# Patient Record
Sex: FEMALE | Race: BLACK OR AFRICAN AMERICAN | Employment: OTHER
[De-identification: names, ages, dates, MRNs, and addresses within clinical notes are randomized per-mention and may not be internally consistent; named-entity substitution may affect disease eponyms.]

---

## 2017-01-05 RX ORDER — PRAVASTATIN SODIUM 40 MG
TABLET ORAL
Qty: 30 TABLET | Refills: 5 | Status: SHIPPED | OUTPATIENT
Start: 2017-01-05 | End: 2017-01-30 | Stop reason: ALTCHOICE

## 2017-01-24 ENCOUNTER — TELEPHONE (OUTPATIENT)
Dept: FAMILY MEDICINE CLINIC | Facility: CLINIC | Age: 77
End: 2017-01-24

## 2017-01-27 RX ORDER — AMLODIPINE BESYLATE 5 MG/1
TABLET ORAL
Qty: 30 TABLET | Refills: 5 | Status: SHIPPED | OUTPATIENT
Start: 2017-01-27 | End: 2017-08-15 | Stop reason: SDUPTHER

## 2017-01-27 RX ORDER — ALLOPURINOL 100 MG/1
TABLET ORAL
Qty: 30 TABLET | Refills: 3 | Status: SHIPPED | OUTPATIENT
Start: 2017-01-27 | End: 2017-06-06 | Stop reason: SDUPTHER

## 2017-01-27 RX ORDER — COLCHICINE 0.6 MG/1
TABLET ORAL
Qty: 30 TABLET | Refills: 3 | Status: SHIPPED | OUTPATIENT
Start: 2017-01-27 | End: 2017-01-30

## 2017-01-30 ENCOUNTER — OFFICE VISIT (OUTPATIENT)
Dept: FAMILY MEDICINE CLINIC | Facility: CLINIC | Age: 77
End: 2017-01-30

## 2017-01-30 VITALS
SYSTOLIC BLOOD PRESSURE: 142 MMHG | WEIGHT: 171 LBS | HEIGHT: 62 IN | BODY MASS INDEX: 31.47 KG/M2 | DIASTOLIC BLOOD PRESSURE: 80 MMHG | HEART RATE: 90 BPM

## 2017-01-30 DIAGNOSIS — I10 ESSENTIAL HYPERTENSION: ICD-10-CM

## 2017-01-30 DIAGNOSIS — G89.29 OTHER CHRONIC PAIN: ICD-10-CM

## 2017-01-30 DIAGNOSIS — E11.9 TYPE 2 DIABETES MELLITUS WITHOUT COMPLICATION, WITHOUT LONG-TERM CURRENT USE OF INSULIN (HCC): ICD-10-CM

## 2017-01-30 DIAGNOSIS — E78.00 HYPERCHOLESTEREMIA: ICD-10-CM

## 2017-01-30 DIAGNOSIS — Z87.39 H/O: GOUT: ICD-10-CM

## 2017-01-30 DIAGNOSIS — R42 VERTIGO: Primary | ICD-10-CM

## 2017-01-30 PROCEDURE — 99214 OFFICE O/P EST MOD 30 MIN: CPT | Performed by: FAMILY MEDICINE

## 2017-01-30 RX ORDER — HYDROCODONE BITARTRATE AND ACETAMINOPHEN 5; 325 MG/1; MG/1
TABLET ORAL
Qty: 60 TABLET | Refills: 0 | Status: SHIPPED | OUTPATIENT
Start: 2017-01-30 | End: 2017-02-27 | Stop reason: SDUPTHER

## 2017-01-30 RX ORDER — ATORVASTATIN CALCIUM 40 MG/1
40 TABLET, FILM COATED ORAL DAILY
Qty: 30 TABLET | Refills: 5 | Status: SHIPPED | OUTPATIENT
Start: 2017-01-30 | End: 2017-08-28 | Stop reason: SDUPTHER

## 2017-01-30 ASSESSMENT — ENCOUNTER SYMPTOMS
DIARRHEA: 0
BLOOD IN STOOL: 0
VOMITING: 0
SHORTNESS OF BREATH: 0
TROUBLE SWALLOWING: 0
WHEEZING: 0
ABDOMINAL PAIN: 0
COUGH: 0

## 2017-02-13 RX ORDER — MECLIZINE HYDROCHLORIDE 25 MG/1
25 TABLET ORAL 3 TIMES DAILY PRN
Qty: 30 TABLET | Refills: 0 | Status: SHIPPED | OUTPATIENT
Start: 2017-02-13 | End: 2017-03-10 | Stop reason: SDUPTHER

## 2017-02-16 RX ORDER — ZOLPIDEM TARTRATE 10 MG/1
TABLET ORAL
Qty: 30 TABLET | Refills: 0 | Status: SHIPPED | OUTPATIENT
Start: 2017-02-16 | End: 2017-04-13 | Stop reason: SDUPTHER

## 2017-02-27 RX ORDER — HYDROCODONE BITARTRATE AND ACETAMINOPHEN 5; 325 MG/1; MG/1
TABLET ORAL
Qty: 60 TABLET | Refills: 0 | Status: SHIPPED | OUTPATIENT
Start: 2017-02-27 | End: 2017-04-06 | Stop reason: SDUPTHER

## 2017-03-06 RX ORDER — MELOXICAM 15 MG/1
TABLET ORAL
Qty: 30 TABLET | Refills: 3 | Status: SHIPPED | OUTPATIENT
Start: 2017-03-06 | End: 2017-07-10 | Stop reason: SDUPTHER

## 2017-03-10 RX ORDER — MECLIZINE HYDROCHLORIDE 25 MG/1
25 TABLET ORAL 3 TIMES DAILY PRN
Qty: 30 TABLET | Refills: 0 | Status: SHIPPED | OUTPATIENT
Start: 2017-03-10 | End: 2017-03-20

## 2017-03-21 ENCOUNTER — OFFICE VISIT (OUTPATIENT)
Dept: OBGYN CLINIC | Age: 77
End: 2017-03-21
Payer: MEDICARE

## 2017-03-21 VITALS
SYSTOLIC BLOOD PRESSURE: 140 MMHG | BODY MASS INDEX: 32.65 KG/M2 | HEIGHT: 62 IN | DIASTOLIC BLOOD PRESSURE: 70 MMHG | WEIGHT: 177.4 LBS

## 2017-03-21 DIAGNOSIS — R35.0 URINARY FREQUENCY: ICD-10-CM

## 2017-03-21 DIAGNOSIS — N94.9 FEMALE PERINEAL PRESSURE: ICD-10-CM

## 2017-03-21 DIAGNOSIS — Z12.31 ENCOUNTER FOR SCREENING MAMMOGRAM FOR BREAST CANCER: Primary | ICD-10-CM

## 2017-03-21 PROCEDURE — 99201 PR OFFICE OUTPATIENT NEW 10 MINUTES: CPT | Performed by: NURSE PRACTITIONER

## 2017-04-06 RX ORDER — HYDROCODONE BITARTRATE AND ACETAMINOPHEN 5; 325 MG/1; MG/1
TABLET ORAL
Qty: 60 TABLET | Refills: 0 | Status: SHIPPED | OUTPATIENT
Start: 2017-04-06 | End: 2017-05-09 | Stop reason: SDUPTHER

## 2017-04-11 ENCOUNTER — OFFICE VISIT (OUTPATIENT)
Dept: FAMILY MEDICINE CLINIC | Age: 77
End: 2017-04-11
Payer: MEDICARE

## 2017-04-11 VITALS
HEART RATE: 88 BPM | HEIGHT: 62 IN | SYSTOLIC BLOOD PRESSURE: 138 MMHG | DIASTOLIC BLOOD PRESSURE: 83 MMHG | WEIGHT: 180 LBS | BODY MASS INDEX: 33.13 KG/M2

## 2017-04-11 DIAGNOSIS — E11.9 TYPE 2 DIABETES MELLITUS WITHOUT COMPLICATION, WITHOUT LONG-TERM CURRENT USE OF INSULIN (HCC): ICD-10-CM

## 2017-04-11 DIAGNOSIS — R35.0 URINARY FREQUENCY: Primary | ICD-10-CM

## 2017-04-11 DIAGNOSIS — I10 ESSENTIAL HYPERTENSION: ICD-10-CM

## 2017-04-11 PROCEDURE — 99213 OFFICE O/P EST LOW 20 MIN: CPT | Performed by: FAMILY MEDICINE

## 2017-04-16 RX ORDER — ZOLPIDEM TARTRATE 10 MG/1
TABLET ORAL
Qty: 30 TABLET | Refills: 0 | Status: SHIPPED | OUTPATIENT
Start: 2017-04-16 | End: 2017-06-15 | Stop reason: SDUPTHER

## 2017-04-16 ASSESSMENT — ENCOUNTER SYMPTOMS
TROUBLE SWALLOWING: 0
SHORTNESS OF BREATH: 0
ABDOMINAL PAIN: 0
SORE THROAT: 0
VOMITING: 0
WHEEZING: 0

## 2017-04-25 ENCOUNTER — OFFICE VISIT (OUTPATIENT)
Dept: FAMILY MEDICINE CLINIC | Age: 77
End: 2017-04-25
Payer: MEDICARE

## 2017-04-25 VITALS — SYSTOLIC BLOOD PRESSURE: 127 MMHG | DIASTOLIC BLOOD PRESSURE: 78 MMHG | HEART RATE: 87 BPM

## 2017-04-25 DIAGNOSIS — E11.9 TYPE 2 DIABETES MELLITUS WITHOUT COMPLICATION, WITHOUT LONG-TERM CURRENT USE OF INSULIN (HCC): ICD-10-CM

## 2017-04-25 DIAGNOSIS — G47.9 SLEEP DISTURBANCE: ICD-10-CM

## 2017-04-25 DIAGNOSIS — I10 ESSENTIAL HYPERTENSION: Primary | ICD-10-CM

## 2017-04-25 PROCEDURE — 99213 OFFICE O/P EST LOW 20 MIN: CPT | Performed by: FAMILY MEDICINE

## 2017-04-27 RX ORDER — CLONIDINE HYDROCHLORIDE 0.2 MG/1
TABLET ORAL
Qty: 30 TABLET | Refills: 3 | Status: SHIPPED | OUTPATIENT
Start: 2017-04-27 | End: 2017-08-31 | Stop reason: SDUPTHER

## 2017-04-30 ASSESSMENT — ENCOUNTER SYMPTOMS
SORE THROAT: 0
ABDOMINAL PAIN: 0
TROUBLE SWALLOWING: 0
RHINORRHEA: 0
SHORTNESS OF BREATH: 0
DIARRHEA: 0
BLOOD IN STOOL: 0
VOMITING: 0
WHEEZING: 0
BACK PAIN: 1

## 2017-05-08 RX ORDER — TIZANIDINE 2 MG/1
TABLET ORAL
Qty: 30 TABLET | Refills: 1 | Status: SHIPPED | OUTPATIENT
Start: 2017-05-08 | End: 2017-07-10 | Stop reason: SDUPTHER

## 2017-05-12 RX ORDER — HYDROCODONE BITARTRATE AND ACETAMINOPHEN 5; 325 MG/1; MG/1
TABLET ORAL
Qty: 60 TABLET | Refills: 0 | Status: SHIPPED | OUTPATIENT
Start: 2017-05-12 | End: 2017-06-13 | Stop reason: SDUPTHER

## 2017-05-19 RX ORDER — PANTOPRAZOLE SODIUM 40 MG/1
TABLET, DELAYED RELEASE ORAL
Qty: 30 TABLET | Refills: 2 | Status: SHIPPED | OUTPATIENT
Start: 2017-05-19 | End: 2017-10-25 | Stop reason: SDUPTHER

## 2017-06-07 RX ORDER — CARVEDILOL 3.12 MG/1
TABLET ORAL
Qty: 60 TABLET | Refills: 5 | Status: SHIPPED | OUTPATIENT
Start: 2017-06-07 | End: 2018-01-08 | Stop reason: SDUPTHER

## 2017-06-07 RX ORDER — ALLOPURINOL 100 MG/1
TABLET ORAL
Qty: 30 TABLET | Refills: 5 | Status: SHIPPED | OUTPATIENT
Start: 2017-06-07 | End: 2017-11-29 | Stop reason: SDUPTHER

## 2017-06-07 RX ORDER — GLIMEPIRIDE 2 MG/1
TABLET ORAL
Qty: 30 TABLET | Refills: 5 | Status: SHIPPED | OUTPATIENT
Start: 2017-06-07 | End: 2017-08-08 | Stop reason: ALTCHOICE

## 2017-06-13 DIAGNOSIS — Z79.899 HIGH RISK MEDICATIONS (NOT ANTICOAGULANTS) LONG-TERM USE: Primary | ICD-10-CM

## 2017-06-13 RX ORDER — HYDROCODONE BITARTRATE AND ACETAMINOPHEN 5; 325 MG/1; MG/1
TABLET ORAL
Qty: 60 TABLET | Refills: 0 | Status: SHIPPED | OUTPATIENT
Start: 2017-06-13 | End: 2017-07-20 | Stop reason: SDUPTHER

## 2017-06-13 RX ORDER — ALLOPURINOL 100 MG/1
TABLET ORAL
Qty: 30 TABLET | Refills: 3 | Status: SHIPPED | OUTPATIENT
Start: 2017-06-13 | End: 2017-08-08 | Stop reason: SDUPTHER

## 2017-06-15 ENCOUNTER — HOSPITAL ENCOUNTER (OUTPATIENT)
Age: 77
Setting detail: SPECIMEN
Discharge: HOME | End: 2017-06-15
Payer: MEDICARE

## 2017-06-15 LAB
AMPHETAMINE SCREEN URINE: NEGATIVE
BARBITURATE SCREEN URINE: NEGATIVE
BENZODIAZEPINE SCREEN, URINE: NEGATIVE
BUPRENORPHINE URINE: NORMAL
CANNABINOID SCREEN URINE: NEGATIVE
COCAINE METABOLITE, URINE: NEGATIVE
MDMA URINE: NORMAL
METHADONE SCREEN, URINE: NEGATIVE
METHAMPHETAMINE, URINE: NORMAL
OPIATES, URINE: NEGATIVE
OXYCODONE SCREEN URINE: NEGATIVE
PHENCYCLIDINE, URINE: NEGATIVE
PROPOXYPHENE, URINE: NORMAL
TEST INFORMATION: NORMAL
TRICYCLIC ANTIDEPRESSANTS, UR: NORMAL

## 2017-06-15 RX ORDER — ZOLPIDEM TARTRATE 10 MG/1
TABLET ORAL
Qty: 30 TABLET | Refills: 0 | Status: SHIPPED | OUTPATIENT
Start: 2017-06-15 | End: 2017-08-15 | Stop reason: SDUPTHER

## 2017-06-15 RX ORDER — GABAPENTIN 300 MG/1
2 CAPSULE ORAL 3 TIMES DAILY
Refills: 0 | COMMUNITY
Start: 2017-06-11 | End: 2017-09-20 | Stop reason: SDUPTHER

## 2017-07-11 RX ORDER — TIZANIDINE 2 MG/1
TABLET ORAL
Qty: 30 TABLET | Refills: 0 | Status: SHIPPED | OUTPATIENT
Start: 2017-07-11 | End: 2017-08-13 | Stop reason: SDUPTHER

## 2017-07-11 RX ORDER — MELOXICAM 15 MG/1
TABLET ORAL
Qty: 30 TABLET | Refills: 0 | Status: SHIPPED | OUTPATIENT
Start: 2017-07-11 | End: 2017-08-13 | Stop reason: SDUPTHER

## 2017-07-24 ENCOUNTER — TELEPHONE (OUTPATIENT)
Dept: FAMILY MEDICINE CLINIC | Age: 77
End: 2017-07-24

## 2017-07-24 ENCOUNTER — HOSPITAL ENCOUNTER (OUTPATIENT)
Age: 77
Setting detail: SPECIMEN
Discharge: HOME | End: 2017-07-24
Payer: MEDICARE

## 2017-07-24 DIAGNOSIS — Z91.89 COMPLIANCE WITH MEDICATION REGIMEN: Primary | ICD-10-CM

## 2017-07-26 LAB
ALCOHOL URINE: NEGATIVE MG/DL
AMPHETAMINE SCREEN URINE: NEGATIVE NG/ML
BARBITURATE SCREEN URINE: NEGATIVE NG/ML
BENZODIAZEPINE SCREEN, URINE: NEGATIVE NG/ML
CANNABINOID SCREEN URINE: NEGATIVE NG/ML
COCAINE(METAB.)SCREEN, URINE: NEGATIVE NG/ML
CREATININE URINE: 49.5 MG/DL (ref 20–400)
Lab: NORMAL
METHADONE SCREEN, URINE: NEGATIVE NG/ML
OPIATES, URINE: NEGATIVE NG/ML
PHENCYCLIDINE, URINE: NEGATIVE NG/ML
PROPOXYPHENE, URINE: NEGATIVE NG/ML

## 2017-08-08 ENCOUNTER — OFFICE VISIT (OUTPATIENT)
Dept: FAMILY MEDICINE CLINIC | Age: 77
End: 2017-08-08
Payer: MEDICARE

## 2017-08-08 VITALS
TEMPERATURE: 98.8 F | SYSTOLIC BLOOD PRESSURE: 126 MMHG | DIASTOLIC BLOOD PRESSURE: 70 MMHG | BODY MASS INDEX: 32.85 KG/M2 | HEART RATE: 85 BPM | OXYGEN SATURATION: 97 % | WEIGHT: 179.6 LBS

## 2017-08-08 DIAGNOSIS — J30.9 ALLERGIC RHINITIS, UNSPECIFIED ALLERGIC RHINITIS TRIGGER, UNSPECIFIED RHINITIS SEASONALITY: ICD-10-CM

## 2017-08-08 DIAGNOSIS — Z13.820 SCREENING FOR OSTEOPOROSIS: ICD-10-CM

## 2017-08-08 DIAGNOSIS — K59.03 DRUG-INDUCED CONSTIPATION: ICD-10-CM

## 2017-08-08 DIAGNOSIS — Z79.899 HIGH RISK MEDICATIONS (NOT ANTICOAGULANTS) LONG-TERM USE: ICD-10-CM

## 2017-08-08 DIAGNOSIS — E78.00 PURE HYPERCHOLESTEROLEMIA: ICD-10-CM

## 2017-08-08 DIAGNOSIS — E11.9 TYPE 2 DIABETES MELLITUS WITHOUT COMPLICATION, WITHOUT LONG-TERM CURRENT USE OF INSULIN (HCC): Primary | ICD-10-CM

## 2017-08-08 DIAGNOSIS — I10 ESSENTIAL HYPERTENSION: ICD-10-CM

## 2017-08-08 DIAGNOSIS — R60.9 PERIPHERAL EDEMA: ICD-10-CM

## 2017-08-08 LAB — HBA1C MFR BLD: 6.2 %

## 2017-08-08 PROCEDURE — 83036 HEMOGLOBIN GLYCOSYLATED A1C: CPT | Performed by: INTERNAL MEDICINE

## 2017-08-08 PROCEDURE — 99214 OFFICE O/P EST MOD 30 MIN: CPT | Performed by: INTERNAL MEDICINE

## 2017-08-08 RX ORDER — LORATADINE 10 MG/1
10 TABLET ORAL DAILY
Qty: 30 TABLET | Refills: 1 | Status: SHIPPED | OUTPATIENT
Start: 2017-08-08 | End: 2017-10-25 | Stop reason: SDUPTHER

## 2017-08-08 RX ORDER — FLUTICASONE PROPIONATE 50 MCG
1 SPRAY, SUSPENSION (ML) NASAL DAILY
Qty: 1 BOTTLE | Refills: 3 | Status: SHIPPED | OUTPATIENT
Start: 2017-08-08 | End: 2017-11-08 | Stop reason: SDUPTHER

## 2017-08-08 ASSESSMENT — PATIENT HEALTH QUESTIONNAIRE - PHQ9
SUM OF ALL RESPONSES TO PHQ9 QUESTIONS 1 & 2: 2
SUM OF ALL RESPONSES TO PHQ QUESTIONS 1-9: 2
1. LITTLE INTEREST OR PLEASURE IN DOING THINGS: 1
2. FEELING DOWN, DEPRESSED OR HOPELESS: 1

## 2017-08-10 ENCOUNTER — TELEPHONE (OUTPATIENT)
Dept: FAMILY MEDICINE CLINIC | Age: 77
End: 2017-08-10

## 2017-08-10 DIAGNOSIS — G89.29 OTHER CHRONIC PAIN: Primary | ICD-10-CM

## 2017-08-15 RX ORDER — TIZANIDINE 2 MG/1
TABLET ORAL
Qty: 30 TABLET | Refills: 0 | Status: SHIPPED | OUTPATIENT
Start: 2017-08-15 | End: 2017-09-24 | Stop reason: SDUPTHER

## 2017-08-15 RX ORDER — AMLODIPINE BESYLATE 5 MG/1
TABLET ORAL
Qty: 30 TABLET | Refills: 5 | Status: SHIPPED | OUTPATIENT
Start: 2017-08-15 | End: 2018-03-06 | Stop reason: SDUPTHER

## 2017-08-15 RX ORDER — MELOXICAM 15 MG/1
TABLET ORAL
Qty: 30 TABLET | Refills: 0 | Status: SHIPPED | OUTPATIENT
Start: 2017-08-15 | End: 2017-08-18 | Stop reason: ALTCHOICE

## 2017-08-18 ENCOUNTER — INITIAL CONSULT (OUTPATIENT)
Dept: PAIN MANAGEMENT | Age: 77
End: 2017-08-18
Payer: MEDICARE

## 2017-08-18 VITALS
DIASTOLIC BLOOD PRESSURE: 80 MMHG | HEART RATE: 62 BPM | OXYGEN SATURATION: 92 % | SYSTOLIC BLOOD PRESSURE: 140 MMHG | WEIGHT: 179.8 LBS | BODY MASS INDEX: 33.09 KG/M2 | RESPIRATION RATE: 16 BRPM | HEIGHT: 62 IN

## 2017-08-18 DIAGNOSIS — M48.02 CERVICAL SPINAL STENOSIS: Primary | ICD-10-CM

## 2017-08-18 DIAGNOSIS — M48.061 LUMBAR SPINAL STENOSIS: ICD-10-CM

## 2017-08-18 DIAGNOSIS — M47.816 SPONDYLOSIS OF LUMBAR REGION WITHOUT MYELOPATHY OR RADICULOPATHY: ICD-10-CM

## 2017-08-18 DIAGNOSIS — M96.1 FAILED NECK SYNDROME: ICD-10-CM

## 2017-08-18 DIAGNOSIS — M47.812 SPONDYLOSIS OF CERVICAL REGION WITHOUT MYELOPATHY OR RADICULOPATHY: ICD-10-CM

## 2017-08-18 PROCEDURE — 99205 OFFICE O/P NEW HI 60 MIN: CPT | Performed by: ANESTHESIOLOGY

## 2017-08-18 RX ORDER — HYDROCODONE BITARTRATE AND ACETAMINOPHEN 5; 325 MG/1; MG/1
1 TABLET ORAL 2 TIMES DAILY PRN
Qty: 60 TABLET | Refills: 0 | Status: ON HOLD | OUTPATIENT
Start: 2017-08-18 | End: 2017-08-25 | Stop reason: SDUPTHER

## 2017-08-18 RX ORDER — GLIMEPIRIDE 2 MG/1
2 TABLET ORAL
COMMUNITY
Start: 2017-08-11 | End: 2017-12-21 | Stop reason: SDUPTHER

## 2017-08-18 ASSESSMENT — ENCOUNTER SYMPTOMS
ALLERGIC/IMMUNOLOGIC NEGATIVE: 1
SHORTNESS OF BREATH: 1
BACK PAIN: 1
GASTROINTESTINAL NEGATIVE: 1
EYES NEGATIVE: 1

## 2017-08-25 ENCOUNTER — APPOINTMENT (OUTPATIENT)
Dept: GENERAL RADIOLOGY | Age: 77
End: 2017-08-25
Attending: ANESTHESIOLOGY
Payer: MEDICARE

## 2017-08-25 ENCOUNTER — HOSPITAL ENCOUNTER (OUTPATIENT)
Age: 77
Setting detail: OUTPATIENT SURGERY
Discharge: HOME | End: 2017-08-25
Attending: ANESTHESIOLOGY | Admitting: ANESTHESIOLOGY
Payer: MEDICARE

## 2017-08-25 VITALS
SYSTOLIC BLOOD PRESSURE: 98 MMHG | HEART RATE: 71 BPM | RESPIRATION RATE: 16 BRPM | WEIGHT: 179 LBS | HEIGHT: 62 IN | DIASTOLIC BLOOD PRESSURE: 70 MMHG | TEMPERATURE: 97.7 F | OXYGEN SATURATION: 96 % | BODY MASS INDEX: 32.94 KG/M2

## 2017-08-25 PROBLEM — M54.12 CERVICAL RADICULAR PAIN: Chronic | Status: ACTIVE | Noted: 2017-08-25

## 2017-08-25 PROBLEM — M96.1 FAILED NECK SYNDROME: Chronic | Status: ACTIVE | Noted: 2017-08-25

## 2017-08-25 PROBLEM — M48.02 CERVICAL STENOSIS OF SPINE: Chronic | Status: ACTIVE | Noted: 2017-08-25

## 2017-08-25 LAB — GLUCOSE BLD-MCNC: 125 MG/DL (ref 65–105)

## 2017-08-25 PROCEDURE — 3209999900 FLUORO FOR SURGICAL PROCEDURES

## 2017-08-25 PROCEDURE — 62321 NJX INTERLAMINAR CRV/THRC: CPT | Performed by: ANESTHESIOLOGY

## 2017-08-25 PROCEDURE — 7100000011 HC PHASE II RECOVERY - ADDTL 15 MIN: Performed by: ANESTHESIOLOGY

## 2017-08-25 PROCEDURE — 7100000010 HC PHASE II RECOVERY - FIRST 15 MIN: Performed by: ANESTHESIOLOGY

## 2017-08-25 PROCEDURE — 6360000002 HC RX W HCPCS: Performed by: ANESTHESIOLOGY

## 2017-08-25 PROCEDURE — 82947 ASSAY GLUCOSE BLOOD QUANT: CPT

## 2017-08-25 PROCEDURE — 2500000003 HC RX 250 WO HCPCS: Performed by: ANESTHESIOLOGY

## 2017-08-25 PROCEDURE — 3600000050 HC PAIN LEVEL 1 BASE: Performed by: ANESTHESIOLOGY

## 2017-08-25 PROCEDURE — 6360000004 HC RX CONTRAST MEDICATION: Performed by: ANESTHESIOLOGY

## 2017-08-25 PROCEDURE — 99152 MOD SED SAME PHYS/QHP 5/>YRS: CPT | Performed by: ANESTHESIOLOGY

## 2017-08-25 RX ORDER — SODIUM CHLORIDE 0.9 % (FLUSH) 0.9 %
10 SYRINGE (ML) INJECTION EVERY 12 HOURS SCHEDULED
Status: DISCONTINUED | OUTPATIENT
Start: 2017-08-25 | End: 2017-08-25 | Stop reason: HOSPADM

## 2017-08-25 RX ORDER — SODIUM CHLORIDE 0.9 % (FLUSH) 0.9 %
10 SYRINGE (ML) INJECTION PRN
Status: DISCONTINUED | OUTPATIENT
Start: 2017-08-25 | End: 2017-08-25 | Stop reason: HOSPADM

## 2017-08-25 RX ORDER — MIDAZOLAM HYDROCHLORIDE 1 MG/ML
INJECTION INTRAMUSCULAR; INTRAVENOUS PRN
Status: DISCONTINUED | OUTPATIENT
Start: 2017-08-25 | End: 2017-08-25 | Stop reason: HOSPADM

## 2017-08-25 RX ORDER — LIDOCAINE HYDROCHLORIDE 10 MG/ML
INJECTION, SOLUTION INFILTRATION; PERINEURAL PRN
Status: DISCONTINUED | OUTPATIENT
Start: 2017-08-25 | End: 2017-08-25 | Stop reason: HOSPADM

## 2017-08-25 RX ORDER — BETAMETHASONE SODIUM PHOSPHATE AND BETAMETHASONE ACETATE 3; 3 MG/ML; MG/ML
INJECTION, SUSPENSION INTRA-ARTICULAR; INTRALESIONAL; INTRAMUSCULAR; SOFT TISSUE PRN
Status: DISCONTINUED | OUTPATIENT
Start: 2017-08-25 | End: 2017-08-25 | Stop reason: HOSPADM

## 2017-08-25 RX ORDER — TRIAMCINOLONE ACETONIDE 40 MG/ML
INJECTION, SUSPENSION INTRA-ARTICULAR; INTRAMUSCULAR PRN
Status: DISCONTINUED | OUTPATIENT
Start: 2017-08-25 | End: 2017-08-25 | Stop reason: HOSPADM

## 2017-08-25 RX ORDER — FENTANYL CITRATE 50 UG/ML
INJECTION, SOLUTION INTRAMUSCULAR; INTRAVENOUS PRN
Status: DISCONTINUED | OUTPATIENT
Start: 2017-08-25 | End: 2017-08-25 | Stop reason: HOSPADM

## 2017-08-25 ASSESSMENT — PAIN SCALES - GENERAL
PAINLEVEL_OUTOF10: 3
PAINLEVEL_OUTOF10: 3
PAINLEVEL_OUTOF10: 0

## 2017-08-25 ASSESSMENT — PAIN DESCRIPTION - DESCRIPTORS: DESCRIPTORS: CONSTANT;ACHING;BURNING

## 2017-08-25 ASSESSMENT — PAIN - FUNCTIONAL ASSESSMENT: PAIN_FUNCTIONAL_ASSESSMENT: 0-10

## 2017-08-28 RX ORDER — ATORVASTATIN CALCIUM 40 MG/1
40 TABLET, FILM COATED ORAL DAILY
Qty: 30 TABLET | Refills: 5 | Status: SHIPPED | OUTPATIENT
Start: 2017-08-28 | End: 2018-03-14 | Stop reason: SDUPTHER

## 2017-09-01 RX ORDER — CLONIDINE HYDROCHLORIDE 0.2 MG/1
TABLET ORAL
Qty: 30 TABLET | Refills: 3 | Status: SHIPPED | OUTPATIENT
Start: 2017-09-01 | End: 2018-01-08 | Stop reason: SDUPTHER

## 2017-09-20 RX ORDER — HYDROCODONE BITARTRATE AND ACETAMINOPHEN 5; 325 MG/1; MG/1
1 TABLET ORAL 2 TIMES DAILY PRN
Qty: 60 TABLET | Refills: 0 | Status: SHIPPED | OUTPATIENT
Start: 2017-09-20 | End: 2017-11-06 | Stop reason: SDUPTHER

## 2017-09-20 RX ORDER — GABAPENTIN 300 MG/1
600 CAPSULE ORAL 3 TIMES DAILY
Qty: 180 CAPSULE | Refills: 0 | Status: SHIPPED | OUTPATIENT
Start: 2017-09-20 | End: 2017-10-31 | Stop reason: SDUPTHER

## 2017-09-26 RX ORDER — TIZANIDINE 2 MG/1
TABLET ORAL
Qty: 30 TABLET | Refills: 0 | Status: SHIPPED | OUTPATIENT
Start: 2017-09-26 | End: 2017-10-25 | Stop reason: SDUPTHER

## 2017-09-27 ENCOUNTER — OFFICE VISIT (OUTPATIENT)
Dept: PAIN MANAGEMENT | Age: 77
End: 2017-09-27
Payer: MEDICARE

## 2017-09-27 VITALS
OXYGEN SATURATION: 98 % | SYSTOLIC BLOOD PRESSURE: 110 MMHG | HEIGHT: 61 IN | HEART RATE: 80 BPM | RESPIRATION RATE: 16 BRPM | WEIGHT: 174.8 LBS | BODY MASS INDEX: 33 KG/M2 | DIASTOLIC BLOOD PRESSURE: 74 MMHG

## 2017-09-27 DIAGNOSIS — M48.02 CERVICAL STENOSIS OF SPINE: Chronic | ICD-10-CM

## 2017-09-27 DIAGNOSIS — M96.1 FAILED NECK SYNDROME: Chronic | ICD-10-CM

## 2017-09-27 DIAGNOSIS — M48.062 LUMBAR STENOSIS WITH NEUROGENIC CLAUDICATION: Primary | ICD-10-CM

## 2017-09-27 DIAGNOSIS — M54.12 CERVICAL RADICULAR PAIN: Chronic | ICD-10-CM

## 2017-09-27 PROCEDURE — 99214 OFFICE O/P EST MOD 30 MIN: CPT | Performed by: ANESTHESIOLOGY

## 2017-09-27 ASSESSMENT — ENCOUNTER SYMPTOMS
SHORTNESS OF BREATH: 1
APNEA: 0
COUGH: 1
CHEST TIGHTNESS: 0
BACK PAIN: 1
CHOKING: 0
STRIDOR: 0
WHEEZING: 1

## 2017-10-17 RX ORDER — ZOLPIDEM TARTRATE 10 MG/1
TABLET ORAL
Qty: 30 TABLET | Refills: 0 | Status: SHIPPED | OUTPATIENT
Start: 2017-10-17 | End: 2018-01-03 | Stop reason: SDUPTHER

## 2017-10-18 DIAGNOSIS — E11.9 TYPE 2 DIABETES MELLITUS WITHOUT COMPLICATION, WITHOUT LONG-TERM CURRENT USE OF INSULIN (HCC): Primary | ICD-10-CM

## 2017-10-18 RX ORDER — ZOLPIDEM TARTRATE 10 MG/1
TABLET ORAL
Qty: 30 TABLET | Refills: 0 | OUTPATIENT
Start: 2017-10-18

## 2017-10-25 DIAGNOSIS — J30.9 ALLERGIC RHINITIS: ICD-10-CM

## 2017-10-26 RX ORDER — PANTOPRAZOLE SODIUM 40 MG/1
TABLET, DELAYED RELEASE ORAL
Qty: 90 TABLET | Refills: 1 | Status: SHIPPED | OUTPATIENT
Start: 2017-10-26 | End: 2018-04-18 | Stop reason: SDUPTHER

## 2017-10-26 RX ORDER — TIZANIDINE 2 MG/1
TABLET ORAL
Qty: 30 TABLET | Refills: 0 | Status: SHIPPED | OUTPATIENT
Start: 2017-10-26 | End: 2017-11-08 | Stop reason: SDUPTHER

## 2017-10-26 RX ORDER — LORATADINE 10 MG/1
TABLET ORAL
Qty: 90 TABLET | Refills: 1 | Status: SHIPPED | OUTPATIENT
Start: 2017-10-26 | End: 2018-05-08 | Stop reason: SDUPTHER

## 2017-10-31 ENCOUNTER — OFFICE VISIT (OUTPATIENT)
Dept: NEUROLOGY | Age: 77
End: 2017-10-31
Payer: MEDICARE

## 2017-10-31 VITALS
WEIGHT: 176.8 LBS | DIASTOLIC BLOOD PRESSURE: 83 MMHG | SYSTOLIC BLOOD PRESSURE: 145 MMHG | HEIGHT: 62 IN | BODY MASS INDEX: 32.54 KG/M2 | HEART RATE: 94 BPM

## 2017-10-31 DIAGNOSIS — M48.062 LUMBAR STENOSIS WITH NEUROGENIC CLAUDICATION: ICD-10-CM

## 2017-10-31 DIAGNOSIS — M48.02 CERVICAL STENOSIS OF SPINE: Primary | Chronic | ICD-10-CM

## 2017-10-31 PROCEDURE — G8417 CALC BMI ABV UP PARAM F/U: HCPCS | Performed by: NURSE PRACTITIONER

## 2017-10-31 PROCEDURE — G8400 PT W/DXA NO RESULTS DOC: HCPCS | Performed by: NURSE PRACTITIONER

## 2017-10-31 PROCEDURE — 1090F PRES/ABSN URINE INCON ASSESS: CPT | Performed by: NURSE PRACTITIONER

## 2017-10-31 PROCEDURE — 3017F COLORECTAL CA SCREEN DOC REV: CPT | Performed by: NURSE PRACTITIONER

## 2017-10-31 PROCEDURE — 4040F PNEUMOC VAC/ADMIN/RCVD: CPT | Performed by: NURSE PRACTITIONER

## 2017-10-31 PROCEDURE — G8484 FLU IMMUNIZE NO ADMIN: HCPCS | Performed by: NURSE PRACTITIONER

## 2017-10-31 PROCEDURE — G8427 DOCREV CUR MEDS BY ELIG CLIN: HCPCS | Performed by: NURSE PRACTITIONER

## 2017-10-31 PROCEDURE — 1123F ACP DISCUSS/DSCN MKR DOCD: CPT | Performed by: NURSE PRACTITIONER

## 2017-10-31 PROCEDURE — 1036F TOBACCO NON-USER: CPT | Performed by: NURSE PRACTITIONER

## 2017-10-31 PROCEDURE — 99214 OFFICE O/P EST MOD 30 MIN: CPT | Performed by: NURSE PRACTITIONER

## 2017-10-31 RX ORDER — AMITRIPTYLINE HYDROCHLORIDE 25 MG/1
25 TABLET, FILM COATED ORAL NIGHTLY
Qty: 30 TABLET | Refills: 3 | Status: SHIPPED | OUTPATIENT
Start: 2017-10-31 | End: 2018-02-22 | Stop reason: SDUPTHER

## 2017-10-31 RX ORDER — GABAPENTIN 300 MG/1
600 CAPSULE ORAL 3 TIMES DAILY
Qty: 180 CAPSULE | Refills: 3 | Status: SHIPPED | OUTPATIENT
Start: 2017-10-31 | End: 2018-05-25 | Stop reason: SDUPTHER

## 2017-10-31 NOTE — LETTER
October 31, 2017     Vanessa Orourke, [address] [address]    Patient: Wesley Pena  MR Number: [id number]  YOB: 1940  Date of Visit: 10/31/2017    Dear Dr. Vanessa Orourke:        [facility]  [facility], [address] [place] [address], [address] [place] [address]  Dept: [phone]  Dept Fax: [phone]                              MD Belgica Price, MD Félix Hansen, MD Trae Avitia, TRUDI Cabrera, MD Saad Lopez MD    10/31/2017    HPI:      Your patient, Wesley Pena returns for continuing neurologic care for numbness. Patient is a 81-year-old woman who was seen in the past by Dr. Hakeem Mann and her last visit was for an EMG in August 2016. She has both cervical and lumbar stenosis and chronic pain as a result of these findings.  prescribed gabapentin at her last visit 600 mg TID. MRI of the cervical spine was done in August 2016 showing postoperative anterior cervical fusion and severe central canal stenosis at C6 and C7. MRI of the lumbar spine showed spondylitic changes with severe central canal stenosis at L4 5 and moderate central canal stenosis at L5-S1. EMG done in 2016 also confirmed as lumbar radiculopathy as well as a borderline primary axonal sensorimotor polyneuropathy. MRI of the brain in April 2017 showed microvascular ischemia, without acute changes. Hemoglobin A1c 6.3, vitamin B12 643, protein electrophoresis negative, and TSH 1.39  She comes today stating that her gabapentin is not significantly relieving her discomfort as it had been initially. She has tingling and numbness in her extremities bilaterally. She uses a walker to ambulate. She also notes that she does not sleep well at night.         Past Medical History:   Diagnosis Date    Arthritis present, Muscle pain: present, Joint pain: present Restless legs: present   DERMATOLOGIC Hair loss: absent, Skin changes: absent   NEUROLOGIC Memory loss: absent, Confusion: absent, Seizures: absent Trouble walking or imbalance: present, Dizziness: absent, Weakness: present, Numbness: present Tremor: absent, Spasm: absent, Speech difficulty: absent, Headache: absent, Light sensitivity: absent   PSYCHIATRIC Anxiety: present, Hallucination: absent, Mood disorder: present   HEMATOLOGIC Abnormal bleeding: absent, Anemia: absent, Clotting disorder: absent, Lymph gland changes: absent           No Known Allergies        Current Outpatient Prescriptions   Medication Sig Dispense Refill    pantoprazole (PROTONIX) 40 MG tablet take 1 tablet by mouth every morning BEFORE BREAKFAST 90 tablet 1    loratadine (CLARITIN) 10 MG tablet take 1 tablet by mouth once daily 90 tablet 1    tiZANidine (ZANAFLEX) 2 MG tablet take 1 tablet by mouth once daily 30 tablet 0    glucose blood VI test strips (ONE TOUCH ULTRA TEST) strip TEST BLOOD SUGAR ONCE DAILY    DX E11.9 100 strip 4    zolpidem (AMBIEN) 10 MG tablet take 1/2 tablet by mouth at bedtime if needed for sleep 30 tablet 0    gabapentin (NEURONTIN) 300 MG capsule Take 2 capsules by mouth 3 times daily 180 capsule 0    cloNIDine (CATAPRES) 0.2 MG tablet take 1/2 tablet by mouth twice a day 30 tablet 3    atorvastatin (LIPITOR) 40 MG tablet Take 1 tablet by mouth daily 30 tablet 5    glimepiride (AMARYL) 2 MG tablet 2 mg      amLODIPine (NORVASC) 5 MG tablet take 1 tablet by mouth once daily 30 tablet 5    fluticasone (FLONASE) 50 MCG/ACT nasal spray 1 spray by Nasal route daily 1 Bottle 3    allopurinol (ZYLOPRIM) 100 MG tablet TAKE 1 TABLET BY MOUTH ONCE DAILY 30 tablet 5    carvedilol (COREG) 3.125 MG tablet take 1 tablet by mouth twice a day 60 tablet 5    metFORMIN (GLUCOPHAGE) 500 MG tablet take 1 tablet by mouth twice a day with food 180 tablet 3

## 2017-10-31 NOTE — PROGRESS NOTES
PHYSICAL EXAMINATION       There were no vitals filed for this visit. .                                                                                                    General Appearance:  Alert, cooperative, no signs of distress, appears stated age   Head:  Normocephalic, no signs of trauma   Eyes:  Conjunctiva/corneas clear;  eyelids intact   Ears:  Normal external ear and canals   Nose: Nares normal, mucosa normal, no drainage    Throat: Lips and tongue normal; teeth normal;  gums normal   Neck: Supple, intact flexion, extension and rotation;   trachea midline;  no adenopathy;   thyroid: not enlarged;   no carotid pulse abnormality   Back:   Symmetric, no curvature, ROM adequate   Lungs:   Respirations unlabored   Heart:  Regular rate and rhythm           Extremities: Extremities normal, no cyanosis, no edema   Pulses: Symmetric over head and neck   Skin: Skin color, texture normal, no rashes, no lesions                                             NEUROLOGIC EXAMINATION    Neurologic Exam     Mental Status   Oriented to person, place, and time. Attention: normal.   Speech: speech is normal   Level of consciousness: alert  Normal comprehension. Cranial Nerves     CN II   Visual fields full to confrontation. CN III, IV, VI   Pupils are equal, round, and reactive to light. Extraocular motions are normal.     CN V   Facial sensation intact. CN VII   Facial expression full, symmetric. CN VIII   CN VIII normal.     CN IX, X   CN IX normal.     CN XI   CN XI normal.     CN XII   CN XII normal.     Motor Exam   Muscle bulk: normal  Overall muscle tone: normal  Right arm pronator drift: absent    Strength   Strength 5/5 throughout.      Sensory Exam   Light touch normal.   Right arm vibration: normal  Left arm vibration: normal  Right leg vibration: decreased from ankle  Left leg vibration: decreased from ankle  Pinprick normal.     Gait,

## 2017-11-06 RX ORDER — HYDROCODONE BITARTRATE AND ACETAMINOPHEN 5; 325 MG/1; MG/1
1 TABLET ORAL 2 TIMES DAILY PRN
Qty: 60 TABLET | Refills: 0 | Status: SHIPPED | OUTPATIENT
Start: 2017-11-06 | End: 2017-12-13 | Stop reason: SDUPTHER

## 2017-11-06 NOTE — TELEPHONE ENCOUNTER
Moody Curtis is requesting a refill on the following medications:   Requested Prescriptions     Pending Prescriptions Disp Refills    HYDROcodone-acetaminophen (NORCO) 5-325 MG per tablet 60 tablet 0     Sig: Take 1 tablet by mouth 2 times daily as needed for Pain . Earliest Fill Date: 11/6/17       Last OV 10/31/17    Future Appointments  Date Time Provider Pradeep Alexander   11/8/2017 10:15 AM Connie Harltey MD [facility] FP [facility]   11/22/2017 9:15 AM Christine Patel MD [facility]   1/31/2018 9:00 AM Humberto Cameron CNP Neuro Spec [facility]       OARRS report sent to Dr. Mecca Lugo through alternative route for review.

## 2017-11-08 ENCOUNTER — OFFICE VISIT (OUTPATIENT)
Dept: FAMILY MEDICINE CLINIC | Age: 77
End: 2017-11-08
Payer: MEDICARE

## 2017-11-08 VITALS
HEART RATE: 90 BPM | DIASTOLIC BLOOD PRESSURE: 64 MMHG | TEMPERATURE: 98.8 F | SYSTOLIC BLOOD PRESSURE: 108 MMHG | RESPIRATION RATE: 14 BRPM | HEIGHT: 62 IN | WEIGHT: 181 LBS | BODY MASS INDEX: 33.31 KG/M2 | OXYGEN SATURATION: 94 %

## 2017-11-08 DIAGNOSIS — M15.9 PRIMARY OSTEOARTHRITIS INVOLVING MULTIPLE JOINTS: ICD-10-CM

## 2017-11-08 DIAGNOSIS — B35.1 ONYCHOMYCOSIS: ICD-10-CM

## 2017-11-08 DIAGNOSIS — K59.03 DRUG INDUCED CONSTIPATION: ICD-10-CM

## 2017-11-08 DIAGNOSIS — M1A.9XX0 CHRONIC GOUT WITHOUT TOPHUS, UNSPECIFIED CAUSE, UNSPECIFIED SITE: ICD-10-CM

## 2017-11-08 DIAGNOSIS — E11.9 TYPE 2 DIABETES MELLITUS WITHOUT COMPLICATION, WITHOUT LONG-TERM CURRENT USE OF INSULIN (HCC): ICD-10-CM

## 2017-11-08 DIAGNOSIS — Z23 NEED FOR INFLUENZA VACCINATION: ICD-10-CM

## 2017-11-08 DIAGNOSIS — J30.89 CHRONIC NON-SEASONAL ALLERGIC RHINITIS, UNSPECIFIED TRIGGER: ICD-10-CM

## 2017-11-08 DIAGNOSIS — J01.90 ACUTE BACTERIAL SINUSITIS: ICD-10-CM

## 2017-11-08 DIAGNOSIS — E61.1 IRON DEFICIENCY: ICD-10-CM

## 2017-11-08 DIAGNOSIS — E78.00 HYPERCHOLESTEROLEMIA: ICD-10-CM

## 2017-11-08 DIAGNOSIS — S16.1XXA ACUTE STRAIN OF NECK MUSCLE, INITIAL ENCOUNTER: ICD-10-CM

## 2017-11-08 DIAGNOSIS — Z78.0 POST-MENOPAUSAL: Primary | ICD-10-CM

## 2017-11-08 DIAGNOSIS — B96.89 ACUTE BACTERIAL SINUSITIS: ICD-10-CM

## 2017-11-08 DIAGNOSIS — I10 ESSENTIAL HYPERTENSION: ICD-10-CM

## 2017-11-08 LAB — HBA1C MFR BLD: 6.4 %

## 2017-11-08 PROCEDURE — G8427 DOCREV CUR MEDS BY ELIG CLIN: HCPCS | Performed by: INTERNAL MEDICINE

## 2017-11-08 PROCEDURE — 3044F HG A1C LEVEL LT 7.0%: CPT | Performed by: INTERNAL MEDICINE

## 2017-11-08 PROCEDURE — 3017F COLORECTAL CA SCREEN DOC REV: CPT | Performed by: INTERNAL MEDICINE

## 2017-11-08 PROCEDURE — 1036F TOBACCO NON-USER: CPT | Performed by: INTERNAL MEDICINE

## 2017-11-08 PROCEDURE — 83036 HEMOGLOBIN GLYCOSYLATED A1C: CPT | Performed by: INTERNAL MEDICINE

## 2017-11-08 PROCEDURE — 4040F PNEUMOC VAC/ADMIN/RCVD: CPT | Performed by: INTERNAL MEDICINE

## 2017-11-08 PROCEDURE — G8400 PT W/DXA NO RESULTS DOC: HCPCS | Performed by: INTERNAL MEDICINE

## 2017-11-08 PROCEDURE — G8484 FLU IMMUNIZE NO ADMIN: HCPCS | Performed by: INTERNAL MEDICINE

## 2017-11-08 PROCEDURE — 1123F ACP DISCUSS/DSCN MKR DOCD: CPT | Performed by: INTERNAL MEDICINE

## 2017-11-08 PROCEDURE — 1090F PRES/ABSN URINE INCON ASSESS: CPT | Performed by: INTERNAL MEDICINE

## 2017-11-08 PROCEDURE — 99214 OFFICE O/P EST MOD 30 MIN: CPT | Performed by: INTERNAL MEDICINE

## 2017-11-08 PROCEDURE — G8417 CALC BMI ABV UP PARAM F/U: HCPCS | Performed by: INTERNAL MEDICINE

## 2017-11-08 RX ORDER — POLYETHYLENE GLYCOL 3350 17 G/17G
17 POWDER, FOR SOLUTION ORAL DAILY
Qty: 510 G | Refills: 1 | Status: SHIPPED | OUTPATIENT
Start: 2017-11-08 | End: 2018-07-19 | Stop reason: SDUPTHER

## 2017-11-08 RX ORDER — TIZANIDINE 2 MG/1
2 TABLET ORAL EVERY 8 HOURS PRN
Qty: 30 TABLET | Refills: 1 | Status: SHIPPED | OUTPATIENT
Start: 2017-11-08 | End: 2018-02-05 | Stop reason: SDUPTHER

## 2017-11-08 RX ORDER — KETOCONAZOLE 20 MG/G
CREAM TOPICAL
Qty: 30 G | Refills: 1 | Status: SHIPPED | OUTPATIENT
Start: 2017-11-08 | End: 2020-09-30 | Stop reason: ALTCHOICE

## 2017-11-08 RX ORDER — AMOXICILLIN AND CLAVULANATE POTASSIUM 875; 125 MG/1; MG/1
1 TABLET, FILM COATED ORAL 2 TIMES DAILY
Qty: 14 TABLET | Refills: 0 | Status: SHIPPED | OUTPATIENT
Start: 2017-11-08 | End: 2017-11-15

## 2017-11-08 RX ORDER — FLUTICASONE PROPIONATE 50 MCG
1 SPRAY, SUSPENSION (ML) NASAL DAILY
Qty: 1 BOTTLE | Refills: 3 | Status: SHIPPED | OUTPATIENT
Start: 2017-11-08 | End: 2018-12-19 | Stop reason: SDUPTHER

## 2017-11-08 NOTE — PROGRESS NOTES
Subjective:       Patient ID: Meaghan Avila is a 78 y.o. female who presents for   Chief Complaint   Patient presents with   [facility]   , and follow up of chronic medical problems. HPI:  Nursing note reviewed and discussed with patient. Cough  Patient complains of nonproductive cough. Symptoms began 1 week ago. Symptoms have been unchanged since that time. The cough is dry and is aggravated by nothing. Associated symptoms include: none. Patient does not have new pets. Patient does not have a history of asthma. Patient does have a history of environmental allergens. Patient has not traveled recently. Patient does have a history of smoking. Patient has not had a previous chest x-ray. Patient has not had a PPD done. Had got better since starting allergy meds last visit but got worse about a week ago. Additional Complaints:  Dyslipidemia  Patient presents for evaluation of lipids. Compliance with treatment thus far has been good. A repeat fasting lipid profile was done. The patient does use medications that may worsen dyslipidemias (corticosteroids, progestins, anabolic steroids, diuretics, beta-blockers, amiodarone, cyclosporine, olanzapine). The patient exercises never. The patient is not known to have coexisting coronary artery disease. Hypertension  Patient is here for follow-up of elevated blood pressure. She is not exercising and is adherent to a low-salt diet. Blood pressure is well controlled at home. Cardiac symptoms: none. Patient denies chest pain, chest pressure/discomfort, claudication, dyspnea, exertional chest pressure/discomfort, fatigue, irregular heart beat, lower extremity edema, near-syncope, orthopnea, palpitations and paroxysmal nocturnal dyspnea. Cardiovascular risk factors: advanced age (older than 54 for men, 72 for women), diabetes mellitus, dyslipidemia, hypertension and sedentary lifestyle. Use of agents associated with hypertension: none.  History of target organ damage: Provider, MD   amLODIPine (NORVASC) 5 MG tablet take 1 tablet by mouth once daily Yes Connie Cervantes MD   allopurinol (ZYLOPRIM) 100 MG tablet TAKE 1 TABLET BY MOUTH ONCE DAILY Yes Connie Cervantes MD   carvedilol (COREG) 3.125 MG tablet take 1 tablet by mouth twice a day Yes Benji Pretty MD   metFORMIN (GLUCOPHAGE) 500 MG tablet take 1 tablet by mouth twice a day with food Yes Estuardo Cuello MD   aspirin 325 MG tablet Take 325 mg by mouth daily Yes Historical Provider, MD   Blood Glucose Monitoring Suppl KIT 1 blood glucose monitoring supplies kit. Test strips, lancets, alcohol swabs Yes Estuardo Cuello MD   fluticasone (FLONASE) 50 MCG/ACT nasal spray 1 spray by Nasal route daily  Connie Cervantes MD          Assessment/Plan:       1. Post-menopausal  - DEXA Bone Density Axial Skeleton; Future    2. Need for influenza vaccination  Cannot get today due to sinusitis     3. Type 2 diabetes mellitus without complication, without long-term current use of insulin ([facility])  -  DIABETES FOOT EXAM  - POCT glycosylated hemoglobin (Hb A1C)  Continue current meds     4. Chronic non-seasonal allergic rhinitis, unspecified trigger  - fluticasone (FLONASE) 50 MCG/ACT nasal spray; 1 spray by Nasal route daily  Dispense: 1 Bottle; Refill: 3    5. Acute bacterial sinusitis  - amoxicillin-clavulanate (AUGMENTIN) 875-125 MG per tablet; Take 1 tablet by mouth 2 times daily for 7 days  Dispense: 14 tablet; Refill: 0  - fluticasone (FLONASE) 50 MCG/ACT nasal spray; 1 spray by Nasal route daily  Dispense: 1 Bottle; Refill: 3    6. Acute strain of neck muscle, initial encounter  - tiZANidine (ZANAFLEX) 2 MG tablet; Take 1 tablet by mouth every 8 hours as needed (pain, muscle spasm)  Dispense: 30 tablet; Refill: 1    7. Essential hypertension  - Comprehensive Metabolic Panel; Future  contineu current regimen    8. Primary osteoarthritis involving multiple joints  norco per pain clinic     9.  Onychomycosis  - ketoconazole (NIZORAL) 2 % cream; Apply topically daily. Dispense: 30 g; Refill: 1    10. Chronic gout without tophus, unspecified cause, unspecified site  - Uric Acid; Future    11. Iron deficiency  - CBC With Auto Differential; Future    12. Hypercholesterolemia  - Comprehensive Metabolic Panel; Future  - Lipid Panel; Future    13. Drug induced constipation  - polyethylene glycol (MIRALAX) powder;  Take 17 g by mouth daily  Dispense: 510 g; Refill: 1             Electronically signed by Purnima Guerrero MD on 11/8/2017 at 10:45 AM

## 2017-11-08 NOTE — PROGRESS NOTES
Pt is here for a routine check up. Pt states that her eyes have been really itchy and it feel like there is water in her head. Pt did not take any OTC medication for this issue. pt does feel drainage to the back of her throat. Pt does have a clear phlegm. Pt describes a bad dry cough. Pt's neck is sore it is hard to move from side to side. This has been present for two weeks. Pt is not sure if it from sleeping in the wrong position. No other concerns at this time. Visit Information    Have you changed or started any medications since your last visit including any over-the-counter medicines, vitamins, or herbal medicines? no   Have you stopped taking any of your medications? Is so, why? -  no  Are you having any side effects from any of your medications? - yes dry mouth from BP meds    Have you seen any other physician or provider since your last visit?  no   Have you had any other diagnostic tests since your last visit?  no   Have you been seen in the emergency room and/or had an admission in a hospital since we last saw you?  no   Have you had your routine dental cleaning in the past 6 months?  no     Do you have an active MyChart account? If no, what is the barrier?   No: declined    Patient Care Team:  Gianluca Schneider MD as PCP - General (Internal Medicine)  Jose Arredondo MD as PCP - Family Medicine (Family Medicine)  Gianluca Schneider MD as PCP - [facility] Attributed Provider  Eula Cho MD as Consulting Physician (Colon and Rectal Surgery)    Medical History Review  Past Medical, Family, and Social History reviewed and does not contribute to the patient presenting condition    Health Maintenance   Topic Date Due    Diabetic foot exam  05/28/1950    Diabetic retinal exam  05/28/1952    Zostavax vaccine  05/28/2002    DEXA (modify frequency per FRAX score)  05/28/2007    Flu vaccine (1) 09/01/2017    DTaP/Tdap/Td vaccine (1 - Tdap) 10/18/2026 (Originally 5/28/1959)    Lipid screen  01/12/2018    Diabetic hemoglobin A1C test  08/08/2018    Colon cancer screen colonoscopy  05/28/2020    Pneumococcal low/med risk  Completed

## 2017-11-20 ENCOUNTER — HOSPITAL ENCOUNTER (OUTPATIENT)
Age: 77
Setting detail: OUTPATIENT SURGERY
Discharge: HOME | End: 2017-11-20
Attending: ANESTHESIOLOGY | Admitting: ANESTHESIOLOGY
Payer: MEDICARE

## 2017-11-20 ENCOUNTER — APPOINTMENT (OUTPATIENT)
Dept: GENERAL RADIOLOGY | Age: 77
End: 2017-11-20
Attending: ANESTHESIOLOGY
Payer: MEDICARE

## 2017-11-20 VITALS
HEIGHT: 62 IN | BODY MASS INDEX: 31.65 KG/M2 | DIASTOLIC BLOOD PRESSURE: 77 MMHG | HEART RATE: 79 BPM | TEMPERATURE: 98.6 F | RESPIRATION RATE: 16 BRPM | WEIGHT: 172 LBS | OXYGEN SATURATION: 100 % | SYSTOLIC BLOOD PRESSURE: 140 MMHG

## 2017-11-20 LAB — GLUCOSE BLD-MCNC: 101 MG/DL (ref 65–105)

## 2017-11-20 PROCEDURE — 64483 NJX AA&/STRD TFRM EPI L/S 1: CPT | Performed by: ANESTHESIOLOGY

## 2017-11-20 PROCEDURE — 6360000002 HC RX W HCPCS: Performed by: ANESTHESIOLOGY

## 2017-11-20 PROCEDURE — 7100000011 HC PHASE II RECOVERY - ADDTL 15 MIN: Performed by: ANESTHESIOLOGY

## 2017-11-20 PROCEDURE — 3600000050 HC PAIN LEVEL 1 BASE: Performed by: ANESTHESIOLOGY

## 2017-11-20 PROCEDURE — 2500000003 HC RX 250 WO HCPCS: Performed by: ANESTHESIOLOGY

## 2017-11-20 PROCEDURE — 82947 ASSAY GLUCOSE BLOOD QUANT: CPT

## 2017-11-20 PROCEDURE — 7100000010 HC PHASE II RECOVERY - FIRST 15 MIN: Performed by: ANESTHESIOLOGY

## 2017-11-20 PROCEDURE — 3600000051 HC PAIN LEVEL 1 ADDL 15 MIN: Performed by: ANESTHESIOLOGY

## 2017-11-20 PROCEDURE — 99152 MOD SED SAME PHYS/QHP 5/>YRS: CPT | Performed by: ANESTHESIOLOGY

## 2017-11-20 PROCEDURE — 6360000004 HC RX CONTRAST MEDICATION: Performed by: ANESTHESIOLOGY

## 2017-11-20 PROCEDURE — 3209999900 FLUORO FOR SURGICAL PROCEDURES

## 2017-11-20 PROCEDURE — 2580000003 HC RX 258: Performed by: ANESTHESIOLOGY

## 2017-11-20 RX ORDER — FENTANYL CITRATE 50 UG/ML
INJECTION, SOLUTION INTRAMUSCULAR; INTRAVENOUS PRN
Status: DISCONTINUED | OUTPATIENT
Start: 2017-11-20 | End: 2017-11-20 | Stop reason: HOSPADM

## 2017-11-20 RX ORDER — SODIUM CHLORIDE 0.9 % (FLUSH) 0.9 %
10 SYRINGE (ML) INJECTION EVERY 12 HOURS SCHEDULED
Status: DISCONTINUED | OUTPATIENT
Start: 2017-11-20 | End: 2017-11-20 | Stop reason: HOSPADM

## 2017-11-20 RX ORDER — LIDOCAINE HYDROCHLORIDE 10 MG/ML
INJECTION, SOLUTION INFILTRATION; PERINEURAL PRN
Status: DISCONTINUED | OUTPATIENT
Start: 2017-11-20 | End: 2017-11-20 | Stop reason: HOSPADM

## 2017-11-20 RX ORDER — MIDAZOLAM HYDROCHLORIDE 1 MG/ML
INJECTION INTRAMUSCULAR; INTRAVENOUS PRN
Status: DISCONTINUED | OUTPATIENT
Start: 2017-11-20 | End: 2017-11-20 | Stop reason: HOSPADM

## 2017-11-20 RX ORDER — TRIAMCINOLONE ACETONIDE 40 MG/ML
INJECTION, SUSPENSION INTRA-ARTICULAR; INTRAMUSCULAR PRN
Status: DISCONTINUED | OUTPATIENT
Start: 2017-11-20 | End: 2017-11-20 | Stop reason: HOSPADM

## 2017-11-20 RX ORDER — SODIUM CHLORIDE 0.9 % (FLUSH) 0.9 %
10 SYRINGE (ML) INJECTION PRN
Status: DISCONTINUED | OUTPATIENT
Start: 2017-11-20 | End: 2017-11-20 | Stop reason: HOSPADM

## 2017-11-20 RX ADMIN — Medication 10 ML: at 09:08

## 2017-11-20 ASSESSMENT — PAIN SCALES - GENERAL
PAINLEVEL_OUTOF10: 2
PAINLEVEL_OUTOF10: 0

## 2017-11-20 ASSESSMENT — PAIN - FUNCTIONAL ASSESSMENT: PAIN_FUNCTIONAL_ASSESSMENT: 0-10

## 2017-11-20 ASSESSMENT — PAIN DESCRIPTION - DESCRIPTORS: DESCRIPTORS: BURNING

## 2017-11-20 NOTE — H&P
History and Physical Update    Pt Name: Myra Gamboa  MRN: [id number]  [place]: 1940  Date of evaluation: 11/20/2017      [x] I have reviewed the Progress notes found in [facility] dated 10/31/17 by Meagan Luque NP which meets the criteria for an Interval History and Physical note and is attached below. Procedure: Epidural steroid injection  Dx: Lumbar stenosis with neurogenic claudication  [x] I have examined  Myra Gamboa and there are no changes to the patient or plans. States numbness and tingling with burning pain to bilateral lower legs. Denies any recent illness fever or chills. States had an injection in her back many years ago. Vital signs: /65   Pulse 90   Temp 98.2 °F (36.8 °C) (Oral)   Resp 20   Ht 5' 2\" (1.575 m)   Wt 172 lb (78 kg)   SpO2 97%   BMI 31.46 kg/m²     Allergies:  Review of patient's allergies indicates no known allergies. Medications:   No current facility-administered medications on file prior to encounter. Current Outpatient Prescriptions on File Prior to Encounter   Medication Sig Dispense Refill    tiZANidine (ZANAFLEX) 2 MG tablet Take 1 tablet by mouth every 8 hours as needed (pain, muscle spasm) 30 tablet 1    fluticasone (FLONASE) 50 MCG/ACT nasal spray 1 spray by Nasal route daily 1 Bottle 3    polyethylene glycol (MIRALAX) powder Take 17 g by mouth daily 510 g 1    HYDROcodone-acetaminophen (NORCO) 5-325 MG per tablet Take 1 tablet by mouth 2 times daily as needed for Pain .  60 tablet 0    gabapentin (NEURONTIN) 300 MG capsule Take 2 capsules by mouth 3 times daily 180 capsule 3    amitriptyline (ELAVIL) 25 MG tablet Take 1 tablet by mouth nightly 30 tablet 3    pantoprazole (PROTONIX) 40 MG tablet take 1 tablet by mouth every morning BEFORE BREAKFAST 90 tablet 1    loratadine (CLARITIN) 10 MG tablet take 1 tablet by mouth once daily 90 tablet 1    zolpidem (AMBIEN) 10 MG tablet take 1/2 tablet by mouth at bedtime if needed for sleep 30 tablet 0    cloNIDine (CATAPRES) 0.2 MG tablet take 1/2 tablet by mouth twice a day 30 tablet 3    atorvastatin (LIPITOR) 40 MG tablet Take 1 tablet by mouth daily 30 tablet 5    glimepiride (AMARYL) 2 MG tablet 2 mg      amLODIPine (NORVASC) 5 MG tablet take 1 tablet by mouth once daily 30 tablet 5    allopurinol (ZYLOPRIM) 100 MG tablet TAKE 1 TABLET BY MOUTH ONCE DAILY 30 tablet 5    carvedilol (COREG) 3.125 MG tablet take 1 tablet by mouth twice a day 60 tablet 5    metFORMIN (GLUCOPHAGE) 500 MG tablet take 1 tablet by mouth twice a day with food 180 tablet 3    ketoconazole (NIZORAL) 2 % cream Apply topically daily. 30 g 1    glucose blood VI test strips (ONE TOUCH ULTRA TEST) strip TEST BLOOD SUGAR ONCE DAILY    DX E11.9 100 strip 4    aspirin 325 MG tablet Take 325 mg by mouth daily      Blood Glucose Monitoring Suppl KIT 1 blood glucose monitoring supplies kit. Test strips, lancets, alcohol swabs 1 kit 0            Prior to Admission medications    Medication Sig Start Date End Date Taking? Authorizing Provider   tiZANidine (ZANAFLEX) 2 MG tablet Take 1 tablet by mouth every 8 hours as needed (pain, muscle spasm) 11/8/17  Yes Connie Mckeon MD   fluticasone [facility]) 50 MCG/ACT nasal spray 1 spray by Nasal route daily 11/8/17  Yes Connie Mckeon MD   polyethylene glycol [facility]) powder Take 17 g by mouth daily 11/8/17 12/8/17 Yes Connie Mckeon MD   HYDROcodone-acetaminophen (NORCO) 5-325 MG per tablet Take 1 tablet by mouth 2 times daily as needed for Pain .  11/6/17 12/6/17 Yes Migue lA Gutiérrez MD   gabapentin (NEURONTIN) 300 MG capsule Take 2 capsules by mouth 3 times daily 10/31/17  Yes Charito Muniz CNP   amitriptyline (ELAVIL) 25 MG tablet Take 1 tablet by mouth nightly 10/31/17  Yes Charito Muniz CNP   pantoprazole (PROTONIX) 40 MG tablet take 1 tablet by mouth every morning BEFORE BREAKFAST 10/26/17  Yes Connie Mckeon MD   loratadine (CLARITIN) 10 MG tablet take 1 tablet by mouth once daily 10/26/17  Yes Connie Ceron MD   zolpidem (AMBIEN) 10 MG tablet take 1/2 tablet by mouth at bedtime if needed for sleep 10/17/17  Yes Connie Ceron MD   cloNIDine (CATAPRES) 0.2 MG tablet take 1/2 tablet by mouth twice a day 9/1/17  Yes Connie Ceron MD   atorvastatin (LIPITOR) 40 MG tablet Take 1 tablet by mouth daily 8/28/17  Yes Claudia Guzman NP   glimepiride (AMARYL) 2 MG tablet 2 mg 8/11/17  Yes Historical Provider, MD   amLODIPine (NORVASC) 5 MG tablet take 1 tablet by mouth once daily 8/15/17  Yes Connie Ceron MD   allopurinol (ZYLOPRIM) 100 MG tablet TAKE 1 TABLET BY MOUTH ONCE DAILY 6/7/17  Yes Connie Ceron MD   carvedilol (COREG) 3.125 MG tablet take 1 tablet by mouth twice a day 6/7/17  Yes Raffi Raymundo MD   metFORMIN (GLUCOPHAGE) 500 MG tablet take 1 tablet by mouth twice a day with food 1/5/17  Yes Misael Ordaz MD   ketoconazole (NIZORAL) 2 % cream Apply topically daily. 11/8/17   Connie Ceron MD   glucose blood VI test strips (ONE TOUCH ULTRA TEST) strip TEST BLOOD SUGAR ONCE DAILY    DX E11.9 10/19/17   Connie Ceron MD   aspirin 325 MG tablet Take 325 mg by mouth daily    Historical Provider, MD   Blood Glucose Monitoring Suppl KIT 1 blood glucose monitoring supplies kit. Test strips, lancets, alcohol swabs 8/26/14   Misael Ordaz MD       This is a 78 y.o. female who is  pleasant, cooperative, alert and oriented x3, in no acute distress. Heart: Heart regular rate and rhythm   Lungs:clear to auscultation without wheezes or rales  No increased work of breathing, good air exchange, Abdomen: soft, nontender, nondistended, no masses or organomegaly.        Daneil Montero NP  Electronically signed 11/20/2017 at 9:20 AM     Dileep Wang CNP Nurse Practitioner Signed   Progress Notes Date of Service: 10/31/2017  9:19 AM   Related encounter: Office Visit from 10/31/2017 in [facility] Neurology Specialist       Expand All  Neck pain      Osteoarthrosis, unspecified whether generalized or localized, unspecified site 10/5/2012    Pure hypercholesterolemia 10/5/2012    Shoulder blade pain       right  side    Stroke [facility])      Tubular adenoma of colon 2015    Type II or unspecified type diabetes mellitus without mention of complication, not stated as uncontrolled 10/5/2012    Unspecified essential hypertension 10/5/2012    Unspecified sleep apnea                 Past Surgical History         Past Surgical History:   Procedure Laterality Date    COLONOSCOPY   8/23/06     Dr Bella Callejas   5 12 15     extensive diverticulosis, tubular adenoma    EPIDURAL STEROID INJECTION N/A 8/25/2017     EPIDURAL STEROID INJECTION cervical performed by Isidro Ward MD at [place] [id number]   10/1/06    NECK SURGERY   5/2011 & 2001    OTHER SURGICAL HISTORY   08/25/2017     L5-S1 steroid injection            Family History          Family History   Problem Relation Age of Onset    Breast Cancer Sister      Cancer Sister         liver    Diabetes Daughter                      Social History   Substance Use Topics    Smoking status: Former Smoker       Types: Cigarettes       Quit date: 10/5/2001    Smokeless tobacco: Never Used    Alcohol use 0.0 oz/week          Comment: on occasion                                  REVIEW OF SYSTEMS     CONSTITUTIONAL Weight: absent, Appetite: absent, Fatigue: present      HEENT Ears: Ringing & Pain, Visual disturbance: absent   RESPIRATORY Shortness of breath: present, Cough: present   CARDIOVASCULAR Chest pain: absent, Leg swelling :absent      GI Constipation: present, Diarrhea: absent, Swallowing change: absent       Urinary frequency: present, Urinary urgency: absent, Urinary incontinence: absent   MUSCULOSKELETAL Neck pain: present, Back pain: present, Stiffness: present, Muscle pain: present, Joint pain: present Restless legs: present DERMATOLOGIC Hair loss: absent, Skin changes: absent   NEUROLOGIC Memory loss: absent, Confusion: absent, Seizures: absent Trouble walking or imbalance: present, Dizziness: absent, Weakness: present, Numbness: present Tremor: absent, Spasm: absent, Speech difficulty: absent, Headache: absent, Light sensitivity: absent   PSYCHIATRIC Anxiety: present, Hallucination: absent, Mood disorder: present   HEMATOLOGIC Abnormal bleeding: absent, Anemia: absent, Clotting disorder: absent, Lymph gland changes: absent             No Known Allergies           Current Facility-Administered Medications          Current Outpatient Prescriptions   Medication Sig Dispense Refill    pantoprazole (PROTONIX) 40 MG tablet take 1 tablet by mouth every morning BEFORE BREAKFAST 90 tablet 1    loratadine (CLARITIN) 10 MG tablet take 1 tablet by mouth once daily 90 tablet 1    tiZANidine (ZANAFLEX) 2 MG tablet take 1 tablet by mouth once daily 30 tablet 0    glucose blood VI test strips (ONE TOUCH ULTRA TEST) strip TEST BLOOD SUGAR ONCE DAILY    DX E11.9 100 strip 4    zolpidem (AMBIEN) 10 MG tablet take 1/2 tablet by mouth at bedtime if needed for sleep 30 tablet 0    gabapentin (NEURONTIN) 300 MG capsule Take 2 capsules by mouth 3 times daily 180 capsule 0    cloNIDine (CATAPRES) 0.2 MG tablet take 1/2 tablet by mouth twice a day 30 tablet 3    atorvastatin (LIPITOR) 40 MG tablet Take 1 tablet by mouth daily 30 tablet 5    glimepiride (AMARYL) 2 MG tablet 2 mg        amLODIPine (NORVASC) 5 MG tablet take 1 tablet by mouth once daily 30 tablet 5    fluticasone (FLONASE) 50 MCG/ACT nasal spray 1 spray by Nasal route daily 1 Bottle 3    allopurinol (ZYLOPRIM) 100 MG tablet TAKE 1 TABLET BY MOUTH ONCE DAILY 30 tablet 5    carvedilol (COREG) 3.125 MG tablet take 1 tablet by mouth twice a day 60 tablet 5    metFORMIN (GLUCOPHAGE) 500 MG tablet take 1 tablet by mouth twice a day with food 180 tablet 3    aspirin 325 MG tablet Take 325 mg by mouth daily        Blood Glucose Monitoring Suppl KIT 1 blood glucose monitoring supplies kit. Test strips, lancets, alcohol swabs 1 kit 0      No current facility-administered medications for this visit.                                                   PHYSICAL EXAMINATION        There were no vitals filed for this visit.                                            .                                                                                                    General Appearance:  Alert, cooperative, no signs of distress, appears stated age   Head:  Normocephalic, no signs of trauma   Eyes:  Conjunctiva/corneas clear;  eyelids intact   Ears:  Normal external ear and canals   Nose: Nares normal, mucosa normal, no drainage    Throat: Lips and tongue normal; teeth normal;  gums normal   Neck: Supple, intact flexion, extension and rotation;   trachea midline;  no adenopathy;   thyroid: not enlarged;   no carotid pulse abnormality   Back:   Symmetric, no curvature, ROM adequate   Lungs:   Respirations unlabored   Heart:  Regular rate and rhythm               Extremities: Extremities normal, no cyanosis, no edema   Pulses: Symmetric over head and neck   Skin: Skin color, texture normal, no rashes, no lesions                                                      NEUROLOGIC EXAMINATION     Neurologic Exam      Mental Status   Oriented to person, place, and time. Attention: normal.   Speech: speech is normal   Level of consciousness: alert  Normal comprehension.      Cranial Nerves      CN II   Visual fields full to confrontation.      CN III, IV, VI   Pupils are equal, round, and reactive to light.   Extraocular motions are normal.      CN V   Facial sensation intact.      CN VII   Facial expression full, symmetric.      CN VIII   CN VIII normal.      CN IX, X   CN IX normal.      CN XI   CN XI normal.      CN XII   CN XII normal.      Motor Exam   Muscle bulk: normal  Overall muscle tone: normal  Right arm

## 2017-11-29 RX ORDER — ALLOPURINOL 100 MG/1
TABLET ORAL
Qty: 30 TABLET | Refills: 5 | Status: SHIPPED | OUTPATIENT
Start: 2017-11-29 | End: 2018-06-14 | Stop reason: SDUPTHER

## 2017-11-29 NOTE — TELEPHONE ENCOUNTER
Last refill 11/07/17  Last visit 11/08/2017    Next Visit Date:  Future Appointments  Date Time Provider Pradeep Alexander   1/31/2018 9:00 [address], CNP Neuro Spec [id number]   2/8/2018 10:15 AM Connie Mccarty  [address] Maintenance   Topic Date Due    Zostavax vaccine  05/28/2002    DEXA (modify frequency per FRAX score)  05/28/2007    Flu vaccine (1) 09/01/2017    Diabetic retinal exam  11/08/2019 (Originally 5/28/1950)    DTaP/Tdap/Td vaccine (1 - Tdap) 10/18/2026 (Originally 5/28/1961)    Lipid screen  01/12/2018    Diabetic foot exam  11/08/2018    Diabetic hemoglobin A1C test  11/08/2018    Colon cancer screen colonoscopy  05/28/2020    Pneumococcal low/med risk  Completed       Hemoglobin A1C (%)   Date Value   11/08/2017 6.4   08/08/2017 6.2   01/12/2017 6.3 (H)             ( goal A1C is < 7)   Microalb/Crt.  Ratio (mcg/mg creat)   Date Value   01/12/2017 18     LDL Cholesterol (mg/dL)   Date Value   01/12/2017 100       (goal LDL is <100)   AST (U/L)   Date Value   01/12/2017 25     ALT (U/L)   Date Value   01/12/2017 22     BUN (mg/dL)   Date Value   01/21/2017 7 (L)     BP Readings from Last 3 Encounters:   11/20/17 (!) 140/77   11/08/17 108/64   10/31/17 (!) 145/83          (goal 120/80)    All Future Testing planned in [facility]  Lab Frequency Next Occurrence   SHAKIR Digital Screen Bilateral Once 05/18/2018   DEXA Bone Density 2 Sites Once 11/22/2017   DEXA Bone Density Axial Skeleton Once 12/08/2017   CBC With Auto Differential Once 02/08/2018   Comprehensive Metabolic Panel Once 60/50/3560   Lipid Panel Once 02/08/2018   Uric Acid Once 02/08/2018               Patient Active Problem List:     Osteoarthritis     Essential hypertension     Type 2 diabetes mellitus without complication ([address]     Pure hypercholesterolemia     Constipation     Rectal pain     Diverticulosis of colon     Tubular adenoma of colon     History of colon polyps     Rectal pressure

## 2017-12-06 ENCOUNTER — TELEPHONE (OUTPATIENT)
Dept: PAIN MANAGEMENT | Age: 77
End: 2017-12-06

## 2017-12-13 ENCOUNTER — OFFICE VISIT (OUTPATIENT)
Dept: PAIN MANAGEMENT | Age: 77
End: 2017-12-13
Payer: MEDICARE

## 2017-12-13 VITALS
BODY MASS INDEX: 32.46 KG/M2 | HEIGHT: 62 IN | TEMPERATURE: 98 F | DIASTOLIC BLOOD PRESSURE: 81 MMHG | RESPIRATION RATE: 16 BRPM | SYSTOLIC BLOOD PRESSURE: 157 MMHG | WEIGHT: 176.4 LBS | HEART RATE: 97 BPM | OXYGEN SATURATION: 97 %

## 2017-12-13 DIAGNOSIS — M15.9 PRIMARY OSTEOARTHRITIS INVOLVING MULTIPLE JOINTS: ICD-10-CM

## 2017-12-13 DIAGNOSIS — M48.062 LUMBAR STENOSIS WITH NEUROGENIC CLAUDICATION: ICD-10-CM

## 2017-12-13 DIAGNOSIS — M96.1 FAILED NECK SYNDROME: Chronic | ICD-10-CM

## 2017-12-13 DIAGNOSIS — M48.02 CERVICAL STENOSIS OF SPINE: Primary | Chronic | ICD-10-CM

## 2017-12-13 PROCEDURE — G8417 CALC BMI ABV UP PARAM F/U: HCPCS | Performed by: ANESTHESIOLOGY

## 2017-12-13 PROCEDURE — 3017F COLORECTAL CA SCREEN DOC REV: CPT | Performed by: ANESTHESIOLOGY

## 2017-12-13 PROCEDURE — 1123F ACP DISCUSS/DSCN MKR DOCD: CPT | Performed by: ANESTHESIOLOGY

## 2017-12-13 PROCEDURE — 4040F PNEUMOC VAC/ADMIN/RCVD: CPT | Performed by: ANESTHESIOLOGY

## 2017-12-13 PROCEDURE — 1090F PRES/ABSN URINE INCON ASSESS: CPT | Performed by: ANESTHESIOLOGY

## 2017-12-13 PROCEDURE — G8427 DOCREV CUR MEDS BY ELIG CLIN: HCPCS | Performed by: ANESTHESIOLOGY

## 2017-12-13 PROCEDURE — 99214 OFFICE O/P EST MOD 30 MIN: CPT | Performed by: ANESTHESIOLOGY

## 2017-12-13 PROCEDURE — G8484 FLU IMMUNIZE NO ADMIN: HCPCS | Performed by: ANESTHESIOLOGY

## 2017-12-13 PROCEDURE — 1036F TOBACCO NON-USER: CPT | Performed by: ANESTHESIOLOGY

## 2017-12-13 PROCEDURE — G8400 PT W/DXA NO RESULTS DOC: HCPCS | Performed by: ANESTHESIOLOGY

## 2017-12-13 RX ORDER — HYDROCODONE BITARTRATE AND ACETAMINOPHEN 5; 325 MG/1; MG/1
1 TABLET ORAL 2 TIMES DAILY PRN
Qty: 60 TABLET | Refills: 0 | Status: SHIPPED | OUTPATIENT
Start: 2017-12-13 | End: 2018-01-24 | Stop reason: SDUPTHER

## 2017-12-13 ASSESSMENT — ENCOUNTER SYMPTOMS
RESPIRATORY NEGATIVE: 1
CONSTIPATION: 1
BACK PAIN: 1

## 2017-12-13 NOTE — PROGRESS NOTES
pain, joint swelling and neck pain. Neurological: Positive for headaches (occasionally). Objective:   Physical Exam   Constitutional: She is oriented to person, place, and time. She appears well-developed and well-nourished. No distress. HENT:   Head: Normocephalic and atraumatic. Eyes: Conjunctivae and EOM are normal. Pupils are equal, round, and reactive to light. Cardiovascular: Normal rate, regular rhythm and normal heart sounds. Pulmonary/Chest: Effort normal and breath sounds normal.   Musculoskeletal:        Cervical back: She exhibits decreased range of motion, tenderness and pain. Lumbar back: She exhibits decreased range of motion, tenderness and pain. Right upper leg: She exhibits tenderness. Left upper leg: She exhibits tenderness. Legs:  Neurological: She is alert and oriented to person, place, and time. She displays no atrophy and no tremor. No cranial nerve deficit or sensory deficit. She exhibits normal muscle tone. She displays no seizure activity. Coordination and gait normal.   Skin: Skin is warm and dry. Psychiatric: She has a normal mood and affect. Her behavior is normal. Judgment and thought content normal.   Nursing note and vitals reviewed. Assessment:      22-year-old woman with a history of chronic neck and chronic lower back pain. -- She describes her neck pain location in the upper mid and lower neck with extension over both shoulder and then radiation down the left arm to the elbow. She reported numbness and paresthesia in both arms. Her neck pain aggravates with activity nothing seems to alleviate the pain. In past she was diagnosed with cervical spinal stenosis and had a cervical spine fusion with Dr. Fariba Davis 7 years ago with no improvement in her symptoms since her surgery. She denies any loss of bladder or bowel control. Last year she had an MRI of cervical spine.   MRI cervical spine without intravenous contrast, 8/15/2016 excluded. Lumbar spondylotic changes with severe central canal stenosis L4-5 level and moderate central canal stenosis L5-S1 level. Please see above level by level complete analysis and details. Procedure Performed:  Transforaminal lumbar epidural steroid injection at the levels of L4  on the Bilateral side under fluoroscopic guidance. % of pain relief:  40% for few days      1. Cervical stenosis of spine  HYDROcodone-acetaminophen (NORCO) 5-325 MG per tablet   2. Primary osteoarthritis involving multiple joints  HYDROcodone-acetaminophen (NORCO) 5-325 MG per tablet   3. Failed neck syndrome     4. Lumbar stenosis with neurogenic claudication             Plan:      - She do have persistent sever severe cervical spinal stenosis despite of anterior cervical discectomy and fusion. Patient denies any loss of bladder or bowel control or any worsening arm weakness or numbness. There are no sign or symptoms of myelopathy. She is not interested in any surgical referral or intervention for her neck at this time. - Chronic lower back and bilateral lower extremity pain associated with walking and bilateral have heaviness and numbness in the leg with walking likely related to lumbar stenosis and neurogenic claudication she had very short term relief with epidural injection. I have suggested a minimally invasive lumbar spine interspace her device for the treatment of lumbar spinal stenosis and neurogenic claudication at L3-L4 and L4-L5 level for failed conservative measures with rest therapy lifestyle modification medical medications and epidural injections. I also offered her a surgical referral but she is not interested in any open surgery for that her concern of advanced age. For admission material for the procedure as provided to the patient she will decide about it and contact us.   Orders Placed This Encounter   Medications    HYDROcodone-acetaminophen (NORCO) 5-325 MG per tablet     Sig: Take 1 tablet by

## 2017-12-21 RX ORDER — GLIMEPIRIDE 2 MG/1
TABLET ORAL
Qty: 90 TABLET | Refills: 1 | Status: SHIPPED | OUTPATIENT
Start: 2017-12-21 | End: 2018-02-08

## 2017-12-21 NOTE — TELEPHONE ENCOUNTER
Last seen 11/8/17  Last refill 11/22/17    Next Visit Date:  Future Appointments  Date Time Provider Pradeep Alexander   1/31/2018 9:00 AM Lizbet Montes CNP Neuro Spec [facility]   2/7/2018 9:20 AM Susanne Black MD [facility]   2/8/2018 10:15 AM Connie Ibrahim  [address] Maintenance   Topic Date Due    Zostavax vaccine  05/28/2002    DEXA (modify frequency per FRAX score)  05/28/2007    Flu vaccine (1) 09/01/2017    Diabetic retinal exam  11/08/2019 (Originally 5/28/1950)    DTaP/Tdap/Td vaccine (1 - Tdap) 10/18/2026 (Originally 5/28/1961)    Lipid screen  01/12/2018    Diabetic foot exam  11/08/2018    Diabetic hemoglobin A1C test  11/08/2018    Colon cancer screen colonoscopy  05/28/2020    Pneumococcal low/med risk  Completed       Hemoglobin A1C (%)   Date Value   11/08/2017 6.4   08/08/2017 6.2   01/12/2017 6.3 (H)             ( goal A1C is < 7)   Microalb/Crt.  Ratio (mcg/mg creat)   Date Value   01/12/2017 18     LDL Cholesterol (mg/dL)   Date Value   01/12/2017 100       (goal LDL is <100)   AST (U/L)   Date Value   01/12/2017 25     ALT (U/L)   Date Value   01/12/2017 22     BUN (mg/dL)   Date Value   01/21/2017 7 (L)     BP Readings from Last 3 Encounters:   12/13/17 (!) 157/81   11/20/17 (!) 140/77   11/08/17 108/64          (goal 120/80)    All Future Testing planned in [facility]  Lab Frequency Next Occurrence   SHAKIR Digital Screen Bilateral Once 05/18/2018   DEXA Bone Density Axial Skeleton Once 12/08/2017   CBC With Auto Differential Once 02/08/2018   Comprehensive Metabolic Panel Once 32/51/1102   Lipid Panel Once 02/08/2018   Uric Acid Once 02/08/2018               Patient Active Problem List:     Osteoarthritis     Essential hypertension     Type 2 diabetes mellitus without complication ([address]     Pure hypercholesterolemia     Constipation     Rectal pain     Diverticulosis of colon     Tubular adenoma of colon     History of colon polyps     Rectal

## 2018-01-03 DIAGNOSIS — G47.9 SLEEPING DIFFICULTY: Primary | ICD-10-CM

## 2018-01-04 NOTE — TELEPHONE ENCOUNTER
Last seen: 11/8/17  Next appt:2/8/18      Uds: 7/24/17  No pain contract    12/22/2017 1 GABAPENTIN 300 MG CAPSULE 180 30  12/16/2017 1 HYDROCODON-ACETAMINOPHEN 5-325  11/06/2017 1 HYDROCODON-ACETAMINOPHEN 5-325  10/31/2017 1 GABAPENTIN 300 MG CAPSULE 180 30  10/30/2017 1 ZOLPIDEM TARTRATE 10 MG TABLET 30       Next Visit Date:  Future Appointments  Date Time Provider Pradeep Alexander   1/31/2018 9:00 AM Frank Vanessa CNP Neuro Spec [facility]   2/7/2018 9:20 AM Shiva Weinberg MD [facility]   2/8/2018 10:15 AM Connie Calabrese  [address] Maintenance   Topic Date Due    Potassium monitoring  1940    Zostavax vaccine  05/28/2002    DEXA (modify frequency per FRAX score)  05/28/2007    Creatinine monitoring  04/01/2014    Flu vaccine (1) 09/01/2017    Lipid screen  01/12/2018    Diabetic retinal exam  11/08/2019 (Originally 5/28/1950)    DTaP/Tdap/Td vaccine (1 - Tdap) 10/18/2026 (Originally 5/28/1959)    Diabetic foot exam  11/08/2018    A1C test (Diabetic or Prediabetic)  11/08/2018    Colon cancer screen colonoscopy  05/28/2020    Pneumococcal low/med risk  Completed       Hemoglobin A1C (%)   Date Value   11/08/2017 6.4   08/08/2017 6.2   01/12/2017 6.3 (H)             ( goal A1C is < 7)   Microalb/Crt.  Ratio (mcg/mg creat)   Date Value   01/12/2017 18     LDL Cholesterol (mg/dL)   Date Value   01/12/2017 100       (goal LDL is <100)   AST (U/L)   Date Value   01/12/2017 25     ALT (U/L)   Date Value   01/12/2017 22     BUN (mg/dL)   Date Value   01/21/2017 7 (L)     BP Readings from Last 3 Encounters:   12/13/17 (!) 157/81   11/20/17 (!) 140/77   11/08/17 108/64          (goal 120/80)    All Future Testing planned in [facility]  Lab Frequency Next Occurrence   SHAKIR Digital Screen Bilateral Once 05/18/2018   DEXA Bone Density Axial Skeleton Once 02/26/2018   CBC With Auto Differential Once 02/08/2018   Comprehensive Metabolic Panel Once 95/69/7877   Lipid Panel Once 02/08/2018   Uric Acid Once 02/08/2018               Patient Active Problem List:     Osteoarthritis     Essential hypertension     Type 2 diabetes mellitus without complication ([address]     Pure hypercholesterolemia     Constipation     Rectal pain     Diverticulosis of colon     Tubular adenoma of colon     History of colon polyps     Rectal pressure     Failed neck syndrome     Cervical stenosis of spine     Cervical radicular pain     Lumbar stenosis with neurogenic claudication

## 2018-01-05 RX ORDER — ZOLPIDEM TARTRATE 10 MG/1
5 TABLET ORAL NIGHTLY PRN
Qty: 30 TABLET | Refills: 0 | Status: SHIPPED | OUTPATIENT
Start: 2018-01-05 | End: 2018-03-05 | Stop reason: SDUPTHER

## 2018-01-08 RX ORDER — CARVEDILOL 3.12 MG/1
TABLET ORAL
Qty: 180 TABLET | Refills: 1 | Status: SHIPPED | OUTPATIENT
Start: 2018-01-08 | End: 2018-06-28 | Stop reason: SDUPTHER

## 2018-01-08 RX ORDER — CLONIDINE HYDROCHLORIDE 0.2 MG/1
TABLET ORAL
Qty: 90 TABLET | Refills: 1 | Status: SHIPPED | OUTPATIENT
Start: 2018-01-08 | End: 2018-05-08 | Stop reason: SDUPTHER

## 2018-01-08 NOTE — TELEPHONE ENCOUNTER
Last filled 1/5/17 #180 with 3 RF  Last seen 11/8/17    Next Visit Date:  Future Appointments  Date Time Provider Pradeep Alexander   1/31/2018 9:00 [address], CNP Neuro Spec [facility]   2/7/2018 9:20 AM Paty العراقي MD [facility]   2/8/2018 10:15 AM Connie Segal  [address] Maintenance   Topic Date Due    Zostavax vaccine  05/28/2002    DEXA (modify frequency per FRAX score)  05/28/2007    Flu vaccine (1) 09/01/2017    Lipid screen  01/12/2018    Potassium monitoring  01/21/2018    Creatinine monitoring  01/21/2018    Diabetic retinal exam  11/08/2019 (Originally 5/28/1950)    DTaP/Tdap/Td vaccine (1 - Tdap) 10/18/2026 (Originally 5/28/1961)    Diabetic foot exam  11/08/2018    A1C test (Diabetic or Prediabetic)  11/08/2018    Colon cancer screen colonoscopy  05/28/2020    Pneumococcal low/med risk  Completed       Hemoglobin A1C (%)   Date Value   11/08/2017 6.4   08/08/2017 6.2   01/12/2017 6.3 (H)             ( goal A1C is < 7)   Microalb/Crt.  Ratio (mcg/mg creat)   Date Value   01/12/2017 18     LDL Cholesterol (mg/dL)   Date Value   01/12/2017 100       (goal LDL is <100)   AST (U/L)   Date Value   01/12/2017 25     ALT (U/L)   Date Value   01/12/2017 22     BUN (mg/dL)   Date Value   01/21/2017 7 (L)     BP Readings from Last 3 Encounters:   12/13/17 (!) 157/81   11/20/17 (!) 140/77   11/08/17 108/64          (goal 120/80)    All Future Testing planned in [facility]  Lab Frequency Next Occurrence   SHAKIR Digital Screen Bilateral Once 05/18/2018   DEXA Bone Density Axial Skeleton Once 02/26/2018   CBC With Auto Differential Once 02/08/2018   Comprehensive Metabolic Panel Once 84/32/6410   Lipid Panel Once 02/08/2018   Uric Acid Once 02/08/2018               Patient Active Problem List:     Osteoarthritis     Essential hypertension     Type 2 diabetes mellitus without complication ([address]     Pure hypercholesterolemia     Constipation     Rectal pain

## 2018-01-08 NOTE — TELEPHONE ENCOUNTER
Last visit 11/8/17  Next visit 2/8/18     Next Visit Date:  Future Appointments  Date Time Provider Pradeep Alexander   1/31/2018 9:00 [address], CNP Neuro Spec [facility]   2/7/2018 9:20 AM MD Flor Hernández Pain [facility]   2/8/2018 10:15 AM Connie Bustos  [address] Maintenance   Topic Date Due    Zostavax vaccine  05/28/2002    DEXA (modify frequency per FRAX score)  05/28/2007    Flu vaccine (1) 09/01/2017    Lipid screen  01/12/2018    Potassium monitoring  01/21/2018    Creatinine monitoring  01/21/2018    Diabetic retinal exam  11/08/2019 (Originally 5/28/1950)    DTaP/Tdap/Td vaccine (1 - Tdap) 10/18/2026 (Originally 5/28/1959)    Diabetic foot exam  11/08/2018    A1C test (Diabetic or Prediabetic)  11/08/2018    Colon cancer screen colonoscopy  05/28/2020    Pneumococcal low/med risk  Completed       Hemoglobin A1C (%)   Date Value   11/08/2017 6.4   08/08/2017 6.2   01/12/2017 6.3 (H)             ( goal A1C is < 7)   Microalb/Crt.  Ratio (mcg/mg creat)   Date Value   01/12/2017 18     LDL Cholesterol (mg/dL)   Date Value   01/12/2017 100       (goal LDL is <100)   AST (U/L)   Date Value   01/12/2017 25     ALT (U/L)   Date Value   01/12/2017 22     BUN (mg/dL)   Date Value   01/21/2017 7 (L)     BP Readings from Last 3 Encounters:   12/13/17 (!) 157/81   11/20/17 (!) 140/77   11/08/17 108/64          (goal 120/80)    All Future Testing planned in [facility]  Lab Frequency Next Occurrence   SHAKIR Digital Screen Bilateral Once 05/18/2018   DEXA Bone Density Axial Skeleton Once 02/26/2018   CBC With Auto Differential Once 02/08/2018   Comprehensive Metabolic Panel Once 30/67/0727   Lipid Panel Once 02/08/2018   Uric Acid Once 02/08/2018               Patient Active Problem List:     Osteoarthritis     Essential hypertension     Type 2 diabetes mellitus without complication ([address]     Pure hypercholesterolemia     Constipation     Rectal pain     Diverticulosis of

## 2018-01-24 DIAGNOSIS — M48.02 CERVICAL STENOSIS OF SPINE: Chronic | ICD-10-CM

## 2018-01-24 DIAGNOSIS — M15.9 PRIMARY OSTEOARTHRITIS INVOLVING MULTIPLE JOINTS: ICD-10-CM

## 2018-01-24 RX ORDER — HYDROCODONE BITARTRATE AND ACETAMINOPHEN 5; 325 MG/1; MG/1
1 TABLET ORAL 2 TIMES DAILY PRN
Qty: 60 TABLET | Refills: 0 | Status: SHIPPED | OUTPATIENT
Start: 2018-01-24 | End: 2018-03-08 | Stop reason: SDUPTHER

## 2018-01-24 NOTE — TELEPHONE ENCOUNTER
script sent to pharmacy    Controlled Substances Monitoring:     Attestation: The Prescription Monitoring Report for this patient was reviewed today.  Massimo Aldana MD)

## 2018-01-24 NOTE — TELEPHONE ENCOUNTER
Viola Fields is requesting a refill on the following medications:   Requested Prescriptions     Pending Prescriptions Disp Refills    HYDROcodone-acetaminophen (NORCO) 5-325 MG per tablet 60 tablet 0     Sig: Take 1 tablet by mouth 2 times daily as needed for Pain for up to 30 days. Earliest Fill Date: 1/24/18       Last OV 12/13/17    Future Appointments  Date Time Provider Department Center   1/31/2018 9:00 AM Lina Gregg CNP Neuro Spec [facility]   2/2/2018 9:30 AM STA DEXA RM STAZ MAMMO [facility] Radiolog   2/2/2018 10:45 AM STA SCR MAMMO RM 2 STAZ MAMMO STA Radiolog   2/7/2018 9:20 AM Shalonda Hood MD Sylv [facility]   2/8/2018 10:15 AM Connie Rouse MD [facility]       OARRS report sent to Dr. Chrissy Cabrales through alternative route for review.

## 2018-02-02 ENCOUNTER — TELEPHONE (OUTPATIENT)
Dept: PAIN MANAGEMENT | Age: 78
End: 2018-02-02

## 2018-02-02 ENCOUNTER — HOSPITAL ENCOUNTER (OUTPATIENT)
Dept: MAMMOGRAPHY | Age: 78
Discharge: HOME | End: 2018-02-04
Payer: MEDICARE

## 2018-02-02 DIAGNOSIS — Z78.0 POST-MENOPAUSAL: ICD-10-CM

## 2018-02-02 DIAGNOSIS — Z12.31 ENCOUNTER FOR SCREENING MAMMOGRAM FOR BREAST CANCER: ICD-10-CM

## 2018-02-02 PROCEDURE — 77080 DXA BONE DENSITY AXIAL: CPT

## 2018-02-02 PROCEDURE — 77063 BREAST TOMOSYNTHESIS BI: CPT

## 2018-02-08 ENCOUNTER — OFFICE VISIT (OUTPATIENT)
Dept: FAMILY MEDICINE CLINIC | Age: 78
End: 2018-02-08
Payer: MEDICARE

## 2018-02-08 ENCOUNTER — HOSPITAL ENCOUNTER (OUTPATIENT)
Age: 78
Setting detail: SPECIMEN
Discharge: HOME | End: 2018-02-08
Payer: MEDICARE

## 2018-02-08 VITALS
RESPIRATION RATE: 16 BRPM | WEIGHT: 176.6 LBS | BODY MASS INDEX: 32.3 KG/M2 | DIASTOLIC BLOOD PRESSURE: 74 MMHG | SYSTOLIC BLOOD PRESSURE: 130 MMHG | HEART RATE: 95 BPM | TEMPERATURE: 97.3 F | OXYGEN SATURATION: 95 %

## 2018-02-08 DIAGNOSIS — E11.40 CONTROLLED TYPE 2 DIABETES WITH NEUROPATHY (HCC): ICD-10-CM

## 2018-02-08 DIAGNOSIS — I10 ESSENTIAL HYPERTENSION: ICD-10-CM

## 2018-02-08 DIAGNOSIS — M15.9 PRIMARY OSTEOARTHRITIS INVOLVING MULTIPLE JOINTS: ICD-10-CM

## 2018-02-08 DIAGNOSIS — Z23 NEED FOR INFLUENZA VACCINATION: ICD-10-CM

## 2018-02-08 DIAGNOSIS — E78.2 MIXED HYPERLIPIDEMIA: ICD-10-CM

## 2018-02-08 DIAGNOSIS — E61.1 IRON DEFICIENCY: ICD-10-CM

## 2018-02-08 DIAGNOSIS — M1A.9XX0 CHRONIC GOUT WITHOUT TOPHUS, UNSPECIFIED CAUSE, UNSPECIFIED SITE: ICD-10-CM

## 2018-02-08 DIAGNOSIS — E11.40 TYPE 2 DIABETES MELLITUS WITH DIABETIC NEUROPATHY, WITHOUT LONG-TERM CURRENT USE OF INSULIN (HCC): ICD-10-CM

## 2018-02-08 DIAGNOSIS — E11.40 TYPE 2 DIABETES MELLITUS WITH DIABETIC NEUROPATHY, WITHOUT LONG-TERM CURRENT USE OF INSULIN (HCC): Primary | ICD-10-CM

## 2018-02-08 DIAGNOSIS — M48.062 LUMBAR STENOSIS WITH NEUROGENIC CLAUDICATION: ICD-10-CM

## 2018-02-08 DIAGNOSIS — E78.00 HYPERCHOLESTEROLEMIA: ICD-10-CM

## 2018-02-08 LAB
ABSOLUTE EOS #: 0.21 K/UL (ref 0–0.44)
ABSOLUTE IMMATURE GRANULOCYTE: <0.03 K/UL (ref 0–0.3)
ABSOLUTE LYMPH #: 4.28 K/UL (ref 1.1–3.7)
ABSOLUTE MONO #: 0.69 K/UL (ref 0.1–1.2)
ALBUMIN SERPL-MCNC: 4.2 G/DL (ref 3.5–5.2)
ALBUMIN/GLOBULIN RATIO: 1.2 (ref 1–2.5)
ALP BLD-CCNC: 93 U/L (ref 35–104)
ALT SERPL-CCNC: 20 U/L (ref 5–33)
ANION GAP SERPL CALCULATED.3IONS-SCNC: 18 MMOL/L (ref 9–17)
AST SERPL-CCNC: 23 U/L
BASOPHILS # BLD: 1 % (ref 0–2)
BASOPHILS ABSOLUTE: 0.06 K/UL (ref 0–0.2)
BILIRUB SERPL-MCNC: 0.28 MG/DL (ref 0.3–1.2)
BUN BLDV-MCNC: 9 MG/DL (ref 8–23)
BUN/CREAT BLD: ABNORMAL (ref 9–20)
CALCIUM SERPL-MCNC: 9.1 MG/DL (ref 8.6–10.4)
CHLORIDE BLD-SCNC: 103 MMOL/L (ref 98–107)
CHOLESTEROL/HDL RATIO: 2.2
CHOLESTEROL: 179 MG/DL
CO2: 24 MMOL/L (ref 20–31)
CREAT SERPL-MCNC: 0.59 MG/DL (ref 0.5–0.9)
CREATININE URINE: 59.8 MG/DL (ref 28–217)
DIFFERENTIAL TYPE: ABNORMAL
EOSINOPHILS RELATIVE PERCENT: 3 % (ref 1–4)
GFR AFRICAN AMERICAN: >60 ML/MIN
GFR NON-AFRICAN AMERICAN: >60 ML/MIN
GFR SERPL CREATININE-BSD FRML MDRD: ABNORMAL ML/MIN/{1.73_M2}
GFR SERPL CREATININE-BSD FRML MDRD: ABNORMAL ML/MIN/{1.73_M2}
GLUCOSE BLD-MCNC: 141 MG/DL (ref 70–99)
HBA1C MFR BLD: 6 %
HCT VFR BLD CALC: 40.1 % (ref 36.3–47.1)
HDLC SERPL-MCNC: 81 MG/DL
HEMOGLOBIN: 11.8 G/DL (ref 11.9–15.1)
IMMATURE GRANULOCYTES: 0 %
LDL CHOLESTEROL: 69 MG/DL (ref 0–130)
LYMPHOCYTES # BLD: 53 % (ref 24–43)
MCH RBC QN AUTO: 22.9 PG (ref 25.2–33.5)
MCHC RBC AUTO-ENTMCNC: 29.4 G/DL (ref 28.4–34.8)
MCV RBC AUTO: 77.9 FL (ref 82.6–102.9)
MICROALBUMIN/CREAT 24H UR: <12 MG/L
MICROALBUMIN/CREAT UR-RTO: NORMAL MCG/MG CREAT
MONOCYTES # BLD: 9 % (ref 3–12)
NRBC AUTOMATED: 0 PER 100 WBC
PDW BLD-RTO: 17.9 % (ref 11.8–14.4)
PLATELET # BLD: 320 K/UL (ref 138–453)
PLATELET ESTIMATE: ABNORMAL
PMV BLD AUTO: 9.9 FL (ref 8.1–13.5)
POTASSIUM SERPL-SCNC: 4.3 MMOL/L (ref 3.7–5.3)
RBC # BLD: 5.15 M/UL (ref 3.95–5.11)
RBC # BLD: ABNORMAL 10*6/UL
SEG NEUTROPHILS: 34 % (ref 36–65)
SEGMENTED NEUTROPHILS ABSOLUTE COUNT: 2.67 K/UL (ref 1.5–8.1)
SODIUM BLD-SCNC: 145 MMOL/L (ref 135–144)
TOTAL PROTEIN: 7.7 G/DL (ref 6.4–8.3)
TRIGL SERPL-MCNC: 146 MG/DL
URIC ACID: 4.2 MG/DL (ref 2.4–5.7)
VLDLC SERPL CALC-MCNC: NORMAL MG/DL (ref 1–30)
WBC # BLD: 7.9 K/UL (ref 3.5–11.3)
WBC # BLD: ABNORMAL 10*3/UL

## 2018-02-08 PROCEDURE — 1036F TOBACCO NON-USER: CPT | Performed by: INTERNAL MEDICINE

## 2018-02-08 PROCEDURE — G8484 FLU IMMUNIZE NO ADMIN: HCPCS | Performed by: INTERNAL MEDICINE

## 2018-02-08 PROCEDURE — 1123F ACP DISCUSS/DSCN MKR DOCD: CPT | Performed by: INTERNAL MEDICINE

## 2018-02-08 PROCEDURE — 90662 IIV NO PRSV INCREASED AG IM: CPT | Performed by: INTERNAL MEDICINE

## 2018-02-08 PROCEDURE — G8399 PT W/DXA RESULTS DOCUMENT: HCPCS | Performed by: INTERNAL MEDICINE

## 2018-02-08 PROCEDURE — 3044F HG A1C LEVEL LT 7.0%: CPT | Performed by: INTERNAL MEDICINE

## 2018-02-08 PROCEDURE — 1090F PRES/ABSN URINE INCON ASSESS: CPT | Performed by: INTERNAL MEDICINE

## 2018-02-08 PROCEDURE — 3017F COLORECTAL CA SCREEN DOC REV: CPT | Performed by: INTERNAL MEDICINE

## 2018-02-08 PROCEDURE — 99214 OFFICE O/P EST MOD 30 MIN: CPT | Performed by: INTERNAL MEDICINE

## 2018-02-08 PROCEDURE — G8427 DOCREV CUR MEDS BY ELIG CLIN: HCPCS | Performed by: INTERNAL MEDICINE

## 2018-02-08 PROCEDURE — 4040F PNEUMOC VAC/ADMIN/RCVD: CPT | Performed by: INTERNAL MEDICINE

## 2018-02-08 PROCEDURE — G8417 CALC BMI ABV UP PARAM F/U: HCPCS | Performed by: INTERNAL MEDICINE

## 2018-02-08 PROCEDURE — 83036 HEMOGLOBIN GLYCOSYLATED A1C: CPT | Performed by: INTERNAL MEDICINE

## 2018-02-08 PROCEDURE — G0008 ADMIN INFLUENZA VIRUS VAC: HCPCS | Performed by: INTERNAL MEDICINE

## 2018-02-08 NOTE — PROGRESS NOTES
needed Yes Historical Provider, MD   tiZANidine (ZANAFLEX) 2 MG tablet take 1 tablet by mouth every 8 hours if needed for muscle spasm Yes Antony Ayon CNP   HYDROcodone-acetaminophen (NORCO) 5-325 MG per tablet Take 1 tablet by mouth 2 times daily as needed for Pain for up to 30 days. Yes Eulogio Slater MD   cloNIDine (CATAPRES) 0.2 MG tablet take 1/2 tablet by mouth twice a day Yes Antony Ayon CNP   carvedilol (COREG) 3.125 MG tablet take 1 tablet by mouth twice a day Yes Antony Ayon CNP   metFORMIN (GLUCOPHAGE) 500 MG tablet take 1 tablet by mouth twice a day with food Yes Antony Ayon CNP   zolpidem (AMBIEN) 10 MG tablet Take 0.5 tablets by mouth nightly as needed for Sleep for up to 60 days. Yes Ashlyn Randall MD   glimepiride (AMARYL) 2 MG tablet take 1 tablet once daily WIT BREAKFAST Yes Connie Combs MD   allopurinol (ZYLOPRIM) 100 MG tablet take 1 tablet by mouth once daily Yes Ashlyn Randall MD   fluticasone [facility]) 50 MCG/ACT nasal spray 1 spray by Nasal route daily Yes Ashlyn Randall MD   ketoconazole (NIZORAL) 2 % cream Apply topically daily.  Yes Ashlyn Randall MD   gabapentin (NEURONTIN) 300 MG capsule Take 2 capsules by mouth 3 times daily Yes Prudencio Crews CNP   amitriptyline (ELAVIL) 25 MG tablet Take 1 tablet by mouth nightly Yes Prudencio Crews CNP   pantoprazole (PROTONIX) 40 MG tablet take 1 tablet by mouth every morning BEFORE BREAKFAST Yes Connie Combs MD   loratadine (CLARITIN) 10 MG tablet take 1 tablet by mouth once daily Yes Connie Combs MD   glucose blood VI test strips (ONE TOUCH ULTRA TEST) strip TEST BLOOD SUGAR ONCE DAILY    DX E11.9 Yes Connie Combs MD   atorvastatin (LIPITOR) 40 MG tablet Take 1 tablet by mouth daily Yes Mathew Yates NP   amLODIPine (NORVASC) 5 MG tablet take 1 tablet by mouth once daily Yes Connie Combs MD   aspirin 325 MG tablet Take 325 mg by mouth daily Yes Historical Provider,

## 2018-02-22 ENCOUNTER — OFFICE VISIT (OUTPATIENT)
Dept: NEUROLOGY | Age: 78
End: 2018-02-22
Payer: MEDICARE

## 2018-02-22 VITALS — HEART RATE: 94 BPM | DIASTOLIC BLOOD PRESSURE: 73 MMHG | SYSTOLIC BLOOD PRESSURE: 125 MMHG

## 2018-02-22 DIAGNOSIS — M48.062 LUMBAR STENOSIS WITH NEUROGENIC CLAUDICATION: ICD-10-CM

## 2018-02-22 DIAGNOSIS — G62.9 NEUROPATHY: ICD-10-CM

## 2018-02-22 DIAGNOSIS — M48.02 CERVICAL STENOSIS OF SPINE: Primary | Chronic | ICD-10-CM

## 2018-02-22 PROCEDURE — 3017F COLORECTAL CA SCREEN DOC REV: CPT | Performed by: NURSE PRACTITIONER

## 2018-02-22 PROCEDURE — G8427 DOCREV CUR MEDS BY ELIG CLIN: HCPCS | Performed by: NURSE PRACTITIONER

## 2018-02-22 PROCEDURE — 99214 OFFICE O/P EST MOD 30 MIN: CPT | Performed by: NURSE PRACTITIONER

## 2018-02-22 PROCEDURE — G8417 CALC BMI ABV UP PARAM F/U: HCPCS | Performed by: NURSE PRACTITIONER

## 2018-02-22 PROCEDURE — 1123F ACP DISCUSS/DSCN MKR DOCD: CPT | Performed by: NURSE PRACTITIONER

## 2018-02-22 PROCEDURE — G8484 FLU IMMUNIZE NO ADMIN: HCPCS | Performed by: NURSE PRACTITIONER

## 2018-02-22 PROCEDURE — G8399 PT W/DXA RESULTS DOCUMENT: HCPCS | Performed by: NURSE PRACTITIONER

## 2018-02-22 PROCEDURE — 1036F TOBACCO NON-USER: CPT | Performed by: NURSE PRACTITIONER

## 2018-02-22 PROCEDURE — 1090F PRES/ABSN URINE INCON ASSESS: CPT | Performed by: NURSE PRACTITIONER

## 2018-02-22 PROCEDURE — 4040F PNEUMOC VAC/ADMIN/RCVD: CPT | Performed by: NURSE PRACTITIONER

## 2018-02-22 RX ORDER — TIZANIDINE 2 MG/1
TABLET ORAL
Qty: 30 TABLET | Refills: 3 | Status: SHIPPED | OUTPATIENT
Start: 2018-02-22 | End: 2018-08-21 | Stop reason: SDUPTHER

## 2018-02-22 RX ORDER — AMITRIPTYLINE HYDROCHLORIDE 25 MG/1
50 TABLET, FILM COATED ORAL NIGHTLY
Qty: 30 TABLET | Refills: 3 | Status: SHIPPED | OUTPATIENT
Start: 2018-02-22 | End: 2018-05-20 | Stop reason: SDUPTHER

## 2018-02-22 NOTE — PROGRESS NOTES
[facility]  [facility], [address] [place] [address], [address] [place] [address]  Dept: [phone]  Dept Fax: [phone]                                                                Abdirahman Méndez, [place]      2/22/2018    HPI:      Your patient, Sloan Mcintyre returns for continuing neurologic care. Patient is a 44-year-old woman who was seen last on October 31, 2017 for cervical and lumbar stenosis associated with chronic pain. Patient is currently taking gabapentin 600 mg 3 times daily and at her last visit amitriptyline was prescribed to help her sleep as well as to help with her chronic pain. She has noticed a difference, that it is helping her to sleep and she is slightly more comfortable. She complains mainly of pain in her neck radiating into her right arm as well as low back pain and painful sensory neuropathy. Previous testing includes MRIs of the cervical spine showing severe central canal stenosis at C6-C7. MRI of the lumbar spine showed spondylitic changes with severe central canal stenosis stenosis at L4-5 and moderate canal stenosis at L5S1. Previous EMG done in 2016 also confirmed a lumbar radiculopathy as well as borderline primary axonal sensorimotor neuropathy. MRI of the brain in April 2017 showed microvascular ischemia, without acute changes. Patient's most recent hemoglobin A1c is 6.4 done on November 8, 2017. Previous testing also includes a negative serum protein electrophoresis, vitamin B12 643, and TSH 1.39.   Patient continues to use a walker to ambulate    Past Medical History:   Diagnosis Date    Arthritis     Cataract     Cataracts, bilateral     Diverticulosis of colon 2015    Headache(784.0)     History of colon polyps 2015    Insomnia     Neck pain     Osteoarthrosis, unspecified whether generalized or localized, unspecified site 10/5/2012    Pure hypercholesterolemia 10/5/2012    Shoulder blade pain     right  side    Stroke ([facility] [address]     Tubular adenoma of colon 2015    Type II or unspecified type diabetes mellitus without mention of complication, not stated as uncontrolled 10/5/2012    Unspecified essential hypertension 10/5/2012    Unspecified sleep apnea          Past Surgical History:   Procedure Laterality Date    COLONOSCOPY  8/23/06    Dr Zeferino Vo  5 12 15    extensive diverticulosis, tubular adenoma    EPIDURAL STEROID INJECTION N/A 8/25/2017    EPIDURAL STEROID INJECTION cervical performed by Shanta Flor MD at [facility] 11/20/2017    EPIDURAL STEROID INJECTION L5S1 performed by Shanta Flor MD at [address]  10/1/06    NECK SURGERY  5/2011 & 2001    OTHER SURGICAL HISTORY  08/25/2017    L5-S1 steroid injection    OTHER SURGICAL HISTORY  11/20/2017    VANITA L5-S1       Family History   Problem Relation Age of Onset    Breast Cancer Sister     Cancer Sister      liver    Diabetes Daughter        Social History   Substance Use Topics    Smoking status: Former Smoker     Types: Cigarettes     Quit date: 10/5/2001    Smokeless tobacco: Never Used    Alcohol use 0.0 oz/week      Comment: on occasion                               REVIEW OF SYSTEMS    CONSTITUTIONAL Weight: absent, Appetite: absent, Fatigue: absent      HEENT Ears: tinnitus, Visual disturbance: absent   RESPIRATORY Shortness of breath: present, Cough: present   CARDIOVASCULAR Chest pain: absent, Leg swelling :absent      GI Constipation: present, Diarrhea: absent, Swallowing change: absent       Urinary frequency: absent, Urinary urgency: absent, Urinary incontinence: absent   MUSCULOSKELETAL Neck pain: present, Back pain: present, Stiffness: present, Muscle pain: present, Joint pain: present Restless legs: absent   DERMATOLOGIC Hair loss: absent, Skin changes: absent   NEUROLOGIC Memory loss: absent, Confusion: absent, Seizures: absent Trouble walking or imbalance: gabapentin. 2. Lumbar stenosis with neurogenic claudication. Severe central canal stenosis at L4-5 and moderate stenosis at L5-1  1. Continue using a walker to ambulate  3. Primary axonal sensorimotor neuropathy  1.  Continue medications as above            Signed: Zoya Cagle, CNP

## 2018-03-06 RX ORDER — AMLODIPINE BESYLATE 5 MG/1
TABLET ORAL
Qty: 30 TABLET | Refills: 5 | Status: SHIPPED | OUTPATIENT
Start: 2018-03-06 | End: 2018-09-01 | Stop reason: SDUPTHER

## 2018-03-08 DIAGNOSIS — M48.02 CERVICAL STENOSIS OF SPINE: Chronic | ICD-10-CM

## 2018-03-08 DIAGNOSIS — M15.9 PRIMARY OSTEOARTHRITIS INVOLVING MULTIPLE JOINTS: ICD-10-CM

## 2018-03-09 RX ORDER — HYDROCODONE BITARTRATE AND ACETAMINOPHEN 5; 325 MG/1; MG/1
1 TABLET ORAL 2 TIMES DAILY PRN
Qty: 60 TABLET | Refills: 0 | Status: SHIPPED | OUTPATIENT
Start: 2018-03-09 | End: 2018-03-28 | Stop reason: SDUPTHER

## 2018-03-15 RX ORDER — ATORVASTATIN CALCIUM 40 MG/1
TABLET, FILM COATED ORAL
Qty: 90 TABLET | Refills: 1 | Status: SHIPPED | OUTPATIENT
Start: 2018-03-15 | End: 2018-07-19 | Stop reason: SDUPTHER

## 2018-03-28 ENCOUNTER — OFFICE VISIT (OUTPATIENT)
Dept: PAIN MANAGEMENT | Age: 78
End: 2018-03-28
Payer: MEDICARE

## 2018-03-28 VITALS
TEMPERATURE: 98 F | WEIGHT: 180 LBS | HEIGHT: 62 IN | DIASTOLIC BLOOD PRESSURE: 67 MMHG | RESPIRATION RATE: 16 BRPM | SYSTOLIC BLOOD PRESSURE: 109 MMHG | HEART RATE: 85 BPM | BODY MASS INDEX: 33.13 KG/M2 | OXYGEN SATURATION: 97 %

## 2018-03-28 DIAGNOSIS — M48.02 CERVICAL STENOSIS OF SPINE: Chronic | ICD-10-CM

## 2018-03-28 DIAGNOSIS — M48.062 LUMBAR STENOSIS WITH NEUROGENIC CLAUDICATION: Primary | ICD-10-CM

## 2018-03-28 DIAGNOSIS — M96.1 FAILED NECK SYNDROME: Chronic | ICD-10-CM

## 2018-03-28 DIAGNOSIS — M15.9 PRIMARY OSTEOARTHRITIS INVOLVING MULTIPLE JOINTS: ICD-10-CM

## 2018-03-28 PROCEDURE — 3017F COLORECTAL CA SCREEN DOC REV: CPT | Performed by: ANESTHESIOLOGY

## 2018-03-28 PROCEDURE — 1123F ACP DISCUSS/DSCN MKR DOCD: CPT | Performed by: ANESTHESIOLOGY

## 2018-03-28 PROCEDURE — G8417 CALC BMI ABV UP PARAM F/U: HCPCS | Performed by: ANESTHESIOLOGY

## 2018-03-28 PROCEDURE — 1036F TOBACCO NON-USER: CPT | Performed by: ANESTHESIOLOGY

## 2018-03-28 PROCEDURE — 99214 OFFICE O/P EST MOD 30 MIN: CPT | Performed by: ANESTHESIOLOGY

## 2018-03-28 PROCEDURE — G8427 DOCREV CUR MEDS BY ELIG CLIN: HCPCS | Performed by: ANESTHESIOLOGY

## 2018-03-28 PROCEDURE — G8399 PT W/DXA RESULTS DOCUMENT: HCPCS | Performed by: ANESTHESIOLOGY

## 2018-03-28 PROCEDURE — 1090F PRES/ABSN URINE INCON ASSESS: CPT | Performed by: ANESTHESIOLOGY

## 2018-03-28 PROCEDURE — 4040F PNEUMOC VAC/ADMIN/RCVD: CPT | Performed by: ANESTHESIOLOGY

## 2018-03-28 PROCEDURE — G8482 FLU IMMUNIZE ORDER/ADMIN: HCPCS | Performed by: ANESTHESIOLOGY

## 2018-03-28 RX ORDER — HYDROCODONE BITARTRATE AND ACETAMINOPHEN 5; 325 MG/1; MG/1
1 TABLET ORAL 2 TIMES DAILY PRN
Qty: 60 TABLET | Refills: 0 | Status: SHIPPED | OUTPATIENT
Start: 2018-04-07 | End: 2018-05-21 | Stop reason: SDUPTHER

## 2018-03-28 RX ORDER — GLIMEPIRIDE 2 MG/1
TABLET ORAL
COMMUNITY
Start: 2018-03-18 | End: 2018-06-28 | Stop reason: SDUPTHER

## 2018-03-28 RX ORDER — POLYETHYLENE GLYCOL 3350 17 G/17G
POWDER, FOR SOLUTION ORAL
COMMUNITY
Start: 2018-03-25 | End: 2018-07-20

## 2018-03-28 ASSESSMENT — ENCOUNTER SYMPTOMS
CONSTIPATION: 1
BACK PAIN: 1
CHOKING: 1

## 2018-03-28 NOTE — PROGRESS NOTES
dizziness (now and then , not too often), weakness (uses a walker ) and numbness (hands ). Hematological: Bruises/bleeds easily. Psychiatric/Behavioral: Positive for sleep disturbance. All other systems reviewed and are negative  Objective:   Physical Exam   Constitutional: She is oriented to person, place, and time. She appears well-developed and well-nourished. No distress. HENT:   Head: Normocephalic and atraumatic. Eyes: Conjunctivae and EOM are normal. Pupils are equal, round, and reactive to light. Cardiovascular: Normal rate, regular rhythm and normal heart sounds. Pulmonary/Chest: Effort normal and breath sounds normal.   Musculoskeletal:        Cervical back: She exhibits decreased range of motion, tenderness and pain. Lumbar back: She exhibits decreased range of motion, tenderness and pain. Back:    Neurological: She is alert and oriented to person, place, and time. Skin: Skin is warm and dry. Psychiatric: She has a normal mood and affect. Her behavior is normal. Thought content normal.   Nursing note and vitals reviewed. Assessment:        79-year-old woman with a history of chronic neck and chronic lower back pain. -- She describes her neck pain location in the upper mid and lower neck with extension over both shoulder and then radiation down the left arm to the elbow. She reported numbness and paresthesia in both arms. Her neck pain aggravates with activity nothing seems to alleviate the pain. In past she was diagnosed with cervical spinal stenosis and had a cervical spine fusion with Dr. Rocío Marques 7 years ago with no improvement in her symptoms since her surgery. She denies any loss of bladder or bowel control. Last year she had an MRI of cervical spine.   MRI cervical spine without intravenous contrast, 8/15/2016    Status post anterior cervical spine fusion with severe central canal stenosis at C6-C7 level     We did a cervical VANITA  She report good but level by level complete analysis and details. Procedure Performed:  Transforaminal lumbar epidural steroid injection at the levels of L4  on the Bilateral side under fluoroscopic guidance. INTERVAL HISTORY:  - Neck pain with radiation down both arms  Ongoing neck pain issues with radiation down left arm more than rightsignificant worsening since last evaluation report worsening weakness in leg and walking with use of walker only    - Lower back pain with radiation down both legs, associated with leg weakness heaviness and numbness, pain aggravates with standing and walking and alleviates with rest  Report worsening leg weakness    - Medication management  Taking several medications for pain including Elavil tizanidine Neurontin and Norco  Denies any side effect of the medication  OA RRS report is reviewed and is consistent with prescription history  No sign symptoms of aberrancy  Last urine drug screening was positive for alcohol    1. Lumbar stenosis with neurogenic claudication     2. Cervical stenosis of spine  HYDROcodone-acetaminophen (NORCO) 5-325 MG per tablet   3. Primary osteoarthritis involving multiple joints  HYDROcodone-acetaminophen (NORCO) 5-325 MG per tablet   4. Failed neck syndrome               Plan:        - Patient is advised to avoid use of alcohol as several of her medication have sedated of potential and can have increased risk of fall with alcohol consumption     - I advised her that her previous cervical spine surgery did not significantly improved the cervical spinal stenosis is suggested with a postsurgical MRI and she in my opinion needs new surgical evaluation for possible posterior cervical spine decompression. This was extensively discussed with patient and her accompanying daughter.   They both are reluctant to proceed with any extensive surgical procedure and declined the the referral    - With regard to lower back and bilateral lower extremity pain related to lumbar spinal stenosis, she states that last lumbar epidural injection did not provide any relief. She is not interested in any surgical referral.  I have discussed in in detail the lumbar spine minimally invasive interspace her device she is not sure if she wanted to proceed with that either. She will contact us if she decides to proceed    - medication management Norco refill is provided. No orders of the defined types were placed in this encounter. Orders Placed This Encounter   Medications    HYDROcodone-acetaminophen (NORCO) 5-325 MG per tablet     Sig: Take 1 tablet by mouth 2 times daily as needed for Pain for up to 30 days. Earliest Fill Date: 4/7/18     Dispense:  60 tablet     Refill:  0       Controlled Substances Monitoring: The Prescription Monitoring Report for this patient was reviewed today. Claudetta Simas, MD)    No signs of potential drug abuse or diversion identified. Claudetta Simas, MD)              Existing medication contract.  Claudetta Simas, MD)

## 2018-04-18 RX ORDER — PANTOPRAZOLE SODIUM 40 MG/1
TABLET, DELAYED RELEASE ORAL
Qty: 90 TABLET | Refills: 1 | Status: SHIPPED | OUTPATIENT
Start: 2018-04-18 | End: 2019-02-12 | Stop reason: SDUPTHER

## 2018-05-08 ENCOUNTER — OFFICE VISIT (OUTPATIENT)
Dept: FAMILY MEDICINE CLINIC | Age: 78
End: 2018-05-08
Payer: MEDICARE

## 2018-05-08 VITALS
HEART RATE: 81 BPM | DIASTOLIC BLOOD PRESSURE: 66 MMHG | WEIGHT: 178.6 LBS | BODY MASS INDEX: 32.67 KG/M2 | RESPIRATION RATE: 16 BRPM | TEMPERATURE: 98 F | OXYGEN SATURATION: 94 % | SYSTOLIC BLOOD PRESSURE: 110 MMHG

## 2018-05-08 DIAGNOSIS — G47.9 SLEEPING DIFFICULTY: ICD-10-CM

## 2018-05-08 DIAGNOSIS — B96.89 ACUTE BACTERIAL SINUSITIS: ICD-10-CM

## 2018-05-08 DIAGNOSIS — I10 ESSENTIAL HYPERTENSION: ICD-10-CM

## 2018-05-08 DIAGNOSIS — J30.9 ALLERGIC RHINITIS, UNSPECIFIED CHRONICITY, UNSPECIFIED SEASONALITY, UNSPECIFIED TRIGGER: ICD-10-CM

## 2018-05-08 DIAGNOSIS — E78.00 PURE HYPERCHOLESTEROLEMIA: ICD-10-CM

## 2018-05-08 DIAGNOSIS — J30.89 ACUTE NON-SEASONAL ALLERGIC RHINITIS, UNSPECIFIED TRIGGER: ICD-10-CM

## 2018-05-08 DIAGNOSIS — E11.40 TYPE 2 DIABETES MELLITUS WITH DIABETIC NEUROPATHY, WITHOUT LONG-TERM CURRENT USE OF INSULIN (HCC): Primary | ICD-10-CM

## 2018-05-08 DIAGNOSIS — J01.90 ACUTE BACTERIAL SINUSITIS: ICD-10-CM

## 2018-05-08 LAB — HBA1C MFR BLD: 6.9 %

## 2018-05-08 PROCEDURE — G8417 CALC BMI ABV UP PARAM F/U: HCPCS | Performed by: INTERNAL MEDICINE

## 2018-05-08 PROCEDURE — G8427 DOCREV CUR MEDS BY ELIG CLIN: HCPCS | Performed by: INTERNAL MEDICINE

## 2018-05-08 PROCEDURE — 3044F HG A1C LEVEL LT 7.0%: CPT | Performed by: INTERNAL MEDICINE

## 2018-05-08 PROCEDURE — 2022F DILAT RTA XM EVC RTNOPTHY: CPT | Performed by: INTERNAL MEDICINE

## 2018-05-08 PROCEDURE — G8399 PT W/DXA RESULTS DOCUMENT: HCPCS | Performed by: INTERNAL MEDICINE

## 2018-05-08 PROCEDURE — 1123F ACP DISCUSS/DSCN MKR DOCD: CPT | Performed by: INTERNAL MEDICINE

## 2018-05-08 PROCEDURE — 1036F TOBACCO NON-USER: CPT | Performed by: INTERNAL MEDICINE

## 2018-05-08 PROCEDURE — 4040F PNEUMOC VAC/ADMIN/RCVD: CPT | Performed by: INTERNAL MEDICINE

## 2018-05-08 PROCEDURE — 83036 HEMOGLOBIN GLYCOSYLATED A1C: CPT | Performed by: INTERNAL MEDICINE

## 2018-05-08 PROCEDURE — 99214 OFFICE O/P EST MOD 30 MIN: CPT | Performed by: INTERNAL MEDICINE

## 2018-05-08 PROCEDURE — 1090F PRES/ABSN URINE INCON ASSESS: CPT | Performed by: INTERNAL MEDICINE

## 2018-05-08 PROCEDURE — 3017F COLORECTAL CA SCREEN DOC REV: CPT | Performed by: INTERNAL MEDICINE

## 2018-05-08 RX ORDER — LORATADINE 10 MG/1
TABLET ORAL
Qty: 90 TABLET | Refills: 1 | Status: CANCELLED | OUTPATIENT
Start: 2018-05-08

## 2018-05-08 RX ORDER — LORATADINE 10 MG/1
10 TABLET ORAL DAILY
Qty: 90 TABLET | Refills: 1 | Status: SHIPPED | OUTPATIENT
Start: 2018-05-08 | End: 2019-01-11 | Stop reason: SDUPTHER

## 2018-05-08 RX ORDER — AMOXICILLIN AND CLAVULANATE POTASSIUM 875; 125 MG/1; MG/1
1 TABLET, FILM COATED ORAL 2 TIMES DAILY
Qty: 14 TABLET | Refills: 0 | Status: SHIPPED | OUTPATIENT
Start: 2018-05-08 | End: 2018-05-15

## 2018-05-08 RX ORDER — LISINOPRIL 10 MG/1
10 TABLET ORAL DAILY
Qty: 30 TABLET | Refills: 3 | Status: SHIPPED | OUTPATIENT
Start: 2018-05-08 | End: 2018-09-19 | Stop reason: SDUPTHER

## 2018-05-08 RX ORDER — CLONIDINE HYDROCHLORIDE 0.2 MG/1
0.1 TABLET ORAL DAILY
Qty: 90 TABLET | Refills: 1 | Status: SHIPPED | OUTPATIENT
Start: 2018-05-08 | End: 2019-06-19 | Stop reason: SDUPTHER

## 2018-05-08 RX ORDER — ZOLPIDEM TARTRATE 10 MG/1
5 TABLET ORAL NIGHTLY PRN
Qty: 15 TABLET | Refills: 0 | Status: SHIPPED | OUTPATIENT
Start: 2018-05-08 | End: 2018-06-07

## 2018-05-21 DIAGNOSIS — M15.9 PRIMARY OSTEOARTHRITIS INVOLVING MULTIPLE JOINTS: ICD-10-CM

## 2018-05-21 DIAGNOSIS — M48.02 CERVICAL STENOSIS OF SPINE: Chronic | ICD-10-CM

## 2018-05-21 RX ORDER — AMITRIPTYLINE HYDROCHLORIDE 25 MG/1
TABLET, FILM COATED ORAL
Qty: 60 TABLET | Refills: 1 | Status: SHIPPED | OUTPATIENT
Start: 2018-05-21 | End: 2018-08-06 | Stop reason: SDUPTHER

## 2018-05-23 RX ORDER — HYDROCODONE BITARTRATE AND ACETAMINOPHEN 5; 325 MG/1; MG/1
1 TABLET ORAL 2 TIMES DAILY PRN
Qty: 60 TABLET | Refills: 0 | Status: SHIPPED | OUTPATIENT
Start: 2018-05-23 | End: 2018-07-10 | Stop reason: SDUPTHER

## 2018-05-25 RX ORDER — GABAPENTIN 300 MG/1
CAPSULE ORAL
Qty: 180 CAPSULE | Refills: 0 | Status: SHIPPED | OUTPATIENT
Start: 2018-05-25 | End: 2018-07-13 | Stop reason: SDUPTHER

## 2018-06-05 ENCOUNTER — OFFICE VISIT (OUTPATIENT)
Dept: FAMILY MEDICINE CLINIC | Age: 78
End: 2018-06-05
Payer: MEDICARE

## 2018-06-05 VITALS
DIASTOLIC BLOOD PRESSURE: 70 MMHG | TEMPERATURE: 97.9 F | OXYGEN SATURATION: 94 % | HEART RATE: 77 BPM | SYSTOLIC BLOOD PRESSURE: 120 MMHG | WEIGHT: 178 LBS | BODY MASS INDEX: 32.56 KG/M2

## 2018-06-05 DIAGNOSIS — G47.33 OSA (OBSTRUCTIVE SLEEP APNEA): ICD-10-CM

## 2018-06-05 DIAGNOSIS — I10 ESSENTIAL HYPERTENSION: ICD-10-CM

## 2018-06-05 DIAGNOSIS — E11.40 TYPE 2 DIABETES MELLITUS WITH DIABETIC NEUROPATHY, WITHOUT LONG-TERM CURRENT USE OF INSULIN (HCC): Primary | ICD-10-CM

## 2018-06-05 DIAGNOSIS — J30.2 ACUTE SEASONAL ALLERGIC RHINITIS, UNSPECIFIED TRIGGER: ICD-10-CM

## 2018-06-05 PROCEDURE — 99214 OFFICE O/P EST MOD 30 MIN: CPT | Performed by: INTERNAL MEDICINE

## 2018-06-05 PROCEDURE — 1090F PRES/ABSN URINE INCON ASSESS: CPT | Performed by: INTERNAL MEDICINE

## 2018-06-05 PROCEDURE — 1123F ACP DISCUSS/DSCN MKR DOCD: CPT | Performed by: INTERNAL MEDICINE

## 2018-06-05 PROCEDURE — G8417 CALC BMI ABV UP PARAM F/U: HCPCS | Performed by: INTERNAL MEDICINE

## 2018-06-05 PROCEDURE — 1036F TOBACCO NON-USER: CPT | Performed by: INTERNAL MEDICINE

## 2018-06-05 PROCEDURE — G8427 DOCREV CUR MEDS BY ELIG CLIN: HCPCS | Performed by: INTERNAL MEDICINE

## 2018-06-05 PROCEDURE — 4040F PNEUMOC VAC/ADMIN/RCVD: CPT | Performed by: INTERNAL MEDICINE

## 2018-06-05 PROCEDURE — G8399 PT W/DXA RESULTS DOCUMENT: HCPCS | Performed by: INTERNAL MEDICINE

## 2018-06-14 RX ORDER — ALLOPURINOL 100 MG/1
TABLET ORAL
Qty: 90 TABLET | Refills: 1 | Status: SHIPPED | OUTPATIENT
Start: 2018-06-14 | End: 2018-12-07 | Stop reason: SDUPTHER

## 2018-06-28 RX ORDER — ALLOPURINOL 100 MG/1
TABLET ORAL
Qty: 30 TABLET | Refills: 5 | OUTPATIENT
Start: 2018-06-28

## 2018-06-28 RX ORDER — GLIMEPIRIDE 2 MG/1
TABLET ORAL
Qty: 30 TABLET | Refills: 5 | Status: SHIPPED | OUTPATIENT
Start: 2018-06-28 | End: 2019-01-09 | Stop reason: SDUPTHER

## 2018-06-28 RX ORDER — CARVEDILOL 3.12 MG/1
TABLET ORAL
Qty: 60 TABLET | Refills: 5 | Status: SHIPPED | OUTPATIENT
Start: 2018-06-28 | End: 2019-06-01 | Stop reason: SDUPTHER

## 2018-07-10 ENCOUNTER — OFFICE VISIT (OUTPATIENT)
Dept: PAIN MANAGEMENT | Age: 78
End: 2018-07-10
Payer: MEDICARE

## 2018-07-10 VITALS
RESPIRATION RATE: 14 BRPM | HEIGHT: 62 IN | WEIGHT: 174 LBS | HEART RATE: 80 BPM | BODY MASS INDEX: 32.02 KG/M2 | SYSTOLIC BLOOD PRESSURE: 119 MMHG | OXYGEN SATURATION: 96 % | DIASTOLIC BLOOD PRESSURE: 74 MMHG

## 2018-07-10 DIAGNOSIS — M96.1 FAILED NECK SYNDROME: Primary | Chronic | ICD-10-CM

## 2018-07-10 DIAGNOSIS — M48.062 LUMBAR STENOSIS WITH NEUROGENIC CLAUDICATION: ICD-10-CM

## 2018-07-10 DIAGNOSIS — M54.12 CERVICAL RADICULAR PAIN: Chronic | ICD-10-CM

## 2018-07-10 DIAGNOSIS — M48.02 CERVICAL STENOSIS OF SPINE: Chronic | ICD-10-CM

## 2018-07-10 DIAGNOSIS — M15.9 PRIMARY OSTEOARTHRITIS INVOLVING MULTIPLE JOINTS: ICD-10-CM

## 2018-07-10 PROCEDURE — 99213 OFFICE O/P EST LOW 20 MIN: CPT | Performed by: NURSE PRACTITIONER

## 2018-07-10 RX ORDER — HYDROCODONE BITARTRATE AND ACETAMINOPHEN 5; 325 MG/1; MG/1
1 TABLET ORAL 2 TIMES DAILY PRN
Qty: 60 TABLET | Refills: 0 | Status: SHIPPED | OUTPATIENT
Start: 2018-07-10 | End: 2018-08-10 | Stop reason: SDUPTHER

## 2018-07-10 ASSESSMENT — ENCOUNTER SYMPTOMS
SHORTNESS OF BREATH: 0
COUGH: 0
BACK PAIN: 1
CONSTIPATION: 0

## 2018-07-10 NOTE — PROGRESS NOTES
times daily as needed for Pain for up to 30 days. Juni Senior Date: 5/23/18, Disp: 60 tablet, Rfl: 0    amitriptyline (ELAVIL) 25 MG tablet, take 2 tablets by mouth at bedtime, Disp: 60 tablet, Rfl: 1    metFORMIN (GLUCOPHAGE) 1000 MG tablet, Take 1 tablet by mouth 2 times daily (with meals), Disp: 180 tablet, Rfl: 1    loratadine (CLARITIN) 10 MG tablet, Take 1 tablet by mouth daily, Disp: 90 tablet, Rfl: 1    cloNIDine (CATAPRES) 0.2 MG tablet, Take 0.5 tablets by mouth daily, Disp: 90 tablet, Rfl: 1    pantoprazole (PROTONIX) 40 MG tablet, take 1 tablet by mouth every morning BEFORE BREAKFAST, Disp: 90 tablet, Rfl: 1    polyethylene glycol (GLYCOLAX) powder, , Disp: , Rfl:     atorvastatin (LIPITOR) 40 MG tablet, TAKE 1 TABLET BY MOUTH ONE TIME A DAY , Disp: 90 tablet, Rfl: 1    amLODIPine (NORVASC) 5 MG tablet, take 1 tablet by mouth once daily, Disp: 30 tablet, Rfl: 5    tiZANidine (ZANAFLEX) 2 MG tablet, take 1 tablet by mouth every 8 hours if needed for muscle spasm, Disp: 30 tablet, Rfl: 3    fluticasone (FLONASE) 50 MCG/ACT nasal spray, 1 spray by Nasal route daily, Disp: 1 Bottle, Rfl: 3    ketoconazole (NIZORAL) 2 % cream, Apply topically daily. , Disp: 30 g, Rfl: 1    glucose blood VI test strips (ONE TOUCH ULTRA TEST) strip, TEST BLOOD SUGAR ONCE DAILY    DX E11.9, Disp: 100 strip, Rfl: 4    aspirin 325 MG tablet, Take 325 mg by mouth daily, Disp: , Rfl:     Blood Glucose Monitoring Suppl KIT, 1 blood glucose monitoring supplies kit.  Test strips, lancets, alcohol swabs, Disp: 1 kit, Rfl: 0    lisinopril (PRINIVIL;ZESTRIL) 10 MG tablet, Take 1 tablet by mouth daily, Disp: 30 tablet, Rfl: 3    Chlorpheniramine Maleate (ALLERGY RELIEF PO), Take by mouth daily as needed, Disp: , Rfl:     Family History   Problem Relation Age of Onset    Breast Cancer Sister     Cancer Sister         liver    Diabetes Daughter        Social History     Social History    Marital status: Single     Spouse

## 2018-07-13 RX ORDER — GABAPENTIN 300 MG/1
CAPSULE ORAL
Qty: 180 CAPSULE | Refills: 0 | Status: SHIPPED | OUTPATIENT
Start: 2018-07-13 | End: 2018-08-06 | Stop reason: SDUPTHER

## 2018-07-18 NOTE — TELEPHONE ENCOUNTER
After Visit Summary   7/18/2018    Don Mayer    MRN: [id number]           Patient Information     Date Of Birth          1984        Visit Information        Provider Department      7/18/2018 8:30 AM Joyce Moura MD [facility]        Today's Diagnoses     Routine general medical examination at a health care facility    -  1    Hypertriglyceridemia          Care Instructions    Ask wife's OB about getting a Tdap, your last one was in 2012.    Fasting lipid panel today.     Follow-up in 1-2 years.    Treat sprain with ibuprofen, ice, rest, strengthening exercises and gradual return to activity.     Preventive Health Recommendations  Male Ages 26 - 39    Yearly exam:             See your health care provider every year in order to  o   Review health changes.   o   Discuss preventive care.    o   Review your medicines if your doctor has prescribed any.    You should be tested each year for STDs (sexually transmitted diseases), if you re at risk.     After age 35, talk to your provider about cholesterol testing. If you are at risk for heart disease, have your cholesterol tested at least every 5 years.     If you are at risk for diabetes, you should have a diabetes test (fasting glucose).  Shots: Get a flu shot each year. Get a tetanus shot every 10 years.     Nutrition:    Eat at least 5 servings of fruits and vegetables daily.     Eat whole-grain bread, whole-wheat pasta and brown rice instead of white grains and rice.     Get adequate Calcium and Vitamin D.     Lifestyle    Exercise for at least 150 minutes a week (30 minutes a day, 5 days a week). This will help you control your weight and prevent disease.     Limit alcohol to one drink per day.     No smoking.     Wear sunscreen to prevent skin cancer.     See your dentist every six months for an exam and cleaning.             Follow-ups after your visit        Who to contact     If you have questions or need follow up  She has still not decided yet she is scared but she is talking it over with her kids and she will call back to let me know what she decides. "information about today's clinic visit or your schedule please contact [facility] directly at [phone].  Normal or non-critical lab and imaging results will be communicated to you by MyChart, letter or phone within 4 business days after the clinic has received the results. If you do not hear from us within 7 days, please contact the clinic through [employer] or phone. If you have a critical or abnormal lab result, we will notify you by phone as soon as possible.  Submit refill requests through [employer] or call your pharmacy and they will forward the refill request to us. Please allow 3 business days for your refill to be completed.          Additional Information About Your Visit        [employer] Information     [employer] gives you secure access to your electronic health record. If you see a primary care provider, you can also send messages to your care team and make appointments. If you have questions, please call your primary care clinic.  If you do not have a primary care provider, please call [phone] and they will assist you.        Care EveryWhere ID     This is your Care EveryWhere ID. This could be used by other organizations to access your [place] medical records  [id number]        Your Vitals Were     Pulse Temperature Height Pulse Oximetry BMI (Body Mass Index)       69 98.1  F (36.7  C) (Oral) 5' 10\" (1.778 m) 96% 32.01 kg/m2        Blood Pressure from Last 3 Encounters:   07/18/18 110/72   07/14/18 114/76   07/13/17 106/68    Weight from Last 3 Encounters:   07/18/18 223 lb 1.6 oz (101.2 kg)   07/14/18 223 lb 4.8 oz (101.3 kg)   07/13/17 204 lb 7 oz (92.7 kg)              We Performed the Following     Lipid panel reflex to direct LDL Fasting        Primary Care Provider Office Phone # Fax #    Joyce Moura [phone] [address] [place] [address]        Equal Access to Services     VALERIO JOHNSON AH: alexi Alaniz, " joseekaykayskyler zoraidanilesdanae bateskelbylottieimtiaz ziain hayaan adeeg kharash la'aan ah. So [facility] [place] [phone].    ATENCIÓN: Si rosy linn, tiene a rivera disposición servicios gratuitos de asistencia lingüística. Savage al [phone].    We comply with applicable federal civil rights laws and [place] laws. We do not discriminate on the basis of race, color, national origin, age, disability, sex, sexual orientation, or gender identity.            Thank you!     Thank you for choosing [facility]  for your care. Our goal is always to provide you with excellent care. Hearing back from our patients is one way we can continue to improve our services. Please take a few minutes to complete the written survey that you may receive in the mail after your visit with us. Thank you!             Your Updated Medication List - Protect others around you: Learn how to safely use, store and throw away your medicines at www.disposemymeds.org.          This list is accurate as of 7/18/18  9:06 AM.  Always use your most recent med list.                   Brand Name Dispense Instructions for use Diagnosis    amoxicillin 875 MG tablet    AMOXIL    20 tablet    Take 1 tablet (875 mg) by mouth 2 times daily    Strep throat       Fish Oil 1000 MG Cpdr      Take 4 capsules by mouth daily.        MULTIVITAMINS PO       Routine general medical examination at a health care facility

## 2018-07-19 RX ORDER — ATORVASTATIN CALCIUM 40 MG/1
TABLET, FILM COATED ORAL
Qty: 90 TABLET | Refills: 1 | Status: SHIPPED | OUTPATIENT
Start: 2018-07-19 | End: 2019-01-07 | Stop reason: SDUPTHER

## 2018-08-06 ENCOUNTER — OFFICE VISIT (OUTPATIENT)
Dept: NEUROLOGY | Age: 78
End: 2018-08-06
Payer: MEDICARE

## 2018-08-06 VITALS
DIASTOLIC BLOOD PRESSURE: 56 MMHG | BODY MASS INDEX: 31.58 KG/M2 | HEART RATE: 96 BPM | HEIGHT: 62 IN | SYSTOLIC BLOOD PRESSURE: 95 MMHG | WEIGHT: 171.6 LBS

## 2018-08-06 DIAGNOSIS — M48.062 LUMBAR STENOSIS WITH NEUROGENIC CLAUDICATION: Primary | ICD-10-CM

## 2018-08-06 DIAGNOSIS — M48.02 CERVICAL STENOSIS OF SPINE: Chronic | ICD-10-CM

## 2018-08-06 PROCEDURE — 4040F PNEUMOC VAC/ADMIN/RCVD: CPT | Performed by: NURSE PRACTITIONER

## 2018-08-06 PROCEDURE — G8417 CALC BMI ABV UP PARAM F/U: HCPCS | Performed by: NURSE PRACTITIONER

## 2018-08-06 PROCEDURE — 1123F ACP DISCUSS/DSCN MKR DOCD: CPT | Performed by: NURSE PRACTITIONER

## 2018-08-06 PROCEDURE — 1036F TOBACCO NON-USER: CPT | Performed by: NURSE PRACTITIONER

## 2018-08-06 PROCEDURE — G8427 DOCREV CUR MEDS BY ELIG CLIN: HCPCS | Performed by: NURSE PRACTITIONER

## 2018-08-06 PROCEDURE — 99214 OFFICE O/P EST MOD 30 MIN: CPT | Performed by: NURSE PRACTITIONER

## 2018-08-06 PROCEDURE — 1101F PT FALLS ASSESS-DOCD LE1/YR: CPT | Performed by: NURSE PRACTITIONER

## 2018-08-06 PROCEDURE — G8399 PT W/DXA RESULTS DOCUMENT: HCPCS | Performed by: NURSE PRACTITIONER

## 2018-08-06 PROCEDURE — 1090F PRES/ABSN URINE INCON ASSESS: CPT | Performed by: NURSE PRACTITIONER

## 2018-08-06 RX ORDER — AMITRIPTYLINE HYDROCHLORIDE 25 MG/1
TABLET, FILM COATED ORAL
Qty: 60 TABLET | Refills: 5 | Status: SHIPPED | OUTPATIENT
Start: 2018-08-06 | End: 2019-01-09 | Stop reason: SDUPTHER

## 2018-08-06 RX ORDER — GABAPENTIN 300 MG/1
CAPSULE ORAL
Qty: 180 CAPSULE | Refills: 5 | Status: SHIPPED | OUTPATIENT
Start: 2018-08-06 | End: 2019-02-06

## 2018-08-06 NOTE — PROGRESS NOTES
[facility]            [address]          [place], [address] [facility]          Dept: [phone]          Dept Fax: [phone]        MD Soni Sharp Ma, MD Ahmed B. Osvaldo Dane, MD Sammie Childs, MD Deane Foerster, MD Karolynn Poles, CNP            8/6/2018    HPI:      Your patient, Zach Perez returns for continuing neurologic care. Patient is a 79-year-old woman whose was seen last on February 22, 2018 for management of cervical and lumbar stenosis. Patient is currently taking gabapentin 600 mg 3 times daily and at her last visit, we increased amitriptyline to 50 mg daily to help her sleep as well as to help her chronic pain. Patient complains mainly of pain in her neck radiating into her right arm as well as her low back radiating into her right leg. Patient displays symptoms of neurogenic claudication with pain being minimized while sitting and exacerbated by standing or ambulating. Previous testing includes MRIs of the cervical spine showing severe central canal stenosis at C6-7 and an MRI of the lumbar spine showing spondylitic changes with severe central canal stenosis at L4-5 and moderate canal stenosis at L5-S1. A previous EMG study done in 2016 also confirmed a lumbar radiculopathy as well as a borderline primary axonal sensorimotor neuropathy. After one of her surgeries, patient also had symptoms of confusion and an MRI of the brain done in April 2017 showed microvascular ischemia without acute changes. Patient continues with pain management, who prescribes [address] [facility] and has recently suggested that the patient reconsider having surgical intervention of her lumbar spine. She continues to ambulate with a wheeled walker.     MRI Cervical Spine and Lumbar spine 8/15/16            Past Medical History:   Diagnosis Date    Arthritis     Cataract     Cataracts, bilateral incontinence: absent   MUSCULOSKELETAL Neck pain: present, Back pain: present, Stiffness: present, Muscle pain: present, Joint pain: absent Restless legs: absent   DERMATOLOGIC Hair loss: absent, Skin changes: absent   NEUROLOGIC Memory loss: present, Confusion: absent, Seizures: absent Trouble walking or imbalance: present, Dizziness: present, Weakness: absent, Numbness: present Tremor: absent, Spasm: present, Speech difficulty: absent, Headache: absent, Light sensitivity: absent   PSYCHIATRIC Anxiety: absent, Hallucination: absent, Mood disorder: absent   HEMATOLOGIC Abnormal bleeding: absent, Anemia: absent, Clotting disorder: absent, Lymph gland changes: absent           No Known Allergies        Current Outpatient Prescriptions   Medication Sig Dispense Refill    polyethylene glycol (GLYCOLAX) powder take 17GM (DISSOLVED IN WATER) by mouth once daily 510 g 1    atorvastatin (LIPITOR) 40 MG tablet TAKE 1 TABLET BY MOUTH ONE TIME A DAY 90 tablet 1    gabapentin (NEURONTIN) 300 MG capsule take 2 capsules by mouth three times a day 180 capsule 0    zolpidem (AMBIEN) 10 MG tablet take 1/2 tablet by mouth NIGHTLY if needed for sleep 15 tablet 0    HYDROcodone-acetaminophen (NORCO) 5-325 MG per tablet Take 1 tablet by mouth 2 times daily as needed for Pain for up to 30 days. . 60 tablet 0    glimepiride (AMARYL) 2 MG tablet take 1 tablet once daily WIT BREAKFAST 30 tablet 5    carvedilol (COREG) 3.125 MG tablet take 1 tablet by mouth twice a day 60 tablet 5    allopurinol (ZYLOPRIM) 100 MG tablet take 1 tablet by mouth once daily 90 tablet 1    amitriptyline (ELAVIL) 25 MG tablet take 2 tablets by mouth at bedtime 60 tablet 1    metFORMIN (GLUCOPHAGE) 1000 MG tablet Take 1 tablet by mouth 2 times daily (with meals) 180 tablet 1    loratadine (CLARITIN) 10 MG tablet Take 1 tablet by mouth daily 90 tablet 1    lisinopril (PRINIVIL;ZESTRIL) 10 MG tablet Take 1 tablet by mouth daily 30 tablet 3    cloNIDine no cyanosis, no edema   Pulses: Symmetric over head and neck   Skin: Skin color, texture normal, no rashes, no lesions                                             NEUROLOGIC EXAMINATION    Neurologic Exam     Mental Status   Oriented to person, place, and time. Attention: normal.   Speech: speech is normal   Level of consciousness: alert  Normal comprehension. Cranial Nerves     CN II   Visual fields full to confrontation. CN III, IV, VI   Pupils are equal, round, and reactive to light. Extraocular motions are normal.     CN V   Facial sensation intact. CN VII   Facial expression full, symmetric. CN VIII   CN VIII normal.     CN IX, X   CN IX normal.     CN XI   CN XI normal.     CN XII   CN XII normal.     Motor Exam   Muscle bulk: normal  Overall muscle tone: normal  Right arm tone: normal  Left arm tone: normal  Right arm pronator drift: absent  Left arm pronator drift: absent  Right leg tone: normal  Left leg tone: normal    Strength   Strength 5/5 throughout. Sensory Exam   Right leg light touch: decreased from knee  Left leg light touch: decreased from knee  Right leg vibration: decreased from knee  Left leg vibration: decreased from knee  Right leg pinprick: decreased from knee  Left leg pinprick: decreased from knee    Gait, Coordination, and Reflexes     Gait  Gait: normal    Coordination   Finger to nose coordination: normal    Tremor   Resting tremor: absent    Reflexes   Right brachioradialis: 2+  Left brachioradialis: 2+  Right biceps: 2+  Left biceps: 2+  Right triceps: 2+  Left triceps: 2+  Right patellar: 1+  Left patellar: 1+  Right achilles: 0  Left achilles: 0  Right ankle clonus: absent  Left ankle clonus: absent            ASSESSMENT/PLAN:       In summary, your patient, Damir Centeno exhibits the following, with associated plan:    1. Cervical stenosis at C6-7  1. Continue gabapentin 600 mg 3 times daily  2. Continue amitriptyline 50 mg at bedtime daily  2.  Lumbar

## 2018-08-10 ENCOUNTER — OFFICE VISIT (OUTPATIENT)
Dept: PAIN MANAGEMENT | Age: 78
End: 2018-08-10
Payer: MEDICARE

## 2018-08-10 VITALS
SYSTOLIC BLOOD PRESSURE: 127 MMHG | DIASTOLIC BLOOD PRESSURE: 72 MMHG | BODY MASS INDEX: 31.98 KG/M2 | TEMPERATURE: 98.5 F | OXYGEN SATURATION: 95 % | WEIGHT: 173.8 LBS | HEIGHT: 62 IN | HEART RATE: 89 BPM

## 2018-08-10 DIAGNOSIS — M48.02 CERVICAL STENOSIS OF SPINE: Chronic | ICD-10-CM

## 2018-08-10 DIAGNOSIS — G89.29 ENCOUNTER FOR CHRONIC PAIN MANAGEMENT: Primary | ICD-10-CM

## 2018-08-10 DIAGNOSIS — M47.817 LUMBOSACRAL SPONDYLOSIS WITHOUT MYELOPATHY: ICD-10-CM

## 2018-08-10 DIAGNOSIS — Z79.891 CHRONIC USE OF OPIATE FOR THERAPEUTIC PURPOSE: ICD-10-CM

## 2018-08-10 DIAGNOSIS — M48.062 LUMBAR STENOSIS WITH NEUROGENIC CLAUDICATION: ICD-10-CM

## 2018-08-10 DIAGNOSIS — M15.9 PRIMARY OSTEOARTHRITIS INVOLVING MULTIPLE JOINTS: ICD-10-CM

## 2018-08-10 DIAGNOSIS — G47.33 OSA (OBSTRUCTIVE SLEEP APNEA): ICD-10-CM

## 2018-08-10 PROCEDURE — G8399 PT W/DXA RESULTS DOCUMENT: HCPCS | Performed by: ANESTHESIOLOGY

## 2018-08-10 PROCEDURE — 1101F PT FALLS ASSESS-DOCD LE1/YR: CPT | Performed by: ANESTHESIOLOGY

## 2018-08-10 PROCEDURE — 1036F TOBACCO NON-USER: CPT | Performed by: ANESTHESIOLOGY

## 2018-08-10 PROCEDURE — 1090F PRES/ABSN URINE INCON ASSESS: CPT | Performed by: ANESTHESIOLOGY

## 2018-08-10 PROCEDURE — 1123F ACP DISCUSS/DSCN MKR DOCD: CPT | Performed by: ANESTHESIOLOGY

## 2018-08-10 PROCEDURE — 4040F PNEUMOC VAC/ADMIN/RCVD: CPT | Performed by: ANESTHESIOLOGY

## 2018-08-10 PROCEDURE — G8427 DOCREV CUR MEDS BY ELIG CLIN: HCPCS | Performed by: ANESTHESIOLOGY

## 2018-08-10 PROCEDURE — G8417 CALC BMI ABV UP PARAM F/U: HCPCS | Performed by: ANESTHESIOLOGY

## 2018-08-10 PROCEDURE — 99214 OFFICE O/P EST MOD 30 MIN: CPT | Performed by: ANESTHESIOLOGY

## 2018-08-10 RX ORDER — HYDROCODONE BITARTRATE AND ACETAMINOPHEN 5; 325 MG/1; MG/1
1 TABLET ORAL 2 TIMES DAILY PRN
Qty: 60 TABLET | Refills: 0 | Status: SHIPPED | OUTPATIENT
Start: 2018-08-10 | End: 2018-09-24 | Stop reason: SDUPTHER

## 2018-08-10 ASSESSMENT — ENCOUNTER SYMPTOMS: RESPIRATORY NEGATIVE: 1

## 2018-08-10 NOTE — PROGRESS NOTES
and fell. she occasionaly has dizzy spells ), weakness (uses walker) and numbness (fingers). Hematological: Bruises/bleeds easily. Psychiatric/Behavioral: Positive for dysphoric mood. Objective:   Physical Exam   Constitutional: She is oriented to person, place, and time. She appears well-developed and well-nourished. No distress. HENT:   Head: Normocephalic and atraumatic. Eyes: Conjunctivae and EOM are normal. Pupils are equal, round, and reactive to light. Cardiovascular: Normal rate. Pulmonary/Chest: Effort normal and breath sounds normal.   Musculoskeletal:        Cervical back: She exhibits decreased range of motion, tenderness and pain. Lumbar back: She exhibits decreased range of motion, tenderness, bony tenderness, pain and spasm. Back:         Legs:  Neurological: She is alert and oriented to person, place, and time. She has normal strength. No sensory deficit. Gait abnormal.   Skin: Skin is warm and dry. Psychiatric: She has a normal mood and affect. Her behavior is normal. Judgment and thought content normal.   Nursing note and vitals reviewed. Assessment:       BRIEF HISTORY:  21-year-old woman with a history of chronic neck and chronic lower back pain. -- Neck pain location in the upper mid and lower neck with extension over both shoulder and then radiation down the left arm to the elbow. She reported numbness and paresthesia in both arms. Her neck pain aggravates with activity nothing seems to alleviate the pain. In past she was diagnosed with cervical spinal stenosis and had a cervical spine fusion with Dr. Nii Schaefer 7 years ago with no improvement in her symptoms since her surgery. She denies any loss of bladder or bowel control. Last year she had an MRI of cervical spine.   MRI cervical spine without intravenous contrast, 8/15/2016    Status post anterior cervical spine fusion with severe central canal stenosis at C6-C7 level     We did a cervical VANITA

## 2018-08-15 DIAGNOSIS — F51.01 PRIMARY INSOMNIA: ICD-10-CM

## 2018-08-15 NOTE — TELEPHONE ENCOUNTER
Last visit: 6/5/18  Last Med refill:5/8/18    Next Visit Date:  Future Appointments  Date Time Provider Pradeep Mcleani   9/5/2018 10:15 AM Connie Sanchez MD [facility] FP [facility]   10/17/2018 9:20 AM MD [facility]   2/6/2019 9:00 AM Jolanta Ocampo, APRN - CNP Neuro [address] [facility] Maintenance   Topic Date Due    Shingles Vaccine (1 of 2 - 2 Dose Series) 05/08/2019 (Originally 5/28/1992)    DTaP/Tdap/Td vaccine (1 - Tdap) 10/18/2026 (Originally 5/28/1959)    Flu vaccine (1) 09/01/2018    Potassium monitoring  02/08/2019    Creatinine monitoring  02/08/2019    DEXA (modify frequency per FRAX score)  Completed    Pneumococcal low/med risk  Completed       Hemoglobin A1C (%)   Date Value   05/08/2018 6.9   02/08/2018 6.0   11/08/2017 6.4             ( goal A1C is < 7)   Microalb/Crt.  Ratio (mcg/mg creat)   Date Value   02/08/2018 CANNOT BE CALCULATED     LDL Cholesterol (mg/dL)   Date Value   02/08/2018 69   01/12/2017 100       (goal LDL is <100)   AST (U/L)   Date Value   02/08/2018 23     ALT (U/L)   Date Value   02/08/2018 20     BUN (mg/dL)   Date Value   02/08/2018 9     BP Readings from Last 3 Encounters:   08/10/18 127/72   08/06/18 (!) 95/56   07/10/18 119/74          (goal 120/80)    All Future Testing planned in [facility]  Lab Frequency Next Occurrence   AL INJ DX/THER AGNT PARAVERT FACET JOINT, LUMBAR/SAC, 1ST LEVEL Once 08/11/2018   AL NJX DX/THER SBST INTRLMNR CRV/THRC W/IMG GDN Once 08/11/2018   AL INJECT ANES/STEROID FORAMEN LUMBAR/SACRAL W IMG GUIDE ,1 LEVEL Once 08/11/2018               Patient Active Problem List:     Osteoarthritis     Essential hypertension     Type 2 diabetes mellitus with diabetic neuropathy, without long-term current use of insulin (HCC)     Pure hypercholesterolemia     Constipation     Rectal pain     Diverticulosis of colon     Tubular adenoma of colon     History of colon polyps     Rectal pressure     Failed neck syndrome

## 2018-08-16 RX ORDER — ZOLPIDEM TARTRATE 10 MG/1
TABLET ORAL
Qty: 15 TABLET | Refills: 0 | Status: SHIPPED | OUTPATIENT
Start: 2018-08-16 | End: 2018-09-12 | Stop reason: SDUPTHER

## 2018-08-21 RX ORDER — TIZANIDINE 2 MG/1
TABLET ORAL
Qty: 30 TABLET | Refills: 2 | Status: SHIPPED | OUTPATIENT
Start: 2018-08-21 | End: 2019-01-11 | Stop reason: SDUPTHER

## 2018-09-04 RX ORDER — AMLODIPINE BESYLATE 5 MG/1
TABLET ORAL
Qty: 90 TABLET | Refills: 1 | Status: SHIPPED | OUTPATIENT
Start: 2018-09-04 | End: 2019-02-26 | Stop reason: SDUPTHER

## 2018-09-04 NOTE — TELEPHONE ENCOUNTER
of spine     Cervical radicular pain     Lumbar stenosis with neurogenic claudication     JANN (obstructive sleep apnea)

## 2018-09-10 ENCOUNTER — TELEPHONE (OUTPATIENT)
Dept: PAIN MANAGEMENT | Age: 78
End: 2018-09-10

## 2018-09-12 DIAGNOSIS — F51.01 PRIMARY INSOMNIA: ICD-10-CM

## 2018-09-13 RX ORDER — ZOLPIDEM TARTRATE 10 MG/1
TABLET ORAL
Qty: 15 TABLET | Refills: 0 | Status: SHIPPED | OUTPATIENT
Start: 2018-09-13 | End: 2018-10-10 | Stop reason: SDUPTHER

## 2018-09-13 NOTE — TELEPHONE ENCOUNTER
Cervical radicular pain     Lumbar stenosis with neurogenic claudication     JANN (obstructive sleep apnea)

## 2018-09-19 DIAGNOSIS — I10 ESSENTIAL HYPERTENSION: ICD-10-CM

## 2018-09-19 DIAGNOSIS — E11.40 TYPE 2 DIABETES MELLITUS WITH DIABETIC NEUROPATHY, WITHOUT LONG-TERM CURRENT USE OF INSULIN (HCC): ICD-10-CM

## 2018-09-19 RX ORDER — LISINOPRIL 10 MG/1
TABLET ORAL
Qty: 30 TABLET | Refills: 3 | Status: SHIPPED | OUTPATIENT
Start: 2018-09-19 | End: 2018-09-26

## 2018-09-19 NOTE — TELEPHONE ENCOUNTER
Last visit: 6/5/18  Last Med refill: 5/8/18    Next Visit Date:  Future Appointments  Date Time Provider Pradeep Mcleani   9/26/2018 9:15 AM Connie Vaughn MD [facility] FP [facility]   10/19/2018 9:20 AM Rik Knox MD [facility]   2/6/2019 9:00 AM Arin Calderon, APRN - CNP [facility] [address] Maintenance   Topic Date Due    Flu vaccine (1) 09/01/2018    Shingles Vaccine (1 of 2 - 2 Dose Series) 05/08/2019 (Originally 5/28/1992)    DTaP/Tdap/Td vaccine (1 - Tdap) 10/18/2026 (Originally 5/28/1959)    Potassium monitoring  02/08/2019    Creatinine monitoring  02/08/2019    DEXA (modify frequency per FRAX score)  Completed    Pneumococcal low/med risk  Completed       Hemoglobin A1C (%)   Date Value   05/08/2018 6.9   02/08/2018 6.0   11/08/2017 6.4             ( goal A1C is < 7)   Microalb/Crt.  Ratio (mcg/mg creat)   Date Value   02/08/2018 CANNOT BE CALCULATED     LDL Cholesterol (mg/dL)   Date Value   02/08/2018 69   01/12/2017 100       (goal LDL is <100)   AST (U/L)   Date Value   02/08/2018 23     ALT (U/L)   Date Value   02/08/2018 20     BUN (mg/dL)   Date Value   02/08/2018 9     BP Readings from Last 3 Encounters:   08/10/18 127/72   08/06/18 (!) 95/56   07/10/18 119/74          (goal 120/80)    All Future Testing planned in [facility]  Lab Frequency Next Occurrence   DC INJ DX/THER AGNT PARAVERT FACET JOINT, LUMBAR/SAC, 1ST LEVEL Once 08/11/2018   DC NJX DX/THER SBST INTRLMNR CRV/THRC W/IMG GDN Once 08/11/2018   DC INJECT ANES/STEROID FORAMEN LUMBAR/SACRAL W IMG GUIDE ,1 LEVEL Once 08/11/2018               Patient Active Problem List:     Osteoarthritis     Essential hypertension     Type 2 diabetes mellitus with diabetic neuropathy, without long-term current use of insulin (HCC)     Pure hypercholesterolemia     Constipation     Rectal pain     Diverticulosis of colon     Tubular adenoma of colon     History of colon polyps     Rectal pressure     Failed neck syndrome Cervical stenosis of spine     Cervical radicular pain     Lumbar stenosis with neurogenic claudication     JANN (obstructive sleep apnea)

## 2018-09-24 DIAGNOSIS — M48.062 LUMBAR STENOSIS WITH NEUROGENIC CLAUDICATION: ICD-10-CM

## 2018-09-24 DIAGNOSIS — Z79.891 CHRONIC USE OF OPIATE FOR THERAPEUTIC PURPOSE: ICD-10-CM

## 2018-09-24 DIAGNOSIS — M48.02 CERVICAL STENOSIS OF SPINE: Chronic | ICD-10-CM

## 2018-09-24 DIAGNOSIS — M15.9 PRIMARY OSTEOARTHRITIS INVOLVING MULTIPLE JOINTS: ICD-10-CM

## 2018-09-24 NOTE — TELEPHONE ENCOUNTER
10/19/2018 9:20 AM Phil Rodriguez MD [facility]   2/6/2019 9:00 AM YOLANDA Corbett - CNP Neuro [address] [facility] Maintenance   Topic Date Due    Flu vaccine (1) 09/01/2018    Shingles Vaccine (1 of 2 - 2 Dose Series) 05/08/2019 (Originally 5/28/1992)    DTaP/Tdap/Td vaccine (1 - Tdap) 10/18/2026 (Originally 5/28/1959)    Potassium monitoring  02/08/2019    Creatinine monitoring  02/08/2019    DEXA (modify frequency per FRAX score)  Completed    Pneumococcal low/med risk  Completed       Hemoglobin A1C (%)   Date Value   05/08/2018 6.9   02/08/2018 6.0   11/08/2017 6.4                                                                     ( goal A1C is < 7)   Microalb/Crt.  Ratio (mcg/mg creat)   Date Value   02/08/2018 CANNOT BE CALCULATED     LDL Cholesterol (mg/dL)   Date Value   02/08/2018 69                                                  (goal LDL is <100)   AST (U/L)   Date Value   02/08/2018 23     ALT (U/L)   Date Value   02/08/2018 20     BUN (mg/dL)   Date Value   02/08/2018 9     BP Readings from Last 3 Encounters:   08/10/18 127/72   08/06/18 (!) 95/56   07/10/18 119/74                                                                                     (goal 120/80)      Patient Active Problem List:     Osteoarthritis     Essential hypertension     Type 2 diabetes mellitus with diabetic neuropathy, without long-term current use of insulin (HCC)     Pure hypercholesterolemia     Constipation     Rectal pain     Diverticulosis of colon     Tubular adenoma of colon     History of colon polyps     Rectal pressure     Failed neck syndrome     Cervical stenosis of spine     Cervical radicular pain     Lumbar stenosis with neurogenic claudication     JANN (obstructive sleep apnea)

## 2018-09-25 RX ORDER — HYDROCODONE BITARTRATE AND ACETAMINOPHEN 5; 325 MG/1; MG/1
1 TABLET ORAL 2 TIMES DAILY PRN
Qty: 60 TABLET | Refills: 0 | Status: SHIPPED | OUTPATIENT
Start: 2018-09-25 | End: 2018-10-19 | Stop reason: SDUPTHER

## 2018-09-26 ENCOUNTER — OFFICE VISIT (OUTPATIENT)
Dept: FAMILY MEDICINE CLINIC | Age: 78
End: 2018-09-26
Payer: MEDICARE

## 2018-09-26 VITALS
HEART RATE: 78 BPM | RESPIRATION RATE: 16 BRPM | WEIGHT: 168.6 LBS | DIASTOLIC BLOOD PRESSURE: 64 MMHG | BODY MASS INDEX: 30.84 KG/M2 | SYSTOLIC BLOOD PRESSURE: 88 MMHG | OXYGEN SATURATION: 96 % | TEMPERATURE: 97.3 F

## 2018-09-26 DIAGNOSIS — E11.40 TYPE 2 DIABETES MELLITUS WITH DIABETIC NEUROPATHY, WITHOUT LONG-TERM CURRENT USE OF INSULIN (HCC): Primary | ICD-10-CM

## 2018-09-26 DIAGNOSIS — Z63.4 RECENT BEREAVEMENT: ICD-10-CM

## 2018-09-26 DIAGNOSIS — K59.03 DRUG-INDUCED CONSTIPATION: ICD-10-CM

## 2018-09-26 DIAGNOSIS — Z23 NEED FOR INFLUENZA VACCINATION: ICD-10-CM

## 2018-09-26 DIAGNOSIS — R05.8 COUGH SECONDARY TO ANGIOTENSIN CONVERTING ENZYME INHIBITOR (ACE-I): ICD-10-CM

## 2018-09-26 DIAGNOSIS — E78.00 PURE HYPERCHOLESTEROLEMIA: ICD-10-CM

## 2018-09-26 DIAGNOSIS — I10 ESSENTIAL HYPERTENSION: ICD-10-CM

## 2018-09-26 DIAGNOSIS — T46.4X5A COUGH SECONDARY TO ANGIOTENSIN CONVERTING ENZYME INHIBITOR (ACE-I): ICD-10-CM

## 2018-09-26 LAB — HBA1C MFR BLD: 6.3 %

## 2018-09-26 PROCEDURE — 99214 OFFICE O/P EST MOD 30 MIN: CPT | Performed by: INTERNAL MEDICINE

## 2018-09-26 PROCEDURE — 1036F TOBACCO NON-USER: CPT | Performed by: INTERNAL MEDICINE

## 2018-09-26 PROCEDURE — 1123F ACP DISCUSS/DSCN MKR DOCD: CPT | Performed by: INTERNAL MEDICINE

## 2018-09-26 PROCEDURE — G8427 DOCREV CUR MEDS BY ELIG CLIN: HCPCS | Performed by: INTERNAL MEDICINE

## 2018-09-26 PROCEDURE — 1090F PRES/ABSN URINE INCON ASSESS: CPT | Performed by: INTERNAL MEDICINE

## 2018-09-26 PROCEDURE — 4040F PNEUMOC VAC/ADMIN/RCVD: CPT | Performed by: INTERNAL MEDICINE

## 2018-09-26 PROCEDURE — 83036 HEMOGLOBIN GLYCOSYLATED A1C: CPT | Performed by: INTERNAL MEDICINE

## 2018-09-26 PROCEDURE — G8399 PT W/DXA RESULTS DOCUMENT: HCPCS | Performed by: INTERNAL MEDICINE

## 2018-09-26 PROCEDURE — 1101F PT FALLS ASSESS-DOCD LE1/YR: CPT | Performed by: INTERNAL MEDICINE

## 2018-09-26 PROCEDURE — G8417 CALC BMI ABV UP PARAM F/U: HCPCS | Performed by: INTERNAL MEDICINE

## 2018-09-26 RX ORDER — LOSARTAN POTASSIUM 25 MG/1
25 TABLET ORAL DAILY
Qty: 30 TABLET | Refills: 3 | Status: SHIPPED | OUTPATIENT
Start: 2018-09-26 | End: 2019-01-09 | Stop reason: SDUPTHER

## 2018-09-26 SDOH — SOCIAL STABILITY - SOCIAL INSECURITY: DISSAPEARANCE AND DEATH OF FAMILY MEMBER: Z63.4

## 2018-09-26 ASSESSMENT — PATIENT HEALTH QUESTIONNAIRE - PHQ9
2. FEELING DOWN, DEPRESSED OR HOPELESS: 1
SUM OF ALL RESPONSES TO PHQ QUESTIONS 1-9: 1
SUM OF ALL RESPONSES TO PHQ9 QUESTIONS 1 & 2: 1
1. LITTLE INTEREST OR PLEASURE IN DOING THINGS: 0
SUM OF ALL RESPONSES TO PHQ QUESTIONS 1-9: 1

## 2018-09-26 NOTE — PROGRESS NOTES
Subjective:       Patient ID: Jeremie Welch is a 70 y.o. female who presents for   Chief Complaint   Patient presents with    Diabetes       HPI:  Nursing note reviewed and discussed with patient. Diabetes Mellitus  Patient presents for follow up of diabetes. Current symptoms include: hypoglycemia  and paresthesia of the feet. Symptoms have been well-controlled. Patient denies foot ulcerations, hypoglycemia , increase appetite, nausea, polydipsia, polyuria, visual disturbances, vomiting and weight loss. Evaluation to date has included: hemoglobin A1C. Home sugars: BGs are running  consistent with Hgb A1C. Current treatment: Continued amaryl and metformin which has been effective and  . Last dilated eye exam: 3-4 years ago, has an appointment scheduled but needs a referral. Does not see podiatry. Foot exam done here 11/2017. Sugars 120's in AM,  in evening. Not checking sugars daily       Additional Complaints:  Dyslipidemia  Patient presents for evaluation of lipids. Compliance with treatment thus far has been good. A repeat fasting lipid profile was not done. She will be getting labs drawn today. The patient does use medications that may worsen dyslipidemias (corticosteroids, progestins, anabolic steroids, diuretics, beta-blockers, amiodarone, cyclosporine, olanzapine). The patient exercises rarely. The patient is not known to have coexisting coronary artery disease. Hypertension  Patient is here for follow-up of elevated blood pressure. She is not exercising and is adherent to a low-salt diet. Blood pressure is well controlled at home. Cardiac symptoms: none. Patient denies chest pain, chest pressure/discomfort, claudication, dyspnea, exertional chest pressure/discomfort, fatigue, irregular heart beat, lower extremity edema, near-syncope, orthopnea, palpitations, paroxysmal nocturnal dyspnea, syncope and tachypnea.  Cardiovascular risk factors: advanced age (older than 54 for men, 72 for women), A1C)    2. Essential hypertension  D/c lisinopril due to ACEI-induced cough   - losartan (COZAAR) 25 MG tablet; Take 1 tablet by mouth daily  Dispense: 30 tablet; Refill: 3    3. Pure hypercholesterolemia  Continue lipitor     4. Drug-induced constipation  Continue bowel regimen with miralax    5. Cough secondary to angiotensin converting enzyme inhibitor (ACE-I)  D/c lisinopril     6. Need for influenza vaccination  - INFLUENZA, HIGH DOSE, 65 YRS +, IM, PF, PREFILL SYR, 0.5ML (FLUZONE HD)    7. Recent bereavement  Increase to ambien 10mg at night for next week or so. If not able to go back down to 5mg thereafter will consider adding some remeron and counseling referral due to concern for complicated grief.           Electronically signed by Eligio Leiva MD on 9/26/2018 at 10:10 AM

## 2018-09-27 PROCEDURE — G0008 ADMIN INFLUENZA VIRUS VAC: HCPCS | Performed by: INTERNAL MEDICINE

## 2018-09-27 PROCEDURE — 90662 IIV NO PRSV INCREASED AG IM: CPT | Performed by: INTERNAL MEDICINE

## 2018-10-01 ENCOUNTER — HOSPITAL ENCOUNTER (OUTPATIENT)
Age: 78
Setting detail: OUTPATIENT SURGERY
Discharge: HOME | End: 2018-10-01
Attending: ANESTHESIOLOGY | Admitting: ANESTHESIOLOGY
Payer: MEDICARE

## 2018-10-01 ENCOUNTER — APPOINTMENT (OUTPATIENT)
Dept: GENERAL RADIOLOGY | Age: 78
End: 2018-10-01
Attending: ANESTHESIOLOGY
Payer: MEDICARE

## 2018-10-01 VITALS
SYSTOLIC BLOOD PRESSURE: 134 MMHG | HEART RATE: 75 BPM | BODY MASS INDEX: 30.84 KG/M2 | HEIGHT: 62 IN | TEMPERATURE: 97.9 F | DIASTOLIC BLOOD PRESSURE: 62 MMHG | RESPIRATION RATE: 16 BRPM | WEIGHT: 167.6 LBS | OXYGEN SATURATION: 95 %

## 2018-10-01 LAB — GLUCOSE BLD-MCNC: 85 MG/DL (ref 65–105)

## 2018-10-01 PROCEDURE — 99152 MOD SED SAME PHYS/QHP 5/>YRS: CPT | Performed by: ANESTHESIOLOGY

## 2018-10-01 PROCEDURE — 3600000050 HC PAIN LEVEL 1 BASE: Performed by: ANESTHESIOLOGY

## 2018-10-01 PROCEDURE — 7100000011 HC PHASE II RECOVERY - ADDTL 15 MIN: Performed by: ANESTHESIOLOGY

## 2018-10-01 PROCEDURE — 3600000051 HC PAIN LEVEL 1 ADDL 15 MIN: Performed by: ANESTHESIOLOGY

## 2018-10-01 PROCEDURE — 7100000010 HC PHASE II RECOVERY - FIRST 15 MIN: Performed by: ANESTHESIOLOGY

## 2018-10-01 PROCEDURE — 82947 ASSAY GLUCOSE BLOOD QUANT: CPT

## 2018-10-01 PROCEDURE — 6360000002 HC RX W HCPCS: Performed by: ANESTHESIOLOGY

## 2018-10-01 PROCEDURE — 2580000003 HC RX 258: Performed by: ANESTHESIOLOGY

## 2018-10-01 PROCEDURE — 62321 NJX INTERLAMINAR CRV/THRC: CPT | Performed by: ANESTHESIOLOGY

## 2018-10-01 PROCEDURE — 2709999900 HC NON-CHARGEABLE SUPPLY: Performed by: ANESTHESIOLOGY

## 2018-10-01 PROCEDURE — 2500000003 HC RX 250 WO HCPCS: Performed by: ANESTHESIOLOGY

## 2018-10-01 PROCEDURE — 3209999900 FLUORO FOR SURGICAL PROCEDURES

## 2018-10-01 RX ORDER — SODIUM CHLORIDE 0.9 % (FLUSH) 0.9 %
10 SYRINGE (ML) INJECTION PRN
Status: DISCONTINUED | OUTPATIENT
Start: 2018-10-01 | End: 2018-10-01 | Stop reason: HOSPADM

## 2018-10-01 RX ORDER — SODIUM CHLORIDE 0.9 % (FLUSH) 0.9 %
10 SYRINGE (ML) INJECTION EVERY 12 HOURS SCHEDULED
Status: DISCONTINUED | OUTPATIENT
Start: 2018-10-01 | End: 2018-10-01 | Stop reason: HOSPADM

## 2018-10-01 RX ORDER — MIDAZOLAM HYDROCHLORIDE 1 MG/ML
INJECTION INTRAMUSCULAR; INTRAVENOUS PRN
Status: DISCONTINUED | OUTPATIENT
Start: 2018-10-01 | End: 2018-10-01 | Stop reason: HOSPADM

## 2018-10-01 RX ORDER — FENTANYL CITRATE 50 UG/ML
INJECTION, SOLUTION INTRAMUSCULAR; INTRAVENOUS PRN
Status: DISCONTINUED | OUTPATIENT
Start: 2018-10-01 | End: 2018-10-01 | Stop reason: HOSPADM

## 2018-10-01 RX ORDER — SODIUM CHLORIDE 0.9 % (FLUSH) 0.9 %
10 SYRINGE (ML) INJECTION EVERY 12 HOURS SCHEDULED
Status: DISCONTINUED | OUTPATIENT
Start: 2018-10-01 | End: 2018-10-01

## 2018-10-01 RX ORDER — SODIUM CHLORIDE 0.9 % (FLUSH) 0.9 %
10 SYRINGE (ML) INJECTION PRN
Status: DISCONTINUED | OUTPATIENT
Start: 2018-10-01 | End: 2018-10-01

## 2018-10-01 ASSESSMENT — PAIN SCALES - GENERAL
PAINLEVEL_OUTOF10: 3
PAINLEVEL_OUTOF10: 0
PAINLEVEL_OUTOF10: 3
PAINLEVEL_OUTOF10: 3

## 2018-10-01 ASSESSMENT — PAIN - FUNCTIONAL ASSESSMENT: PAIN_FUNCTIONAL_ASSESSMENT: 0-10

## 2018-10-01 ASSESSMENT — PAIN DESCRIPTION - DESCRIPTORS: DESCRIPTORS: ACHING

## 2018-10-01 NOTE — H&P
speech, cranial nerves II through XII grossly intact  Strength 5+/5+   Skin: No gross lesions, rashes, bruising or bleeding on exposed skin area  Extremities:  peripheral pulses palpable, no pedal edema or calf pain with palpation  Psych: normal affect     Investigations:      Laboratory Testing:  Recent Results (from the past 24 hour(s))   POC Glucose Fingerstick    Collection Time: 10/01/18 10:30 AM   Result Value Ref Range    POC Glucose 85 65 - 105 mg/dL       No results for input(s): HGB, HCT, WBC, MCV, PLATELET, NA, K, CL, CO2, BUN, CREATININE, GLUCOSE, INR, PROTIME, APTT, AST, ALT, LABALBU, HCG in the last 720 hours. Imaging/Diagnostics:      Diagnosis:      1. Cervical spine stenosis     Plans:     1.  Cervical Epidural steroid injection C7-T1      YOLANDA Valladares - CNP  10/1/2018  10:42 AM

## 2018-10-10 DIAGNOSIS — F51.01 PRIMARY INSOMNIA: ICD-10-CM

## 2018-10-10 RX ORDER — ZOLPIDEM TARTRATE 10 MG/1
TABLET ORAL
Qty: 15 TABLET | Refills: 0 | Status: SHIPPED | OUTPATIENT
Start: 2018-10-10 | End: 2018-11-20 | Stop reason: SDUPTHER

## 2018-10-10 NOTE — TELEPHONE ENCOUNTER
Last visit: 9/26/18  Last Med refill: 9/13/18    Next Visit Date:  Future Appointments  Date Time Provider Pradeep Alexander   10/19/2018 9:20 AM Festus Hashimoto, MD [facility]   11/28/2018 9:45 AM Connie Hamilton MD [facility] FP [facility]   2/6/2019 9:00 AM Acosta Query, APRN - CNP Neuro [address] [facility] Maintenance   Topic Date Due    Shingles Vaccine (1 of 2 - 2 Dose Series) 05/08/2019 (Originally 5/28/1992)    DTaP/Tdap/Td vaccine (1 - Tdap) 10/18/2026 (Originally [birth year]28/1959)    Potassium monitoring  02/08/2019    Creatinine monitoring  02/08/2019    DEXA (modify frequency per FRAX score)  Completed    Flu vaccine  Completed    Pneumococcal low/med risk  Completed       Hemoglobin A1C (%)   Date Value   09/26/2018 6.3   05/08/2018 6.9   02/08/2018 6.0             ( goal A1C is < 7)   Microalb/Crt.  Ratio (mcg/mg creat)   Date Value   02/08/2018 CANNOT BE CALCULATED     LDL Cholesterol (mg/dL)   Date Value   02/08/2018 69   01/12/2017 100       (goal LDL is <100)   AST (U/L)   Date Value   02/08/2018 23     ALT (U/L)   Date Value   02/08/2018 20     BUN (mg/dL)   Date Value   02/08/2018 9     BP Readings from Last 3 Encounters:   10/01/18 134/62   09/26/18 88/64   08/10/18 127/72          (goal 120/80)    All Future Testing planned in [facility]  Lab Frequency Next Occurrence   ME INJ DX/THER AGNT PARAVERT FACET JOINT, LUMBAR/SAC, 1ST LEVEL Once 08/11/2018   ME NJX DX/THER SBST INTRLMNR CRV/THRC W/IMG GDN Once 08/11/2018   ME INJECT ANES/STEROID FORAMEN LUMBAR/SACRAL W IMG GUIDE ,1 LEVEL Once 08/11/2018               Patient Active Problem List:     Osteoarthritis     Essential hypertension     Type 2 diabetes mellitus with diabetic neuropathy, without long-term current use of insulin (HCC)     Pure hypercholesterolemia     Constipation     Rectal pain     Diverticulosis of colon     Tubular adenoma of colon     History of colon polyps     Rectal pressure     Failed neck syndrome     Cervical

## 2018-10-14 DIAGNOSIS — F51.01 PRIMARY INSOMNIA: ICD-10-CM

## 2018-10-15 RX ORDER — ZOLPIDEM TARTRATE 10 MG/1
TABLET ORAL
Qty: 15 TABLET | Refills: 0 | OUTPATIENT
Start: 2018-10-15 | End: 2018-11-14

## 2018-10-19 ENCOUNTER — OFFICE VISIT (OUTPATIENT)
Dept: PAIN MANAGEMENT | Age: 78
End: 2018-10-19
Payer: MEDICARE

## 2018-10-19 VITALS
RESPIRATION RATE: 16 BRPM | HEIGHT: 62 IN | SYSTOLIC BLOOD PRESSURE: 125 MMHG | DIASTOLIC BLOOD PRESSURE: 71 MMHG | HEART RATE: 83 BPM | WEIGHT: 171.8 LBS | OXYGEN SATURATION: 97 % | TEMPERATURE: 97.9 F | BODY MASS INDEX: 31.62 KG/M2

## 2018-10-19 DIAGNOSIS — M47.817 LUMBOSACRAL SPONDYLOSIS WITHOUT MYELOPATHY: Primary | ICD-10-CM

## 2018-10-19 DIAGNOSIS — M15.9 PRIMARY OSTEOARTHRITIS INVOLVING MULTIPLE JOINTS: ICD-10-CM

## 2018-10-19 DIAGNOSIS — Z79.891 CHRONIC USE OF OPIATE FOR THERAPEUTIC PURPOSE: ICD-10-CM

## 2018-10-19 DIAGNOSIS — M48.062 LUMBAR STENOSIS WITH NEUROGENIC CLAUDICATION: ICD-10-CM

## 2018-10-19 DIAGNOSIS — M48.02 CERVICAL STENOSIS OF SPINE: Chronic | ICD-10-CM

## 2018-10-19 PROCEDURE — G8417 CALC BMI ABV UP PARAM F/U: HCPCS | Performed by: ANESTHESIOLOGY

## 2018-10-19 PROCEDURE — 1123F ACP DISCUSS/DSCN MKR DOCD: CPT | Performed by: ANESTHESIOLOGY

## 2018-10-19 PROCEDURE — G8482 FLU IMMUNIZE ORDER/ADMIN: HCPCS | Performed by: ANESTHESIOLOGY

## 2018-10-19 PROCEDURE — 1036F TOBACCO NON-USER: CPT | Performed by: ANESTHESIOLOGY

## 2018-10-19 PROCEDURE — G8427 DOCREV CUR MEDS BY ELIG CLIN: HCPCS | Performed by: ANESTHESIOLOGY

## 2018-10-19 PROCEDURE — G8399 PT W/DXA RESULTS DOCUMENT: HCPCS | Performed by: ANESTHESIOLOGY

## 2018-10-19 PROCEDURE — 99214 OFFICE O/P EST MOD 30 MIN: CPT | Performed by: ANESTHESIOLOGY

## 2018-10-19 PROCEDURE — 4040F PNEUMOC VAC/ADMIN/RCVD: CPT | Performed by: ANESTHESIOLOGY

## 2018-10-19 PROCEDURE — 1101F PT FALLS ASSESS-DOCD LE1/YR: CPT | Performed by: ANESTHESIOLOGY

## 2018-10-19 PROCEDURE — 1090F PRES/ABSN URINE INCON ASSESS: CPT | Performed by: ANESTHESIOLOGY

## 2018-10-19 RX ORDER — HYDROCODONE BITARTRATE AND ACETAMINOPHEN 5; 325 MG/1; MG/1
1 TABLET ORAL 2 TIMES DAILY PRN
Qty: 60 TABLET | Refills: 0 | Status: SHIPPED | OUTPATIENT
Start: 2018-10-24 | End: 2018-12-06 | Stop reason: SDUPTHER

## 2018-10-19 ASSESSMENT — ENCOUNTER SYMPTOMS: RESPIRATORY NEGATIVE: 1

## 2018-10-19 NOTE — PROGRESS NOTES
2018  Positive for gabapentin, hydrocodone and metabolite  No illicit substance  Validity testing okay  Consistent with prescription history    - Lower back pain with radiation down both legs  Onset of pain several years ago, symptoms have progressively worsened over years  Patient a failed conservative measures in past  Pain interfering with quality of life  She has been diagnosed with severe spinal stenosis  She has been offered referable to the surgery but she is adamant that she will do not want to have any surgery at this age. In is constant aching throbbing stabbing pain in back with sharp shooting burning sensation in legs  Pain aggravates with daily physical activity    Lab Results   Component Value Date    LABA1C 6.3 09/26/2018     Lab Results   Component Value Date     01/12/2017       Past Medical History, Past Surgical History, Social History, Allergies and Medications, reviewed and updated in EPIC as indicated    Review of Systems   HENT: Negative. Respiratory: Negative. Cardiovascular: Negative. Musculoskeletal: Positive for joint swelling. Neurological: Positive for dizziness and weakness (uses a walker ). Numbness: fingers. Hematological: Bruises/bleeds easily. Psychiatric/Behavioral: Positive for confusion (minimal ). Objective:   Physical Exam    Assessment:      BRIEF HISTORY:  29-year-old woman with a history of chronic neck and chronic lower back pain. -- Neck pain location in the upper mid and lower neck with extension over both shoulder and then radiation down the left arm to the elbow. She reported numbness and paresthesia in both arms. Her neck pain aggravates with activity nothing seems to alleviate the pain. In past she was diagnosed with cervical spinal stenosis and had a cervical spine fusion with Dr. Jadiel Kinney 7 years ago with no improvement in her symptoms since her surgery. She denies any loss of bladder or bowel control.   Last year she had an MRI of cervical spine. MRI cervical spine without intravenous contrast, 8/15/2016    Status post anterior cervical spine fusion with severe central canal stenosis at C6-C7 level     We did a cervical VANITA 10/2018  She report good but short term pain relief     -- For her lower back pain she describes the pain location in the lower lumbar area as a constant aching throbbing pain sensation which radiates down both legs and numbness in the leg with walking only for a few steps. He she cannot even walk one block. She walks with the help of 4 walker in a forward leaning posture. Her pain and numbness in the leg improves with sitting. She denies any loss of bladder or bowel control. She tried conservative measures with rest lifestyle modifications and medications and home exercises with no relief pain significantly interfere with her quality of life and daily activities. MRI lumbar spine without intravenous contrast, 8/15/2016    L1-L2 level: Bilateral facet arthrosis with ligamentum flavum thickening without significant central canal stenosis and mild bilateral neuroforaminal narrowing     L2-L3 level: Mild bilateral facet arthrosis with ligamentum flavum thickening and broad-based is bulge with mild bilateral neuroforaminal narrowing without significant central canal stenosis. L3-L4 level: Mild bilateral facet arthrosis with ligamentum flavum thickening and facet joint fusions with broad-based is bulge resulting in moderate to severe bilateral neuroforaminal narrowing and moderate central canal stenosis. L4-5 level: Bilateral facet arthrosis with ligamentum flavum thickening and broad-based is bulge resulting in severe bilateral neuroforaminal narrowing and severe central canal stenosis. Complete effacement of the thecal sac at this level.  Bilateral facet joint fusions     L5-S1 level: Bilateral facet arthrosis and ligamentum flavum thickening and broad-based is bulge with moderate bilateral neuroforaminal

## 2018-11-10 DIAGNOSIS — E11.40 TYPE 2 DIABETES MELLITUS WITH DIABETIC NEUROPATHY, WITHOUT LONG-TERM CURRENT USE OF INSULIN (HCC): ICD-10-CM

## 2018-11-19 ENCOUNTER — HOSPITAL ENCOUNTER (OUTPATIENT)
Age: 78
Setting detail: OUTPATIENT SURGERY
Discharge: HOME | End: 2018-11-19
Attending: ANESTHESIOLOGY | Admitting: ANESTHESIOLOGY
Payer: MEDICARE

## 2018-11-19 ENCOUNTER — APPOINTMENT (OUTPATIENT)
Dept: GENERAL RADIOLOGY | Age: 78
End: 2018-11-19
Attending: ANESTHESIOLOGY
Payer: MEDICARE

## 2018-11-19 VITALS
DIASTOLIC BLOOD PRESSURE: 60 MMHG | HEIGHT: 62 IN | BODY MASS INDEX: 30.79 KG/M2 | OXYGEN SATURATION: 97 % | SYSTOLIC BLOOD PRESSURE: 109 MMHG | WEIGHT: 167.3 LBS | RESPIRATION RATE: 12 BRPM | TEMPERATURE: 97.5 F | HEART RATE: 80 BPM

## 2018-11-19 PROBLEM — M48.062 SPINAL STENOSIS OF LUMBAR REGION WITH NEUROGENIC CLAUDICATION: Chronic | Status: ACTIVE | Noted: 2017-09-27

## 2018-11-19 LAB — GLUCOSE BLD-MCNC: 112 MG/DL (ref 65–105)

## 2018-11-19 PROCEDURE — 3209999900 FLUORO FOR SURGICAL PROCEDURES

## 2018-11-19 PROCEDURE — 82947 ASSAY GLUCOSE BLOOD QUANT: CPT

## 2018-11-19 PROCEDURE — 99152 MOD SED SAME PHYS/QHP 5/>YRS: CPT | Performed by: ANESTHESIOLOGY

## 2018-11-19 PROCEDURE — 2709999900 HC NON-CHARGEABLE SUPPLY: Performed by: ANESTHESIOLOGY

## 2018-11-19 PROCEDURE — 2500000003 HC RX 250 WO HCPCS: Performed by: ANESTHESIOLOGY

## 2018-11-19 PROCEDURE — 7100000011 HC PHASE II RECOVERY - ADDTL 15 MIN: Performed by: ANESTHESIOLOGY

## 2018-11-19 PROCEDURE — 62323 NJX INTERLAMINAR LMBR/SAC: CPT | Performed by: ANESTHESIOLOGY

## 2018-11-19 PROCEDURE — 6360000004 HC RX CONTRAST MEDICATION: Performed by: ANESTHESIOLOGY

## 2018-11-19 PROCEDURE — 6360000002 HC RX W HCPCS: Performed by: ANESTHESIOLOGY

## 2018-11-19 PROCEDURE — 3600000050 HC PAIN LEVEL 1 BASE: Performed by: ANESTHESIOLOGY

## 2018-11-19 PROCEDURE — 99153 MOD SED SAME PHYS/QHP EA: CPT | Performed by: ANESTHESIOLOGY

## 2018-11-19 PROCEDURE — 7100000010 HC PHASE II RECOVERY - FIRST 15 MIN: Performed by: ANESTHESIOLOGY

## 2018-11-19 PROCEDURE — 3600000051 HC PAIN LEVEL 1 ADDL 15 MIN: Performed by: ANESTHESIOLOGY

## 2018-11-19 RX ORDER — DEXAMETHASONE SODIUM PHOSPHATE 10 MG/ML
INJECTION INTRAMUSCULAR; INTRAVENOUS PRN
Status: DISCONTINUED | OUTPATIENT
Start: 2018-11-19 | End: 2018-11-19 | Stop reason: HOSPADM

## 2018-11-19 RX ORDER — LIDOCAINE HYDROCHLORIDE 10 MG/ML
INJECTION, SOLUTION EPIDURAL; INFILTRATION; INTRACAUDAL; PERINEURAL PRN
Status: DISCONTINUED | OUTPATIENT
Start: 2018-11-19 | End: 2018-11-19 | Stop reason: HOSPADM

## 2018-11-19 RX ORDER — MIDAZOLAM HYDROCHLORIDE 1 MG/ML
INJECTION INTRAMUSCULAR; INTRAVENOUS PRN
Status: DISCONTINUED | OUTPATIENT
Start: 2018-11-19 | End: 2018-11-19 | Stop reason: HOSPADM

## 2018-11-19 RX ORDER — SODIUM CHLORIDE 0.9 % (FLUSH) 0.9 %
10 SYRINGE (ML) INJECTION PRN
Status: DISCONTINUED | OUTPATIENT
Start: 2018-11-19 | End: 2018-11-19

## 2018-11-19 RX ORDER — SODIUM CHLORIDE 0.9 % (FLUSH) 0.9 %
10 SYRINGE (ML) INJECTION PRN
Status: DISCONTINUED | OUTPATIENT
Start: 2018-11-19 | End: 2018-11-19 | Stop reason: HOSPADM

## 2018-11-19 RX ORDER — SODIUM CHLORIDE 0.9 % (FLUSH) 0.9 %
10 SYRINGE (ML) INJECTION EVERY 12 HOURS SCHEDULED
Status: DISCONTINUED | OUTPATIENT
Start: 2018-11-19 | End: 2018-11-19

## 2018-11-19 RX ORDER — LIDOCAINE HYDROCHLORIDE 10 MG/ML
INJECTION, SOLUTION INFILTRATION; PERINEURAL PRN
Status: DISCONTINUED | OUTPATIENT
Start: 2018-11-19 | End: 2018-11-19 | Stop reason: HOSPADM

## 2018-11-19 RX ORDER — SODIUM CHLORIDE 0.9 % (FLUSH) 0.9 %
10 SYRINGE (ML) INJECTION EVERY 12 HOURS SCHEDULED
Status: DISCONTINUED | OUTPATIENT
Start: 2018-11-19 | End: 2018-11-19 | Stop reason: HOSPADM

## 2018-11-19 RX ORDER — FENTANYL CITRATE 50 UG/ML
INJECTION, SOLUTION INTRAMUSCULAR; INTRAVENOUS PRN
Status: DISCONTINUED | OUTPATIENT
Start: 2018-11-19 | End: 2018-11-19 | Stop reason: HOSPADM

## 2018-11-19 ASSESSMENT — PAIN SCALES - GENERAL
PAINLEVEL_OUTOF10: 4
PAINLEVEL_OUTOF10: 0
PAINLEVEL_OUTOF10: 4

## 2018-11-19 ASSESSMENT — PAIN - FUNCTIONAL ASSESSMENT: PAIN_FUNCTIONAL_ASSESSMENT: 0-10

## 2018-11-19 ASSESSMENT — PAIN DESCRIPTION - DESCRIPTORS: DESCRIPTORS: ACHING

## 2018-11-19 NOTE — H&P
Arnaud Johnson MD   cloNIDine (CATAPRES) 0.2 MG tablet Take 0.5 tablets by mouth daily 5/8/18  Yes Connie Johnson MD   pantoprazole (PROTONIX) 40 MG tablet take 1 tablet by mouth every morning BEFORE BREAKFAST 4/18/18  Yes Connie Johnson MD   Chlorpheniramine Maleate (ALLERGY RELIEF PO) Take by mouth daily as needed   Yes Historical Provider, MD   fluticasone Latishaa Kawasaki) 50 MCG/ACT nasal spray 1 spray by Nasal route daily 11/8/17  Yes Connie Johnson MD   polyethylene glycol (GLYCOLAX) powder take 17GM (DISSOLVED IN WATER) by mouth once daily 7/20/18   YOLANDA Spears CNP   ketoconazole (NIZORAL) 2 % cream Apply topically daily. 11/8/17   Connie Johnson MD   glucose blood VI test strips (ONE TOUCH ULTRA TEST) strip TEST BLOOD SUGAR ONCE DAILY    DX E11.9 10/19/17   Connie Johnson MD   aspirin 325 MG tablet Take 325 mg by mouth daily    Historical Provider, MD   Blood Glucose Monitoring Suppl KIT 1 blood glucose monitoring supplies kit. Test strips, lancets, alcohol swabs 8/26/14   Chalo Dasilva MD        Allergies:     Patient has no known allergies. Social History:     Tobacco:    reports that she quit smoking about 17 years ago. Her smoking use included Cigarettes. She has a 7.50 pack-year smoking history. She has never used smokeless tobacco.  Alcohol:      reports that she drinks alcohol. Drug Use:  reports that she does not use drugs. Family History:     Family History   Problem Relation Age of Onset    Breast Cancer Sister     Cancer Sister         liver    Diabetes Daughter        Review of Systems:     Positive and Negative as described in HPI. CONSTITUTIONAL:  negative for fevers, chills, sweats, fatigue, weight loss  HEENT:  negative for vision, hearing changes, runny nose, throat pain  RESPIRATORY:  negative for shortness of breath, cough, congestion, wheezing. CARDIOVASCULAR:  negative for chest pain, palpitations.   GASTROINTESTINAL:  negative for nausea,

## 2018-11-20 DIAGNOSIS — F51.01 PRIMARY INSOMNIA: ICD-10-CM

## 2018-11-20 NOTE — TELEPHONE ENCOUNTER
Filled 10/10/18 #15 with 0 RF  Seen 9/26/18    Next Visit Date:  Future Appointments  Date Time Provider Pradeep Catherine   11/28/2018 9:45 AM Connie Balbuena MD [facility]   2/6/2019 9:00 AM YOLANDA Cortez - CNP [facility] Maintenance   Topic Date Due    Shingles Vaccine (1 of 2 - 2 Dose Series) 05/08/2019 (Originally 5/28/1992)    DTaP/Tdap/Td vaccine (1 - Tdap) 10/18/2026 (Originally [birth year]28/1959)    Potassium monitoring  02/08/2019    Creatinine monitoring  02/08/2019    DEXA (modify frequency per FRAX score)  Completed    Flu vaccine  Completed    Pneumococcal low/med risk  Completed       Hemoglobin A1C (%)   Date Value   09/26/2018 6.3   05/08/2018 6.9   02/08/2018 6.0             ( goal A1C is < 7)   Microalb/Crt.  Ratio (mcg/mg creat)   Date Value   02/08/2018 CANNOT BE CALCULATED     LDL Cholesterol (mg/dL)   Date Value   02/08/2018 69   01/12/2017 100       (goal LDL is <100)   AST (U/L)   Date Value   02/08/2018 23     ALT (U/L)   Date Value   02/08/2018 20     BUN (mg/dL)   Date Value   02/08/2018 9     BP Readings from Last 3 Encounters:   11/19/18 109/60   10/19/18 125/71   10/01/18 134/62          (goal 120/80)    All Future Testing planned in [facility]  Lab Frequency Next Occurrence               Patient Active Problem List:     Osteoarthritis     Essential hypertension     Type 2 diabetes mellitus with diabetic neuropathy, without long-term current use of insulin (HCC)     Pure hypercholesterolemia     Constipation     Rectal pain     Diverticulosis of colon     Tubular adenoma of colon     History of colon polyps     Rectal pressure     Failed neck syndrome     Cervical stenosis of spine     Cervical radicular pain     Spinal stenosis of lumbar region with neurogenic claudication     JANN (obstructive sleep apnea)

## 2018-11-23 RX ORDER — ZOLPIDEM TARTRATE 10 MG/1
TABLET ORAL
Qty: 15 TABLET | Refills: 0 | Status: SHIPPED | OUTPATIENT
Start: 2018-11-23 | End: 2018-12-28 | Stop reason: SDUPTHER

## 2018-12-03 DIAGNOSIS — Z79.891 CHRONIC USE OF OPIATE FOR THERAPEUTIC PURPOSE: ICD-10-CM

## 2018-12-03 DIAGNOSIS — M48.062 LUMBAR STENOSIS WITH NEUROGENIC CLAUDICATION: ICD-10-CM

## 2018-12-03 DIAGNOSIS — M48.02 CERVICAL STENOSIS OF SPINE: Chronic | ICD-10-CM

## 2018-12-03 DIAGNOSIS — M15.9 PRIMARY OSTEOARTHRITIS INVOLVING MULTIPLE JOINTS: ICD-10-CM

## 2018-12-03 RX ORDER — HYDROCODONE BITARTRATE AND ACETAMINOPHEN 5; 325 MG/1; MG/1
1 TABLET ORAL 2 TIMES DAILY PRN
Qty: 60 TABLET | Refills: 0 | Status: CANCELLED | OUTPATIENT
Start: 2018-12-03 | End: 2019-01-02

## 2018-12-03 NOTE — TELEPHONE ENCOUNTER
Pt scheduled to come in for med refill Thursday
02/08/2019    DEXA (modify frequency per FRAX score)  Completed    Flu vaccine  Completed    Pneumococcal low/med risk  Completed       Hemoglobin A1C (%)   Date Value   09/26/2018 6.3   05/08/2018 6.9   02/08/2018 6.0                                                                     ( goal A1C is < 7)   Microalb/Crt.  Ratio (mcg/mg creat)   Date Value   02/08/2018 CANNOT BE CALCULATED     LDL Cholesterol (mg/dL)   Date Value   02/08/2018 69                                                  (goal LDL is <100)   AST (U/L)   Date Value   02/08/2018 23     ALT (U/L)   Date Value   02/08/2018 20     BUN (mg/dL)   Date Value   02/08/2018 9     BP Readings from Last 3 Encounters:   11/19/18 109/60   10/19/18 125/71   10/01/18 134/62                                                                                     (goal 120/80)      Patient Active Problem List:     Osteoarthritis     Essential hypertension     Type 2 diabetes mellitus with diabetic neuropathy, without long-term current use of insulin (HCC)     Pure hypercholesterolemia     Constipation     Rectal pain     Diverticulosis of colon     Tubular adenoma of colon     History of colon polyps     Rectal pressure     Failed neck syndrome     Cervical stenosis of spine     Cervical radicular pain     Spinal stenosis of lumbar region with neurogenic claudication     JANN (obstructive sleep apnea)

## 2018-12-06 ENCOUNTER — OFFICE VISIT (OUTPATIENT)
Dept: PAIN MANAGEMENT | Age: 78
End: 2018-12-06
Payer: MEDICARE

## 2018-12-06 VITALS
HEART RATE: 80 BPM | RESPIRATION RATE: 16 BRPM | DIASTOLIC BLOOD PRESSURE: 53 MMHG | WEIGHT: 169.2 LBS | TEMPERATURE: 97 F | HEIGHT: 62 IN | BODY MASS INDEX: 31.14 KG/M2 | SYSTOLIC BLOOD PRESSURE: 86 MMHG | OXYGEN SATURATION: 95 %

## 2018-12-06 DIAGNOSIS — M48.062 LUMBAR STENOSIS WITH NEUROGENIC CLAUDICATION: ICD-10-CM

## 2018-12-06 DIAGNOSIS — M48.02 CERVICAL STENOSIS OF SPINE: Primary | Chronic | ICD-10-CM

## 2018-12-06 DIAGNOSIS — Z79.891 CHRONIC USE OF OPIATE FOR THERAPEUTIC PURPOSE: ICD-10-CM

## 2018-12-06 DIAGNOSIS — M47.817 LUMBOSACRAL SPONDYLOSIS WITHOUT MYELOPATHY: ICD-10-CM

## 2018-12-06 DIAGNOSIS — M15.9 PRIMARY OSTEOARTHRITIS INVOLVING MULTIPLE JOINTS: ICD-10-CM

## 2018-12-06 PROCEDURE — G8427 DOCREV CUR MEDS BY ELIG CLIN: HCPCS | Performed by: ANESTHESIOLOGY

## 2018-12-06 PROCEDURE — 1123F ACP DISCUSS/DSCN MKR DOCD: CPT | Performed by: ANESTHESIOLOGY

## 2018-12-06 PROCEDURE — G8482 FLU IMMUNIZE ORDER/ADMIN: HCPCS | Performed by: ANESTHESIOLOGY

## 2018-12-06 PROCEDURE — G8417 CALC BMI ABV UP PARAM F/U: HCPCS | Performed by: ANESTHESIOLOGY

## 2018-12-06 PROCEDURE — 99213 OFFICE O/P EST LOW 20 MIN: CPT | Performed by: ANESTHESIOLOGY

## 2018-12-06 PROCEDURE — 4040F PNEUMOC VAC/ADMIN/RCVD: CPT | Performed by: ANESTHESIOLOGY

## 2018-12-06 PROCEDURE — 1101F PT FALLS ASSESS-DOCD LE1/YR: CPT | Performed by: ANESTHESIOLOGY

## 2018-12-06 PROCEDURE — 1090F PRES/ABSN URINE INCON ASSESS: CPT | Performed by: ANESTHESIOLOGY

## 2018-12-06 PROCEDURE — 1036F TOBACCO NON-USER: CPT | Performed by: ANESTHESIOLOGY

## 2018-12-06 PROCEDURE — G8399 PT W/DXA RESULTS DOCUMENT: HCPCS | Performed by: ANESTHESIOLOGY

## 2018-12-06 RX ORDER — HYDROCODONE BITARTRATE AND ACETAMINOPHEN 5; 325 MG/1; MG/1
1 TABLET ORAL 2 TIMES DAILY PRN
Qty: 60 TABLET | Refills: 0 | Status: SHIPPED | OUTPATIENT
Start: 2018-12-06 | End: 2019-01-21 | Stop reason: SDUPTHER

## 2018-12-06 ASSESSMENT — ENCOUNTER SYMPTOMS
BACK PAIN: 1
COUGH: 1
GASTROINTESTINAL NEGATIVE: 1

## 2018-12-06 NOTE — PROGRESS NOTES
Subjective:      Patient ID: Claribel Ley is a 70 y.o. female. HPI   Chief Complaint   Patient presents with    Medication Refill    Follow Up After Procedure    Back Pain    Neck Pain    Shoulder Pain     right      This is a 42-year-old woman with history of chronic neck and chronic lower back pain    Chronic neck pain  Neck pain onset several years ago radiates down left arm to the elbow associated with numbness and paresthesia in both arms. MRI cervical spine showed cervical spinal stenosis and patient had a cervical spine fusion surgery 7 years ago without any improvement in her symptoms. Most recent MRI in August 2016 showed severe cervical spinal stenosis at C6-C7 level    Patient had a cervical epidural steroid injection October 2018 with good outcome  Neck and arm pain are currently in control    Lower back pain  Back pain located in the lumbar area with radiation down both legs and numbness in both legs. Pain aggravates with walking only a few steps. She cannot even walk 1 block. Leaning forward helps her pain. Pain improves with sitting. Patient failed conservative measures  MRI lumbar spine in August 2016 showed severe central spinal stenosis at L4-L5 level  Patient had a caudal epidural injection in October 2018 with 50% improvement in her pain. Medication management  Currently takes Norco when necessary for pain  Denies any side effect  No sign or symptoms of any aberrancy  Finds the medication helpful  Automated prescription report is reviewed and is consistent with prescription history    S/P:CAUDAL epidural steroid injection under fluoroscopy guidance  Outcome: pain in the back is better she still gets tired quickly, wakes up with less pain    Any improvement of activity?   No still using, sleeping a little better    Any side effects (appetite,leg cramping,facial flushing):none   Increase of pain: no  Pain score Today: 4   % of pain relief:50 %    Pain score Today: 4  Adverse effects (Constipation / Nausea / Sedation / sexual Dysfunction / others) : none   Mood: good  Sleep pattern and quality: fair  Activity level: poor    Pill count Today:#2  Last dose taken  Last night   OARRS report reviewed today: yes  ER/Hospitalizations/PCP visit related to pain since last visit:no   Any legal problems e.g. DUI etc.:No  Satisfied with current management: Yes    Opioid Contract:8/10/14  Last Urine Dug screen dated:8/10/18    Lab Results   Component Value Date    LABA1C 6.3 09/26/2018     Lab Results   Component Value Date     01/12/2017       Past Medical History, Past Surgical History, Social History, Allergies and Medications, reviewed and updated in EPIC as indicated      Review of Systems   Constitutional: Positive for chills and diaphoresis. HENT: Negative. Respiratory: Positive for cough (dry). Cardiovascular: Negative. Gastrointestinal: Negative. Musculoskeletal: Positive for back pain and neck pain. Neurological: Positive for weakness (weakness in the legs, uses a walker ) and numbness (hands ). Hematological: Bruises/bleeds easily. Psychiatric/Behavioral: Positive for decreased concentration (little ), dysphoric mood (little ) and sleep disturbance. Objective:   Physical Exam    Assessment:        1. Cervical stenosis of spine    2. Primary osteoarthritis involving multiple joints    3. Lumbar stenosis with neurogenic claudication    4. Chronic use of opiate for therapeutic purpose    5. Lumbosacral spondylosis without myelopathy            Plan:      I have discussed surgical decompression option for cervical spinal stenosis and for lumbar stenosis again today  Patient is not interested in any surgery at this time.     Symptoms are currently in control  There is no sign of cauda equina    Automated prescription report is reviewed and is consistent with prescription history  Med refills ordered    May consider obtaining a new MRI cervical and lumbar spine in

## 2018-12-07 RX ORDER — ALLOPURINOL 100 MG/1
TABLET ORAL
Qty: 90 TABLET | Refills: 1 | Status: SHIPPED | OUTPATIENT
Start: 2018-12-07 | End: 2019-06-05 | Stop reason: SDUPTHER

## 2018-12-19 DIAGNOSIS — J30.89 CHRONIC NON-SEASONAL ALLERGIC RHINITIS: ICD-10-CM

## 2018-12-19 DIAGNOSIS — J01.90 ACUTE BACTERIAL SINUSITIS: ICD-10-CM

## 2018-12-19 DIAGNOSIS — B96.89 ACUTE BACTERIAL SINUSITIS: ICD-10-CM

## 2018-12-19 RX ORDER — FLUTICASONE PROPIONATE 50 MCG
SPRAY, SUSPENSION (ML) NASAL
Qty: 16 G | Refills: 2 | Status: SHIPPED | OUTPATIENT
Start: 2018-12-19 | End: 2021-04-13 | Stop reason: SDUPTHER

## 2018-12-19 NOTE — TELEPHONE ENCOUNTER
Last visit 9/26/18  Next Visit Date:  Future Appointments  Date Time Provider Pradeep Alexander   1/3/2019 9:00 AM YOLANDA Amaro CNP [facility]   2/6/2019 9:00 AM YOLANDA Valentine CNP [facility] Maintenance   Topic Date Due    Shingles Vaccine (1 of 2 - 2 Dose Series) 05/08/2019 (Originally 5/28/1992)    DTaP/Tdap/Td vaccine (1 - Tdap) 10/18/2026 (Originally [birth year]28/1959)    Potassium monitoring  02/08/2019    Creatinine monitoring  02/08/2019    DEXA (modify frequency per FRAX score)  Completed    Flu vaccine  Completed    Pneumococcal low/med risk  Completed       Hemoglobin A1C (%)   Date Value   09/26/2018 6.3   05/08/2018 6.9   02/08/2018 6.0             ( goal A1C is < 7)   Microalb/Crt.  Ratio (mcg/mg creat)   Date Value   02/08/2018 CANNOT BE CALCULATED     LDL Cholesterol (mg/dL)   Date Value   02/08/2018 69   01/12/2017 100       (goal LDL is <100)   AST (U/L)   Date Value   02/08/2018 23     ALT (U/L)   Date Value   02/08/2018 20     BUN (mg/dL)   Date Value   02/08/2018 9     BP Readings from Last 3 Encounters:   12/06/18 (!) 86/53   11/19/18 109/60   10/19/18 125/71          (goal 120/80)    All Future Testing planned in [facility]  Lab Frequency Next Occurrence               Patient Active Problem List:     Osteoarthritis     Essential hypertension     Type 2 diabetes mellitus with diabetic neuropathy, without long-term current use of insulin (HCC)     Pure hypercholesterolemia     Constipation     Rectal pain     Diverticulosis of colon     Tubular adenoma of colon     History of colon polyps     Rectal pressure     Failed neck syndrome     Cervical stenosis of spine     Cervical radicular pain     Spinal stenosis of lumbar region with neurogenic claudication     JANN (obstructive sleep apnea)

## 2018-12-28 DIAGNOSIS — F51.01 PRIMARY INSOMNIA: ICD-10-CM

## 2018-12-28 DIAGNOSIS — Z79.899 HIGH RISK MEDICATION USE: Primary | ICD-10-CM

## 2019-01-04 ENCOUNTER — TELEPHONE (OUTPATIENT)
Dept: FAMILY MEDICINE CLINIC | Age: 79
End: 2019-01-04

## 2019-01-04 ENCOUNTER — HOSPITAL ENCOUNTER (OUTPATIENT)
Age: 79
Setting detail: SPECIMEN
Discharge: HOME | End: 2019-01-04
Payer: MEDICARE

## 2019-01-04 DIAGNOSIS — Z79.899 HIGH RISK MEDICATION USE: ICD-10-CM

## 2019-01-04 RX ORDER — ZOLPIDEM TARTRATE 10 MG/1
5 TABLET ORAL NIGHTLY PRN
Qty: 15 TABLET | Refills: 1 | Status: SHIPPED | OUTPATIENT
Start: 2019-01-04 | End: 2019-03-08 | Stop reason: SDUPTHER

## 2019-01-07 LAB
6-ACETYLMORPHINE, UR: NOT DETECTED
7-AMINOCLONAZEPAM, URINE: NOT DETECTED
ALPHA-OH-ALPRAZ, URINE: NOT DETECTED
ALPRAZOLAM, URINE: NOT DETECTED
AMPHETAMINES, URINE: NOT DETECTED
BARBITURATES, URINE: NOT DETECTED
BENZOYLECGONINE, UR: NOT DETECTED
BUPRENORPHINE URINE: NOT DETECTED
CARISOPRODOL, UR: NOT DETECTED
CLONAZEPAM, URINE: NOT DETECTED
CODEINE, URINE: NOT DETECTED
CREATININE URINE: 47 MG/DL (ref 20–400)
DIAZEPAM, URINE: NOT DETECTED
EER PAIN MGT DRUG PANEL, HIGH RES/EMIT U: NORMAL
ETHYL GLUCURONIDE UR: NOT DETECTED
FENTANYL URINE: NOT DETECTED
HYDROCODONE, URINE: PRESENT
HYDROMORPHONE, URINE: PRESENT
LORAZEPAM, URINE: NOT DETECTED
MARIJUANA METAB, UR: NOT DETECTED
MDA, UR: NOT DETECTED
MDEA, EVE, UR: NOT DETECTED
MDMA URINE: NOT DETECTED
MEPERIDINE METAB, UR: NOT DETECTED
METHADONE, URINE: NOT DETECTED
METHAMPHETAMINE, URINE: NOT DETECTED
METHYLPHENIDATE: NOT DETECTED
MIDAZOLAM, URINE: NOT DETECTED
MORPHINE URINE: NOT DETECTED
NORBUPRENORPHINE, URINE: NOT DETECTED
NORDIAZEPAM, URINE: NOT DETECTED
NORFENTANYL, URINE: NOT DETECTED
NORHYDROCODONE, URINE: PRESENT
NOROXYCODONE, URINE: NOT DETECTED
NOROXYMORPHONE, URINE: NOT DETECTED
OXAZEPAM, URINE: NOT DETECTED
OXYCODONE URINE: NOT DETECTED
OXYMORPHONE, URINE: NOT DETECTED
PAIN MGT DRUG PANEL, HI RES, UR: NORMAL
PCP,URINE: NOT DETECTED
PHENTERMINE, UR: NOT DETECTED
PROPOXYPHENE, URINE: NOT DETECTED
TAPENTADOL, URINE: NOT DETECTED
TAPENTADOL-O-SULFATE, URINE: NOT DETECTED
TEMAZEPAM, URINE: NOT DETECTED
TRAMADOL, URINE: NOT DETECTED
ZOLPIDEM, URINE: NOT DETECTED

## 2019-01-08 RX ORDER — ATORVASTATIN CALCIUM 40 MG/1
TABLET, FILM COATED ORAL
Qty: 90 TABLET | Refills: 1 | Status: SHIPPED | OUTPATIENT
Start: 2019-01-08 | End: 2019-07-01 | Stop reason: SDUPTHER

## 2019-01-09 ENCOUNTER — OFFICE VISIT (OUTPATIENT)
Dept: FAMILY MEDICINE CLINIC | Age: 79
End: 2019-01-09
Payer: MEDICARE

## 2019-01-09 VITALS
OXYGEN SATURATION: 95 % | HEART RATE: 87 BPM | SYSTOLIC BLOOD PRESSURE: 110 MMHG | RESPIRATION RATE: 16 BRPM | TEMPERATURE: 98.5 F | BODY MASS INDEX: 30.36 KG/M2 | WEIGHT: 166 LBS | DIASTOLIC BLOOD PRESSURE: 68 MMHG

## 2019-01-09 DIAGNOSIS — I10 ESSENTIAL HYPERTENSION: ICD-10-CM

## 2019-01-09 DIAGNOSIS — E11.40 TYPE 2 DIABETES MELLITUS WITH DIABETIC NEUROPATHY, WITHOUT LONG-TERM CURRENT USE OF INSULIN (HCC): Primary | ICD-10-CM

## 2019-01-09 DIAGNOSIS — M54.12 CERVICAL RADICULAR PAIN: Chronic | ICD-10-CM

## 2019-01-09 DIAGNOSIS — G47.33 OSA (OBSTRUCTIVE SLEEP APNEA): ICD-10-CM

## 2019-01-09 DIAGNOSIS — M15.9 PRIMARY OSTEOARTHRITIS INVOLVING MULTIPLE JOINTS: ICD-10-CM

## 2019-01-09 DIAGNOSIS — Z91.81 AT HIGH RISK FOR FALLS: ICD-10-CM

## 2019-01-09 DIAGNOSIS — F51.01 PRIMARY INSOMNIA: ICD-10-CM

## 2019-01-09 DIAGNOSIS — M48.062 SPINAL STENOSIS OF LUMBAR REGION WITH NEUROGENIC CLAUDICATION: Chronic | ICD-10-CM

## 2019-01-09 DIAGNOSIS — E78.00 PURE HYPERCHOLESTEROLEMIA: ICD-10-CM

## 2019-01-09 LAB — HBA1C MFR BLD: 6.8 %

## 2019-01-09 PROCEDURE — 1101F PT FALLS ASSESS-DOCD LE1/YR: CPT | Performed by: INTERNAL MEDICINE

## 2019-01-09 PROCEDURE — 4040F PNEUMOC VAC/ADMIN/RCVD: CPT | Performed by: INTERNAL MEDICINE

## 2019-01-09 PROCEDURE — G8427 DOCREV CUR MEDS BY ELIG CLIN: HCPCS | Performed by: INTERNAL MEDICINE

## 2019-01-09 PROCEDURE — 1123F ACP DISCUSS/DSCN MKR DOCD: CPT | Performed by: INTERNAL MEDICINE

## 2019-01-09 PROCEDURE — 1090F PRES/ABSN URINE INCON ASSESS: CPT | Performed by: INTERNAL MEDICINE

## 2019-01-09 PROCEDURE — G8417 CALC BMI ABV UP PARAM F/U: HCPCS | Performed by: INTERNAL MEDICINE

## 2019-01-09 PROCEDURE — 1036F TOBACCO NON-USER: CPT | Performed by: INTERNAL MEDICINE

## 2019-01-09 PROCEDURE — 99214 OFFICE O/P EST MOD 30 MIN: CPT | Performed by: INTERNAL MEDICINE

## 2019-01-09 PROCEDURE — G8399 PT W/DXA RESULTS DOCUMENT: HCPCS | Performed by: INTERNAL MEDICINE

## 2019-01-09 PROCEDURE — 83036 HEMOGLOBIN GLYCOSYLATED A1C: CPT | Performed by: INTERNAL MEDICINE

## 2019-01-09 PROCEDURE — G8482 FLU IMMUNIZE ORDER/ADMIN: HCPCS | Performed by: INTERNAL MEDICINE

## 2019-01-09 RX ORDER — AMITRIPTYLINE HYDROCHLORIDE 25 MG/1
TABLET, FILM COATED ORAL
Qty: 60 TABLET | Refills: 5 | Status: SHIPPED | OUTPATIENT
Start: 2019-01-09 | End: 2019-08-12 | Stop reason: SDUPTHER

## 2019-01-09 RX ORDER — GLIMEPIRIDE 2 MG/1
TABLET ORAL
Qty: 90 TABLET | Refills: 1 | Status: SHIPPED | OUTPATIENT
Start: 2019-01-09 | End: 2019-06-05 | Stop reason: SDUPTHER

## 2019-01-09 RX ORDER — LOSARTAN POTASSIUM 25 MG/1
25 TABLET ORAL DAILY
Qty: 90 TABLET | Refills: 1 | Status: SHIPPED | OUTPATIENT
Start: 2019-01-09 | End: 2019-06-05 | Stop reason: SDUPTHER

## 2019-01-11 DIAGNOSIS — J30.89 ACUTE NON-SEASONAL ALLERGIC RHINITIS: ICD-10-CM

## 2019-01-11 RX ORDER — LORATADINE 10 MG/1
TABLET ORAL
Qty: 90 TABLET | Refills: 1 | Status: SHIPPED | OUTPATIENT
Start: 2019-01-11 | End: 2019-06-05 | Stop reason: SDUPTHER

## 2019-01-12 RX ORDER — TIZANIDINE 2 MG/1
TABLET ORAL
Qty: 30 TABLET | Refills: 0 | Status: SHIPPED | OUTPATIENT
Start: 2019-01-12 | End: 2019-02-06 | Stop reason: SDUPTHER

## 2019-01-15 DIAGNOSIS — M48.062 LUMBAR STENOSIS WITH NEUROGENIC CLAUDICATION: ICD-10-CM

## 2019-01-15 DIAGNOSIS — M15.9 PRIMARY OSTEOARTHRITIS INVOLVING MULTIPLE JOINTS: ICD-10-CM

## 2019-01-15 DIAGNOSIS — M48.02 CERVICAL STENOSIS OF SPINE: Chronic | ICD-10-CM

## 2019-01-15 RX ORDER — HYDROCODONE BITARTRATE AND ACETAMINOPHEN 5; 325 MG/1; MG/1
1 TABLET ORAL 2 TIMES DAILY PRN
Qty: 60 TABLET | Refills: 0 | Status: CANCELLED | OUTPATIENT
Start: 2019-01-15 | End: 2019-02-14

## 2019-01-21 ENCOUNTER — OFFICE VISIT (OUTPATIENT)
Dept: PAIN MANAGEMENT | Age: 79
End: 2019-01-21
Payer: MEDICARE

## 2019-01-21 VITALS
RESPIRATION RATE: 16 BRPM | DIASTOLIC BLOOD PRESSURE: 84 MMHG | WEIGHT: 170.6 LBS | SYSTOLIC BLOOD PRESSURE: 139 MMHG | HEART RATE: 108 BPM | OXYGEN SATURATION: 98 % | HEIGHT: 62 IN | BODY MASS INDEX: 31.39 KG/M2

## 2019-01-21 DIAGNOSIS — M48.062 LUMBAR STENOSIS WITH NEUROGENIC CLAUDICATION: ICD-10-CM

## 2019-01-21 DIAGNOSIS — M48.02 CERVICAL STENOSIS OF SPINE: Chronic | ICD-10-CM

## 2019-01-21 DIAGNOSIS — M15.9 PRIMARY OSTEOARTHRITIS INVOLVING MULTIPLE JOINTS: ICD-10-CM

## 2019-01-21 PROCEDURE — 99213 OFFICE O/P EST LOW 20 MIN: CPT | Performed by: NURSE PRACTITIONER

## 2019-01-21 RX ORDER — HYDROCODONE BITARTRATE AND ACETAMINOPHEN 5; 325 MG/1; MG/1
1 TABLET ORAL 2 TIMES DAILY PRN
Qty: 60 TABLET | Refills: 0 | Status: SHIPPED | OUTPATIENT
Start: 2019-01-21 | End: 2019-02-25 | Stop reason: SDUPTHER

## 2019-01-21 ASSESSMENT — ENCOUNTER SYMPTOMS: BACK PAIN: 1

## 2019-02-06 ENCOUNTER — OFFICE VISIT (OUTPATIENT)
Dept: NEUROLOGY | Age: 79
End: 2019-02-06
Payer: MEDICARE

## 2019-02-06 VITALS
HEIGHT: 62 IN | WEIGHT: 165 LBS | SYSTOLIC BLOOD PRESSURE: 134 MMHG | DIASTOLIC BLOOD PRESSURE: 83 MMHG | BODY MASS INDEX: 30.36 KG/M2 | HEART RATE: 94 BPM

## 2019-02-06 DIAGNOSIS — E11.40 TYPE 2 DIABETES MELLITUS WITH DIABETIC NEUROPATHY, WITHOUT LONG-TERM CURRENT USE OF INSULIN (HCC): ICD-10-CM

## 2019-02-06 DIAGNOSIS — M48.062 SPINAL STENOSIS OF LUMBAR REGION WITH NEUROGENIC CLAUDICATION: Chronic | ICD-10-CM

## 2019-02-06 DIAGNOSIS — M48.02 CERVICAL STENOSIS OF SPINE: Chronic | ICD-10-CM

## 2019-02-06 DIAGNOSIS — M96.1 FAILED NECK SYNDROME: Primary | Chronic | ICD-10-CM

## 2019-02-06 PROCEDURE — 4040F PNEUMOC VAC/ADMIN/RCVD: CPT | Performed by: NURSE PRACTITIONER

## 2019-02-06 PROCEDURE — G8417 CALC BMI ABV UP PARAM F/U: HCPCS | Performed by: NURSE PRACTITIONER

## 2019-02-06 PROCEDURE — 1090F PRES/ABSN URINE INCON ASSESS: CPT | Performed by: NURSE PRACTITIONER

## 2019-02-06 PROCEDURE — 99214 OFFICE O/P EST MOD 30 MIN: CPT | Performed by: NURSE PRACTITIONER

## 2019-02-06 PROCEDURE — G8427 DOCREV CUR MEDS BY ELIG CLIN: HCPCS | Performed by: NURSE PRACTITIONER

## 2019-02-06 PROCEDURE — 1101F PT FALLS ASSESS-DOCD LE1/YR: CPT | Performed by: NURSE PRACTITIONER

## 2019-02-06 PROCEDURE — 1123F ACP DISCUSS/DSCN MKR DOCD: CPT | Performed by: NURSE PRACTITIONER

## 2019-02-06 PROCEDURE — 1036F TOBACCO NON-USER: CPT | Performed by: NURSE PRACTITIONER

## 2019-02-06 PROCEDURE — G8482 FLU IMMUNIZE ORDER/ADMIN: HCPCS | Performed by: NURSE PRACTITIONER

## 2019-02-06 PROCEDURE — G8399 PT W/DXA RESULTS DOCUMENT: HCPCS | Performed by: NURSE PRACTITIONER

## 2019-02-06 RX ORDER — GABAPENTIN 600 MG/1
600 TABLET ORAL 3 TIMES DAILY
Qty: 90 TABLET | Refills: 5 | Status: SHIPPED | OUTPATIENT
Start: 2019-02-06 | End: 2019-08-15 | Stop reason: SDUPTHER

## 2019-02-06 RX ORDER — TIZANIDINE 2 MG/1
TABLET ORAL
Qty: 90 TABLET | Refills: 5 | Status: SHIPPED | OUTPATIENT
Start: 2019-02-06 | End: 2019-08-15 | Stop reason: SDUPTHER

## 2019-02-12 RX ORDER — PANTOPRAZOLE SODIUM 40 MG/1
TABLET, DELAYED RELEASE ORAL
Qty: 90 TABLET | Refills: 1 | Status: SHIPPED | OUTPATIENT
Start: 2019-02-12 | End: 2019-10-21 | Stop reason: SDUPTHER

## 2019-02-25 ENCOUNTER — OFFICE VISIT (OUTPATIENT)
Dept: PAIN MANAGEMENT | Age: 79
End: 2019-02-25
Payer: MEDICARE

## 2019-02-25 VITALS
OXYGEN SATURATION: 94 % | RESPIRATION RATE: 16 BRPM | WEIGHT: 163.2 LBS | HEART RATE: 105 BPM | DIASTOLIC BLOOD PRESSURE: 79 MMHG | BODY MASS INDEX: 30.03 KG/M2 | TEMPERATURE: 98 F | HEIGHT: 62 IN | SYSTOLIC BLOOD PRESSURE: 133 MMHG

## 2019-02-25 DIAGNOSIS — Z79.891 ENCOUNTER FOR LONG-TERM OPIATE ANALGESIC USE: ICD-10-CM

## 2019-02-25 DIAGNOSIS — M96.1 FAILED NECK SYNDROME: Chronic | ICD-10-CM

## 2019-02-25 DIAGNOSIS — M54.12 CERVICAL RADICULAR PAIN: Primary | Chronic | ICD-10-CM

## 2019-02-25 DIAGNOSIS — M15.9 PRIMARY OSTEOARTHRITIS INVOLVING MULTIPLE JOINTS: ICD-10-CM

## 2019-02-25 DIAGNOSIS — M48.02 CERVICAL STENOSIS OF SPINE: Chronic | ICD-10-CM

## 2019-02-25 DIAGNOSIS — M48.062 LUMBAR STENOSIS WITH NEUROGENIC CLAUDICATION: ICD-10-CM

## 2019-02-25 PROCEDURE — 99213 OFFICE O/P EST LOW 20 MIN: CPT | Performed by: NURSE PRACTITIONER

## 2019-02-25 RX ORDER — HYDROCODONE BITARTRATE AND ACETAMINOPHEN 5; 325 MG/1; MG/1
1 TABLET ORAL 2 TIMES DAILY PRN
Qty: 60 TABLET | Refills: 0 | Status: SHIPPED | OUTPATIENT
Start: 2019-02-25 | End: 2019-03-25 | Stop reason: SDUPTHER

## 2019-02-25 ASSESSMENT — ENCOUNTER SYMPTOMS: BACK PAIN: 1

## 2019-02-26 RX ORDER — AMLODIPINE BESYLATE 5 MG/1
TABLET ORAL
Qty: 90 TABLET | Refills: 1 | Status: SHIPPED | OUTPATIENT
Start: 2019-02-26 | End: 2019-08-27 | Stop reason: SDUPTHER

## 2019-03-08 DIAGNOSIS — F51.01 PRIMARY INSOMNIA: ICD-10-CM

## 2019-03-08 RX ORDER — ZOLPIDEM TARTRATE 10 MG/1
TABLET ORAL
Qty: 15 TABLET | Refills: 1 | Status: SHIPPED | OUTPATIENT
Start: 2019-03-08 | End: 2019-05-03 | Stop reason: SDUPTHER

## 2019-03-24 DIAGNOSIS — K59.03 DRUG INDUCED CONSTIPATION: ICD-10-CM

## 2019-03-25 ENCOUNTER — OFFICE VISIT (OUTPATIENT)
Dept: PAIN MANAGEMENT | Age: 79
End: 2019-03-25
Payer: MEDICARE

## 2019-03-25 VITALS
TEMPERATURE: 97.8 F | HEART RATE: 107 BPM | WEIGHT: 168.2 LBS | HEIGHT: 62 IN | OXYGEN SATURATION: 95 % | SYSTOLIC BLOOD PRESSURE: 135 MMHG | RESPIRATION RATE: 16 BRPM | BODY MASS INDEX: 30.95 KG/M2 | DIASTOLIC BLOOD PRESSURE: 74 MMHG

## 2019-03-25 DIAGNOSIS — M48.062 LUMBAR STENOSIS WITH NEUROGENIC CLAUDICATION: ICD-10-CM

## 2019-03-25 DIAGNOSIS — M15.9 PRIMARY OSTEOARTHRITIS INVOLVING MULTIPLE JOINTS: ICD-10-CM

## 2019-03-25 DIAGNOSIS — M48.02 CERVICAL STENOSIS OF SPINE: Primary | Chronic | ICD-10-CM

## 2019-03-25 DIAGNOSIS — Z79.891 ENCOUNTER FOR LONG-TERM OPIATE ANALGESIC USE: ICD-10-CM

## 2019-03-25 PROCEDURE — 99213 OFFICE O/P EST LOW 20 MIN: CPT | Performed by: NURSE PRACTITIONER

## 2019-03-25 RX ORDER — HYDROCODONE BITARTRATE AND ACETAMINOPHEN 5; 325 MG/1; MG/1
1 TABLET ORAL 2 TIMES DAILY PRN
Qty: 60 TABLET | Refills: 0 | Status: SHIPPED | OUTPATIENT
Start: 2019-03-27 | End: 2019-05-02 | Stop reason: SDUPTHER

## 2019-03-25 ASSESSMENT — ENCOUNTER SYMPTOMS
BACK PAIN: 1
COUGH: 1

## 2019-03-27 RX ORDER — POLYETHYLENE GLYCOL 3350 17 G/17G
POWDER, FOR SOLUTION ORAL
Qty: 510 G | Refills: 1 | Status: SHIPPED | OUTPATIENT
Start: 2019-03-27 | End: 2020-12-17 | Stop reason: SDUPTHER

## 2019-03-28 DIAGNOSIS — E11.9 TYPE 2 DIABETES MELLITUS WITHOUT COMPLICATION, WITHOUT LONG-TERM CURRENT USE OF INSULIN (HCC): ICD-10-CM

## 2019-05-02 ENCOUNTER — OFFICE VISIT (OUTPATIENT)
Dept: PAIN MANAGEMENT | Age: 79
End: 2019-05-02
Payer: MEDICARE

## 2019-05-02 VITALS
SYSTOLIC BLOOD PRESSURE: 127 MMHG | HEIGHT: 62 IN | DIASTOLIC BLOOD PRESSURE: 80 MMHG | HEART RATE: 101 BPM | BODY MASS INDEX: 30.91 KG/M2 | WEIGHT: 168 LBS | OXYGEN SATURATION: 93 %

## 2019-05-02 DIAGNOSIS — M48.02 CERVICAL STENOSIS OF SPINE: Chronic | ICD-10-CM

## 2019-05-02 DIAGNOSIS — Z79.891 CHRONIC USE OF OPIATE DRUGS THERAPEUTIC PURPOSES: ICD-10-CM

## 2019-05-02 DIAGNOSIS — Z99.89 OSA ON CPAP: ICD-10-CM

## 2019-05-02 DIAGNOSIS — M15.9 PRIMARY OSTEOARTHRITIS INVOLVING MULTIPLE JOINTS: ICD-10-CM

## 2019-05-02 DIAGNOSIS — Z98.1 HX OF FUSION OF CERVICAL SPINE: ICD-10-CM

## 2019-05-02 DIAGNOSIS — M48.062 SPINAL STENOSIS OF LUMBAR REGION WITH NEUROGENIC CLAUDICATION: Primary | Chronic | ICD-10-CM

## 2019-05-02 DIAGNOSIS — G47.33 OSA ON CPAP: ICD-10-CM

## 2019-05-02 PROBLEM — M15.0 PRIMARY OSTEOARTHRITIS INVOLVING MULTIPLE JOINTS: Status: ACTIVE | Noted: 2019-05-02

## 2019-05-02 PROCEDURE — 1123F ACP DISCUSS/DSCN MKR DOCD: CPT | Performed by: ANESTHESIOLOGY

## 2019-05-02 PROCEDURE — G8399 PT W/DXA RESULTS DOCUMENT: HCPCS | Performed by: ANESTHESIOLOGY

## 2019-05-02 PROCEDURE — G8427 DOCREV CUR MEDS BY ELIG CLIN: HCPCS | Performed by: ANESTHESIOLOGY

## 2019-05-02 PROCEDURE — 1090F PRES/ABSN URINE INCON ASSESS: CPT | Performed by: ANESTHESIOLOGY

## 2019-05-02 PROCEDURE — 1036F TOBACCO NON-USER: CPT | Performed by: ANESTHESIOLOGY

## 2019-05-02 PROCEDURE — 4040F PNEUMOC VAC/ADMIN/RCVD: CPT | Performed by: ANESTHESIOLOGY

## 2019-05-02 PROCEDURE — 99214 OFFICE O/P EST MOD 30 MIN: CPT | Performed by: ANESTHESIOLOGY

## 2019-05-02 PROCEDURE — G8417 CALC BMI ABV UP PARAM F/U: HCPCS | Performed by: ANESTHESIOLOGY

## 2019-05-02 RX ORDER — HYDROCODONE BITARTRATE AND ACETAMINOPHEN 5; 325 MG/1; MG/1
1 TABLET ORAL 2 TIMES DAILY PRN
Qty: 60 TABLET | Refills: 0 | Status: SHIPPED | OUTPATIENT
Start: 2019-05-02 | End: 2019-06-07 | Stop reason: SDUPTHER

## 2019-05-02 NOTE — PROGRESS NOTES
The patient is a 70 y. o. Non-/non  female.     Chief Complaint   Patient presents with    Back Pain    Neck Pain    Medication Refill        HPI  Neck pain  Pain is chronic onset many years ago  Pain usually radiates down left arm to the elbow  Also reported extension over the trapezius to the right shoulder  History of cervical spine fusion surgery 7 years ago  Recent MRI cervical spine in 2016 showed severe cervical spinal stenosis  Patient have declined any further surgical treatment  Had cervical epidural injection in October 2018 with significant improvement in her symptoms lasting for more than 4 months  Pain have now returned to the baseline  Reports average pain score of 7/10 effecting daily life activities and aggravates with routine physical activities    Back pain  Pain is chronic onset many years ago located in the lumbar area across midline affecting both sides with radiation down both legs  Pain aggravated with standing and walking  She can barely walk one block  Failed elevated measures with therapy in past  MRI lumbar spine 2016 showed severe lumbar spinal stenosis at L4-L5 level  Had caudal epidural injection in October 2018 that provided relief lasting for more than 4 months  Pain have now returned back to the baseline  She have declined surgical intervention    Medication management  Currently taking hydrocodone 5 mg twice a day  Finds it helpful  Deny any side effects  No sign or symptoms of any aberrancy      Past Medical History:   Diagnosis Date    Arthritis     Cataract     Cataracts, bilateral     Diverticulosis of colon 2015    Headache(784.0)     History of colon polyps 2015    Insomnia     Neck pain     Osteoarthrosis, unspecified whether generalized or localized, unspecified site 10/5/2012    Pure hypercholesterolemia 10/5/2012    Shoulder blade pain     right  side    Stroke ([facility] [address]     Tubular adenoma of colon 2015    Type II or unspecified type diabetes mellitus without mention of complication, not stated as uncontrolled 10/5/2012    Unspecified essential hypertension 10/5/2012    Unspecified sleep apnea       Past Surgical History:   Procedure Laterality Date    COLONOSCOPY  06    Dr Earl Briggs  5 12 15    extensive diverticulosis, tubular adenoma    EPIDURAL STEROID INJECTION N/A 2017    EPIDURAL STEROID INJECTION cervical performed by Aniket Perales MD at [address] N/A 2017    EPIDURAL STEROID INJECTION L5S1 performed by Aniket Perales MD at [address]  10/1/06    NECK SURGERY  2011 &     OTHER SURGICAL HISTORY  2017    L5-S1 steroid injection    OTHER SURGICAL HISTORY  2017    VANITA L5-S1    OTHER SURGICAL HISTORY  10/01/2018    cervical steroid injection    NV INJECT ANES/STEROID FORAMEN LUMBAR/SACRAL W IMG GUIDE ,1 LEVEL Bilateral 2018    BILATERAL L4 VANITA performed by Aniket Perales MD at [address] DX/THER SBST INTRLMNR CRV/THRC W/IMG GDN N/A 10/1/2018    EPIDURAL STEROID INJECTION CERVICAL C7-T1 performed by Aniket Perales MD at [address] [facility] History     Socioeconomic History    Marital status: Single     Spouse name: None    Number of children: None    Years of education: None    Highest education level: None   Occupational History    None   Social Needs    Financial resource strain: None    Food insecurity:     Worry: None     Inability: None    Transportation needs:     Medical: None     Non-medical: None   Tobacco Use    Smoking status: Former Smoker     Packs/day: 0.50     Years: 15.00     Pack years: 7.50     Types: Cigarettes     Last attempt to quit: 10/5/2001     Years since quittin.5    Smokeless tobacco: Never Used   Substance and Sexual Activity    Alcohol use:  Yes     Alcohol/week: 0.0 oz     Comment: on occasion    Drug use: No    Sexual activity: None   Lifestyle    Physical activity:     Days per week: None     Minutes per session: None    Stress: None   Relationships    Social connections:     Talks on phone: None     Gets together: None     Attends Jain service: None     Active member of club or organization: None     Attends meetings of clubs or organizations: None     Relationship status: None    Intimate partner violence:     Fear of current or ex partner: None     Emotionally abused: None     Physically abused: None     Forced sexual activity: None   Other Topics Concern    None   Social History Narrative    None     Family History   Problem Relation Age of Onset    Breast Cancer Sister     Cancer Sister         liver    Diabetes Daughter      No Known Allergies  Patient has no known allergies. Vitals:    05/02/19 0920   BP: 127/80   Pulse: 101   SpO2: 93%     Current Outpatient Medications   Medication Sig Dispense Refill    HYDROcodone-acetaminophen (NORCO) 5-325 MG per tablet Take 1 tablet by mouth 2 times daily as needed for Pain for up to 30 days. 60 tablet 0    blood glucose test strips (ONE TOUCH ULTRA TEST) strip TEST once daily 100 strip 3    polyethylene glycol (GLYCOLAX) powder take 17GM (DISSOLVED IN WATER) by mouth once daily 510 g 1    amLODIPine (NORVASC) 5 MG tablet take 1 tablet by mouth once daily 90 tablet 1    pantoprazole (PROTONIX) 40 MG tablet take 1 tablet by mouth every morning BEFORE BREAKFAST 90 tablet 1    tiZANidine (ZANAFLEX) 2 MG tablet Take 2 tablets at bedtime and once as needed during the day 90 tablet 5    gabapentin (NEURONTIN) 600 MG tablet Take 1 tablet by mouth 3 times daily for 30 days. . 90 tablet 5    loratadine (CLARITIN) 10 MG tablet take 1 tablet by mouth once daily 90 tablet 1    amitriptyline (ELAVIL) 25 MG tablet take 2 tablets by mouth at bedtime 60 tablet 5    glimepiride (AMARYL) 2 MG tablet take 1 tablet once daily WIT BREAKFAST 90 tablet 1    losartan (COZAAR) 25 MG tablet Take 1 tablet by mouth daily 90 tablet 1    atorvastatin (LIPITOR) 40 MG tablet take 1 tablet by mouth once daily 90 tablet 1    fluticasone (FLONASE) 50 MCG/ACT nasal spray instill 1 spray into each nostril once daily 16 g 2    allopurinol (ZYLOPRIM) 100 MG tablet take 1 tablet by mouth once daily 90 tablet 1    metFORMIN (GLUCOPHAGE) 1000 MG tablet take 1 tablet by mouth twice a day with meals 180 tablet 1    carvedilol (COREG) 3.125 MG tablet take 1 tablet by mouth twice a day 60 tablet 5    cloNIDine (CATAPRES) 0.2 MG tablet Take 0.5 tablets by mouth daily 90 tablet 1    ketoconazole (NIZORAL) 2 % cream Apply topically daily. 30 g 1    aspirin 325 MG tablet Take 325 mg by mouth daily      Blood Glucose Monitoring Suppl KIT 1 blood glucose monitoring supplies kit. Test strips, lancets, alcohol swabs 1 kit 0     No current facility-administered medications for this visit. Review of Systems   Constitutional: Negative for fever. Objective:  General Appearance:  Uncomfortable. Vital signs: (most recent): not currently breastfeeding. Vital signs are normal.  No fever. HEENT: Normal HEENT exam.    Lungs:  Normal effort and normal respiratory rate. Breath sounds clear to auscultation. She is not in respiratory distress. Heart: Normal rate. Chest: Symmetric chest wall expansion. No chest wall tenderness. Abdomen: Abdomen is soft and non-distended. Extremities: Normal range of motion. Neurological: Patient is alert and oriented to person, place and time. Normal strength. Patient has normal reflexes, normal muscle tone and normal coordination. (Gait is antalgic  Ambulate with a walker). Pupils:  Pupils are equal, round, and reactive to light. Skin:  Warm, dry and pale. No rash, ecchymosis, cyanosis or ulceration. Assessment & Plan     1. Spinal stenosis of lumbar region with neurogenic claudication    2. Cervical stenosis of spine    3.  Primary osteoarthritis involving multiple joints    4. Hx of fusion of cervical spine    5. Chronic use of opiate drugs therapeutic purposes    6. JANN on CPAP        Orders Placed This Encounter   Procedures    CA NJX DX/THER SBST INTRLMNR CRV/THRC W/IMG GDN    CA INJECT ANES/STEROID FORAMEN LUMBAR/SACRAL W IMG GUIDE ,1 LEVEL      Orders Placed This Encounter   Medications    HYDROcodone-acetaminophen (NORCO) 5-325 MG per tablet     Sig: Take 1 tablet by mouth 2 times daily as needed for Pain for up to 30 days. Dispense:  60 tablet     Refill:  0     Reduce doses taken as pain becomes manageable      Controlled Substances Monitoring:     RX Monitoring 5/2/2019   Attestation The Prescription Monitoring Report for this patient was reviewed today. Chronic Pain Routine Monitoring No signs of potential drug abuse or diversion identified: otherwise, see note documentation;Possible medication side effects, risk of tolerance/dependence & alternative treatments discussed. Chronic Pain > 50 MEDD Obtained or confirmened \"Consent of Opioid Use\" on file.    Chronic Pain > 80 MEDD -         Electronically signed by Marylu Rojo MD on 5/2/2019 at 9:21 AM

## 2019-05-03 DIAGNOSIS — F51.01 PRIMARY INSOMNIA: ICD-10-CM

## 2019-05-06 NOTE — TELEPHONE ENCOUNTER
Last visit 1/9/19  Next Visit Date:  Future Appointments   Date Time Provider Pradeep Catherine   5/29/2019 10:30 AM [facility]   7/8/2019  9:40 AM Katarina Gill APRN - CNP [facility]   8/6/2019  9:00 AM YOLANDA Sun - CNP Neuro [facility] Maintenance   Topic Date Due    Potassium monitoring  02/08/2019    Creatinine monitoring  02/08/2019    Shingles Vaccine (1 of 2) 05/08/2019 (Originally 5/28/1992)    DTaP/Tdap/Td vaccine (1 - Tdap) 10/18/2026 (Originally [birth year]28/1959)    DEXA (modify frequency per FRAX score)  Completed    Flu vaccine  Completed    Pneumococcal 65+ years Vaccine  Completed       Hemoglobin A1C (%)   Date Value   01/09/2019 6.8   09/26/2018 6.3   05/08/2018 6.9             ( goal A1C is < 7)   Microalb/Crt.  Ratio (mcg/mg creat)   Date Value   02/08/2018 CANNOT BE CALCULATED     LDL Cholesterol (mg/dL)   Date Value   02/08/2018 69   01/12/2017 100       (goal LDL is <100)   AST (U/L)   Date Value   02/08/2018 23     ALT (U/L)   Date Value   02/08/2018 20     BUN (mg/dL)   Date Value   02/08/2018 9     BP Readings from Last 3 Encounters:   05/02/19 127/80   03/25/19 135/74   02/25/19 133/79          (goal 120/80)    All Future Testing planned in [facility]  Lab Frequency Next Occurrence   Lipid, Fasting Once 06/03/2019   Comprehensive Metabolic Panel Once 07/60/3054   Microalbumin, Ur Once 06/03/2019   TX INJECT ANES/STEROID FORAMEN LUMBAR/SACRAL W IMG GUIDE ,1 LEVEL Once 05/03/2019               Patient Active Problem List:     Osteoarthritis     Essential hypertension     Type 2 diabetes mellitus with diabetic neuropathy, without long-term current use of insulin (HCC)     Pure hypercholesterolemia     Constipation     Rectal pain     Diverticulosis of colon     Tubular adenoma of colon     History of colon polyps     Rectal pressure     Failed neck syndrome     Cervical stenosis of spine     Cervical radicular pain     Lumbar stenosis with neurogenic claudication     JANN on CPAP     Primary osteoarthritis involving multiple joints     Hx of fusion of cervical spine     Chronic use of opiate drugs therapeutic purposes

## 2019-05-07 RX ORDER — ZOLPIDEM TARTRATE 10 MG/1
TABLET ORAL
Qty: 15 TABLET | Refills: 1 | Status: SHIPPED | OUTPATIENT
Start: 2019-05-07 | End: 2019-06-05 | Stop reason: SDUPTHER

## 2019-05-09 DIAGNOSIS — F51.01 PRIMARY INSOMNIA: ICD-10-CM

## 2019-05-09 RX ORDER — ZOLPIDEM TARTRATE 10 MG/1
TABLET ORAL
Qty: 15 TABLET | Refills: 1 | OUTPATIENT
Start: 2019-05-09 | End: 2019-06-08

## 2019-05-20 ENCOUNTER — APPOINTMENT (OUTPATIENT)
Dept: GENERAL RADIOLOGY | Age: 79
End: 2019-05-20
Attending: ANESTHESIOLOGY
Payer: MEDICARE

## 2019-05-20 ENCOUNTER — HOSPITAL ENCOUNTER (OUTPATIENT)
Age: 79
Setting detail: OUTPATIENT SURGERY
Discharge: HOME | End: 2019-05-20
Attending: ANESTHESIOLOGY | Admitting: ANESTHESIOLOGY
Payer: MEDICARE

## 2019-05-20 VITALS
DIASTOLIC BLOOD PRESSURE: 49 MMHG | TEMPERATURE: 97.2 F | BODY MASS INDEX: 30.55 KG/M2 | SYSTOLIC BLOOD PRESSURE: 97 MMHG | WEIGHT: 166 LBS | RESPIRATION RATE: 20 BRPM | OXYGEN SATURATION: 94 % | HEART RATE: 84 BPM | HEIGHT: 62 IN

## 2019-05-20 LAB — GLUCOSE BLD-MCNC: 101 MG/DL (ref 65–105)

## 2019-05-20 PROCEDURE — 3600000050 HC PAIN LEVEL 1 BASE: Performed by: ANESTHESIOLOGY

## 2019-05-20 PROCEDURE — 64483 NJX AA&/STRD TFRM EPI L/S 1: CPT | Performed by: ANESTHESIOLOGY

## 2019-05-20 PROCEDURE — 6360000002 HC RX W HCPCS: Performed by: ANESTHESIOLOGY

## 2019-05-20 PROCEDURE — 99152 MOD SED SAME PHYS/QHP 5/>YRS: CPT | Performed by: ANESTHESIOLOGY

## 2019-05-20 PROCEDURE — 7100000011 HC PHASE II RECOVERY - ADDTL 15 MIN: Performed by: ANESTHESIOLOGY

## 2019-05-20 PROCEDURE — 3600000051 HC PAIN LEVEL 1 ADDL 15 MIN: Performed by: ANESTHESIOLOGY

## 2019-05-20 PROCEDURE — 62323 NJX INTERLAMINAR LMBR/SAC: CPT | Performed by: ANESTHESIOLOGY

## 2019-05-20 PROCEDURE — 2500000003 HC RX 250 WO HCPCS: Performed by: ANESTHESIOLOGY

## 2019-05-20 PROCEDURE — 6360000004 HC RX CONTRAST MEDICATION: Performed by: ANESTHESIOLOGY

## 2019-05-20 PROCEDURE — 2580000003 HC RX 258: Performed by: ANESTHESIOLOGY

## 2019-05-20 PROCEDURE — 7100000010 HC PHASE II RECOVERY - FIRST 15 MIN: Performed by: ANESTHESIOLOGY

## 2019-05-20 PROCEDURE — 82947 ASSAY GLUCOSE BLOOD QUANT: CPT

## 2019-05-20 PROCEDURE — 3209999900 FLUORO FOR SURGICAL PROCEDURES

## 2019-05-20 PROCEDURE — 2709999900 HC NON-CHARGEABLE SUPPLY: Performed by: ANESTHESIOLOGY

## 2019-05-20 PROCEDURE — 99153 MOD SED SAME PHYS/QHP EA: CPT | Performed by: ANESTHESIOLOGY

## 2019-05-20 RX ORDER — SODIUM CHLORIDE 0.9 % (FLUSH) 0.9 %
10 SYRINGE (ML) INJECTION EVERY 12 HOURS SCHEDULED
Status: DISCONTINUED | OUTPATIENT
Start: 2019-05-20 | End: 2019-05-20 | Stop reason: HOSPADM

## 2019-05-20 RX ORDER — SODIUM CHLORIDE 0.9 % (FLUSH) 0.9 %
10 SYRINGE (ML) INJECTION PRN
Status: DISCONTINUED | OUTPATIENT
Start: 2019-05-20 | End: 2019-05-20 | Stop reason: HOSPADM

## 2019-05-20 RX ORDER — DEXAMETHASONE SODIUM PHOSPHATE 10 MG/ML
INJECTION INTRAMUSCULAR; INTRAVENOUS PRN
Status: DISCONTINUED | OUTPATIENT
Start: 2019-05-20 | End: 2019-05-20 | Stop reason: ALTCHOICE

## 2019-05-20 RX ORDER — MIDAZOLAM HYDROCHLORIDE 1 MG/ML
INJECTION INTRAMUSCULAR; INTRAVENOUS PRN
Status: DISCONTINUED | OUTPATIENT
Start: 2019-05-20 | End: 2019-05-20 | Stop reason: ALTCHOICE

## 2019-05-20 RX ORDER — LIDOCAINE HYDROCHLORIDE 10 MG/ML
INJECTION, SOLUTION INFILTRATION; PERINEURAL PRN
Status: DISCONTINUED | OUTPATIENT
Start: 2019-05-20 | End: 2019-05-20 | Stop reason: ALTCHOICE

## 2019-05-20 RX ORDER — FENTANYL CITRATE 50 UG/ML
INJECTION, SOLUTION INTRAMUSCULAR; INTRAVENOUS PRN
Status: DISCONTINUED | OUTPATIENT
Start: 2019-05-20 | End: 2019-05-20 | Stop reason: ALTCHOICE

## 2019-05-20 RX ADMIN — Medication 10 ML: at 14:13

## 2019-05-20 ASSESSMENT — PAIN - FUNCTIONAL ASSESSMENT: PAIN_FUNCTIONAL_ASSESSMENT: 0-10

## 2019-05-20 ASSESSMENT — PAIN SCALES - GENERAL
PAINLEVEL_OUTOF10: 0
PAINLEVEL_OUTOF10: 5
PAINLEVEL_OUTOF10: 0

## 2019-05-20 NOTE — OP NOTE
Patient Name: Brown Coats   YOB: 1940  Room/Bed: [facility] OR Pool/NONE  Medical Record Number: [id number]  Date: 5/20/2019       Sedation/ Anesthesia Plan:   intravenous sedation   as needed. Medications Planned:   midazolam (Versed) / Fentanyl  Intravenously  as needed. Preoperative Diagnosis:  Lumbar spinal stenosis with neurogenic claudication    Postoperative Diagnosis:  Lumbar spinal stenosis with neurogenic claudication    Procedure Performed:  CAUDAL epidural steroid injection under fluoroscopy guidance    Procedure: The Patient was seen in the preop area, chart was reviewed, informed consent was obtained. Patient was taken to procedure room and was placed in prone position. Vital signs were monitored through out the procedure. A time out was completed. The skin over the back was prepped and draped in sterile manner. The target point was marked at the sacrococcygeal ligament. Skin and deep tissues were anesthetized with 1 % lidocaine. A 17-gauge Tuohy epidural needlele was advanced  under fluoroscopy guidance in lateral view. Once Epidural space was entered, aspiration was -ve. A 19 G catheter was passed through the needle into the epidural space and was advanced several cm. Then after negative aspiration contrast dye was injected with live fluoroscopy in AP views that showed  spread of the contrast in the epidural space  and no vascular runoff or intrathecal spread. Finally 10 ml of treatment solution containing 5 ml of  1 % PF lidocaine and 4 ml of PF NS and 1 ml of dexamethasone 10 mg / ml was injected. The needle was removed and a Band-Aid was placed over the needle  insertion site. The patient's vital signs remained stable and the patient tolerated the procedure well.       NOTE:  The procedure was first attempted with a transforaminal approach at S1 level but despite of multiple attempts at needle repositioning with fluoroscopy guidance and injection of the contrast dye was not able to get optimal contrast to spread in the epidural space that is why the approach was aborted and we switched to caudal epidural injection    Electronically signed by Hamilton Lynn MD on 5/20/2019 at 3:20 PM

## 2019-05-20 NOTE — H&P
History and Physical Update    Pt Name: Hortensia Braga  MRN: [id number]  [place]: 1940  Date of evaluation: 5/20/2019      [x] I have reviewed the Pain Management Note by Dr Zachary Bhat dated 5/2/19 in Epic which meets the criteria for an Interval History and Physical note and is attached below. [x] I have examined  Hortensia Braga  There are no changes to the patient who is scheduled for an Epidural steroid injection bilateral S1 by Dr Zachary Bhat for spinal stenosis lumbar region with neurogenic claudication. Pain rated 6/10 today  The patient denies new health changes, fever, chills, productive cough, SOB, chest pain, open sores or wounds. +DM POC   Last ASA 325mg 10 days ago      Vital signs: /74   Pulse 83   Temp 98.1 °F (36.7 °C) (Oral)   Resp 17   Ht 5' 2\" (1.575 m)   Wt 166 lb (75.3 kg)   SpO2 95%   BMI 30.36 kg/m²     Allergies:  Patient has no known allergies. Medications:    Prior to Admission medications    Medication Sig Start Date End Date Taking? Authorizing Provider   zolpidem (AMBIEN) 10 MG tablet take 1/2 tablet by mouth NIGHTLY if needed for sleep 5/7/19 6/6/19  Connie Montgomery MD   HYDROcodone-acetaminophen (NORCO) 5-325 MG per tablet Take 1 tablet by mouth 2 times daily as needed for Pain for up to 30 days.  5/2/19 6/1/19  Zachary Bhat MD   blood glucose test strips (ONE TOUCH ULTRA TEST) strip TEST once daily 3/29/19   Connie Montgomery MD   polyethylene glycol (GLYCOLAX) powder take 17GM (DISSOLVED IN WATER) by mouth once daily 3/27/19   Connie Montgomery MD   amLODIPine (NORVASC) 5 MG tablet take 1 tablet by mouth once daily 2/26/19   Connie Montgomery MD   pantoprazole (PROTONIX) 40 MG tablet take 1 tablet by mouth every morning BEFORE BREAKFAST 2/12/19   Connie Montgomery MD   tiZANidine (ZANAFLEX) 2 MG tablet Take 2 tablets at bedtime and once as needed during the day 2/6/19   YOLANDA Gupta - CNP   gabapentin (NEURONTIN) 600 MG tablet Take 1 tablet by mouth 3 times daily for 30 days. . 2/6/19 3/8/19  YOLANDA Mathis CNP   loratadine (CLARITIN) 10 MG tablet take 1 tablet by mouth once daily 1/11/19   Connie Caceres MD   amitriptyline (ELAVIL) 25 MG tablet take 2 tablets by mouth at bedtime 1/9/19   Connie Caceres MD   glimepiride (AMARYL) 2 MG tablet take 1 tablet once daily WIT BREAKFAST 1/9/19   Connie Caceres MD   losartan (COZAAR) 25 MG tablet Take 1 tablet by mouth daily 1/9/19   Connie Caceres MD   atorvastatin (LIPITOR) 40 MG tablet take 1 tablet by mouth once daily 1/8/19   Connie Caceres MD   fluticasone [facility]) 50 MCG/ACT nasal spray instill 1 spray into each nostril once daily 12/19/18   YOLANDA Huddleston CNP   allopurinol (ZYLOPRIM) 100 MG tablet take 1 tablet by mouth once daily 12/7/18   Connie Caceres MD   metFORMIN (GLUCOPHAGE) 1000 MG tablet take 1 tablet by mouth twice a day with meals 11/12/18   YOLANDA Huddleston CNP   carvedilol (COREG) 3.125 MG tablet take 1 tablet by mouth twice a day 6/28/18   YOLANDA Huddleston CNP   cloNIDine (CATAPRES) 0.2 MG tablet Take 0.5 tablets by mouth daily 5/8/18   Connie Caceres MD   ketoconazole (NIZORAL) 2 % cream Apply topically daily. 11/8/17   Connie Caceres MD   aspirin 325 MG tablet Take 325 mg by mouth daily    Historical Provider, MD   Blood Glucose Monitoring Suppl KIT 1 blood glucose monitoring supplies kit. Test strips, lancets, alcohol swabs 8/26/14   Rosemary Wu MD       This is a 70 y.o. female who is pleasant, cooperative, alert and oriented x3, in no acute distress. Heart: Heart sounds are normal.  HR 83 regular rate and rhythm without murmur, gallop or rub. Lungs: Normal respiratory effort with good air exchange, unlabored and clear to auscultation without wheezes or rales bilaterally   Abdomen: soft, round, nontender, nondistended with bowel sounds.        Labs:  No results for input(s): HGB, HCT, WBC, MCV, PLT, NA, K, CL, CO2, BUN, CREATININE, GLUCOSE, INR, PROTIME, APTT, AST, ALT, LABALBU, HCG in the last 720 hours. LITZY Omer-BC  Electronically signed 5/20/2019 at 2:06 PM      Marylu Ugalde MD   Physician   Pain Management   Progress Notes      Signed   Encounter Date:  5/2/2019               Signed        Expand All Collapse All           Show:Clear all  [x]Manual[x]Template[]Copied    Added by:  Nanda Lam MD      []Brysonver for details       The patient is a 70 y. o. Non-/non  female.          Chief Complaint   Patient presents with    Back Pain    Neck Pain    Medication Refill         HPI  Neck pain  Pain is chronic onset many years ago  Pain usually radiates down left arm to the elbow  Also reported extension over the trapezius to the right shoulder  History of cervical spine fusion surgery 7 years ago  Recent MRI cervical spine in 2016 showed severe cervical spinal stenosis  Patient have declined any further surgical treatment  Had cervical epidural injection in October 2018 with significant improvement in her symptoms lasting for more than 4 months  Pain have now returned to the baseline  Reports average pain score of 7/10 effecting daily life activities and aggravates with routine physical activities     Back pain  Pain is chronic onset many years ago located in the lumbar area across midline affecting both sides with radiation down both legs  Pain aggravated with standing and walking  She can barely walk one block  Failed elevated measures with therapy in past  MRI lumbar spine 2016 showed severe lumbar spinal stenosis at L4-L5 level  Had caudal epidural injection in October 2018 that provided relief lasting for more than 4 months  Pain have now returned back to the baseline  She have declined surgical intervention     Medication management  Currently taking hydrocodone 5 mg twice a day  Finds it helpful  Deny any side effects  No sign or symptoms of any aberrancy Past Medical History        Past Medical History:   Diagnosis Date    Arthritis      Cataract      Cataracts, bilateral      Diverticulosis of colon 2015    Headache(784.0)      History of colon polyps 2015    Insomnia      Neck pain      Osteoarthrosis, unspecified whether generalized or localized, unspecified site 10/5/2012    Pure hypercholesterolemia 10/5/2012    Shoulder blade pain       right  side    Stroke ([facility] [address]      Tubular adenoma of colon 2015    Type II or unspecified type diabetes mellitus without mention of complication, not stated as uncontrolled 10/5/2012    Unspecified essential hypertension 10/5/2012    Unspecified sleep apnea           Past Surgical History         Past Surgical History:   Procedure Laterality Date    COLONOSCOPY   8/23/06     Dr Lieberman Degree   5 12 15     extensive diverticulosis, tubular adenoma    EPIDURAL STEROID INJECTION N/A 8/25/2017     EPIDURAL STEROID INJECTION cervical performed by Cathie Arias MD at [address] 11/20/2017     EPIDURAL STEROID INJECTION L5S1 performed by Cathie Arias MD at [address] [address]   10/1/06    NECK SURGERY   5/2011 & 2001    OTHER SURGICAL HISTORY   08/25/2017     L5-S1 steroid injection    OTHER SURGICAL HISTORY   11/20/2017     VANITA L5-S1    OTHER SURGICAL HISTORY   10/01/2018     cervical steroid injection    RI INJECT ANES/STEROID FORAMEN LUMBAR/SACRAL W IMG GUIDE ,1 LEVEL Bilateral 11/19/2018     BILATERAL L4 VANITA performed by Cathie Arias MD at [address] DX/THER SBST INTRLMNR CRV/THRC W/IMG GDN N/A 10/1/2018     EPIDURAL STEROID INJECTION CERVICAL C7-T1 performed by Cathie [facility]as MD at [facility]. 18. History            Socioeconomic History    Marital status: Single       Spouse name: None    Number of children: None    Years of education: None    Highest education level: None Occupational History    None   Social Needs    Financial resource strain: None    Food insecurity:       Worry: None       Inability: None    Transportation needs:       Medical: None       Non-medical: None   Tobacco Use    Smoking status: Former Smoker       Packs/day: 0.50       Years: 15.00       Pack years: 7.50       Types: Cigarettes       Last attempt to quit: 10/5/2001       Years since quittin.5    Smokeless tobacco: Never Used   Substance and Sexual Activity    Alcohol use: Yes       Alcohol/week: 0.0 oz       Comment: on occasion    Drug use: No    Sexual activity: None   Lifestyle    Physical activity:       Days per week: None       Minutes per session: None    Stress: None   Relationships    Social connections:       Talks on phone: None       Gets together: None       Attends Orthodox service: None       Active member of club or organization: None       Attends meetings of clubs or organizations: None       Relationship status: None    Intimate partner violence:       Fear of current or ex partner: None       Emotionally abused: None       Physically abused: None       Forced sexual activity: None   Other Topics Concern    None   Social History Narrative    None         Family History         Family History   Problem Relation Age of Onset    Breast Cancer Sister      Cancer Sister           liver    Diabetes Daughter           No Known Allergies  Patient has no known allergies. Vitals:     19 0920   BP: 127/80   Pulse: 101   SpO2: 93%      Current Facility-Administered Medications   Current Outpatient Medications   Medication Sig Dispense Refill    HYDROcodone-acetaminophen (NORCO) 5-325 MG per tablet Take 1 tablet by mouth 2 times daily as needed for Pain for up to 30 days.  60 tablet 0    blood glucose test strips (ONE TOUCH ULTRA TEST) strip TEST once daily 100 strip 3    polyethylene glycol (GLYCOLAX) powder take 17GM (DISSOLVED IN WATER) by mouth once daily 510 g 1    amLODIPine (NORVASC) 5 MG tablet take 1 tablet by mouth once daily 90 tablet 1    pantoprazole (PROTONIX) 40 MG tablet take 1 tablet by mouth every morning BEFORE BREAKFAST 90 tablet 1    tiZANidine (ZANAFLEX) 2 MG tablet Take 2 tablets at bedtime and once as needed during the day 90 tablet 5    gabapentin (NEURONTIN) 600 MG tablet Take 1 tablet by mouth 3 times daily for 30 days. . 90 tablet 5    loratadine (CLARITIN) 10 MG tablet take 1 tablet by mouth once daily 90 tablet 1    amitriptyline (ELAVIL) 25 MG tablet take 2 tablets by mouth at bedtime 60 tablet 5    glimepiride (AMARYL) 2 MG tablet take 1 tablet once daily WIT BREAKFAST 90 tablet 1    losartan (COZAAR) 25 MG tablet Take 1 tablet by mouth daily 90 tablet 1    atorvastatin (LIPITOR) 40 MG tablet take 1 tablet by mouth once daily 90 tablet 1    fluticasone (FLONASE) 50 MCG/ACT nasal spray instill 1 spray into each nostril once daily 16 g 2    allopurinol (ZYLOPRIM) 100 MG tablet take 1 tablet by mouth once daily 90 tablet 1    metFORMIN (GLUCOPHAGE) 1000 MG tablet take 1 tablet by mouth twice a day with meals 180 tablet 1    carvedilol (COREG) 3.125 MG tablet take 1 tablet by mouth twice a day 60 tablet 5    cloNIDine (CATAPRES) 0.2 MG tablet Take 0.5 tablets by mouth daily 90 tablet 1    ketoconazole (NIZORAL) 2 % cream Apply topically daily. 30 g 1    aspirin 325 MG tablet Take 325 mg by mouth daily        Blood Glucose Monitoring Suppl KIT 1 blood glucose monitoring supplies kit. Test strips, lancets, alcohol swabs 1 kit 0      No current facility-administered medications for this visit. Review of Systems   Constitutional: Negative for fever. Objective:  General Appearance:  Uncomfortable. Vital signs: (most recent): not currently breastfeeding. Vital signs are normal.  No fever. HEENT: Normal HEENT exam.    Lungs:  Normal effort and normal respiratory rate. Breath sounds clear to auscultation. She is not in respiratory distress. Heart: Normal rate. Chest: Symmetric chest wall expansion. No chest wall tenderness. Abdomen: Abdomen is soft and non-distended. Extremities: Normal range of motion. Neurological: Patient is alert and oriented to person, place and time. Normal strength. Patient has normal reflexes, normal muscle tone and normal coordination. (Gait is antalgic  Ambulate with a walker). Pupils:  Pupils are equal, round, and reactive to light. Skin:  Warm, dry and pale. No rash, ecchymosis, cyanosis or ulceration. Assessment & Plan      1. Spinal stenosis of lumbar region with neurogenic claudication    2. Cervical stenosis of spine    3. Primary osteoarthritis involving multiple joints    4. Hx of fusion of cervical spine    5. Chronic use of opiate drugs therapeutic purposes    6. JANN on CPAP            Orders Placed This Encounter   Procedures    RI NJX DX/THER SBST INTRLMNR CRV/THRC W/IMG GDN    RI INJECT ANES/STEROID FORAMEN LUMBAR/SACRAL W IMG GUIDE ,1 LEVEL      Encounter Medications         Orders Placed This Encounter   Medications    HYDROcodone-acetaminophen (NORCO) 5-325 MG per tablet       Sig: Take 1 tablet by mouth 2 times daily as needed for Pain for up to 30 days. Dispense:  60 tablet       Refill:  0       Reduce doses taken as pain becomes manageable         Controlled Substances Monitoring:      RX Monitoring 5/2/2019   Attestation The Prescription Monitoring Report for this patient was reviewed today. Chronic Pain Routine Monitoring No signs of potential drug abuse or diversion identified: otherwise, see note documentation;Possible medication side effects, risk of tolerance/dependence & alternative treatments discussed. Chronic Pain > 50 MEDD Obtained or confirmened \"Consent of Opioid Use\" on file.    Chronic Pain > 80 MEDD -            Electronically signed by Claire Grossman MD on 5/2/2019 at 9:21 AM                   ·   Office Visit on 5/2/2019   ·     ·   Detailed Report   ·     Progress Notes Info     Author Note Status Last Update User   Ameya Fermin MD Signed Ameya Fermin MD   Last Update Date/Time: 5/2/2019  9:26 AM   Chart Review Routing History     Routing history could not be found for this note. This is because the note has never been routed or because communication record creation was suppressed.

## 2019-06-01 DIAGNOSIS — I10 ESSENTIAL HYPERTENSION: Primary | ICD-10-CM

## 2019-06-03 ENCOUNTER — HOSPITAL ENCOUNTER (OUTPATIENT)
Age: 79
Setting detail: SPECIMEN
Discharge: HOME | End: 2019-06-03
Payer: MEDICARE

## 2019-06-03 DIAGNOSIS — E11.40 TYPE 2 DIABETES MELLITUS WITH DIABETIC NEUROPATHY, WITHOUT LONG-TERM CURRENT USE OF INSULIN (HCC): ICD-10-CM

## 2019-06-03 DIAGNOSIS — E78.00 PURE HYPERCHOLESTEROLEMIA: ICD-10-CM

## 2019-06-03 LAB
ALBUMIN SERPL-MCNC: 4.2 G/DL (ref 3.5–5.2)
ALBUMIN/GLOBULIN RATIO: 1.4 (ref 1–2.5)
ALP BLD-CCNC: 77 U/L (ref 35–104)
ALT SERPL-CCNC: 14 U/L (ref 5–33)
ANION GAP SERPL CALCULATED.3IONS-SCNC: 16 MMOL/L (ref 9–17)
AST SERPL-CCNC: 18 U/L
BILIRUB SERPL-MCNC: 0.42 MG/DL (ref 0.3–1.2)
BUN BLDV-MCNC: 13 MG/DL (ref 8–23)
BUN/CREAT BLD: NORMAL (ref 9–20)
CALCIUM SERPL-MCNC: 9.7 MG/DL (ref 8.6–10.4)
CHLORIDE BLD-SCNC: 105 MMOL/L (ref 98–107)
CHOLESTEROL, FASTING: 205 MG/DL
CHOLESTEROL/HDL RATIO: 3
CO2: 22 MMOL/L (ref 20–31)
CREAT SERPL-MCNC: 0.59 MG/DL (ref 0.5–0.9)
CREATININE URINE: 85.1 MG/DL (ref 28–217)
GFR AFRICAN AMERICAN: >60 ML/MIN
GFR NON-AFRICAN AMERICAN: >60 ML/MIN
GFR SERPL CREATININE-BSD FRML MDRD: NORMAL ML/MIN/{1.73_M2}
GFR SERPL CREATININE-BSD FRML MDRD: NORMAL ML/MIN/{1.73_M2}
GLUCOSE BLD-MCNC: 98 MG/DL (ref 70–99)
HDLC SERPL-MCNC: 68 MG/DL
LDL CHOLESTEROL: 102 MG/DL (ref 0–130)
MICROALBUMIN/CREAT 24H UR: <12 MG/L
MICROALBUMIN/CREAT UR-RTO: NORMAL MCG/MG CREAT
POTASSIUM SERPL-SCNC: 4.5 MMOL/L (ref 3.7–5.3)
SODIUM BLD-SCNC: 143 MMOL/L (ref 135–144)
TOTAL PROTEIN: 7.3 G/DL (ref 6.4–8.3)
TRIGLYCERIDE, FASTING: 176 MG/DL
VLDLC SERPL CALC-MCNC: ABNORMAL MG/DL (ref 1–30)

## 2019-06-03 NOTE — TELEPHONE ENCOUNTER
Last visit: 1/9/19  Last Med refill: 6/28/18  Does patient have enough medication for 72 hours: Yes    Next Visit Date:  Future Appointments   Date Time Provider Pradeep Alexander   6/5/2019  3:45 PM [facility]ardo MD [facility]   7/8/2019  9:40 AM Micah Ku APRN - CNP [facility]   8/6/2019  9:00 AM YOLANDA Olivo - CNP [facility] Maintenance   Topic Date Due    Shingles Vaccine (1 of 2) 05/28/1992    Potassium monitoring  02/08/2019    Creatinine monitoring  02/08/2019    DTaP/Tdap/Td vaccine (1 - Tdap) 10/18/2026 (Originally 5/28/1959)    DEXA (modify frequency per FRAX score)  Completed    Flu vaccine  Completed    Pneumococcal 65+ years Vaccine  Completed       Hemoglobin A1C (%)   Date Value   01/09/2019 6.8   09/26/2018 6.3   05/08/2018 6.9             ( goal A1C is < 7)   Microalb/Crt.  Ratio (mcg/mg creat)   Date Value   02/08/2018 CANNOT BE CALCULATED     LDL Cholesterol (mg/dL)   Date Value   02/08/2018 69   01/12/2017 100       (goal LDL is <100)   AST (U/L)   Date Value   02/08/2018 23     ALT (U/L)   Date Value   02/08/2018 20     BUN (mg/dL)   Date Value   02/08/2018 9     BP Readings from Last 3 Encounters:   05/20/19 (!) 97/49   05/02/19 127/80   03/25/19 135/74          (goal 120/80)    All Future Testing planned in [facility]  Lab Frequency Next Occurrence   Lipid, Fasting Once 06/03/2019   Comprehensive Metabolic Panel Once 55/03/0417   Microalbumin, Ur Once 06/03/2019   UT INJECT ANES/STEROID FORAMEN LUMBAR/SACRAL W IMG GUIDE ,1 LEVEL Once 05/03/2019               Patient Active Problem List:     Osteoarthritis     Essential hypertension     Type 2 diabetes mellitus with diabetic neuropathy, without long-term current use of insulin (HCC)     Pure hypercholesterolemia     Constipation     Rectal pain     Diverticulosis of colon     Tubular adenoma of colon     History of colon polyps     Rectal pressure     Failed neck syndrome     Cervical stenosis of spine     Cervical radicular pain     Lumbar stenosis with neurogenic claudication     JANN on CPAP     Primary osteoarthritis involving multiple joints     Hx of fusion of cervical spine     Chronic use of opiate drugs therapeutic purposes

## 2019-06-04 DIAGNOSIS — M15.9 PRIMARY OSTEOARTHRITIS INVOLVING MULTIPLE JOINTS: ICD-10-CM

## 2019-06-04 DIAGNOSIS — M48.02 CERVICAL STENOSIS OF SPINE: Chronic | ICD-10-CM

## 2019-06-04 RX ORDER — CARVEDILOL 3.12 MG/1
TABLET ORAL
Qty: 60 TABLET | Refills: 0 | Status: SHIPPED | OUTPATIENT
Start: 2019-06-04 | End: 2019-09-12 | Stop reason: SDUPTHER

## 2019-06-04 NOTE — TELEPHONE ENCOUNTER
Patient called stating she need a refill on Teola Saliva is requesting a refill on the following medications:   Requested Prescriptions     Pending Prescriptions Disp Refills    HYDROcodone-acetaminophen (NORCO) 5-325 MG per tablet 60 tablet 0     Sig: Take 1 tablet by mouth 2 times daily as needed for Pain for up to 30 days. Last OV 5/2/2019, Patient had a procedure on 5/20    Future Appointments   Date Time Provider Pradeep Alexander   6/5/2019  3:45 PM Connie Kenyon MD [facility]   7/8/2019  9:40 AM YOLANDA Robert CNP [facility]   8/6/2019  9:00 AM YOLANDA Chester CNP Neuro Spec [facility]       OARRS report sent to Dr. Nathan Clark through alternative route for review.

## 2019-06-05 ENCOUNTER — OFFICE VISIT (OUTPATIENT)
Dept: FAMILY MEDICINE CLINIC | Age: 79
End: 2019-06-05
Payer: MEDICARE

## 2019-06-05 ENCOUNTER — TELEPHONE (OUTPATIENT)
Dept: PAIN MANAGEMENT | Age: 79
End: 2019-06-05

## 2019-06-05 VITALS
OXYGEN SATURATION: 97 % | SYSTOLIC BLOOD PRESSURE: 110 MMHG | HEART RATE: 83 BPM | RESPIRATION RATE: 16 BRPM | BODY MASS INDEX: 30.54 KG/M2 | TEMPERATURE: 97.4 F | DIASTOLIC BLOOD PRESSURE: 62 MMHG | WEIGHT: 167 LBS

## 2019-06-05 DIAGNOSIS — M48.02 CERVICAL STENOSIS OF SPINE: Chronic | ICD-10-CM

## 2019-06-05 DIAGNOSIS — M48.062 LUMBAR STENOSIS WITH NEUROGENIC CLAUDICATION: ICD-10-CM

## 2019-06-05 DIAGNOSIS — M15.9 PRIMARY OSTEOARTHRITIS INVOLVING MULTIPLE JOINTS: ICD-10-CM

## 2019-06-05 DIAGNOSIS — F51.01 PRIMARY INSOMNIA: ICD-10-CM

## 2019-06-05 DIAGNOSIS — J30.89 ACUTE NON-SEASONAL ALLERGIC RHINITIS: ICD-10-CM

## 2019-06-05 DIAGNOSIS — E11.40 TYPE 2 DIABETES MELLITUS WITH DIABETIC NEUROPATHY, WITHOUT LONG-TERM CURRENT USE OF INSULIN (HCC): ICD-10-CM

## 2019-06-05 DIAGNOSIS — I10 ESSENTIAL HYPERTENSION: ICD-10-CM

## 2019-06-05 LAB — HBA1C MFR BLD: 6.4 %

## 2019-06-05 PROCEDURE — G8417 CALC BMI ABV UP PARAM F/U: HCPCS | Performed by: INTERNAL MEDICINE

## 2019-06-05 PROCEDURE — 99214 OFFICE O/P EST MOD 30 MIN: CPT | Performed by: INTERNAL MEDICINE

## 2019-06-05 PROCEDURE — 1036F TOBACCO NON-USER: CPT | Performed by: INTERNAL MEDICINE

## 2019-06-05 PROCEDURE — G8399 PT W/DXA RESULTS DOCUMENT: HCPCS | Performed by: INTERNAL MEDICINE

## 2019-06-05 PROCEDURE — G8428 CUR MEDS NOT DOCUMENT: HCPCS | Performed by: INTERNAL MEDICINE

## 2019-06-05 PROCEDURE — 1123F ACP DISCUSS/DSCN MKR DOCD: CPT | Performed by: INTERNAL MEDICINE

## 2019-06-05 PROCEDURE — 4040F PNEUMOC VAC/ADMIN/RCVD: CPT | Performed by: INTERNAL MEDICINE

## 2019-06-05 PROCEDURE — 1090F PRES/ABSN URINE INCON ASSESS: CPT | Performed by: INTERNAL MEDICINE

## 2019-06-05 PROCEDURE — 83036 HEMOGLOBIN GLYCOSYLATED A1C: CPT | Performed by: INTERNAL MEDICINE

## 2019-06-05 RX ORDER — LISINOPRIL 10 MG/1
1 TABLET ORAL DAILY
COMMUNITY
Start: 2019-06-03 | End: 2019-06-05

## 2019-06-05 RX ORDER — ZOLPIDEM TARTRATE 10 MG/1
TABLET ORAL
Qty: 15 TABLET | Refills: 1 | Status: SHIPPED | OUTPATIENT
Start: 2019-06-05 | End: 2019-08-14 | Stop reason: SDUPTHER

## 2019-06-05 RX ORDER — LORATADINE 10 MG/1
TABLET ORAL
Qty: 90 TABLET | Refills: 1 | Status: SHIPPED | OUTPATIENT
Start: 2019-06-05 | End: 2020-04-28 | Stop reason: SDUPTHER

## 2019-06-05 RX ORDER — GLIMEPIRIDE 2 MG/1
TABLET ORAL
Qty: 90 TABLET | Refills: 1 | Status: SHIPPED | OUTPATIENT
Start: 2019-06-05 | End: 2020-02-25 | Stop reason: SDUPTHER

## 2019-06-05 RX ORDER — ALLOPURINOL 100 MG/1
TABLET ORAL
Qty: 90 TABLET | Refills: 1 | Status: SHIPPED | OUTPATIENT
Start: 2019-06-05 | End: 2020-01-22

## 2019-06-05 RX ORDER — LOSARTAN POTASSIUM 25 MG/1
25 TABLET ORAL DAILY
Qty: 90 TABLET | Refills: 1 | Status: SHIPPED | OUTPATIENT
Start: 2019-06-05 | End: 2019-08-15 | Stop reason: ALTCHOICE

## 2019-06-05 NOTE — PROGRESS NOTES
Patient is present for a routine check up. She states she has a dry cough. Pt states she she has an issue with headaches on the left side of the back of her head. She has not tried any otc meds. Pt does not see auras. No other concerns at this time. Visit Information    Have you changed or started any medications since your last visit including any over-the-counter medicines, vitamins, or herbal medicines? no   Have you stopped taking any of your medications? Is so, why? -  no  Are you having any side effects from any of your medications? - no    Have you seen any other physician or provider since your last visit? yes - PM    Have you had any other diagnostic tests since your last visit?  no   Have you been seen in the emergency room and/or had an admission in a hospital since we last saw you?  no   Have you had your routine dental cleaning in the past 6 months?  no     Do you have an active [employer] account? If no, what is the barrier?   No: declined     Patient Care Team:  Raymond Arevalo MD as PCP - General (Internal Medicine)  [facility]  Blair Bennett MD as Consulting Physician (Colon and Rectal Surgery)  Marylu Rojo MD as Consulting Physician (Pain Management)    Medical History Review  Past Medical, Family, and Social History reviewed and does not contribute to the patient presenting condition    Health Maintenance   Topic Date Due    Shingles Vaccine (1 of 2) 05/28/1992    DTaP/Tdap/Td vaccine (1 - Tdap) 10/18/2026 (Originally 5/28/1959)    Potassium monitoring  06/03/2020    Creatinine monitoring  06/03/2020    DEXA (modify frequency per FRAX score)  Completed    Flu vaccine  Completed    Pneumococcal 65+ years Vaccine  Completed

## 2019-06-05 NOTE — PROGRESS NOTES
Subjective:       Patient ID: Amrik Reddy is a 70 y.o. female who presents for   Chief Complaint   Patient presents with    Diabetes    Cough    Headache       HPI:  Nursing note reviewed and discussed with patient. Diabetes Mellitus  Patient presents for follow up of diabetes. Current symptoms include: hypoglycemia  and paresthesia of the feet. Symptoms have been well-controlled. Patient denies foot ulcerations, hypoglycemia , increase appetite, nausea, polydipsia, polyuria, visual disturbances, vomiting and weight loss. Evaluation to date has included: hemoglobin A1C. Home sugars: BGs are running  consistent with Hgb A1C. Current treatment: Continued amaryl and metformin which has been effective and  . Last dilated eye exam: 3-4 years ago, has an appointment scheduled but needs a referral. Does not see podiatry. Foot exam done here 11/2017. Sugars 120's in AM,  in evening. Not checking sugars daily       Additional Complaints:  Dyslipidemia  Patient presents for evaluation of lipids. Compliance with treatment thus far has been good. A repeat fasting lipid profile was not done. She will be getting labs drawn today. The patient does use medications that may worsen dyslipidemias (corticosteroids, progestins, anabolic steroids, diuretics, beta-blockers, amiodarone, cyclosporine, olanzapine). The patient exercises rarely. The patient is not known to have coexisting coronary artery disease. Hypertension  Patient is here for follow-up of elevated blood pressure. She is not exercising and is adherent to a low-salt diet. Blood pressure is well controlled at home. Cardiac symptoms: none. Patient denies chest pain, chest pressure/discomfort, claudication, dyspnea, exertional chest pressure/discomfort, fatigue, irregular heart beat, lower extremity edema, near-syncope, orthopnea, palpitations, paroxysmal nocturnal dyspnea, syncope and tachypnea.  Cardiovascular risk factors: advanced age (older than 54 for men, 72 for women), diabetes mellitus, dyslipidemia, hypertension and sedentary lifestyle. Use of agents associated with hypertension: none. History of target organ damage: none. She follows with pain clinic for her norco for back and joints. Bowels are moving okay, about every other day, no straining, bleeding, abdominal pain. Bowel regimen with miralax. --> she needs a refill on her norco - Dr Idalia Hernandez is out of the office till Monday 6/10 and apparently they told her she has to wait till then. Sleeping a little better now, she is getting at least 5 uninterrupted hours of sleep at night. Remains on ambien with good effect. Headache on the left posterior side of head. Lasting hours sometimes. Worse with turning her head to the right. Better when she wakes up in the morning. No vision changes, nausea, vomiting, diarrhea. Dry cough for four days now. No congestion, dyspnea, fevers, chills, sore throat, ear pain. No exacerbating or relieving factors. No nighttime cough. Seems to be back on lisinopril which had been stopped due to the dry cough - she isnt sure why. Patient's medications, allergies, past medical, surgical, social and family histories were reviewed and updated as appropriate. Social History     Tobacco Use    Smoking status: Former Smoker     Packs/day: 0.50     Years: 15.00     Pack years: 7.50     Types: Cigarettes     Last attempt to quit: 10/5/2001     Years since quittin.6    Smokeless tobacco: Never Used   Substance Use Topics    Alcohol use: Yes     Alcohol/week: 0.0 oz     Comment: on occasion        Review of Systems  Energy level good overall, and weight is stable. No chest pain or shortness of breath.     Bowels have been normal without constipation or diarrhea         Objective:        Physical Exam:  /62 (Site: Right Upper Arm, Position: Sitting, Cuff Size: Large Adult)   Pulse 83   Temp 97.4 °F (36.3 °C) (Oral)   Resp 16   Wt 167 lb (75.8 kg)   SpO2 tablet take 1 tablet by mouth once daily  Connie Lozano MD   amitriptyline (ELAVIL) 25 MG tablet take 2 tablets by mouth at bedtime  Connie Lozano MD   glimepiride (AMARYL) 2 MG tablet take 1 tablet once daily WIT BREAKFAST  Connie Lozano MD   losartan (COZAAR) 25 MG tablet Take 1 tablet by mouth daily  Connie Lozano MD   atorvastatin (LIPITOR) 40 MG tablet take 1 tablet by mouth once daily  Connie Lozano MD   fluticasone (FLONASE) 50 MCG/ACT nasal spray instill 1 spray into each nostril once daily  YOLANDA Reich CNP   allopurinol (ZYLOPRIM) 100 MG tablet take 1 tablet by mouth once daily  Connie Lozano MD   metFORMIN (GLUCOPHAGE) 1000 MG tablet take 1 tablet by mouth twice a day with meals  YOLANDA Reich CNP   cloNIDine (CATAPRES) 0.2 MG tablet Take 0.5 tablets by mouth daily  Connie Lozano MD   ketoconazole (NIZORAL) 2 % cream Apply topically daily. Collin Duran MD   aspirin 325 MG tablet Take 325 mg by mouth daily  Historical Provider, MD   Blood Glucose Monitoring Suppl KIT 1 blood glucose monitoring supplies kit.  Test strips, lancets, alcohol swabs  Marianna Newton MD       Data Review      Component      Latest Ref Rng & Units 5/8/2018 2/8/2018 11/8/2017          12:23 PM 11:20 AM 11:00 AM   Hemoglobin A1C      % 6.9 6.0 6.4     Component      Latest Ref Rng & Units 2/8/2018 1/12/2017 12/18/2015          11:46 AM  7:03 PM 10:20 AM   Cholesterol      <200 mg/dL 179 203 (H) 214 (H)   HDL Cholesterol      >40 mg/dL 81 70 69   LDL Cholesterol      0 - 130 mg/dL 69 100 98   Chol/HDL Ratio      <5 2.2 2.9 3.1   Triglycerides      <150 mg/dL 146 167 (H) 234 (H)   VLDL      1 - 30 mg/dL NOT REPORTED NOT REPORTED NOT REPORTED     Lab Results   Component Value Date     06/03/2019    K 4.5 06/03/2019     06/03/2019    CO2 22 06/03/2019    BUN 13 06/03/2019    CREATININE 0.59 06/03/2019    GLUCOSE 98 06/03/2019    CALCIUM 9.7 06/03/2019

## 2019-06-06 RX ORDER — HYDROCODONE BITARTRATE AND ACETAMINOPHEN 5; 325 MG/1; MG/1
1 TABLET ORAL 2 TIMES DAILY PRN
Qty: 60 TABLET | Refills: 0 | Status: SHIPPED | OUTPATIENT
Start: 2019-06-06 | End: 2019-09-05 | Stop reason: SDUPTHER

## 2019-06-07 ENCOUNTER — TELEPHONE (OUTPATIENT)
Dept: FAMILY MEDICINE CLINIC | Age: 79
End: 2019-06-07

## 2019-06-07 RX ORDER — HYDROCODONE BITARTRATE AND ACETAMINOPHEN 5; 325 MG/1; MG/1
1 TABLET ORAL 2 TIMES DAILY PRN
Qty: 60 TABLET | Refills: 0 | Status: SHIPPED | OUTPATIENT
Start: 2019-06-07 | End: 2019-07-10 | Stop reason: SDUPTHER

## 2019-06-11 ENCOUNTER — TELEPHONE (OUTPATIENT)
Dept: FAMILY MEDICINE CLINIC | Age: 79
End: 2019-06-11

## 2019-06-11 DIAGNOSIS — K64.9 HEMORRHOIDS, UNSPECIFIED HEMORRHOID TYPE: Primary | ICD-10-CM

## 2019-06-11 NOTE — TELEPHONE ENCOUNTER
Takes 2-4 weeks for the cough from lisinopril to stop. Would not worry about the cough too much unless having new symptoms like coughing up sputum or blood.

## 2019-06-19 DIAGNOSIS — I10 ESSENTIAL HYPERTENSION: ICD-10-CM

## 2019-06-19 RX ORDER — CLONIDINE HYDROCHLORIDE 0.2 MG/1
TABLET ORAL
Qty: 90 TABLET | Refills: 1 | Status: SHIPPED | OUTPATIENT
Start: 2019-06-19 | End: 2019-10-07

## 2019-06-19 NOTE — TELEPHONE ENCOUNTER
Last visit: 6/5/19  Last Med refill: 5/8/18  Does patient have enough medication for 72 hours: Yes    Next Visit Date:  Future Appointments   Date Time Provider Pradeep Alexander   7/8/2019  9:40 AM YOLANDA Acuna - CNP [facility]   8/6/2019  9:00 AM YOLANDA Cummings - CNP Neuro Spec [facility]   10/4/2019  9:30 AM Connie Vicente  [address] [facility]   Topic Date Due    Annual Wellness Visit (AWV)  05/28/2005    Shingles Vaccine (1 of 2) 06/05/2020 (Originally 5/28/1992)    DTaP/Tdap/Td vaccine (1 - Tdap) 10/18/2026 (Originally 5/28/1959)    Potassium monitoring  06/03/2020    Creatinine monitoring  06/03/2020    DEXA (modify frequency per FRAX score)  Completed    Flu vaccine  Completed    Pneumococcal 65+ years Vaccine  Completed       Hemoglobin A1C (%)   Date Value   06/05/2019 6.4   01/09/2019 6.8   09/26/2018 6.3             ( goal A1C is < 7)   Microalb/Crt.  Ratio (mcg/mg creat)   Date Value   06/03/2019 CANNOT BE CALCULATED     LDL Cholesterol (mg/dL)   Date Value   06/03/2019 102   02/08/2018 69       (goal LDL is <100)   AST (U/L)   Date Value   06/03/2019 18     ALT (U/L)   Date Value   06/03/2019 14     BUN (mg/dL)   Date Value   06/03/2019 13     BP Readings from Last 3 Encounters:   06/05/19 110/62   05/20/19 (!) 97/49   05/02/19 127/80          (goal 120/80)    All Future Testing planned in [facility]  Lab Frequency Next Occurrence   CT INJECT ANES/STEROID FORAMEN LUMBAR/SACRAL W IMG GUIDE ,1 LEVEL Once 05/03/2019               Patient Active Problem List:     Osteoarthritis     Essential hypertension     Type 2 diabetes mellitus with diabetic neuropathy, without long-term current use of insulin (HCC)     Pure hypercholesterolemia     Constipation     Rectal pain     Diverticulosis of colon     Tubular adenoma of colon     History of colon polyps     Rectal pressure     Failed neck syndrome     Cervical stenosis of spine     Cervical radicular pain Lumbar stenosis with neurogenic claudication     JANN on CPAP     Primary osteoarthritis involving multiple joints     Hx of fusion of cervical spine     Chronic use of opiate drugs therapeutic purposes

## 2019-06-20 ENCOUNTER — TELEPHONE (OUTPATIENT)
Dept: PAIN MANAGEMENT | Age: 79
End: 2019-06-20

## 2019-06-24 NOTE — SEDATION DOCUMENTATION
SEDATION NOTE:    ASA CLASSIFICATION  3  MP   CLASSIFICATION  3    · Moderate intravenous conscious sedation was supervised by Dr. Iván Brady  . The patient was independently monitored by a Registered Nurse assigned to the Procedure Room  . Monitoring included automated blood pressure, continuous EKG, Capnography and continuous pulse oximetry. . The detailed Conscious Record is permanently stored in the [place] [id number].      The following is the conscious sedation record;  Start Time:  1441  End times:  1457  Duration:  16 minutes  MEDS GIVEN 2 MG VERSED AND 50 MCG FENTANYL

## 2019-07-01 ENCOUNTER — TELEPHONE (OUTPATIENT)
Dept: FAMILY MEDICINE CLINIC | Age: 79
End: 2019-07-01

## 2019-07-01 ENCOUNTER — TELEPHONE (OUTPATIENT)
Dept: GASTROENTEROLOGY | Age: 79
End: 2019-07-01

## 2019-07-01 RX ORDER — ATORVASTATIN CALCIUM 40 MG/1
TABLET, FILM COATED ORAL
Qty: 90 TABLET | Refills: 1 | Status: SHIPPED | OUTPATIENT
Start: 2019-07-01 | End: 2020-02-25 | Stop reason: SDUPTHER

## 2019-07-01 NOTE — TELEPHONE ENCOUNTER
Last visit: 6/5/19  Last Med refill: 1/8/19  Does patient have enough medication for 72 hours: Yes    Next Visit Date:  Future Appointments   Date Time Provider Pradeep Alexander   7/8/2019  9:40 AM YOLANDA Rojas - CNP [facility]   8/6/2019  9:00 AM YOLANDA Latham - CNP Neuro Spec [facility]   10/4/2019  9:30 AM Connie De Los Santos  [address] Maintenance   Topic Date Due    Annual Wellness Visit (AWV)  05/28/2005    Shingles Vaccine (1 of 2) 06/05/2020 (Originally 5/28/1992)    DTaP/Tdap/Td vaccine (1 - Tdap) 10/18/2026 (Originally [birth year]28[birth year]1961)    Flu vaccine (1) 09/01/2019    Potassium monitoring  06/03/2020    Creatinine monitoring  06/03/2020    DEXA (modify frequency per FRAX score)  Completed    Pneumococcal 65+ years Vaccine  Completed       Hemoglobin A1C (%)   Date Value   06/05/2019 6.4   01/09/2019 6.8   09/26/2018 6.3             ( goal A1C is < 7)   Microalb/Crt.  Ratio (mcg/mg creat)   Date Value   06/03/2019 CANNOT BE CALCULATED     LDL Cholesterol (mg/dL)   Date Value   06/03/2019 102   02/08/2018 69       (goal LDL is <100)   AST (U/L)   Date Value   06/03/2019 18     ALT (U/L)   Date Value   06/03/2019 14     BUN (mg/dL)   Date Value   06/03/2019 13     BP Readings from Last 3 Encounters:   06/05/19 110/62   05/20/19 (!) 97/49   05/02/19 127/80          (goal 120/80)    All Future Testing planned in [facility]  Lab Frequency Next Occurrence   [place] INJECT ANES/STEROID FORAMEN LUMBAR/SACRAL W IMG GUIDE ,1 LEVEL Once 05/03/2019               Patient Active Problem List:     Osteoarthritis     Essential hypertension     Type 2 diabetes mellitus with diabetic neuropathy, without long-term current use of insulin (HCC)     Pure hypercholesterolemia     Constipation     Rectal pain     Diverticulosis of colon     Tubular adenoma of colon     History of colon polyps     Rectal pressure     Failed neck syndrome     Cervical stenosis of spine     Cervical radicular

## 2019-07-10 ENCOUNTER — OFFICE VISIT (OUTPATIENT)
Dept: PAIN MANAGEMENT | Age: 79
End: 2019-07-10
Payer: MEDICARE

## 2019-07-10 VITALS
SYSTOLIC BLOOD PRESSURE: 102 MMHG | WEIGHT: 167 LBS | OXYGEN SATURATION: 92 % | BODY MASS INDEX: 30.54 KG/M2 | DIASTOLIC BLOOD PRESSURE: 67 MMHG

## 2019-07-10 DIAGNOSIS — R26.89 ABNORMALITY OF GAIT DUE TO IMPAIRMENT OF BALANCE: Primary | ICD-10-CM

## 2019-07-10 DIAGNOSIS — G47.33 OSA ON CPAP: ICD-10-CM

## 2019-07-10 DIAGNOSIS — Z99.89 OSA ON CPAP: ICD-10-CM

## 2019-07-10 DIAGNOSIS — Z79.891 CHRONIC USE OF OPIATE DRUG FOR THERAPEUTIC PURPOSE: ICD-10-CM

## 2019-07-10 DIAGNOSIS — M15.9 PRIMARY OSTEOARTHRITIS INVOLVING MULTIPLE JOINTS: ICD-10-CM

## 2019-07-10 DIAGNOSIS — M48.02 CERVICAL STENOSIS OF SPINE: Chronic | ICD-10-CM

## 2019-07-10 DIAGNOSIS — M48.062 LUMBAR STENOSIS WITH NEUROGENIC CLAUDICATION: Chronic | ICD-10-CM

## 2019-07-10 DIAGNOSIS — R29.6 FREQUENT FALLS: ICD-10-CM

## 2019-07-10 PROCEDURE — 1090F PRES/ABSN URINE INCON ASSESS: CPT | Performed by: ANESTHESIOLOGY

## 2019-07-10 PROCEDURE — G8399 PT W/DXA RESULTS DOCUMENT: HCPCS | Performed by: ANESTHESIOLOGY

## 2019-07-10 PROCEDURE — G8417 CALC BMI ABV UP PARAM F/U: HCPCS | Performed by: ANESTHESIOLOGY

## 2019-07-10 PROCEDURE — 1123F ACP DISCUSS/DSCN MKR DOCD: CPT | Performed by: ANESTHESIOLOGY

## 2019-07-10 PROCEDURE — 4040F PNEUMOC VAC/ADMIN/RCVD: CPT | Performed by: ANESTHESIOLOGY

## 2019-07-10 PROCEDURE — 1036F TOBACCO NON-USER: CPT | Performed by: ANESTHESIOLOGY

## 2019-07-10 PROCEDURE — 99214 OFFICE O/P EST MOD 30 MIN: CPT | Performed by: ANESTHESIOLOGY

## 2019-07-10 PROCEDURE — G8427 DOCREV CUR MEDS BY ELIG CLIN: HCPCS | Performed by: ANESTHESIOLOGY

## 2019-07-10 RX ORDER — HYDROCODONE BITARTRATE AND ACETAMINOPHEN 5; 325 MG/1; MG/1
1 TABLET ORAL 2 TIMES DAILY PRN
Qty: 60 TABLET | Refills: 0 | Status: SHIPPED | OUTPATIENT
Start: 2019-07-10 | End: 2019-08-06 | Stop reason: SDUPTHER

## 2019-07-10 ASSESSMENT — ENCOUNTER SYMPTOMS
BACK PAIN: 0
RESPIRATORY NEGATIVE: 1
GASTROINTESTINAL NEGATIVE: 1

## 2019-07-10 NOTE — PROGRESS NOTES
The patient is a 70 y. o. Non-/non  female. Chief Complaint   Patient presents with    Medication Refill    Neck Pain        HPI   Is a 61-year-old woman who is very well-known to our clinic  She is seen for chronic neck pain with radiation down left arm and chronic low back pain with radiation down both legs  She is been diagnosed with severe cervical and lumbar spinal stenosis  Patient have declined surgical referral in past  She had lumbar and cervical epidural injections in past with good outcome  She is on chronic medication contract with our clinic for low-dose opioid therapy for her chronic pain issues    Today here for medication refills  Finds the medication helpful  Denies any side effects from the medication  No signs or symptoms of any aberrancy    Today patient reported that she has been having frequent falls and worsening gait balance    Medication Refill: Norco    Pain score Today:  4  Adverse effects (Constipation / Nausea / Sedation / sexual Dysfunction / others) : none  Mood: fair  Sleep pattern and quality: poor  Activity level: poor    Pill count Today: #4  Last dose taken  7/9/2019   OARRS report reviewed today: yes  ER/Hospitalizations/PCP visit related to pain since last visit:no   Any legal problems e.g. DUI etc.:No  Satisfied with current management: Yes    Opioid Contract: 8/10/2018  Last Urine Dug screen dated: 8/10/2018          Past Medical History, Past Surgical History, Social History, Allergies and Medications reviewed and updated in EPIC as indicated    Family History reviewed and is noncontributory.             Past Medical History:   Diagnosis Date    Arthritis     Cataract     Cataracts, bilateral     Diverticulosis of colon 2015    Headache(784.0)     History of colon polyps 2015    Insomnia     Neck pain     Osteoarthrosis, unspecified whether generalized or localized, unspecified site 10/5/2012    Pure hypercholesterolemia 10/5/2012    Shoulder blade MEDD -   Chronic Pain > 80 MEDD -             Electronically signed by Dejuan Oneill MD on 7/10/2019 at 9:46 AM

## 2019-07-31 ENCOUNTER — HOSPITAL ENCOUNTER (OUTPATIENT)
Dept: MRI IMAGING | Facility: CLINIC | Age: 79
Discharge: HOME | End: 2019-08-02
Payer: MEDICARE

## 2019-07-31 DIAGNOSIS — R29.6 FREQUENT FALLS: ICD-10-CM

## 2019-07-31 DIAGNOSIS — R26.89 ABNORMALITY OF GAIT DUE TO IMPAIRMENT OF BALANCE: ICD-10-CM

## 2019-07-31 DIAGNOSIS — M48.02 CERVICAL STENOSIS OF SPINE: Chronic | ICD-10-CM

## 2019-07-31 DIAGNOSIS — M48.062 LUMBAR STENOSIS WITH NEUROGENIC CLAUDICATION: Chronic | ICD-10-CM

## 2019-07-31 PROCEDURE — 72141 MRI NECK SPINE W/O DYE: CPT

## 2019-07-31 PROCEDURE — 72148 MRI LUMBAR SPINE W/O DYE: CPT

## 2019-08-06 ENCOUNTER — OFFICE VISIT (OUTPATIENT)
Dept: PAIN MANAGEMENT | Age: 79
End: 2019-08-06
Payer: MEDICARE

## 2019-08-06 VITALS
DIASTOLIC BLOOD PRESSURE: 64 MMHG | SYSTOLIC BLOOD PRESSURE: 96 MMHG | HEIGHT: 62 IN | BODY MASS INDEX: 30.73 KG/M2 | OXYGEN SATURATION: 91 % | HEART RATE: 91 BPM | WEIGHT: 167 LBS

## 2019-08-06 DIAGNOSIS — Z98.1 HX OF FUSION OF CERVICAL SPINE: ICD-10-CM

## 2019-08-06 DIAGNOSIS — Z79.899 MEDICATION MANAGEMENT: ICD-10-CM

## 2019-08-06 DIAGNOSIS — G95.89 MYELOMALACIA (HCC): ICD-10-CM

## 2019-08-06 DIAGNOSIS — M48.02 CERVICAL STENOSIS OF SPINE: Chronic | ICD-10-CM

## 2019-08-06 DIAGNOSIS — Z79.891 CHRONIC USE OF OPIATE DRUG FOR THERAPEUTIC PURPOSE: ICD-10-CM

## 2019-08-06 DIAGNOSIS — M48.062 LUMBAR STENOSIS WITH NEUROGENIC CLAUDICATION: Chronic | ICD-10-CM

## 2019-08-06 DIAGNOSIS — R93.7 ABNORMAL SPINAL DIAGNOSTIC IMAGING: ICD-10-CM

## 2019-08-06 DIAGNOSIS — Z99.89 OSA ON CPAP: ICD-10-CM

## 2019-08-06 DIAGNOSIS — M47.817 LUMBOSACRAL SPONDYLOSIS WITHOUT MYELOPATHY: ICD-10-CM

## 2019-08-06 DIAGNOSIS — G47.33 OSA ON CPAP: ICD-10-CM

## 2019-08-06 DIAGNOSIS — G89.29 ENCOUNTER FOR CHRONIC PAIN MANAGEMENT: Primary | ICD-10-CM

## 2019-08-06 PROCEDURE — 99215 OFFICE O/P EST HI 40 MIN: CPT | Performed by: ANESTHESIOLOGY

## 2019-08-06 PROCEDURE — 1090F PRES/ABSN URINE INCON ASSESS: CPT | Performed by: ANESTHESIOLOGY

## 2019-08-06 PROCEDURE — G8399 PT W/DXA RESULTS DOCUMENT: HCPCS | Performed by: ANESTHESIOLOGY

## 2019-08-06 PROCEDURE — 1123F ACP DISCUSS/DSCN MKR DOCD: CPT | Performed by: ANESTHESIOLOGY

## 2019-08-06 PROCEDURE — G8417 CALC BMI ABV UP PARAM F/U: HCPCS | Performed by: ANESTHESIOLOGY

## 2019-08-06 PROCEDURE — G8427 DOCREV CUR MEDS BY ELIG CLIN: HCPCS | Performed by: ANESTHESIOLOGY

## 2019-08-06 PROCEDURE — 1036F TOBACCO NON-USER: CPT | Performed by: ANESTHESIOLOGY

## 2019-08-06 PROCEDURE — 4040F PNEUMOC VAC/ADMIN/RCVD: CPT | Performed by: ANESTHESIOLOGY

## 2019-08-06 RX ORDER — HYDROCODONE BITARTRATE AND ACETAMINOPHEN 5; 325 MG/1; MG/1
1 TABLET ORAL 2 TIMES DAILY PRN
Qty: 60 TABLET | Refills: 0 | Status: SHIPPED | OUTPATIENT
Start: 2019-08-06 | End: 2019-08-15 | Stop reason: ALTCHOICE

## 2019-08-06 ASSESSMENT — ENCOUNTER SYMPTOMS
APNEA: 0
WHEEZING: 0
COUGH: 1
SHORTNESS OF BREATH: 0
CHEST TIGHTNESS: 0
CHOKING: 0
BACK PAIN: 1
STRIDOR: 0

## 2019-08-06 NOTE — PROGRESS NOTES
systems reviewed and are negative. Objective:  General Appearance:  Well-appearing, in no acute distress, uncomfortable and in pain. Vital signs: (most recent): Height 5' 2\" (1.575 m), weight 167 lb (75.8 kg), not currently breastfeeding. Vital signs are normal.  No fever. HEENT: Normal HEENT exam.    Lungs:  Normal effort. Breath sounds clear to auscultation. Heart: Normal rate. Abdomen: Abdomen is soft. Extremities: Normal range of motion. Neurological: Patient is alert and oriented to person, place and time. Pupils:  Pupils are equal, round, and reactive to light. Pupils are equal.   Skin:  Warm, dry and pale. No rash, ecchymosis, cyanosis or ulceration. Assessment & Plan       EXAMINATION: MRI OF THE CERVICAL SPINE WITHOUT CONTRAST 7/31/2019 8:26 am     Impression Reversal of the normal cervical lordosis with redemonstration of postsurgical change from C3-C6. Canal stenosis most pronounced at C3-4 and C6-7. There is evidence of myelomalacia at C3-4 and C6-7 as described above. Mild right foraminal narrowing at C2-3, mild left and moderate right foraminal narrowing at C3-4, moderate to severe right and mild-to-moderate left foraminal narrowing at C5-6, severe bilateral foraminal narrowing at C6-7. EXAMINATION: MRI OF THE LUMBAR SPINE WITHOUT CONTRAST, 7/31/2019 8:26 am     Impression Multilevel degenerative disc disease and associated multilevel degenerative facet and ligamentous hypertrophy with canal stenosis that is severe at L4-5. Foraminal narrowing bilaterally throughout the lower thoracic and lumbar spine as described above. L1-L2: There is a circumferential disc bulge with facet and ligamentous hypertrophy. There is no canal stenosis or left foraminal narrowing. There is mild right foraminal narrowing. L2-L3: There is a mild circumferential disc bulge with facet and ligamentous hypertrophy. There is no canal stenosis or left foraminal narrowing.   There is mild right foraminal narrowing. L3-L4: There is a circumferential disc bulge with facet and ligamentous hypertrophy and large bilateral facet effusions. There is canal stenosis measuring 7 mm in AP dimension. There is mild bilateral foraminal narrowing. L4-L5: There is a circumferential disc bulge with mild uncovering of the disc space posteriorly. There is associated facet hypertrophy. There is severe canal stenosis. There is severe bilateral foraminal narrowing. L5-S1: There is a circumferential disc bulge with facet and ligamentous hypertrophy. There is canal stenosis measuring 8 mm in AP dimension. There is moderate to severe left and severe right foraminal narrowing. 1. Encounter for chronic pain management    2. Cervical stenosis of spine    3. Lumbar stenosis with neurogenic claudication    4. JANN on CPAP    5. Hx of fusion of cervical spine    6. Chronic use of opiate drugs therapeutic purposes    7. Medication management    8. Abnormal spinal diagnostic imaging    9. Myelomalacia ([facility] [address]    10.  Lumbosacral spondylosis without myelopathy          MRI cervical spine and MRI lumbar spine  Reports are reviewed  Images reviewed independently  Images were shown to the patient  Particular finding discussed with and detail with patient    Worsening of cervical spinal stenosis, history of anterior cervical spine surgery  Myelomalacia seen on cervical spinal cord  Will refer for neurosurgical evaluation  In past patient has been reluctant for any lumbar surgery    Worsening of lumbar spinal stenosis with most severe at L4-L5 and L3-L4 level  Patient is not interested in any open lumbar spine surgery  Pain of progressively been worsening and effectively life activities  In past she has failed different modalities including epidural injections and physical therapy and medication management  I think patient is a good candidate for minimally invasive lumbar spine interspace or with vertical flex device  Procedure discussed in detail  Device was shown to the patient  Information material provided    For chronic axial lower back pain, progressively worsened over many many years, described as stiffness constantly in the back particularly in the morning, MRI showing multilevel lumbar facet arthropathy and facet joint effusion  I will recommend bilateral lumbar facet steroid injection  Orders Placed This Encounter   Procedures    Lis Katz DO, Neurosurgery, [facility] INJ DX/THER AGNT PARAVERT FACET JOINT, LUMBAR/SAC, 1ST LEVEL      Orders Placed This Encounter   Medications    HYDROcodone-acetaminophen (NORCO) 5-325 MG per tablet     Sig: Take 1 tablet by mouth 2 times daily as needed for Pain for up to 30 days. Dispense:  60 tablet     Refill:  0     Reduce doses taken as pain becomes manageable      Controlled Substance Monitoring:    Acute and Chronic Pain Monitoring:   RX Monitoring 8/6/2019   Attestation -   Periodic Controlled Substance Monitoring Possible medication side effects, risk of tolerance/dependence & alternative treatments discussed. ;No signs of potential drug abuse or diversion identified. Chronic Pain > 50 MEDD Obtained or confirmed \"Consent for Opioid Use\" on file.    Chronic Pain > 80 MEDD -           Electronically signed by Ousmane Nevarez MD on 8/6/2019 at 9:28 AM

## 2019-08-12 DIAGNOSIS — M54.12 CERVICAL RADICULAR PAIN: Chronic | ICD-10-CM

## 2019-08-12 RX ORDER — AMITRIPTYLINE HYDROCHLORIDE 25 MG/1
TABLET, FILM COATED ORAL
Qty: 60 TABLET | Refills: 5 | Status: SHIPPED | OUTPATIENT
Start: 2019-08-12 | End: 2019-08-15 | Stop reason: SDUPTHER

## 2019-08-12 NOTE — TELEPHONE ENCOUNTER
Lumbar stenosis with neurogenic claudication     JANN on CPAP     Primary osteoarthritis involving multiple joints     Hx of fusion of cervical spine     Chronic use of opiate drugs therapeutic purposes     Myelomalacia (HCC)

## 2019-08-14 DIAGNOSIS — F51.01 PRIMARY INSOMNIA: ICD-10-CM

## 2019-08-14 RX ORDER — ZOLPIDEM TARTRATE 10 MG/1
TABLET ORAL
Qty: 15 TABLET | Refills: 1 | Status: SHIPPED | OUTPATIENT
Start: 2019-08-14 | End: 2019-10-23 | Stop reason: SDUPTHER

## 2019-08-14 NOTE — TELEPHONE ENCOUNTER
Last visit: 6/5/19  Last Med refill: 6/5/19  Does patient have enough medication for 72 hours: No:     Patient needs med contract  Last UDS: 1/4/19    Next Visit Date:  Future Appointments   Date Time Provider Pradeep Alexander   8/15/2019 11:40 AM YOLANDA Jean - CNP Neuro Spec [facility]   8/28/2019  9:30 AM Alexi Rubio DO [facility]   10/7/2019  9:45 AM Connie Santiago  [address] Maintenance   Topic Date Due    Annual Wellness Visit (AWV)  05/28/2005    Shingles Vaccine (1 of 2) 06/05/2020 (Originally 5/28/1992)    DTaP/Tdap/Td vaccine (1 - Tdap) 10/18/2026 (Originally [birth year]28/1959)    Flu vaccine (1) 09/01/2019    Potassium monitoring  06/03/2020    Creatinine monitoring  06/03/2020    DEXA (modify frequency per FRAX score)  Completed    Pneumococcal 65+ years Vaccine  Completed       Hemoglobin A1C (%)   Date Value   06/05/2019 6.4   01/09/2019 6.8   09/26/2018 6.3             ( goal A1C is < 7)   Microalb/Crt.  Ratio (mcg/mg creat)   Date Value   06/03/2019 CANNOT BE CALCULATED     LDL Cholesterol (mg/dL)   Date Value   06/03/2019 102   02/08/2018 69       (goal LDL is <100)   AST (U/L)   Date Value   06/03/2019 18     ALT (U/L)   Date Value   06/03/2019 14     BUN (mg/dL)   Date Value   06/03/2019 13     BP Readings from Last 3 Encounters:   08/06/19 96/64   07/10/19 102/67   06/05/19 110/62          (goal 120/80)    All Future Testing planned in [facility]  Lab Frequency Next Occurrence   [place] INJ DX/THER AGNT PARAVERT FACET JOINT, LUMBAR/SAC, 1ST LEVEL Once 08/07/2019               Patient Active Problem List:     Osteoarthritis     Essential hypertension     Type 2 diabetes mellitus with diabetic neuropathy, without long-term current use of insulin (HCC)     Pure hypercholesterolemia     Constipation     Rectal pain     Diverticulosis of colon     Tubular adenoma of colon     History of colon polyps     Rectal pressure     Failed neck syndrome     Cervical stenosis of spine     Cervical radicular pain     Lumbar stenosis with neurogenic claudication     JANN on CPAP     Primary osteoarthritis involving multiple joints     Hx of fusion of cervical spine     Chronic use of opiate drugs therapeutic purposes     Myelomalacia ([address]

## 2019-08-15 ENCOUNTER — OFFICE VISIT (OUTPATIENT)
Dept: NEUROLOGY | Age: 79
End: 2019-08-15
Payer: MEDICARE

## 2019-08-15 VITALS
HEART RATE: 106 BPM | WEIGHT: 167.6 LBS | HEIGHT: 62 IN | SYSTOLIC BLOOD PRESSURE: 139 MMHG | DIASTOLIC BLOOD PRESSURE: 83 MMHG | BODY MASS INDEX: 30.84 KG/M2

## 2019-08-15 DIAGNOSIS — M48.062 LUMBAR STENOSIS WITH NEUROGENIC CLAUDICATION: Chronic | ICD-10-CM

## 2019-08-15 DIAGNOSIS — G95.89 MYELOMALACIA (HCC): Primary | ICD-10-CM

## 2019-08-15 DIAGNOSIS — Z98.1 HX OF FUSION OF CERVICAL SPINE: ICD-10-CM

## 2019-08-15 DIAGNOSIS — M48.02 CERVICAL STENOSIS OF SPINE: Chronic | ICD-10-CM

## 2019-08-15 DIAGNOSIS — M54.12 CERVICAL RADICULAR PAIN: Chronic | ICD-10-CM

## 2019-08-15 PROCEDURE — G8417 CALC BMI ABV UP PARAM F/U: HCPCS | Performed by: NURSE PRACTITIONER

## 2019-08-15 PROCEDURE — G8427 DOCREV CUR MEDS BY ELIG CLIN: HCPCS | Performed by: NURSE PRACTITIONER

## 2019-08-15 PROCEDURE — 1036F TOBACCO NON-USER: CPT | Performed by: NURSE PRACTITIONER

## 2019-08-15 PROCEDURE — G8399 PT W/DXA RESULTS DOCUMENT: HCPCS | Performed by: NURSE PRACTITIONER

## 2019-08-15 PROCEDURE — 1090F PRES/ABSN URINE INCON ASSESS: CPT | Performed by: NURSE PRACTITIONER

## 2019-08-15 PROCEDURE — 4040F PNEUMOC VAC/ADMIN/RCVD: CPT | Performed by: NURSE PRACTITIONER

## 2019-08-15 PROCEDURE — 99214 OFFICE O/P EST MOD 30 MIN: CPT | Performed by: NURSE PRACTITIONER

## 2019-08-15 PROCEDURE — 1123F ACP DISCUSS/DSCN MKR DOCD: CPT | Performed by: NURSE PRACTITIONER

## 2019-08-15 RX ORDER — AMITRIPTYLINE HYDROCHLORIDE 25 MG/1
25 TABLET, FILM COATED ORAL NIGHTLY
Qty: 60 TABLET | Refills: 5 | Status: SHIPPED | OUTPATIENT
Start: 2019-08-15 | End: 2020-03-24

## 2019-08-15 RX ORDER — TIZANIDINE 2 MG/1
TABLET ORAL
Qty: 90 TABLET | Refills: 5 | Status: SHIPPED | OUTPATIENT
Start: 2019-08-15 | End: 2020-03-09

## 2019-08-15 RX ORDER — GABAPENTIN 600 MG/1
600 TABLET ORAL 3 TIMES DAILY
Qty: 90 TABLET | Refills: 5 | Status: SHIPPED | OUTPATIENT
Start: 2019-08-15 | End: 2020-07-09 | Stop reason: SDUPTHER

## 2019-08-15 NOTE — PROGRESS NOTES
Unspecified sleep apnea         PAST SURGICAL HISTORY:         Procedure Laterality Date    COLONOSCOPY  8/23/06    Dr Neville Freed  5 12 15    extensive diverticulosis, tubular adenoma    EPIDURAL STEROID INJECTION N/A 8/25/2017    EPIDURAL STEROID INJECTION cervical performed by Izabella Berger MD at [address] [facility] N/A 11/20/2017    EPIDURAL STEROID INJECTION L5S1 performed by Izabella Berger MD at [address] [facility] [address] Bilateral 5/20/2019    EPIDURAL STEROID INJECTION BILATERAL S1 performed by Izabella Berger MD at [id number] [place] [address]  10/1/06    NECK SURGERY  5/2011 & 2001    OTHER SURGICAL HISTORY  08/25/2017    L5-S1 steroid injection    OTHER SURGICAL HISTORY  11/20/2017    VANITA L5-S1    OTHER SURGICAL HISTORY  10/01/2018    cervical steroid injection    OTHER SURGICAL HISTORY Bilateral 05/20/2019    EPIDURAL STEROID INJECTION BILATERAL S1 (Bilateral )    IL INJECT ANES/STEROID FORAMEN LUMBAR/SACRAL W IMG GUIDE ,1 LEVEL Bilateral 11/19/2018    BILATERAL L4 VANITA performed by Izabella Berger MD at [address] [place] [address] Ave W DX/THER SBST INTRLMNR CRV/THRC W/IMG GDN N/A 10/1/2018    EPIDURAL STEROID INJECTION CERVICAL C7-T1 performed by Izabella Berger MD at [address] [facility]:     Social History     Socioeconomic History    Marital status: Single     Spouse name: Not on file    Number of children: Not on file    Years of education: Not on file    Highest education level: Not on file   Occupational History    Not on file   Social Needs    Financial resource strain: Not on file    Food insecurity:     Worry: Not on file     Inability: Not on file    Transportation needs:     Medical: Not on file     Non-medical: Not on file   Tobacco Use    Smoking status: Former Smoker     Packs/day: 0.50     Years: 15.00     Pack years: 7.50     Types: Cigarettes     Last attempt to quit: 10/5/2001     Years since quitting: Anxiety: present, Hallucination: absent, Mood disorder: present   HEMATOLOGIC Abnormal bleeding: absent, Anemia: absent,            PHYSICAL EXAMINATION:       Vitals:    08/15/19 1201   BP: 139/83   Pulse: 106                                              .                                                                                                     General Appearance:  Alert, cooperative, no signs of distress, appears stated age   Head:  Normocephalic, no signs of trauma   Eyes:  Conjunctiva/corneas clear;  eyelids intact   Ears:  Normal external ear and canals   Nose: Nares normal, mucosa normal, no drainage    Throat: Lips and tongue normal; teeth normal;  gums normal   Neck: Supple, intact flexion, extension and rotation;   trachea midline;  no adenopathy;   thyroid: not enlarged;   no carotid pulse abnormality   Back:   Symmetric, no curvature, ROM adequate   Lungs:   Respirations unlabored   Heart:  Regular rate and rhythm           Extremities: Extremities normal, no cyanosis, no edema   Pulses: Symmetric over head and neck   Skin: Skin color, texture normal, no rashes, no lesions                                     NEUROLOGIC EXAMINATION    Neurologic Exam  Mental status    Alert and oriented x 3; intact memory with no confusion, speech or language problems; no hallucinations or delusions  Fund of information appropriate for level of education    Cranial nerves    II - visual fields intact to confrontation bilaterally  III, IV, VI - extra-ocular muscles full: no pupillary defect; no FRANCISCO J, no nystagmus, no ptosis   V - normal facial sensation                                                               VII - normal facial symmetry                                                             VIII - intact hearing                                                                             IX, X - symmetrical palate                                                                  XI - symmetrical shoulder shrug                                                       XII - tongue midline without atrophy or fasciculation      Motor function  Normal muscle bulk and tone; strength 5/5 on all 4 extremities, no pronator drift      Sensory function Intact to light touch, pinprick, vibration, proprioception on all 4 extremities      Cerebellar Intact fine motor movement. No involuntary movements or tremors. No ataxia or dysmetria on finger to nose or heel to shin testing      Reflex function DTR 2+ on bilateral UE and LE, symmetric. Negative Babinski      Gait                   normal base and arm swing                  ASSESSMENT AND PLAN:           In summary, your patient, Reva Turk exhibits the following, with associated plan:    1. Cervical stenosis at C6-7 and lumbar stenosis with neurogenic claudication. There is severe central canal stenosis at L4/5 and moderate stenosis at L5-1  1. Continue gabapentin 600 mg 3 times daily. We will change from two 300 g capsules 3 times daily to 1 600 mg tablet 3 times daily  2. Zanaflex. Patient to take 1-2 tablets at bedtime to control muscle spasms and 1 as needed during the day. 3. Continue amitriptyline 25 mg at bedtime daily  4. Surgery evaluation at the end of the month  5. Continue to follow with pain management. Since there may be some duplication of services, the patient can continue with pain management if they are willing to prescribe her gabapentin, Zanaflex, and amitriptyline  2. Primary axonal sensorimotor neuropathy  1. Continue to monitor  2.  Return in follow-up in 6 months                 Signed: Jessica Sims CNP      *Please note that portions of this note were completed with a voice recognition program.  Although every effort was made to insure the accuracy of this automated transcription, some errors in transcription may have occurred, occasionally words and are mis-transcribed

## 2019-08-27 RX ORDER — AMLODIPINE BESYLATE 5 MG/1
TABLET ORAL
Qty: 90 TABLET | Refills: 1 | Status: SHIPPED | OUTPATIENT
Start: 2019-08-27 | End: 2020-02-25 | Stop reason: SDUPTHER

## 2019-08-27 NOTE — TELEPHONE ENCOUNTER
Last visit: 6/5/19  Last Med refill: 2/26/19  Does patient have enough medication for 72 hours: No:     Next Visit Date:  Future Appointments   Date Time Provider Pradeep Alexander   8/28/2019  9:30 AM Cleo Kaur Neuro [facility]   10/7/2019  9:45 AM Connie Gracia MD [facility]   2/17/2020  8:40 AM YOLANDA Valenzuela - CNP Neuro [facility] Maintenance   Topic Date Due    Annual Wellness Visit (AWV)  05/28/2005    Shingles Vaccine (1 of 2) 06/05/2020 (Originally 5/28/1992)    DTaP/Tdap/Td vaccine (1 - Tdap) 10/18/2026 (Originally 5/28/1961)    Flu vaccine (1) 09/01/2019    Potassium monitoring  06/03/2020    Creatinine monitoring  06/03/2020    DEXA (modify frequency per FRAX score)  Completed    Pneumococcal 65+ years Vaccine  Completed       Hemoglobin A1C (%)   Date Value   06/05/2019 6.4   01/09/2019 6.8   09/26/2018 6.3             ( goal A1C is < 7)   Microalb/Crt.  Ratio (mcg/mg creat)   Date Value   06/03/2019 CANNOT BE CALCULATED     LDL Cholesterol (mg/dL)   Date Value   06/03/2019 102   02/08/2018 69       (goal LDL is <100)   AST (U/L)   Date Value   06/03/2019 18     ALT (U/L)   Date Value   06/03/2019 14     BUN (mg/dL)   Date Value   06/03/2019 13     BP Readings from Last 3 Encounters:   08/15/19 139/83   08/06/19 96/64   07/10/19 102/67          (goal 120/80)    All Future Testing planned in [facility]  Lab Frequency Next Occurrence   AL INJ DX/THER AGNT PARAVERT FACET JOINT, LUMBAR/SAC, 1ST LEVEL Once 08/07/2019               Patient Active Problem List:     Osteoarthritis     Essential hypertension     Type 2 diabetes mellitus with diabetic neuropathy, without long-term current use of insulin (HCC)     Pure hypercholesterolemia     Constipation     Rectal pain     Diverticulosis of colon     Tubular adenoma of colon     History of colon polyps     Rectal pressure     Failed neck syndrome     Cervical stenosis of spine     Cervical radicular pain

## 2019-08-28 ENCOUNTER — OFFICE VISIT (OUTPATIENT)
Dept: NEUROSURGERY | Age: 79
End: 2019-08-28
Payer: MEDICARE

## 2019-08-28 VITALS — DIASTOLIC BLOOD PRESSURE: 84 MMHG | SYSTOLIC BLOOD PRESSURE: 152 MMHG | HEART RATE: 76 BPM

## 2019-08-28 DIAGNOSIS — M48.062 LUMBAR STENOSIS WITH NEUROGENIC CLAUDICATION: ICD-10-CM

## 2019-08-28 DIAGNOSIS — M47.12 CERVICAL SPONDYLOSIS WITH MYELOPATHY: Primary | ICD-10-CM

## 2019-08-28 PROCEDURE — 1123F ACP DISCUSS/DSCN MKR DOCD: CPT | Performed by: NEUROLOGICAL SURGERY

## 2019-08-28 PROCEDURE — 4040F PNEUMOC VAC/ADMIN/RCVD: CPT | Performed by: NEUROLOGICAL SURGERY

## 2019-08-28 PROCEDURE — 1090F PRES/ABSN URINE INCON ASSESS: CPT | Performed by: NEUROLOGICAL SURGERY

## 2019-08-28 PROCEDURE — 1036F TOBACCO NON-USER: CPT | Performed by: NEUROLOGICAL SURGERY

## 2019-08-28 PROCEDURE — 99204 OFFICE O/P NEW MOD 45 MIN: CPT | Performed by: NEUROLOGICAL SURGERY

## 2019-08-28 PROCEDURE — G8427 DOCREV CUR MEDS BY ELIG CLIN: HCPCS | Performed by: NEUROLOGICAL SURGERY

## 2019-08-28 PROCEDURE — G8399 PT W/DXA RESULTS DOCUMENT: HCPCS | Performed by: NEUROLOGICAL SURGERY

## 2019-08-28 PROCEDURE — G8417 CALC BMI ABV UP PARAM F/U: HCPCS | Performed by: NEUROLOGICAL SURGERY

## 2019-08-28 NOTE — PROGRESS NOTES
[facility] PHYSICIANS  [facility] CHRISTINA Howard  MOB # [address] [facility] [address]  Dept: [phone]    Patient:  Indiana Dial  YOB: 1940  Date: 8/28/19    The patient is a 70 y.o. female who presents today for consult of the following problems:     Chief Complaint   Patient presents with   [facility] Fall    Back Pain    Numbness             HPI:     Indiana Dial is a 70 y.o. female on whom neurosurgical consultation was requested by Jennifer Valdivia MD for management of neck pain and arm symptoms. Neck pain is 5  paraspinal right in location and worsened with extension. Pain improved with none. Radiating Pain and numbness down right arms in a C5 distribution is prompted by nothing. Nocturnal Awakening Yes  Reason not due to numbness or pain in neck     MYELOPATHY    Frequently dropping things Yes; new  Difficulty with buttoning clothes, using zipper, putting on watch OR  jewelry Yes  Changes in handwriting No  Numbness or tingling Yes    LIMITATIONS    Pain significantly limiting on a daily basis   Daily pharmacologic pain control include: norco  Neck : Arm pain Neck 80 // Arms 20    MANAGEMENT     Prior Surgery: Yes  Prior to 1yr ago  In the last year:    Injections: Yes  Improvement Yes   Physical Therapy: No   Chiropractic Interventions: No    68-year-old female with a history of axial low back pain as well as pain radiating into bilateral lower extremities nondermatomal ranging anywhere from 7-10 out of 10 with significant discomfort over the course of the last month. Prior to that she did have bilateral lower extremity numbness and is requiring to use a walker for approximately 1 year. Lower extremity discomfort and pain does improve upon leaning forward. She has been seen by pain management with injections and potential plans for intra-spacer device. No new bowel or bladder discomfort or abnormalities.     History:     Past Medical History:   Diagnosis affect  Registration intact  Orientation intact  3 item recall intact  Judgement intact to situation    Cranial Nerves:   Pupils equal and reactive to light  Extraocular motion intact  Face and shrug symmetric  Tongue midline  No dysarthria  v1-3 sensation symmetric, masseter tone symmetric  Hearing symmetric and intact to finger rub    Sensation:   Abnormal LE diffuse  Ring finger split no    Motor  L deltoid 5/5; R deltoid 5/5  L biceps 5/5; R biceps 5/5  L triceps 5/5; R triceps 5/5  L wrist extension 5/5; R wrist extension 5/5  L intrinsics 5/5; R intrinsics 5/5     L iliopsoas 5/5 , R iliopsoas 5/5  L quadriceps 5/5; R quadriceps 5/5  L Dorsiflexion 5/5; R dorsiflexion 5/5  L Plantarflexion 5/5; R plantarflexion 5/5  L EHL 5/5; R EHL 5/5    Reflexes  L Brachioradialis 2+/4; R brachioradialis 2+/4  L Biceps 2+/4; R Biceps 2+/4  L Triceps 2+/4; R Triceps 2+/4  L Patellar 2+/4: R Patellar 2+/4  L Achilles 2+/4; R Achilles 2+/4    hoffmans L: neg  hoffmans R: neg  Clonus L: neg  Clonus R: neg  Babinski L: up  Babinski R; up    Spurlings No  Lhermittes No    Neg SLR. Neg ROYER    Negative Tinel's at Cubital Tunl. and Carpal Tunl. negative Phalen's negative okay sign. No thenar or hyperthenar atrophy  Studies Review:     MRI C -adjacent level disease at C6-7 with significant central stenosis with disc osteophyte complex and cord distortion evident area of prior myelomalacia more cephalad    MRI lumbar spine shows grade 1 L4-5 anterolisthesis with severe stenosis at both L3-4 and L4-5 with significant facet effusions. Assessment and Plan:      1. Cervical spondylosis with myelopathy    2. Lumbar stenosis with neurogenic claudication          Plan: Regarding the cervical spine the patient does have adjacent level disease with significant stenosis. She has chronic myelopathic findings on exam as well as subjectively including numbness and tingling of the upper extremities.   However upon explicitly asking the

## 2019-09-05 ENCOUNTER — APPOINTMENT (OUTPATIENT)
Dept: GENERAL RADIOLOGY | Age: 79
End: 2019-09-05
Attending: ANESTHESIOLOGY
Payer: MEDICARE

## 2019-09-05 ENCOUNTER — HOSPITAL ENCOUNTER (OUTPATIENT)
Age: 79
Setting detail: OUTPATIENT SURGERY
Discharge: HOME | End: 2019-09-05
Attending: ANESTHESIOLOGY | Admitting: ANESTHESIOLOGY
Payer: MEDICARE

## 2019-09-05 VITALS
WEIGHT: 163 LBS | HEIGHT: 62 IN | RESPIRATION RATE: 16 BRPM | DIASTOLIC BLOOD PRESSURE: 71 MMHG | SYSTOLIC BLOOD PRESSURE: 130 MMHG | TEMPERATURE: 97.7 F | BODY MASS INDEX: 30 KG/M2 | HEART RATE: 77 BPM | OXYGEN SATURATION: 94 %

## 2019-09-05 DIAGNOSIS — M15.9 PRIMARY OSTEOARTHRITIS INVOLVING MULTIPLE JOINTS: ICD-10-CM

## 2019-09-05 DIAGNOSIS — M48.062 LUMBAR STENOSIS WITH NEUROGENIC CLAUDICATION: ICD-10-CM

## 2019-09-05 DIAGNOSIS — M48.02 CERVICAL STENOSIS OF SPINE: Chronic | ICD-10-CM

## 2019-09-05 LAB — GLUCOSE BLD-MCNC: 115 MG/DL (ref 65–105)

## 2019-09-05 PROCEDURE — 2580000003 HC RX 258: Performed by: ANESTHESIOLOGY

## 2019-09-05 PROCEDURE — 64494 INJ PARAVERT F JNT L/S 2 LEV: CPT | Performed by: ANESTHESIOLOGY

## 2019-09-05 PROCEDURE — 99152 MOD SED SAME PHYS/QHP 5/>YRS: CPT | Performed by: ANESTHESIOLOGY

## 2019-09-05 PROCEDURE — 64495 INJ PARAVERT F JNT L/S 3 LEV: CPT | Performed by: ANESTHESIOLOGY

## 2019-09-05 PROCEDURE — 7100000011 HC PHASE II RECOVERY - ADDTL 15 MIN: Performed by: ANESTHESIOLOGY

## 2019-09-05 PROCEDURE — 6360000004 HC RX CONTRAST MEDICATION: Performed by: ANESTHESIOLOGY

## 2019-09-05 PROCEDURE — 7100000010 HC PHASE II RECOVERY - FIRST 15 MIN: Performed by: ANESTHESIOLOGY

## 2019-09-05 PROCEDURE — 64493 INJ PARAVERT F JNT L/S 1 LEV: CPT | Performed by: ANESTHESIOLOGY

## 2019-09-05 PROCEDURE — 3600000050 HC PAIN LEVEL 1 BASE: Performed by: ANESTHESIOLOGY

## 2019-09-05 PROCEDURE — 2500000003 HC RX 250 WO HCPCS: Performed by: ANESTHESIOLOGY

## 2019-09-05 PROCEDURE — 3600000051 HC PAIN LEVEL 1 ADDL 15 MIN: Performed by: ANESTHESIOLOGY

## 2019-09-05 PROCEDURE — 82947 ASSAY GLUCOSE BLOOD QUANT: CPT

## 2019-09-05 PROCEDURE — 3209999900 FLUORO FOR SURGICAL PROCEDURES

## 2019-09-05 PROCEDURE — 6360000002 HC RX W HCPCS: Performed by: ANESTHESIOLOGY

## 2019-09-05 RX ORDER — HYDROCODONE BITARTRATE AND ACETAMINOPHEN 5; 325 MG/1; MG/1
1 TABLET ORAL 2 TIMES DAILY PRN
Qty: 60 TABLET | Refills: 0 | Status: SHIPPED | OUTPATIENT
Start: 2019-09-09 | End: 2019-10-10 | Stop reason: SDUPTHER

## 2019-09-05 RX ORDER — LIDOCAINE HYDROCHLORIDE 10 MG/ML
INJECTION, SOLUTION INFILTRATION; PERINEURAL PRN
Status: DISCONTINUED | OUTPATIENT
Start: 2019-09-05 | End: 2019-09-05 | Stop reason: ALTCHOICE

## 2019-09-05 RX ORDER — SODIUM CHLORIDE 0.9 % (FLUSH) 0.9 %
10 SYRINGE (ML) INJECTION PRN
Status: DISCONTINUED | OUTPATIENT
Start: 2019-09-05 | End: 2019-09-05 | Stop reason: HOSPADM

## 2019-09-05 RX ORDER — BUPIVACAINE HYDROCHLORIDE 5 MG/ML
INJECTION, SOLUTION EPIDURAL; INTRACAUDAL PRN
Status: DISCONTINUED | OUTPATIENT
Start: 2019-09-05 | End: 2019-09-05 | Stop reason: ALTCHOICE

## 2019-09-05 RX ORDER — SODIUM CHLORIDE 0.9 % (FLUSH) 0.9 %
10 SYRINGE (ML) INJECTION EVERY 12 HOURS SCHEDULED
Status: DISCONTINUED | OUTPATIENT
Start: 2019-09-05 | End: 2019-09-05 | Stop reason: HOSPADM

## 2019-09-05 RX ORDER — MIDAZOLAM HYDROCHLORIDE 1 MG/ML
INJECTION INTRAMUSCULAR; INTRAVENOUS PRN
Status: DISCONTINUED | OUTPATIENT
Start: 2019-09-05 | End: 2019-09-05 | Stop reason: ALTCHOICE

## 2019-09-05 RX ORDER — DEXAMETHASONE SODIUM PHOSPHATE 10 MG/ML
INJECTION INTRAMUSCULAR; INTRAVENOUS PRN
Status: DISCONTINUED | OUTPATIENT
Start: 2019-09-05 | End: 2019-09-05 | Stop reason: ALTCHOICE

## 2019-09-05 RX ORDER — FENTANYL CITRATE 50 UG/ML
INJECTION, SOLUTION INTRAMUSCULAR; INTRAVENOUS PRN
Status: DISCONTINUED | OUTPATIENT
Start: 2019-09-05 | End: 2019-09-05 | Stop reason: ALTCHOICE

## 2019-09-05 RX ADMIN — Medication 10 ML: at 11:46

## 2019-09-05 ASSESSMENT — PAIN SCALES - GENERAL
PAINLEVEL_OUTOF10: 3
PAINLEVEL_OUTOF10: 10
PAINLEVEL_OUTOF10: 5
PAINLEVEL_OUTOF10: 3
PAINLEVEL_OUTOF10: 3
PAINLEVEL_OUTOF10: 5
PAINLEVEL_OUTOF10: 5

## 2019-09-05 ASSESSMENT — PAIN DESCRIPTION - LOCATION
LOCATION: BACK

## 2019-09-05 ASSESSMENT — PAIN DESCRIPTION - DESCRIPTORS: DESCRIPTORS: BURNING;ACHING;PRESSURE

## 2019-09-05 ASSESSMENT — PAIN DESCRIPTION - PAIN TYPE
TYPE: CHRONIC PAIN

## 2019-09-05 ASSESSMENT — PAIN - FUNCTIONAL ASSESSMENT: PAIN_FUNCTIONAL_ASSESSMENT: 0-10

## 2019-09-05 NOTE — H&P
by mouth 2 times daily as needed for Pain for up to 30 days. 6/6/19 8/15/19  YOLANDA Fraga CNP   allopurinol (ZYLOPRIM) 100 MG tablet take 1 tablet by mouth once daily 6/5/19   Connie Dial MD   glimepiride (AMARYL) 2 MG tablet take 1 tablet once daily WIT BREAKFAST 6/5/19   Connie Dial MD   metFORMIN (GLUCOPHAGE) 1000 MG tablet take 1 tablet by mouth twice a day with meals 6/5/19   Connie Dial MD   loratadine (CLARITIN) 10 MG tablet take 1 tablet by mouth once daily 6/5/19   Connie Dial MD   carvedilol (COREG) 3.125 MG tablet take 1 tablet by mouth twice a day 6/4/19   YOLANDA Vo CNP   blood glucose test strips (ONE TOUCH ULTRA TEST) strip TEST once daily 3/29/19   Connie Dial MD   polyethylene glycol (GLYCOLAX) powder take 17GM (DISSOLVED IN WATER) by mouth once daily 3/27/19   Connie Dial MD   pantoprazole (PROTONIX) 40 MG tablet take 1 tablet by mouth every morning BEFORE BREAKFAST 2/12/19   Connie Dial MD   fluticasone (FLONASE) 50 MCG/ACT nasal spray instill 1 spray into each nostril once daily 12/19/18   YOLANDA Vo CNP   ketoconazole (NIZORAL) 2 % cream Apply topically daily. 11/8/17   Connie Dial MD   aspirin 325 MG tablet Take 325 mg by mouth daily    Historical Provider, MD   Blood Glucose Monitoring Suppl KIT 1 blood glucose monitoring supplies kit. Test strips, lancets, alcohol swabs 8/26/14   Antonio Pace MD         This is a 70 y.o. female who is pleasant, cooperative, alert and oriented x3, in no acute distress. Heart: Heart sounds are normal.  HR 92 regular rate and rhythm without murmur, gallop or rub. Lungs: Normal respiratory effort with good air exchange, unlabored and clear to auscultation without wheezes or rales bilaterally   Abdomen: Round, soft, nontender, nondistended with bowel sounds .        Labs:  No results for input(s): HGB, HCT, WBC, MCV, PLT, NA, K, CL, CO2, BUN, CREATININE, GLUCOSE, INR, PROTIME, APTT, AST, ALT, LABALBU, HCG in the last 720 hours. Dorcas GUERRERO ANP-BC  Electronically signed 9/5/2019 at 11:35 Yaneth Christian MD   Physician   Pain Management   Progress Notes      Signed   Encounter Date:  8/6/2019               Signed        Expand All Collapse All     Show:Clear all  [x]Manual[x]Template[x]Copied    Added by:  [x]Olivia Boyce MD    []Garcia for details   The patient is a 70 y. o. Non-/non  female.      Chief Complaint   Patient presents with    Back Pain    Neck Pain    Medication Refill    Results         HPI      47-year-old established patient of our clinic for chronic neck and chronic low back pain  Neck pain radiates down left arm predominantly associated with left arm numbness  Back pain with radiation down both legs with walking and standing and alleviates with rest  In past have been diagnosed with cervical and lumbar spinal stenosis  Reported progressive leg weakness and difficulty in walking with gait instability  New MRI of cervical and lumbar spine were ordered  Patient of completed imaging and is here for review of the diagnostic work-up     Currently taking Norco 1 tablet twice a day  Denies any side effects  Takes the medication as prescribed  No signs or symptoms of any aberrancy  Needs refill for medication today  Medication Refill: Norco     Pain score Today:  4  Adverse effects (Constipation / Nausea / Sedation / sexual Dysfunction / others) : none  Mood: good  Sleep pattern and quality: good  Activity level: fair     Pill count Today: patient forgot medication at home  Last dose taken  8/5/2019  OARRS report reviewed today: yes  ER/Hospitalizations/PCP visit related to pain since last visit:no   Any legal problems e.g. DUI etc.:No  Satisfied with current management: Yes     Opioid Contract 8/10/2018  Last Urine Dug screen dated: 1/4/2019              Past Medical History, Past Surgical History, MD at [address] DX/THER SBST INTRLMNR CRV/THRC W/IMG GDN N/A 10/1/2018     EPIDURAL STEROID INJECTION CERVICAL C7-T1 performed by Karolina Chow MD at [facility]. 18. History            Socioeconomic History    Marital status: Single       Spouse name: None    Number of children: None    Years of education: None    Highest education level: None   Occupational History    None   Social Needs    Financial resource strain: None    Food insecurity:       Worry: None       Inability: None    Transportation needs:       Medical: None       Non-medical: None   Tobacco Use    Smoking status: Former Smoker       Packs/day: 0.50       Years: 15.00       Pack years: 7.50       Types: Cigarettes       Last attempt to quit: 10/5/2001       Years since quittin.8    Smokeless tobacco: Never Used   Substance and Sexual Activity    Alcohol use: Yes       Alcohol/week: 0.0 standard drinks       Comment: on occasion    Drug use: No    Sexual activity: None   Lifestyle    Physical activity:       Days per week: None       Minutes per session: None    Stress: None   Relationships    Social connections:       Talks on phone: None       Gets together: None       Attends Judaism service: None       Active member of club or organization: None       Attends meetings of clubs or organizations: None       Relationship status: None    Intimate partner violence:       Fear of current or ex partner: None       Emotionally abused: None       Physically abused: None       Forced sexual activity: None   Other Topics Concern    None   Social History Narrative    None         Family History         Family History   Problem Relation Age of Onset    Breast Cancer Sister      Cancer Sister           liver    Diabetes Daughter           No Known Allergies  Patient has no known allergies. There were no vitals filed for this visit.   Current Facility-Administered Medications          Current

## 2019-09-05 NOTE — OP NOTE
SEDATION NOTE:    ASA CLASSIFICATION  2  MP   CLASSIFICATION  2    · Moderate intravenous conscious sedation was supervised by Dr. Robert Parks  . The patient was independently monitored by a Registered Nurse assigned to the Procedure Room  . Monitoring included automated blood pressure, continuous EKG, Capnography and continuous pulse oximetry. . The detailed Conscious Record is permanently stored in the [place] [id number]. The following is the conscious sedation record;  Start Time:  1228  End times:  1248  Duration:  20 minutes  MEDS GIVEN 1 MG VERSED AND 50 MCG FENTANYL      Preoperative Diagnosis:    Lumbar spondylosis w/o myelopathy  Postoperative Diagnosis:   Lumbar spondylosis w/o myelopathy    Procedure Performed[de-identified]  Lumbar facet joint injections at the levels of L2-L3, L3-L4, L4-L5, on the Bilateral side with fluoroscopy guidance, with IV sedation. Procedure:     Using fluoroscopy the facet joints were identified and by adjusting the angle of the fluoroscopy, the view of the joint space was optimized. The skin and deep tissues over the joint space were anesthetized with 1 ml of 1 % lidocaine    The #22-gauge, 3-1/2 inch spinal needle was introduced through the skin wheal under fluoroscopoc guidance such that the tip of the needle lies in the joint space. This was confirmed by injecting about 0.2 ml of Omnipaque-180 through the needle and observing the spread of the contrast along the joint space. These were placed first on the right side at L2-L3, L3-L4 and L4-L5 level    The procedure was first performed on the right side and was then repeated on the left side at the same level  Each target level was injected 0.6 mL of treatment solution  The treatment solution was made by mixing 4 mL of 0.5% bupivacaine with 1 mL of dexamethasone 10 mg  The total dose of steroid injected was dexamethasone 10 mg    Patient's vital signs remained stable and tolerated the procedure well.   The patient was

## 2019-09-12 DIAGNOSIS — I10 ESSENTIAL HYPERTENSION: ICD-10-CM

## 2019-09-13 RX ORDER — CARVEDILOL 3.12 MG/1
TABLET ORAL
Qty: 60 TABLET | Refills: 5 | Status: SHIPPED | OUTPATIENT
Start: 2019-09-13 | End: 2020-02-25 | Stop reason: SDUPTHER

## 2019-09-13 NOTE — TELEPHONE ENCOUNTER
spine     Cervical radicular pain     Lumbar stenosis with neurogenic claudication     JANN on CPAP     Primary osteoarthritis involving multiple joints     Hx of fusion of cervical spine     Chronic use of opiate drugs therapeutic purposes     Myelomalacia ([address]

## 2019-10-07 ENCOUNTER — OFFICE VISIT (OUTPATIENT)
Dept: FAMILY MEDICINE CLINIC | Age: 79
End: 2019-10-07
Payer: MEDICARE

## 2019-10-07 VITALS
BODY MASS INDEX: 27.79 KG/M2 | HEART RATE: 90 BPM | WEIGHT: 151 LBS | HEIGHT: 62 IN | OXYGEN SATURATION: 96 % | DIASTOLIC BLOOD PRESSURE: 72 MMHG | SYSTOLIC BLOOD PRESSURE: 130 MMHG | TEMPERATURE: 97.8 F

## 2019-10-07 DIAGNOSIS — Z99.89 OSA ON CPAP: ICD-10-CM

## 2019-10-07 DIAGNOSIS — I10 ESSENTIAL HYPERTENSION: ICD-10-CM

## 2019-10-07 DIAGNOSIS — G47.33 OSA ON CPAP: ICD-10-CM

## 2019-10-07 DIAGNOSIS — E11.40 TYPE 2 DIABETES MELLITUS WITH DIABETIC NEUROPATHY, WITHOUT LONG-TERM CURRENT USE OF INSULIN (HCC): Primary | ICD-10-CM

## 2019-10-07 DIAGNOSIS — Z23 FLU VACCINE NEED: ICD-10-CM

## 2019-10-07 DIAGNOSIS — E78.00 PURE HYPERCHOLESTEROLEMIA: ICD-10-CM

## 2019-10-07 LAB — HBA1C MFR BLD: 6.5 %

## 2019-10-07 PROCEDURE — 99214 OFFICE O/P EST MOD 30 MIN: CPT | Performed by: INTERNAL MEDICINE

## 2019-10-07 PROCEDURE — G8482 FLU IMMUNIZE ORDER/ADMIN: HCPCS | Performed by: INTERNAL MEDICINE

## 2019-10-07 PROCEDURE — G8417 CALC BMI ABV UP PARAM F/U: HCPCS | Performed by: INTERNAL MEDICINE

## 2019-10-07 PROCEDURE — G8427 DOCREV CUR MEDS BY ELIG CLIN: HCPCS | Performed by: INTERNAL MEDICINE

## 2019-10-07 PROCEDURE — 90653 IIV ADJUVANT VACCINE IM: CPT | Performed by: INTERNAL MEDICINE

## 2019-10-07 PROCEDURE — 1036F TOBACCO NON-USER: CPT | Performed by: INTERNAL MEDICINE

## 2019-10-07 PROCEDURE — 83036 HEMOGLOBIN GLYCOSYLATED A1C: CPT | Performed by: INTERNAL MEDICINE

## 2019-10-07 PROCEDURE — 1090F PRES/ABSN URINE INCON ASSESS: CPT | Performed by: INTERNAL MEDICINE

## 2019-10-07 PROCEDURE — G8399 PT W/DXA RESULTS DOCUMENT: HCPCS | Performed by: INTERNAL MEDICINE

## 2019-10-07 PROCEDURE — 1123F ACP DISCUSS/DSCN MKR DOCD: CPT | Performed by: INTERNAL MEDICINE

## 2019-10-07 PROCEDURE — 4040F PNEUMOC VAC/ADMIN/RCVD: CPT | Performed by: INTERNAL MEDICINE

## 2019-10-07 PROCEDURE — G0008 ADMIN INFLUENZA VIRUS VAC: HCPCS | Performed by: INTERNAL MEDICINE

## 2019-10-07 RX ORDER — CLONIDINE HYDROCHLORIDE 0.2 MG/1
0.1 TABLET ORAL 2 TIMES DAILY
Qty: 90 TABLET | Refills: 1
Start: 2019-10-07 | End: 2020-04-28 | Stop reason: SDUPTHER

## 2019-10-07 ASSESSMENT — PATIENT HEALTH QUESTIONNAIRE - PHQ9
SUM OF ALL RESPONSES TO PHQ QUESTIONS 1-9: 1
2. FEELING DOWN, DEPRESSED OR HOPELESS: 1
1. LITTLE INTEREST OR PLEASURE IN DOING THINGS: 0
SUM OF ALL RESPONSES TO PHQ9 QUESTIONS 1 & 2: 1
SUM OF ALL RESPONSES TO PHQ QUESTIONS 1-9: 1

## 2019-10-09 DIAGNOSIS — M48.062 LUMBAR STENOSIS WITH NEUROGENIC CLAUDICATION: ICD-10-CM

## 2019-10-09 DIAGNOSIS — M15.9 PRIMARY OSTEOARTHRITIS INVOLVING MULTIPLE JOINTS: ICD-10-CM

## 2019-10-09 DIAGNOSIS — M48.02 CERVICAL STENOSIS OF SPINE: Chronic | ICD-10-CM

## 2019-10-10 RX ORDER — HYDROCODONE BITARTRATE AND ACETAMINOPHEN 5; 325 MG/1; MG/1
1 TABLET ORAL 2 TIMES DAILY PRN
Qty: 60 TABLET | Refills: 0 | Status: SHIPPED | OUTPATIENT
Start: 2019-10-10 | End: 2020-01-16 | Stop reason: SDUPTHER

## 2019-10-21 RX ORDER — PANTOPRAZOLE SODIUM 40 MG/1
TABLET, DELAYED RELEASE ORAL
Qty: 90 TABLET | Refills: 1 | Status: SHIPPED | OUTPATIENT
Start: 2019-10-21 | End: 2020-04-28 | Stop reason: SDUPTHER

## 2019-10-23 DIAGNOSIS — F51.01 PRIMARY INSOMNIA: ICD-10-CM

## 2019-10-23 RX ORDER — ZOLPIDEM TARTRATE 10 MG/1
TABLET ORAL
Qty: 15 TABLET | Refills: 1 | Status: SHIPPED | OUTPATIENT
Start: 2019-10-23 | End: 2019-12-23

## 2019-12-21 DIAGNOSIS — F51.01 PRIMARY INSOMNIA: ICD-10-CM

## 2019-12-23 RX ORDER — ZOLPIDEM TARTRATE 10 MG/1
5 TABLET ORAL NIGHTLY PRN
Qty: 15 TABLET | Refills: 1 | Status: SHIPPED | OUTPATIENT
Start: 2019-12-23 | End: 2020-02-24

## 2019-12-26 DIAGNOSIS — F51.01 PRIMARY INSOMNIA: ICD-10-CM

## 2019-12-27 RX ORDER — ZOLPIDEM TARTRATE 10 MG/1
TABLET ORAL
Qty: 15 TABLET | OUTPATIENT
Start: 2019-12-27

## 2020-01-16 ENCOUNTER — OFFICE VISIT (OUTPATIENT)
Dept: PAIN MANAGEMENT | Age: 80
End: 2020-01-16
Payer: MEDICARE

## 2020-01-16 VITALS
HEART RATE: 89 BPM | RESPIRATION RATE: 16 BRPM | OXYGEN SATURATION: 97 % | SYSTOLIC BLOOD PRESSURE: 159 MMHG | WEIGHT: 164.4 LBS | BODY MASS INDEX: 30.06 KG/M2 | DIASTOLIC BLOOD PRESSURE: 90 MMHG

## 2020-01-16 PROCEDURE — 1036F TOBACCO NON-USER: CPT | Performed by: ANESTHESIOLOGY

## 2020-01-16 PROCEDURE — G8399 PT W/DXA RESULTS DOCUMENT: HCPCS | Performed by: ANESTHESIOLOGY

## 2020-01-16 PROCEDURE — 99214 OFFICE O/P EST MOD 30 MIN: CPT | Performed by: ANESTHESIOLOGY

## 2020-01-16 PROCEDURE — G8417 CALC BMI ABV UP PARAM F/U: HCPCS | Performed by: ANESTHESIOLOGY

## 2020-01-16 PROCEDURE — 1123F ACP DISCUSS/DSCN MKR DOCD: CPT | Performed by: ANESTHESIOLOGY

## 2020-01-16 PROCEDURE — G8482 FLU IMMUNIZE ORDER/ADMIN: HCPCS | Performed by: ANESTHESIOLOGY

## 2020-01-16 PROCEDURE — G8427 DOCREV CUR MEDS BY ELIG CLIN: HCPCS | Performed by: ANESTHESIOLOGY

## 2020-01-16 PROCEDURE — 1090F PRES/ABSN URINE INCON ASSESS: CPT | Performed by: ANESTHESIOLOGY

## 2020-01-16 PROCEDURE — 4040F PNEUMOC VAC/ADMIN/RCVD: CPT | Performed by: ANESTHESIOLOGY

## 2020-01-16 RX ORDER — HYDROCODONE BITARTRATE AND ACETAMINOPHEN 5; 325 MG/1; MG/1
1 TABLET ORAL 2 TIMES DAILY PRN
Qty: 60 TABLET | Refills: 0 | Status: SHIPPED | OUTPATIENT
Start: 2020-01-16 | End: 2020-02-20 | Stop reason: SDUPTHER

## 2020-01-16 ASSESSMENT — ENCOUNTER SYMPTOMS
RESPIRATORY NEGATIVE: 1
CONSTIPATION: 1

## 2020-01-16 NOTE — PROGRESS NOTES
The patient is a 70 y. o. Non-/non  female.     Chief Complaint   Patient presents with    Medication Refill        HPI  Chief Complaint: Neck pain and back pain    Neck pain  Pain is chronic onset many years ago radiates down both arms right more than left associated with arm numbness and weakness  Diagnosed with cervical spinal stenosis and cervical myelopathy  Past history significant for cervical spine anterior fusion surgery  Had a recent MRI that showed worsening of cervical spinal stenosis and myelopathy  Was referred to neurosurgery  Neurosurgery have advised for adjacent level anterior cervical decompression  Patient of decided not to proceed with surgery    She denied any progressive arm weakness or loss of bladder or bowel control    Today here for follow-up for back pain 9 back pain is chronic onset many years ago located in the lumbar area across midline aggravated with standing and walking and alleviates with rest  Also history of chronic aching nagging throbbing stabbing pain sensation for which she was diagnosed with facet syndrome and patient had lumbar facet injections several months ago which provided 8200% improvement in back pain for 3 months  Pain is now back to the baseline    Also with diagnosis of lumbar spinal stenosis and neurogenic claudication    She has been offered minimally invasive lumbar spine interspace or for the treatment of spinal stenosis    Currently taking Norco PRN for pain  Has been out of medication for several months as she did not follow-up with us in clinic  Medication Refill:     Pain score Today:  4  Adverse effects (Constipation / Nausea / Sedation / sexual Dysfunction / others) : no  Mood: fair  Sleep pattern and quality: poor  Activity level: fair to poor    Pill count Today:0  Last dose taken  January / 2020  OARRS report reviewed today: yes  ER/Hospitalizations/PCP visit related to pain since last visit:no   Any legal problems e.g. DUI etc.:No  Satisfied with current management: Yes    Opioid Contract 8/10/2018  Last Urine Dug screen dated:8/10/2018    Lab Results   Component Value Date    LABA1C 6.5 10/07/2019     Lab Results   Component Value Date     01/12/2017       Past Medical History, Past Surgical History, Social History, Allergies and Medications reviewed and updated in EPIC as indicated    Family History reviewed and is noncontributory.           Past Medical History:   Diagnosis Date    Arthritis     Cataract     Cataracts, bilateral     Diverticulosis of colon 2015    Headache(784.0)     History of colon polyps 2015    Insomnia     Neck pain     Osteoarthrosis, unspecified whether generalized or localized, unspecified site 10/5/2012    Pure hypercholesterolemia 10/5/2012    Shoulder blade pain     right  side    Stroke ([facility] [place] [address]     Tubular adenoma of colon 2015    Type II or unspecified type diabetes mellitus without mention of complication, not stated as uncontrolled 10/5/2012    Unspecified essential hypertension 10/5/2012    Unspecified sleep apnea       Past Surgical History:   Procedure Laterality Date    COLONOSCOPY  8/23/06    Dr Giancarlo Dominique  5 12 15    extensive diverticulosis, tubular adenoma    EPIDURAL STEROID INJECTION N/A 8/25/2017    EPIDURAL STEROID INJECTION cervical performed by Glory Agee MD at [facility] 11/20/2017    EPIDURAL STEROID INJECTION L5S1 performed by Glory Agee MD at [facility] Bilateral 5/20/2019    EPIDURAL STEROID INJECTION BILATERAL S1 performed by Glory Agee MD at [id number] [place] [address]  10/1/06    NECK SURGERY  5/2011 & 2001    NERVE SURGERY Bilateral 9/5/2019    BILATERAL LUMBAR FACET STEROID INJECTION L2-L5 performed by Glory Agee MD at [address]  08/25/2017    L5-S1 steroid injection    OTHER SURGICAL HISTORY  11/20/2017 Sister         liver    Diabetes Daughter      No Known Allergies  Patient has no known allergies. Vitals:    01/16/20 1315   BP: (!) 159/90   Pulse:    Resp:    SpO2:      Current Outpatient Medications   Medication Sig Dispense Refill    HYDROcodone-acetaminophen (NORCO) 5-325 MG per tablet Take 1 tablet by mouth 2 times daily as needed for Pain for up to 30 days. 60 tablet 0    loratadine (CLARITIN) 10 MG tablet take 1 tablet by mouth once daily 90 tablet 1    ketoconazole (NIZORAL) 2 % cream Apply topically daily. 30 g 1    zolpidem (AMBIEN) 10 MG tablet Take 0.5 tablets by mouth nightly as needed for Sleep for up to 30 days. 15 tablet 1    pantoprazole (PROTONIX) 40 MG tablet take 1 tablet by mouth every morning BEFORE BREAKFAST 90 tablet 1    cloNIDine (CATAPRES) 0.2 MG tablet Take 0.5 tablets by mouth 2 times daily 90 tablet 1    carvedilol (COREG) 3.125 MG tablet take 1 tablet by mouth twice a day 60 tablet 5    amLODIPine (NORVASC) 5 MG tablet take 1 tablet by mouth once daily 90 tablet 1    gabapentin (NEURONTIN) 600 MG tablet Take 1 tablet by mouth 3 times daily for 190 days.  90 tablet 5    amitriptyline (ELAVIL) 25 MG tablet Take 1 tablet by mouth nightly 60 tablet 5    tiZANidine (ZANAFLEX) 2 MG tablet Take 2 tablets at bedtime and once as needed during the day 90 tablet 5    atorvastatin (LIPITOR) 40 MG tablet take 1 tablet by mouth once daily 90 tablet 1    allopurinol (ZYLOPRIM) 100 MG tablet take 1 tablet by mouth once daily 90 tablet 1    glimepiride (AMARYL) 2 MG tablet take 1 tablet once daily WIT BREAKFAST 90 tablet 1    metFORMIN (GLUCOPHAGE) 1000 MG tablet take 1 tablet by mouth twice a day with meals 180 tablet 1    blood glucose test strips (ONE TOUCH ULTRA TEST) strip TEST once daily 100 strip 3    polyethylene glycol (GLYCOLAX) powder take 17GM (DISSOLVED IN WATER) by mouth once daily 510 g 1    fluticasone (FLONASE) 50 MCG/ACT nasal spray instill 1 spray into each nostril once daily 16 g 2    aspirin 325 MG tablet Take 325 mg by mouth daily      Blood Glucose Monitoring Suppl KIT 1 blood glucose monitoring supplies kit. Test strips, lancets, alcohol swabs 1 kit 0     No current facility-administered medications for this visit. Review of Systems   Constitutional: Negative for fever. Respiratory: Negative. Gastrointestinal: Positive for constipation. Genitourinary: Negative. Musculoskeletal: Positive for neck pain. Neurological: Positive for weakness. Objective:  General Appearance:  Uncomfortable and in pain. Vital signs: (most recent): Blood pressure (!) 159/90, pulse 89, resp. rate 16, weight 164 lb 6.4 oz (74.6 kg), SpO2 97 %, not currently breastfeeding. Vital signs are normal.  No fever. HEENT: Normal HEENT exam.    Lungs:  Normal effort. Breath sounds clear to auscultation. Heart: Normal rate. Abdomen: Abdomen is soft. Extremities: Normal range of motion. Neurological: Patient is alert and oriented to person, place and time. Normal strength. Patient has normal reflexes, normal muscle tone and normal coordination. Pupils:  Pupils are equal, round, and reactive to light. Skin:  Warm, dry and pale. No rash, ecchymosis, cyanosis or ulceration. Assessment & Plan     Chronic Axial lumbar spinal pain onset many years ago located in the lumbar area across midline affect both side progressively worsened  Interfere with daily life activities  Imaging of shown lumbar spondylosis  Patient had excellent relief for 3 months after lumbar facet the steroid injection  I will recommend for repeat lumbar facet injection    1. Encounter for chronic pain management    2. Cervical stenosis of spine    3. Primary osteoarthritis involving multiple joints    4. Lumbar stenosis with neurogenic claudication    5. Chronic use of opiate drugs therapeutic purposes    6. JANN on CPAP    7. Hx of fusion of cervical spine    8.  Myelomalacia ([facility] [address] 9. Lumbosacral spondylosis without myelopathy        Orders Placed This Encounter   Procedures    XR Lumbar Spine Ap Lateral Flexion and Extension and Oblique    ID INJ DX/THER AGNT PARAVERT FACET JOINT, LUMBAR/SAC, 1ST LEVEL      Orders Placed This Encounter   Medications    HYDROcodone-acetaminophen (NORCO) 5-325 MG per tablet     Sig: Take 1 tablet by mouth 2 times daily as needed for Pain for up to 30 days.      Dispense:  60 tablet     Refill:  0     Reduce doses taken as pain becomes manageable      Consider for lumbar spine minimally invasive spine interspace or in future for neurogenic claudication symptoms if spondylolisthesis cannot be ruled out with x-ray lumbar spine    Electronically signed by Ty Morgan MD on 1/16/2020 at 1:57 PM

## 2020-01-22 RX ORDER — ALLOPURINOL 100 MG/1
TABLET ORAL
Qty: 90 TABLET | Refills: 1 | Status: SHIPPED | OUTPATIENT
Start: 2020-01-22 | End: 2020-09-03 | Stop reason: SDUPTHER

## 2020-01-22 NOTE — TELEPHONE ENCOUNTER
Rectal pain     Diverticulosis of colon     Tubular adenoma of colon     History of colon polyps     Rectal pressure     Failed neck syndrome     Cervical stenosis of spine     Cervical radicular pain     Lumbar stenosis with neurogenic claudication     JANN on CPAP     Primary osteoarthritis involving multiple joints     Hx of fusion of cervical spine     Chronic use of opiate drugs therapeutic purposes     Myelomalacia ([address]

## 2020-02-13 RX ORDER — ZOLPIDEM TARTRATE 10 MG/1
TABLET ORAL
Qty: 15 TABLET | OUTPATIENT
Start: 2020-02-13

## 2020-02-17 ENCOUNTER — APPOINTMENT (OUTPATIENT)
Dept: GENERAL RADIOLOGY | Age: 80
End: 2020-02-17
Attending: ANESTHESIOLOGY
Payer: MEDICARE

## 2020-02-17 ENCOUNTER — HOSPITAL ENCOUNTER (OUTPATIENT)
Age: 80
Setting detail: OUTPATIENT SURGERY
Discharge: HOME | End: 2020-02-17
Attending: ANESTHESIOLOGY | Admitting: ANESTHESIOLOGY
Payer: MEDICARE

## 2020-02-17 VITALS
HEIGHT: 62 IN | BODY MASS INDEX: 32.39 KG/M2 | SYSTOLIC BLOOD PRESSURE: 167 MMHG | WEIGHT: 176 LBS | RESPIRATION RATE: 20 BRPM | TEMPERATURE: 97.2 F | DIASTOLIC BLOOD PRESSURE: 72 MMHG | OXYGEN SATURATION: 96 % | HEART RATE: 75 BPM

## 2020-02-17 PROBLEM — M47.816 LUMBAR FACET JOINT SYNDROME: Chronic | Status: ACTIVE | Noted: 2020-02-17

## 2020-02-17 LAB — GLUCOSE BLD-MCNC: 112 MG/DL (ref 65–105)

## 2020-02-17 PROCEDURE — 2709999900 HC NON-CHARGEABLE SUPPLY: Performed by: ANESTHESIOLOGY

## 2020-02-17 PROCEDURE — 7100000011 HC PHASE II RECOVERY - ADDTL 15 MIN: Performed by: ANESTHESIOLOGY

## 2020-02-17 PROCEDURE — 2580000003 HC RX 258: Performed by: ANESTHESIOLOGY

## 2020-02-17 PROCEDURE — 72110 X-RAY EXAM L-2 SPINE 4/>VWS: CPT

## 2020-02-17 PROCEDURE — 82947 ASSAY GLUCOSE BLOOD QUANT: CPT

## 2020-02-17 PROCEDURE — 64494 INJ PARAVERT F JNT L/S 2 LEV: CPT | Performed by: ANESTHESIOLOGY

## 2020-02-17 PROCEDURE — 73502 X-RAY EXAM HIP UNI 2-3 VIEWS: CPT

## 2020-02-17 PROCEDURE — 6360000002 HC RX W HCPCS: Performed by: ANESTHESIOLOGY

## 2020-02-17 PROCEDURE — 2500000003 HC RX 250 WO HCPCS: Performed by: ANESTHESIOLOGY

## 2020-02-17 PROCEDURE — 3600000050 HC PAIN LEVEL 1 BASE: Performed by: ANESTHESIOLOGY

## 2020-02-17 PROCEDURE — 99152 MOD SED SAME PHYS/QHP 5/>YRS: CPT | Performed by: ANESTHESIOLOGY

## 2020-02-17 PROCEDURE — 3600000051 HC PAIN LEVEL 1 ADDL 15 MIN: Performed by: ANESTHESIOLOGY

## 2020-02-17 PROCEDURE — 64495 INJ PARAVERT F JNT L/S 3 LEV: CPT | Performed by: ANESTHESIOLOGY

## 2020-02-17 PROCEDURE — 64493 INJ PARAVERT F JNT L/S 1 LEV: CPT | Performed by: ANESTHESIOLOGY

## 2020-02-17 PROCEDURE — 7100000010 HC PHASE II RECOVERY - FIRST 15 MIN: Performed by: ANESTHESIOLOGY

## 2020-02-17 PROCEDURE — 6360000004 HC RX CONTRAST MEDICATION: Performed by: ANESTHESIOLOGY

## 2020-02-17 PROCEDURE — 3209999900 FLUORO FOR SURGICAL PROCEDURES

## 2020-02-17 RX ORDER — SODIUM CHLORIDE 0.9 % (FLUSH) 0.9 %
10 SYRINGE (ML) INJECTION PRN
Status: DISCONTINUED | OUTPATIENT
Start: 2020-02-17 | End: 2020-02-17 | Stop reason: HOSPADM

## 2020-02-17 RX ORDER — DEXAMETHASONE SODIUM PHOSPHATE 10 MG/ML
INJECTION INTRAMUSCULAR; INTRAVENOUS PRN
Status: DISCONTINUED | OUTPATIENT
Start: 2020-02-17 | End: 2020-02-17 | Stop reason: ALTCHOICE

## 2020-02-17 RX ORDER — SODIUM CHLORIDE 0.9 % (FLUSH) 0.9 %
10 SYRINGE (ML) INJECTION EVERY 12 HOURS SCHEDULED
Status: DISCONTINUED | OUTPATIENT
Start: 2020-02-17 | End: 2020-02-17 | Stop reason: HOSPADM

## 2020-02-17 RX ORDER — FENTANYL CITRATE 50 UG/ML
INJECTION, SOLUTION INTRAMUSCULAR; INTRAVENOUS PRN
Status: DISCONTINUED | OUTPATIENT
Start: 2020-02-17 | End: 2020-02-17 | Stop reason: ALTCHOICE

## 2020-02-17 RX ORDER — BUPIVACAINE HYDROCHLORIDE 5 MG/ML
INJECTION, SOLUTION EPIDURAL; INTRACAUDAL PRN
Status: DISCONTINUED | OUTPATIENT
Start: 2020-02-17 | End: 2020-02-17 | Stop reason: ALTCHOICE

## 2020-02-17 RX ORDER — MIDAZOLAM HYDROCHLORIDE 1 MG/ML
INJECTION INTRAMUSCULAR; INTRAVENOUS PRN
Status: DISCONTINUED | OUTPATIENT
Start: 2020-02-17 | End: 2020-02-17 | Stop reason: ALTCHOICE

## 2020-02-17 RX ORDER — LIDOCAINE HYDROCHLORIDE 10 MG/ML
INJECTION, SOLUTION INFILTRATION; PERINEURAL PRN
Status: DISCONTINUED | OUTPATIENT
Start: 2020-02-17 | End: 2020-02-17 | Stop reason: ALTCHOICE

## 2020-02-17 RX ADMIN — Medication 10 ML: at 08:54

## 2020-02-17 ASSESSMENT — PAIN SCALES - GENERAL
PAINLEVEL_OUTOF10: 10
PAINLEVEL_OUTOF10: 10
PAINLEVEL_OUTOF10: 8
PAINLEVEL_OUTOF10: 8
PAINLEVEL_OUTOF10: 10
PAINLEVEL_OUTOF10: 6
PAINLEVEL_OUTOF10: 10
PAINLEVEL_OUTOF10: 10
PAINLEVEL_OUTOF10: 8

## 2020-02-17 ASSESSMENT — PAIN DESCRIPTION - ORIENTATION
ORIENTATION: LOWER
ORIENTATION: LOWER

## 2020-02-17 ASSESSMENT — PAIN DESCRIPTION - DESCRIPTORS
DESCRIPTORS: CONSTANT
DESCRIPTORS: ACHING;DULL;DISCOMFORT

## 2020-02-17 ASSESSMENT — PAIN DESCRIPTION - LOCATION
LOCATION: BACK
LOCATION: BACK

## 2020-02-17 ASSESSMENT — PAIN - FUNCTIONAL ASSESSMENT: PAIN_FUNCTIONAL_ASSESSMENT: 0-10

## 2020-02-17 NOTE — OP NOTE
Band-Aids were placed over the needle insertion sites. Patient's vital signs remained stable and tolerated the procedure well. The patient was discharged home in stable condition and will be followed in the pain clinic in the next few weeks for further planning.

## 2020-02-17 NOTE — H&P
MD at [facility] 11/20/2017    EPIDURAL STEROID INJECTION L5S1 performed by Guy Mccabe MD at [facility] Bilateral 5/20/2019    EPIDURAL STEROID INJECTION BILATERAL S1 performed by Guy Mccabe MD at [id number] [place] [address]  10/1/06    NECK SURGERY  5/2011 & 2001    NERVE SURGERY Bilateral 9/5/2019    BILATERAL LUMBAR FACET STEROID INJECTION L2-L5 performed by Guy Mccabe MD at [facility]  08/25/2017    L5-S1 steroid injection    OTHER SURGICAL HISTORY  11/20/2017    VANITA L5-S1    OTHER SURGICAL HISTORY  10/01/2018    cervical steroid injection    OTHER SURGICAL HISTORY Bilateral 05/20/2019    EPIDURAL STEROID INJECTION BILATERAL S1 (Bilateral )    HI INJECT ANES/STEROID FORAMEN LUMBAR/SACRAL W IMG GUIDE ,1 LEVEL Bilateral 11/19/2018    BILATERAL L4 VANITA performed by Guy Mccabe MD at [address] DX/THER SBST INTRLMNR CRV/THRC W/IMG GDN N/A 10/1/2018    EPIDURAL STEROID INJECTION CERVICAL C7-T1 performed by Guy Mccabe MD at [facility]        Medications Prior to Admission:     Prior to Admission medications    Medication Sig Start Date End Date Taking? Authorizing Provider   allopurinol (ZYLOPRIM) 100 MG tablet take 1 tablet by mouth once daily 1/22/20  Yes Rylie Schaefer MD   HYDROcodone-acetaminophen (NORCO) 5-325 MG per tablet Take 1 tablet by mouth 2 times daily as needed for Pain for up to 30 days.  1/16/20 2/17/20 Yes Guy Mccabe MD   pantoprazole (PROTONIX) 40 MG tablet take 1 tablet by mouth every morning BEFORE BREAKFAST 10/21/19  Yes Connie Hyatt MD   cloNIDine (CATAPRES) 0.2 MG tablet Take 0.5 tablets by mouth 2 times daily 10/7/19  Yes Connie Hyatt MD   carvedilol (COREG) 3.125 MG tablet take 1 tablet by mouth twice a day 9/13/19  Yes YOLANDA Maloney CNP   amLODIPine (NORVASC) 5 MG tablet take 1 tablet by mouth once daily 8/27/19 Relation Age of Onset    Breast Cancer Sister     Cancer Sister         liver    Diabetes Daughter        Review of Systems:     Positive and Negative as described in HPI. CONSTITUTIONAL:  negative for fevers, chills, sweats, fatigue, weight loss  HEENT:  negative for vision, hearing changes, runny nose, throat pain  RESPIRATORY:  negative for shortness of breath, cough, congestion, wheezing. CARDIOVASCULAR:  negative for chest pain, palpitations. GASTROINTESTINAL:  negative for nausea, vomiting, diarrhea, constipation, change in bowel habits, abdominal pain   GENITOURINARY:  negative for difficulty of urination, burning with urination, frequency   INTEGUMENT:  negative for rash, skin lesions, easy bruising   HEMATOLOGIC/LYMPHATIC:  negative for swelling/edema   ALLERGIC/IMMUNOLOGIC:  negative for urticaria , itching  ENDOCRINE:  negative increase in drinking, increase in urination, hot or cold intolerance  MUSCULOSKELETAL:  See HPI. negative joint pains, muscle aches, swelling of joints  NEUROLOGICAL:  negative for headaches, dizziness, lightheadedness, numbness, pain, tingling extremities  BEHAVIOR/PSYCH:  negative for depression, anxiety    Physical Exam:   BP (!) 177/73   Pulse 71   Temp 98.2 °F (36.8 °C) (Oral)   Resp 20   Ht 5' 2\" (1.575 m)   Wt 176 lb (79.8 kg)   SpO2 94%   BMI 32.19 kg/m²       General Appearance:  alert, well appearing, and in no acute distress  Mental status: oriented to person, place, and time with normal affect  Head:  normocephalic, atraumatic.   Eye: no icterus, redness, pupils equal and reactive, extraocular eye movements intact, conjunctiva clear  Ear: normal external ear, no discharge, hearing intact  Nose:  no drainage noted  Mouth: mucous membranes moist  Neck: supple, no carotid bruits, thyroid not palpable  Lungs: Bilateral equal air entry, clear to ausculation, no wheezing, rales or rhonchi, normal effort  Cardiovascular: normal rate, regular rhythm, no murmur,

## 2020-02-19 NOTE — TELEPHONE ENCOUNTER
Duarte Hernandez is requesting a refill on the following medications:   Requested Prescriptions     Pending Prescriptions Disp Refills    HYDROcodone-acetaminophen (NORCO) 5-325 MG per tablet 60 tablet 0     Sig: Take 1 tablet by mouth 2 times daily as needed for Pain for up to 30 days. Last OV 1/16/20    Future Appointments   Date Time Provider Pradeep Alexander   2/25/2020 10:45 AM Connie Mckinnon MD [facility] FP [facility]       OARRS report sent to Dr. Dov Stafford through alternative route for review.

## 2020-02-20 RX ORDER — HYDROCODONE BITARTRATE AND ACETAMINOPHEN 5; 325 MG/1; MG/1
1 TABLET ORAL 2 TIMES DAILY PRN
Qty: 60 TABLET | Refills: 0 | Status: SHIPPED | OUTPATIENT
Start: 2020-02-20 | End: 2020-03-20 | Stop reason: SDUPTHER

## 2020-02-24 RX ORDER — ZOLPIDEM TARTRATE 10 MG/1
TABLET ORAL
Qty: 15 TABLET | Refills: 1 | Status: SHIPPED | OUTPATIENT
Start: 2020-02-24 | End: 2020-04-06

## 2020-02-24 NOTE — TELEPHONE ENCOUNTER
Last visit: 10/7/19  Last Med refill: 10/23/19  Does patient have enough medication for 72 hours: No:     Next Visit Date:  Future Appointments   Date Time Provider Pradeep Alexander   2/25/2020 10:45 AM Connie Castaneda MD [facility]   3/4/2020 11:20 AM Naa Ramirez APRN - CNP Neuro Spec [facility]   3/23/2020  2:20 PM YOLANDA Jay CNP [facility] [address] Maintenance   Topic Date Due    Hepatitis B vaccine (1 of 3 - Risk 3-dose series) 05/28/1959    Annual Wellness Visit (AWV)  05/29/2019    Shingles Vaccine (1 of 2) 06/05/2020 (Originally 5/28/1992)    DTaP/Tdap/Td vaccine (1 - Tdap) 10/18/2026 (Originally 5/28/1951)    Lipid screen  06/03/2020    Potassium monitoring  06/03/2020    Creatinine monitoring  06/03/2020    DEXA (modify frequency per FRAX score)  Completed    Flu vaccine  Completed    Pneumococcal 65+ years Vaccine  Completed    Hepatitis A vaccine  Aged Out    Hib vaccine  Aged Out    Meningococcal (ACWY) vaccine  Aged Out       Hemoglobin A1C (%)   Date Value   10/07/2019 6.5   06/05/2019 6.4   01/09/2019 6.8             ( goal A1C is < 7)   Microalb/Crt.  Ratio (mcg/mg creat)   Date Value   06/03/2019 CANNOT BE CALCULATED     LDL Cholesterol (mg/dL)   Date Value   06/03/2019 102   02/08/2018 69       (goal LDL is <100)   AST (U/L)   Date Value   06/03/2019 18     ALT (U/L)   Date Value   06/03/2019 14     BUN (mg/dL)   Date Value   06/03/2019 13     BP Readings from Last 3 Encounters:   02/17/20 (!) 167/72   01/16/20 (!) 159/90   10/07/19 130/72          (goal 120/80)    All Future Testing planned in [facility]  Lab Frequency Next Occurrence   XR Lumbar Spine Flex and Ext Only Once 08/13/2020   XR SPINE ENTIRE (2-3 VWS) Once 08/28/2020   XR Lumbar Spine Ap Lateral Flexion and Extension and Oblique Once 01/16/2020   [place] INJ DX/THER AGNT PARAVERT FACET JOINT, LUMBAR/SAC, 1ST LEVEL Once 01/17/2020   XR HIP BILATERAL W AP PELVIS (2 VIEWS) Once 02/17/2020   XR LUMBAR SPINE (MIN 4 VIEWS) Once 02/17/2020               Patient Active Problem List:     Osteoarthritis     Essential hypertension     Type 2 diabetes mellitus with diabetic neuropathy, without long-term current use of insulin ([address]     Pure hypercholesterolemia     Constipation     Rectal pain     Diverticulosis of colon     Tubular adenoma of colon     History of colon polyps     Rectal pressure     Failed neck syndrome     Cervical stenosis of spine     Cervical radicular pain     Lumbar stenosis with neurogenic claudication     JANN on CPAP     Primary osteoarthritis involving multiple joints     Hx of fusion of cervical spine     Chronic use of opiate drugs therapeutic purposes     Myelomalacia (HCC)     Lumbar facet joint syndrome

## 2020-02-25 ENCOUNTER — OFFICE VISIT (OUTPATIENT)
Dept: FAMILY MEDICINE CLINIC | Age: 80
End: 2020-02-25
Payer: MEDICARE

## 2020-02-25 VITALS
BODY MASS INDEX: 29.44 KG/M2 | HEIGHT: 62 IN | OXYGEN SATURATION: 97 % | HEART RATE: 77 BPM | WEIGHT: 160 LBS | SYSTOLIC BLOOD PRESSURE: 123 MMHG | DIASTOLIC BLOOD PRESSURE: 82 MMHG

## 2020-02-25 LAB — HBA1C MFR BLD: 6.6 %

## 2020-02-25 PROCEDURE — G8417 CALC BMI ABV UP PARAM F/U: HCPCS | Performed by: INTERNAL MEDICINE

## 2020-02-25 PROCEDURE — G8427 DOCREV CUR MEDS BY ELIG CLIN: HCPCS | Performed by: INTERNAL MEDICINE

## 2020-02-25 PROCEDURE — G8482 FLU IMMUNIZE ORDER/ADMIN: HCPCS | Performed by: INTERNAL MEDICINE

## 2020-02-25 PROCEDURE — 4040F PNEUMOC VAC/ADMIN/RCVD: CPT | Performed by: INTERNAL MEDICINE

## 2020-02-25 PROCEDURE — 1090F PRES/ABSN URINE INCON ASSESS: CPT | Performed by: INTERNAL MEDICINE

## 2020-02-25 PROCEDURE — 1036F TOBACCO NON-USER: CPT | Performed by: INTERNAL MEDICINE

## 2020-02-25 PROCEDURE — G8399 PT W/DXA RESULTS DOCUMENT: HCPCS | Performed by: INTERNAL MEDICINE

## 2020-02-25 PROCEDURE — 83036 HEMOGLOBIN GLYCOSYLATED A1C: CPT | Performed by: INTERNAL MEDICINE

## 2020-02-25 PROCEDURE — 1123F ACP DISCUSS/DSCN MKR DOCD: CPT | Performed by: INTERNAL MEDICINE

## 2020-02-25 PROCEDURE — 99214 OFFICE O/P EST MOD 30 MIN: CPT | Performed by: INTERNAL MEDICINE

## 2020-02-25 RX ORDER — CARVEDILOL 3.12 MG/1
TABLET ORAL
Qty: 180 TABLET | Refills: 1 | Status: SHIPPED | OUTPATIENT
Start: 2020-02-25 | End: 2020-09-21 | Stop reason: SDUPTHER

## 2020-02-25 RX ORDER — ATORVASTATIN CALCIUM 40 MG/1
40 TABLET, FILM COATED ORAL DAILY
Qty: 90 TABLET | Refills: 1 | Status: SHIPPED | OUTPATIENT
Start: 2020-02-25 | End: 2020-09-30 | Stop reason: SDUPTHER

## 2020-02-25 RX ORDER — GLIMEPIRIDE 2 MG/1
TABLET ORAL
Qty: 90 TABLET | Refills: 1 | Status: SHIPPED | OUTPATIENT
Start: 2020-02-25 | End: 2020-09-30

## 2020-02-25 RX ORDER — AMLODIPINE BESYLATE 5 MG/1
5 TABLET ORAL DAILY
Qty: 90 TABLET | Refills: 1 | Status: SHIPPED | OUTPATIENT
Start: 2020-02-25 | End: 2020-09-30

## 2020-02-25 NOTE — PROGRESS NOTES
Subjective:       Patient ID: Misael Watkins is a 70 y.o. female who presents for   Chief Complaint   Patient presents with    Hypertension    Diabetes       HPI:  Nursing note reviewed and discussed with patient. Diabetes Mellitus  Patient presents for follow up of diabetes. Current symptoms include: hypoglycemia  and paresthesia of the feet. Symptoms have been well-controlled. Patient denies foot ulcerations, hypoglycemia , increase appetite, nausea, polydipsia, polyuria, visual disturbances, vomiting and weight loss. Evaluation to date has included: hemoglobin A1C. Home sugars: BGs are running  consistent with Hgb A1C. Current treatment: Continued amaryl and metformin which has been effective and  . Last dilated eye exam: 3-4 years ago, has an appointment scheduled but needs a referral. Does not see podiatry. Foot exam done here 11/2017. Sugars 120's in AM,  in evening. Not checking sugars daily --> no issues with hypoglycemia or hyperglycemia. Vision is stable, following with Dr Charity Montgomery, eye exam done 8/2019. Additional Complaints:  Dyslipidemia  Patient presents for evaluation of lipids. Compliance with treatment thus far has been good. A repeat fasting lipid profile was not done. She will be getting labs drawn today. The patient does use medications that may worsen dyslipidemias (corticosteroids, progestins, anabolic steroids, diuretics, beta-blockers, amiodarone, cyclosporine, olanzapine). The patient exercises rarely. The patient is not known to have coexisting coronary artery disease. Hypertension  Patient is here for follow-up of elevated blood pressure. She is not exercising and is adherent to a low-salt diet. Blood pressure is well controlled at home. Cardiac symptoms: none.  Patient denies chest pain, chest pressure/discomfort, claudication, dyspnea, exertional chest pressure/discomfort, fatigue, irregular heart beat, lower extremity edema, near-syncope, orthopnea, palpitations, distress. Patient ambulating with normal gait with walker. Normal body habitus. Chest: clear with no wheezes or rales. No retractions, or use of accessory muscles noted. Cardiovascular: PMI is not displaced, and no thrill noted. Regular rate and rhythm with no rub, murmur or gallop. There is no peripheral edema. Pedal pulses are normal.   Abdomen: Abdomen is soft and nontender. The bowel sounds are normal.   Musculoskeletal: There are no deformities of the the extremities. Patient has all ten fingers intact. The patient has full range of motion on all 4 extremities without pain. Skin: The skin is warm and dry. There are no rashes noted. Diabetic Foot Exam    Pulses:  normal,   Edema: negative  Skin: normal    Sensory exam  Monofilament Sensation:  normal  (minimum of 5 random plantar locations tested, avoid callous areas - > 1 area with absence of sensation is + for neuropathy)      Plus one of the following  Pinprick:  Intact  Proprioception:  Intact  Vibration (128 Hz):  N/A          Prior to Visit Medications    Medication Sig Taking? Authorizing Provider   zolpidem (AMBIEN) 10 MG tablet take 1/2 tablet by mouth nightly if needed for sleep Yes Connie Galvez MD   HYDROcodone-acetaminophen (NORCO) 5-325 MG per tablet Take 1 tablet by mouth 2 times daily as needed for Pain for up to 30 days.  Yes YOLANDA Poe CNP   allopurinol (ZYLOPRIM) 100 MG tablet take 1 tablet by mouth once daily Yes Connie Galvez MD   pantoprazole (PROTONIX) 40 MG tablet take 1 tablet by mouth every morning BEFORE BREAKFAST Yes Connie Galvez MD   cloNIDine (CATAPRES) 0.2 MG tablet Take 0.5 tablets by mouth 2 times daily Yes Connie Galvez MD   carvedilol (COREG) 3.125 MG tablet take 1 tablet by mouth twice a day Yes YOLANDA Price CNP   amLODIPine (NORVASC) 5 MG tablet take 1 tablet by mouth once daily Yes YOLANDA Price CNP   amitriptyline (ELAVIL) 25 MG tablet Take 1 tablet by Latest Ref Rng & Units 1/12/2017 12/18/2015           7:03 PM 10:20 AM   Cholesterol      <200 mg/dL 203 (H) 214 (H)   HDL Cholesterol      >40 mg/dL 70 69   LDL Cholesterol      0 - 130 mg/dL 100 98   Chol/HDL Ratio      <5 2.9 3.1   Triglycerides      <150 mg/dL 167 (H) 234 (H)   VLDL      1 - 30 mg/dL NOT REPORTED NOT REPORTED   Cholesterol, Fasting      <200 mg/dL     Triglyceride, Fasting      <150 mg/dL       Lab Results   Component Value Date     06/03/2019    K 4.5 06/03/2019     06/03/2019    CO2 22 06/03/2019    BUN 13 06/03/2019    CREATININE 0.59 06/03/2019    GLUCOSE 98 06/03/2019    CALCIUM 9.7 06/03/2019    PROT 7.3 06/03/2019    LABALBU 4.2 06/03/2019    BILITOT 0.42 06/03/2019    ALKPHOS 77 06/03/2019    AST 18 06/03/2019    ALT 14 06/03/2019    LABGLOM >60 06/03/2019    GFRAA >60 06/03/2019           Assessment/Plan:     1. Type 2 diabetes mellitus with diabetic neuropathy, without long-term current use of insulin (HCC)  Continue current regimen. Continue diet and lifestyle management.  - POCT glycosylated hemoglobin (Hb A1C)  -  DIABETES FOOT EXAM    2. Pure hypercholesterolemia  Continue atorvastatin. 3. Essential hypertension  Continue current regimen. 4. Myelomalacia ([address]  Stable, continue follow-up with pain clinic. 5. JANN on CPAP  Continue compliance with CPAP. 6. Cervical stenosis of spine  Follow-up pain management.           Electronically signed by Mary Monk MD on 2/25/2020 at 11:11 AM

## 2020-03-09 RX ORDER — TIZANIDINE 2 MG/1
TABLET ORAL
Qty: 90 TABLET | Refills: 0 | Status: SHIPPED | OUTPATIENT
Start: 2020-03-09 | End: 2020-04-29

## 2020-03-20 ENCOUNTER — TELEPHONE (OUTPATIENT)
Dept: PAIN MANAGEMENT | Age: 80
End: 2020-03-20

## 2020-03-20 RX ORDER — HYDROCODONE BITARTRATE AND ACETAMINOPHEN 5; 325 MG/1; MG/1
1 TABLET ORAL 2 TIMES DAILY PRN
Qty: 60 TABLET | Refills: 0 | Status: SHIPPED | OUTPATIENT
Start: 2020-03-21 | End: 2020-05-11 | Stop reason: SDUPTHER

## 2020-03-20 NOTE — TELEPHONE ENCOUNTER
Pt appointment on 3/23/20 needs cancelled due to coronavirus protocol. Pt is considered high risk. Medication refills can be sent to pharmacy and appointment can be rescheduled in May.

## 2020-03-20 NOTE — TELEPHONE ENCOUNTER
Abida Tello is requesting a refill on the following medications:   Requested Prescriptions     Pending Prescriptions Disp Refills    HYDROcodone-acetaminophen (NORCO) 5-325 MG per tablet 60 tablet 0     Sig: Take 1 tablet by mouth 2 times daily as needed for Pain for up to 30 days. Last OV 1/16/20. Pt appointment on 3/23/20 cancelled due to coronavirus protocol, pt is considered high risk at the age of 70, has diabetes          Future Appointments   Date Time Provider Pradeep Alexander   3/23/2020  2:20 PM YOLANDA Mckinney - CNP [facility]   4/2/2020  9:20 AM YOLANDA Phipps CNP Neuro Spec [facility]   6/25/2020 10:15 AM Connie Gonzales MD [facility]       OARRS report sent to Dr. Carmen Bradshaw through alternative route for review.

## 2020-03-24 RX ORDER — AMITRIPTYLINE HYDROCHLORIDE 25 MG/1
TABLET, FILM COATED ORAL
Qty: 60 TABLET | Refills: 5 | Status: SHIPPED | OUTPATIENT
Start: 2020-03-24 | End: 2020-09-30 | Stop reason: SDUPTHER

## 2020-03-24 NOTE — TELEPHONE ENCOUNTER
Last visit: 2/25/20  Last Med refill: 1/15/20  Does patient have enough medication for 72 hours: No:     Next Visit Date:  Future Appointments   Date Time Provider Pradeep Alexander   4/2/2020  9:20 AM YOLANDA Mitchell - CNP Neuro Spec [facility]   5/11/2020  2:20 PM YOLANDA Zeng - CNP [facility]   6/25/2020 10:15 AM Connie Sahu  [address] Maintenance   Topic Date Due    Hepatitis B vaccine (1 of 3 - Risk 3-dose series) 05/28/1959    Annual Wellness Visit (AWV)  05/29/2019    Shingles Vaccine (1 of 2) 06/05/2020 (Originally 5/28/1992)    DTaP/Tdap/Td vaccine (1 - Tdap) 10/18/2026 (Originally 5/28/1959)    Lipid screen  06/03/2020    Potassium monitoring  06/03/2020    Creatinine monitoring  06/03/2020    DEXA (modify frequency per FRAX score)  Completed    Flu vaccine  Completed    Pneumococcal 65+ years Vaccine  Completed    Hepatitis A vaccine  Aged Out    Hib vaccine  Aged Out    Meningococcal (ACWY) vaccine  Aged Out       Hemoglobin A1C (%)   Date Value   02/25/2020 6.6   10/07/2019 6.5   06/05/2019 6.4             ( goal A1C is < 7)   Microalb/Crt.  Ratio (mcg/mg creat)   Date Value   06/03/2019 CANNOT BE CALCULATED     LDL Cholesterol (mg/dL)   Date Value   06/03/2019 102   02/08/2018 69       (goal LDL is <100)   AST (U/L)   Date Value   06/03/2019 18     ALT (U/L)   Date Value   06/03/2019 14     BUN (mg/dL)   Date Value   06/03/2019 13     BP Readings from Last 3 Encounters:   02/25/20 123/82   02/17/20 (!) 167/72   01/16/20 (!) 159/90          (goal 120/80)    All Future Testing planned in [facility]  Lab Frequency Next Occurrence   XR Lumbar Spine Flex and Ext Only Once 08/13/2020   XR SPINE ENTIRE (2-3 VWS) Once 08/28/2020   XR Lumbar Spine Ap Lateral Flexion and Extension and Oblique Once 01/16/2020   NM INJ DX/THER AGNT PARAVERT FACET JOINT, LUMBAR/SAC, 1ST LEVEL Once 01/17/2020   XR HIP BILATERAL W AP PELVIS (2 VIEWS) Once 02/17/2020   XR LUMBAR SPINE (MIN 4 VIEWS) Once 02/17/2020               Patient Active Problem List:     Osteoarthritis     Essential hypertension     Type 2 diabetes mellitus with diabetic neuropathy, without long-term current use of insulin ([address]     Pure hypercholesterolemia     Constipation     Rectal pain     Diverticulosis of colon     Tubular adenoma of colon     History of colon polyps     Rectal pressure     Failed neck syndrome     Cervical stenosis of spine     Cervical radicular pain     Lumbar stenosis with neurogenic claudication     JANN on CPAP     Primary osteoarthritis involving multiple joints     Hx of fusion of cervical spine     Chronic use of opiate drugs therapeutic purposes     Myelomalacia (HCC)     Lumbar facet joint syndrome

## 2020-04-06 RX ORDER — ZOLPIDEM TARTRATE 10 MG/1
TABLET ORAL
Qty: 15 TABLET | Refills: 1 | Status: SHIPPED | OUTPATIENT
Start: 2020-04-06 | End: 2020-04-28

## 2020-04-06 NOTE — TELEPHONE ENCOUNTER
Last visit: 2/25/20  Last Med refill: 3/24/20  Does patient have enough medication for 72 hours: Yes    Next Visit Date:  Future Appointments   Date Time Provider Pradeep Alexander   5/11/2020  2:20 PM YOLANDA Chou - CNP [facility] [address] Maintenance   Topic Date Due    Annual Wellness Visit (AWV)  05/29/2019    Shingles Vaccine (1 of 2) 06/05/2020 (Originally 5/28/1992)    DTaP/Tdap/Td vaccine (1 - Tdap) 10/18/2026 (Originally 5/28/1959)    Lipid screen  06/03/2020    Potassium monitoring  06/03/2020    Creatinine monitoring  06/03/2020    DEXA (modify frequency per FRAX score)  Completed    Flu vaccine  Completed    Pneumococcal 65+ years Vaccine  Completed    Hepatitis A vaccine  Aged Out    Hib vaccine  Aged Out    Meningococcal (ACWY) vaccine  Aged Out       Hemoglobin A1C (%)   Date Value   02/25/2020 6.6   10/07/2019 6.5   06/05/2019 6.4             ( goal A1C is < 7)   Microalb/Crt.  Ratio (mcg/mg creat)   Date Value   06/03/2019 CANNOT BE CALCULATED     LDL Cholesterol (mg/dL)   Date Value   06/03/2019 102   02/08/2018 69       (goal LDL is <100)   AST (U/L)   Date Value   06/03/2019 18     ALT (U/L)   Date Value   06/03/2019 14     BUN (mg/dL)   Date Value   06/03/2019 13     BP Readings from Last 3 Encounters:   02/25/20 123/82   02/17/20 (!) 167/72   01/16/20 (!) 159/90          (goal 120/80)    All Future Testing planned in [facility]  Lab Frequency Next Occurrence   XR Lumbar Spine Flex and Ext Only Once 08/13/2020   XR SPINE ENTIRE (2-3 VWS) Once 08/28/2020   XR Lumbar Spine Ap Lateral Flexion and Extension and Oblique Once 01/16/2020   MT INJ DX/THER AGNT PARAVERT FACET JOINT, LUMBAR/SAC, 1ST LEVEL Once 01/17/2020   XR HIP BILATERAL W AP PELVIS (2 VIEWS) Once 02/17/2020   XR LUMBAR SPINE (MIN 4 VIEWS) Once 02/17/2020               Patient Active Problem List:     Osteoarthritis     Essential hypertension     Type 2 diabetes mellitus with diabetic neuropathy, without

## 2020-04-28 ENCOUNTER — OFFICE VISIT (OUTPATIENT)
Dept: FAMILY MEDICINE CLINIC | Age: 80
End: 2020-04-28
Payer: MEDICARE

## 2020-04-28 VITALS
SYSTOLIC BLOOD PRESSURE: 110 MMHG | OXYGEN SATURATION: 98 % | DIASTOLIC BLOOD PRESSURE: 70 MMHG | TEMPERATURE: 98 F | WEIGHT: 160 LBS | HEART RATE: 76 BPM | BODY MASS INDEX: 29.26 KG/M2

## 2020-04-28 PROCEDURE — G8417 CALC BMI ABV UP PARAM F/U: HCPCS | Performed by: INTERNAL MEDICINE

## 2020-04-28 PROCEDURE — G8399 PT W/DXA RESULTS DOCUMENT: HCPCS | Performed by: INTERNAL MEDICINE

## 2020-04-28 PROCEDURE — 1036F TOBACCO NON-USER: CPT | Performed by: INTERNAL MEDICINE

## 2020-04-28 PROCEDURE — G8427 DOCREV CUR MEDS BY ELIG CLIN: HCPCS | Performed by: INTERNAL MEDICINE

## 2020-04-28 PROCEDURE — 1090F PRES/ABSN URINE INCON ASSESS: CPT | Performed by: INTERNAL MEDICINE

## 2020-04-28 PROCEDURE — 4040F PNEUMOC VAC/ADMIN/RCVD: CPT | Performed by: INTERNAL MEDICINE

## 2020-04-28 PROCEDURE — 1123F ACP DISCUSS/DSCN MKR DOCD: CPT | Performed by: INTERNAL MEDICINE

## 2020-04-28 PROCEDURE — 99213 OFFICE O/P EST LOW 20 MIN: CPT | Performed by: INTERNAL MEDICINE

## 2020-04-28 RX ORDER — CLONIDINE HYDROCHLORIDE 0.2 MG/1
0.1 TABLET ORAL 2 TIMES DAILY
Qty: 90 TABLET | Refills: 1 | Status: SHIPPED | OUTPATIENT
Start: 2020-04-28 | End: 2020-09-30 | Stop reason: SDUPTHER

## 2020-04-28 RX ORDER — LORATADINE 10 MG/1
TABLET ORAL
Qty: 90 TABLET | Refills: 1 | Status: SHIPPED | OUTPATIENT
Start: 2020-04-28 | End: 2020-09-30 | Stop reason: SDUPTHER

## 2020-04-28 RX ORDER — PANTOPRAZOLE SODIUM 40 MG/1
TABLET, DELAYED RELEASE ORAL
Qty: 90 TABLET | Refills: 1 | Status: SHIPPED | OUTPATIENT
Start: 2020-04-28 | End: 2020-09-30 | Stop reason: SDUPTHER

## 2020-04-28 RX ORDER — FUROSEMIDE 20 MG/1
20 TABLET ORAL DAILY
Qty: 3 TABLET | Refills: 0 | Status: SHIPPED | OUTPATIENT
Start: 2020-04-28 | End: 2020-06-08

## 2020-04-28 RX ORDER — ZOLPIDEM TARTRATE 10 MG/1
TABLET ORAL
Qty: 15 TABLET | Refills: 0 | Status: SHIPPED | OUTPATIENT
Start: 2020-04-28 | End: 2020-06-27

## 2020-04-28 NOTE — PROGRESS NOTES
Patient is present complaining of left foot swelling x1 week  Patient states it is not red or warm to the touch  Patient states she has not tried elevating her legs

## 2020-04-28 NOTE — TELEPHONE ENCOUNTER
Last visit: 02/25/2020  Last Med refill: 04/06/2020  Does patient have enough medication for 72 hours: Unknown    Next Visit Date:  Future Appointments   Date Time Provider Pradeep Catherine   4/28/2020 11:45 AM Connie Barrera MD [facility]   5/11/2020  2:20 PM Chantel Syed, APRN - CNP [facility] [address] Maintenance   Topic Date Due    Annual Wellness Visit (AWV)  05/29/2019    Shingles Vaccine (1 of 2) 06/05/2020 (Originally 5/28/1992)    DTaP/Tdap/Td vaccine (1 - Tdap) 10/18/2026 (Originally 5/28/1959)    Lipid screen  06/03/2020    Potassium monitoring  06/03/2020    Creatinine monitoring  06/03/2020    DEXA (modify frequency per FRAX score)  Completed    Flu vaccine  Completed    Pneumococcal 65+ years Vaccine  Completed    Hepatitis A vaccine  Aged Out    Hib vaccine  Aged Out    Meningococcal (ACWY) vaccine  Aged Out       Hemoglobin A1C (%)   Date Value   02/25/2020 6.6   10/07/2019 6.5   06/05/2019 6.4             ( goal A1C is < 7)   Microalb/Crt.  Ratio (mcg/mg creat)   Date Value   06/03/2019 CANNOT BE CALCULATED     LDL Cholesterol (mg/dL)   Date Value   06/03/2019 102   02/08/2018 69       (goal LDL is <100)   AST (U/L)   Date Value   06/03/2019 18     ALT (U/L)   Date Value   06/03/2019 14     BUN (mg/dL)   Date Value   06/03/2019 13     BP Readings from Last 3 Encounters:   02/25/20 123/82   02/17/20 (!) 167/72   01/16/20 (!) 159/90          (goal 120/80)    All Future Testing planned in [facility]  Lab Frequency Next Occurrence   XR Lumbar Spine Flex and Ext Only Once 08/13/2020   XR SPINE ENTIRE (2-3 VWS) Once 08/28/2020   XR Lumbar Spine Ap Lateral Flexion and Extension and Oblique Once 01/16/2020   XR HIP BILATERAL W AP PELVIS (2 VIEWS) Once 02/17/2020   XR LUMBAR SPINE (MIN 4 VIEWS) Once 02/17/2020               Patient Active Problem List:     Osteoarthritis     Essential hypertension     Type 2 diabetes mellitus with diabetic neuropathy, without long-term current use of insulin ([address]     Pure hypercholesterolemia     Constipation     Rectal pain     Diverticulosis of colon     Tubular adenoma of colon     History of colon polyps     Rectal pressure     Failed neck syndrome     Cervical stenosis of spine     Cervical radicular pain     Lumbar stenosis with neurogenic claudication     JANN on CPAP     Primary osteoarthritis involving multiple joints     Hx of fusion of cervical spine     Chronic use of opiate drugs therapeutic purposes     Myelomalacia (HCC)     Lumbar facet joint syndrome

## 2020-04-28 NOTE — PROGRESS NOTES
Subjective:       Patient ID: Allison Santoyo is a 70 y.o. female who presents for   Chief Complaint   Patient presents with    Foot Swelling       HPI:  Nursing note reviewed and discussed with patient. Edema  Patient complains of edema in the left ankle and foot. The edema has been moderate. Onset of symptoms was 1 week ago, and patient reports symptoms have been unchanged since that time. The edema is present all day. The patient states the problem is new. The swelling has been aggravated by dependency of involved area. The swelling has been relieved by nothing. Associated factors include: nothing. Cardiac risk factors include advanced age (older than 54 for men, 72 for women), diabetes mellitus, dyslipidemia, hypertension, male gender, microalbuminuria, obesity (BMI >= 30 kg/m2), sedentary lifestyle and smoking/ tobacco exposure. Denies chest pain, palpitations, dyspnea on exertion, orthopnea. No injury that she can recall. She was eating tomatoes with salt daily prior to this starting. Some dizziness only when she is rolling over in bed. Patient's medications, allergies, past medical, surgical, social and family histories were reviewed and updated as appropriate. Social History     Tobacco Use    Smoking status: Former Smoker     Packs/day: 0.50     Years: 15.00     Pack years: 7.50     Types: Cigarettes     Last attempt to quit: 10/5/2001     Years since quittin.5    Smokeless tobacco: Never Used   Substance Use Topics    Alcohol use: Yes     Alcohol/week: 0.0 standard drinks     Comment: on occasion        Review of Systems  Energy level good overall, and weight is stable. No chest pain or shortness of breath.     Bowels have been normal without constipation or diarrhea         Objective:        Physical Exam:  /70 (Site: Left Upper Arm, Position: Sitting, Cuff Size: Large Adult)   Pulse 76   Temp 98 °F (36.7 °C) (Oral)   Wt 160 lb (72.6 kg)   SpO2 98%   BMI 29.26 kg/m²     General: Alert and oriented, in no distress. Patient ambulating with normal gait. Normal body habitus. Chest: clear with no wheezes or rales. No retractions, or use of accessory muscles noted. Cardiovascular: PMI is not displaced, and no thrill noted. Regular rate and rhythm with no rub, murmur or gallop. There is 2+ pitting peripheral edema of bilateral feet and ankles. Pedal pulses are normal.   Abdomen: Abdomen is soft and nontender. The bowel sounds are normal.   Musculoskeletal: There are no deformities of the the extremities. Patient has all ten fingers intact. The patient has full range of motion on all 4 extremities without pain. Skin: The skin is warm and dry. There are no rashes noted. Prior to Visit Medications    Medication Sig Taking? Authorizing Provider   zolpidem (AMBIEN) 10 MG tablet take 0.5 tablet by mouth nightly if needed for sleep Yes Connie Lancaster MD   amitriptyline (ELAVIL) 25 MG tablet take 2 tablets by mouth at bedtime Yes Connie Lancaster MD   HYDROcodone-acetaminophen (NORCO) 5-325 MG per tablet Take 1 tablet by mouth 2 times daily as needed for Pain for up to 30 days.  Yes YOLANDA Baez CNP   tiZANidine (ZANAFLEX) 2 MG tablet take 2 tablets by mouth at bedtime AND ONCE if needed DURING THE DAY Yes YOLANDA Bangura CNP   amLODIPine (NORVASC) 5 MG tablet Take 1 tablet by mouth daily Yes Erum Contreras MD   atorvastatin (LIPITOR) 40 MG tablet Take 1 tablet by mouth daily Yes Connie Lancaster MD   glimepiride (AMARYL) 2 MG tablet take 1 tablet once daily WIT BREAKFAST Yes Erum Contreras MD   metFORMIN (GLUCOPHAGE) 1000 MG tablet take 1 tablet by mouth twice a day with meals Yes Connie Lancaster MD   allopurinol (ZYLOPRIM) 100 MG tablet take 1 tablet by mouth once daily Yes Erum Contreras MD   pantoprazole (PROTONIX) 40 MG tablet take 1 tablet by mouth every morning BEFORE BREAKFAST Yes Erum Contreras MD   cloNIDine (CATAPRES) 0.2 MG tablet Take 0.5 tablets by mouth 2 times daily Yes Connie Silva MD   gabapentin (NEURONTIN) 600 MG tablet Take 1 tablet by mouth 3 times daily for 190 days. Yes YOLANDA Davila CNP   loratadine (CLARITIN) 10 MG tablet take 1 tablet by mouth once daily Yes John Solares MD   blood glucose test strips (ONE TOUCH ULTRA TEST) strip TEST once daily Yes Connie Silva MD   fluticasone (FLONASE) 50 MCG/ACT nasal spray instill 1 spray into each nostril once daily Yes YOLANDA Herbert CNP   aspirin 325 MG tablet Take 325 mg by mouth daily Yes Radha Liu MD   Blood Glucose Monitoring Suppl KIT 1 blood glucose monitoring supplies kit. Test strips, lancets, alcohol swabs Yes Erin Dsouza MD   carvedilol (COREG) 3.125 MG tablet take 1 tablet by mouth twice a day  Patient not taking: Reported on 4/28/2020  Connie Silva MD   polyethylene glycol (GLYCOLAX) powder take 17GM (DISSOLVED IN WATER) by mouth once daily  Patient not taking: Reported on 4/28/2020  Connie Silva MD   ketoconazole (NIZORAL) 2 % cream Apply topically daily. Patient not taking: Reported on 4/28/2020  Connie Silva MD       Data Review  BMP:   Lab Results   Component Value Date    GLUCOSE 98 06/03/2019    GLUCOSE 111 01/19/2012    CO2 22 06/03/2019    BUN 13 06/03/2019    CREATININE 0.59 06/03/2019    CALCIUM 9.7 06/03/2019         Assessment/Plan:      1. Peripheral edema  - furosemide (LASIX) 20 MG tablet; Take 1 tablet by mouth daily for 3 days  Dispense: 3 tablet; Refill: 0    2. Essential hypertension  - cloNIDine (CATAPRES) 0.2 MG tablet; Take 0.5 tablets by mouth 2 times daily  Dispense: 90 tablet; Refill: 1    3. Acute non-seasonal allergic rhinitis  - loratadine (CLARITIN) 10 MG tablet; take 1 tablet by mouth once daily  Dispense: 90 tablet; Refill: 1    4.  Gastroesophageal reflux disease, esophagitis presence not specified  - pantoprazole (PROTONIX) 40 MG tablet; take 1 tablet by mouth every morning BEFORE BREAKFAST  Dispense: 90 tablet;  Refill: 1          Electronically signed by Hawa Rose MD on 4/28/2020 at 12:05 PM

## 2020-04-29 RX ORDER — FUROSEMIDE 20 MG/1
TABLET ORAL
Qty: 3 TABLET | Refills: 0 | OUTPATIENT
Start: 2020-04-29

## 2020-04-29 RX ORDER — TIZANIDINE 2 MG/1
TABLET ORAL
Qty: 90 TABLET | Refills: 0 | Status: SHIPPED | OUTPATIENT
Start: 2020-04-29 | End: 2020-07-09 | Stop reason: SDUPTHER

## 2020-05-11 ENCOUNTER — VIRTUAL VISIT (OUTPATIENT)
Dept: PAIN MANAGEMENT | Age: 80
End: 2020-05-11
Payer: MEDICARE

## 2020-05-11 PROCEDURE — 99442 PR PHYS/QHP TELEPHONE EVALUATION 11-20 MIN: CPT | Performed by: ANESTHESIOLOGY

## 2020-05-11 RX ORDER — HYDROCODONE BITARTRATE AND ACETAMINOPHEN 5; 325 MG/1; MG/1
1 TABLET ORAL 2 TIMES DAILY PRN
Qty: 60 TABLET | Refills: 0 | Status: SHIPPED | OUTPATIENT
Start: 2020-05-11 | End: 2020-05-28

## 2020-05-15 ENCOUNTER — TELEPHONE (OUTPATIENT)
Dept: PAIN MANAGEMENT | Age: 80
End: 2020-05-15

## 2020-05-28 ENCOUNTER — TELEPHONE (OUTPATIENT)
Dept: PAIN MANAGEMENT | Age: 80
End: 2020-05-28

## 2020-05-28 RX ORDER — HYDROCODONE BITARTRATE AND ACETAMINOPHEN 5; 325 MG/1; MG/1
1 TABLET ORAL 2 TIMES DAILY PRN
Qty: 60 TABLET | Refills: 0 | Status: SHIPPED | OUTPATIENT
Start: 2020-05-28 | End: 2020-08-12 | Stop reason: SDUPTHER

## 2020-05-28 NOTE — TELEPHONE ENCOUNTER
Pt states she only received a 7 day supply of Norco instead of 30 day supply. Writer called [employer] [facility] and spoke to Sarthak landin who confirmed pt insurance would only allow her to fill a 7 day supply and pilled this on 5/11/20. Per Pharmacy, pt needs new Rx sent in to receive remaining of prescription and PA will be completed.

## 2020-05-29 ENCOUNTER — HOSPITAL ENCOUNTER (OUTPATIENT)
Dept: PREADMISSION TESTING | Age: 80
Setting detail: SPECIMEN
Discharge: HOME | End: 2020-05-29
Payer: MEDICARE

## 2020-06-05 ENCOUNTER — NURSE TRIAGE (OUTPATIENT)
Dept: OTHER | Facility: CLINIC | Age: 80
End: 2020-06-05

## 2020-06-05 NOTE — TELEPHONE ENCOUNTER
Reason for Disposition   Swollen ankle joint  (Exception: area of localized swelling which is itchy)    Answer Assessment - Initial Assessment Questions  1. LOCATION: \"Which joint is swollen? \"      Both feet, left worse  2. ONSET: \"When did the swelling start? \"      This morning, has had before  3. SIZE: \"How large is the swelling? \"      Mild pitting per pt  4. PAIN: \"Is there any pain? \" If so, ask: \"How bad is it? \" (Scale 1-10; or mild, moderate, severe)      no  5. CAUSE: \"What do you think caused the swollen joint? \"      unsure  6. OTHER SYMPTOMS: \"Do you have any other symptoms? \" (e.g., fever, chest pain, difficulty breathing, calf pain)      no  7. PREGNANCY: \"Is there any chance you are pregnant? \" \"When was your last menstrual period? \"      n/a    Protocols used: ANKLE SWELLING-ADULT-

## 2020-06-08 ENCOUNTER — OFFICE VISIT (OUTPATIENT)
Dept: FAMILY MEDICINE CLINIC | Age: 80
End: 2020-06-08
Payer: MEDICARE

## 2020-06-08 VITALS
SYSTOLIC BLOOD PRESSURE: 120 MMHG | WEIGHT: 154 LBS | TEMPERATURE: 97.5 F | BODY MASS INDEX: 28.16 KG/M2 | OXYGEN SATURATION: 98 % | HEART RATE: 66 BPM | DIASTOLIC BLOOD PRESSURE: 60 MMHG

## 2020-06-08 PROCEDURE — 1036F TOBACCO NON-USER: CPT | Performed by: NURSE PRACTITIONER

## 2020-06-08 PROCEDURE — 4040F PNEUMOC VAC/ADMIN/RCVD: CPT | Performed by: NURSE PRACTITIONER

## 2020-06-08 PROCEDURE — G8399 PT W/DXA RESULTS DOCUMENT: HCPCS | Performed by: NURSE PRACTITIONER

## 2020-06-08 PROCEDURE — 1123F ACP DISCUSS/DSCN MKR DOCD: CPT | Performed by: NURSE PRACTITIONER

## 2020-06-08 PROCEDURE — G8417 CALC BMI ABV UP PARAM F/U: HCPCS | Performed by: NURSE PRACTITIONER

## 2020-06-08 PROCEDURE — G8427 DOCREV CUR MEDS BY ELIG CLIN: HCPCS | Performed by: NURSE PRACTITIONER

## 2020-06-08 PROCEDURE — 1090F PRES/ABSN URINE INCON ASSESS: CPT | Performed by: NURSE PRACTITIONER

## 2020-06-08 PROCEDURE — 99214 OFFICE O/P EST MOD 30 MIN: CPT | Performed by: NURSE PRACTITIONER

## 2020-06-08 RX ORDER — FUROSEMIDE 40 MG/1
40 TABLET ORAL DAILY
Qty: 5 TABLET | Refills: 0 | Status: SHIPPED | OUTPATIENT
Start: 2020-06-08 | End: 2020-06-22 | Stop reason: SDUPTHER

## 2020-06-08 ASSESSMENT — ENCOUNTER SYMPTOMS
PHOTOPHOBIA: 0
TROUBLE SWALLOWING: 0

## 2020-06-08 NOTE — PROGRESS NOTES
[address] [place]  [phone]    Cristal Lewis is a 68 y.o. female who presents today for her  medical conditions/complaints as noted below. Cristal Lewis is c/o of Foot Swelling  . HPI:     Neck Pain    This is a chronic problem. The current episode started more than 1 month ago. The problem has been unchanged. The pain is associated with nothing. The pain is present in the left side. The symptoms are aggravated by position and twisting. Pertinent negatives include no chest pain, fever, headaches, photophobia, syncope, trouble swallowing or weakness. She has tried nothing for the symptoms. Edema  Patient complains of edema in the left and right ankle and foot. L>R. The edema has been moderate. Onset of symptoms was 6 week ago, and patient reports symptoms have been unchanged since that time. The edema is better int he mornings and worsens during the day. The swelling has been aggravated by dependency of involved area. The swelling has been relieved by elevation. Associated factors include: nothing. Cardiac risk factors include advanced age (older than 54 for men, 72 for women), diabetes mellitus, dyslipidemia, hypertension, male gender, microalbuminuria, obesity (BMI >= 30 kg/m2), sedentary lifestyle and smoking/ tobacco exposure. She does not feel the water pill helped in April. She does wear knee highs (nylon) but leaves them bunched at the ankle. She does nt get too much activity, watches tv a lot during the day. Wt Readings from Last 3 Encounters:   06/08/20 154 lb (69.9 kg)   04/28/20 160 lb (72.6 kg)   02/25/20 160 lb (72.6 kg)         Nursing note reviewed and discussed with patient. Patient's medications, allergies, past medical, surgical, social and family histories were reviewed and updated asappropriate.     Current Outpatient Medications   Medication Sig Dispense Refill    furosemide (LASIX) 40 MG tablet Take 1 ANES/STEROID FORAMEN LUMBAR/SACRAL W IMG GUIDE ,1 LEVEL Bilateral 2018    BILATERAL L4 VANITA performed by Guy Mccabe MD at [address] DX/THER SBST INTRLMNR CRV/THRC W/IMG GDN N/A 10/1/2018    EPIDURAL STEROID INJECTION CERVICAL C7-T1 performed by Guy Mccabe MD at [facility] History   Problem Relation Age of Onset    Breast Cancer Sister     Cancer Sister         liver    Diabetes Daughter      Social History     Tobacco Use    Smoking status: Former Smoker     Packs/day: 0.50     Years: 15.00     Pack years: 7.50     Types: Cigarettes     Last attempt to quit: 10/5/2001     Years since quittin.6    Smokeless tobacco: Never Used   Substance Use Topics    Alcohol use: Yes     Alcohol/week: 0.0 standard drinks     Comment: on occasion      No Known Allergies    Subjective:      Review of Systems   Constitutional: Negative for fever. HENT: Negative for trouble swallowing. Eyes: Negative for photophobia. Cardiovascular: Negative for chest pain and syncope. Musculoskeletal: Positive for neck pain. Neurological: Negative for weakness and headaches. Other pertinent ROS in HPI  Objective:     /60 (Site: Left Upper Arm, Position: Sitting, Cuff Size: Large Adult)   Pulse 66   Temp 97.5 °F (36.4 °C)   Wt 154 lb (69.9 kg)   SpO2 98%   BMI 28.16 kg/m²    Physical Exam  Constitutional:       Appearance: She is well-developed. HENT:      Right Ear: External ear normal.      Left Ear: External ear normal.      Nose: Nose normal.   Neck:      Musculoskeletal: Normal range of motion. Pain with movement (with right side flexion) present. No neck rigidity. Cardiovascular:      Rate and Rhythm: Normal rate and regular rhythm. Pulses:           Dorsalis pedis pulses are 2+ on the right side and 2+ on the left side. Posterior tibial pulses are 2+ on the right side and 2+ on the left side.       Heart sounds: Normal heart sounds, S1 normal and S2 normal.

## 2020-06-08 NOTE — PROGRESS NOTES
[address], [address] [place]  [phone]    Deya Moctezuma is a 68 y.o. female who presents today for her  medical conditions/complaints as noted below. Deya Moctezuma is c/o of Foot Swelling  . HPI:     HPI         ***  Nursing note reviewed and discussed with patient. {HPI:31290}    {Common ambulatory SmartLinks:08615::\"Patient's medications, allergies, past medical, surgical, social and family histories were reviewed and updated asappropriate. \"}    Current Outpatient Medications   Medication Sig Dispense Refill    HYDROcodone-acetaminophen (NORCO) 5-325 MG per tablet Take 1 tablet by mouth 2 times daily as needed for Pain for up to 30 days. 60 tablet 0    tiZANidine (ZANAFLEX) 2 MG tablet take 2 tablets by mouth AT BEDTIME AND ONCE DURING THE DAY IF NEEDED 90 tablet 0    zolpidem (AMBIEN) 10 MG tablet take 0.5 tablet by mouth nightly if needed for sleep 15 tablet 0    pantoprazole (PROTONIX) 40 MG tablet take 1 tablet by mouth every morning BEFORE BREAKFAST 90 tablet 1    cloNIDine (CATAPRES) 0.2 MG tablet Take 0.5 tablets by mouth 2 times daily 90 tablet 1    loratadine (CLARITIN) 10 MG tablet take 1 tablet by mouth once daily 90 tablet 1    amitriptyline (ELAVIL) 25 MG tablet take 2 tablets by mouth at bedtime 60 tablet 5    carvedilol (COREG) 3.125 MG tablet take 1 tablet by mouth twice a day 180 tablet 1    amLODIPine (NORVASC) 5 MG tablet Take 1 tablet by mouth daily 90 tablet 1    atorvastatin (LIPITOR) 40 MG tablet Take 1 tablet by mouth daily 90 tablet 1    glimepiride (AMARYL) 2 MG tablet take 1 tablet once daily WIT BREAKFAST 90 tablet 1    metFORMIN (GLUCOPHAGE) 1000 MG tablet take 1 tablet by mouth twice a day with meals 180 tablet 1    allopurinol (ZYLOPRIM) 100 MG tablet take 1 tablet by mouth once daily 90 tablet 1    gabapentin (NEURONTIN) 600 MG tablet Take 1 tablet by mouth 3 times daily for 190 days.  90 tablet 5   

## 2020-06-09 RX ORDER — FUROSEMIDE 40 MG/1
TABLET ORAL
Qty: 5 TABLET | Refills: 0 | OUTPATIENT
Start: 2020-06-09

## 2020-06-11 ENCOUNTER — HOSPITAL ENCOUNTER (OUTPATIENT)
Dept: PREADMISSION TESTING | Age: 80
Setting detail: SPECIMEN
Discharge: HOME | End: 2020-06-15
Payer: MEDICARE

## 2020-06-11 PROCEDURE — U0004 COV-19 TEST NON-CDC HGH THRU: HCPCS

## 2020-06-12 LAB
SARS-COV-2, PCR: NOT DETECTED
SARS-COV-2, RAPID: NORMAL
SARS-COV-2: NORMAL
SOURCE: NORMAL

## 2020-06-13 ENCOUNTER — TELEPHONE (OUTPATIENT)
Dept: PRIMARY CARE CLINIC | Age: 80
End: 2020-06-13

## 2020-06-22 ENCOUNTER — TELEPHONE (OUTPATIENT)
Dept: PAIN MANAGEMENT | Age: 80
End: 2020-06-22

## 2020-06-22 ENCOUNTER — TELEPHONE (OUTPATIENT)
Dept: FAMILY MEDICINE CLINIC | Age: 80
End: 2020-06-22

## 2020-06-23 RX ORDER — FUROSEMIDE 40 MG/1
40 TABLET ORAL DAILY
Qty: 5 TABLET | Refills: 0 | Status: SHIPPED | OUTPATIENT
Start: 2020-06-23 | End: 2020-07-17

## 2020-06-24 RX ORDER — FUROSEMIDE 40 MG/1
TABLET ORAL
Qty: 5 TABLET | Refills: 0 | OUTPATIENT
Start: 2020-06-24

## 2020-07-09 RX ORDER — GABAPENTIN 600 MG/1
600 TABLET ORAL 3 TIMES DAILY
Qty: 90 TABLET | Refills: 5 | Status: SHIPPED | OUTPATIENT
Start: 2020-07-09 | End: 2020-08-20 | Stop reason: SDUPTHER

## 2020-07-09 RX ORDER — TIZANIDINE 2 MG/1
TABLET ORAL
Qty: 90 TABLET | Refills: 0 | Status: SHIPPED | OUTPATIENT
Start: 2020-07-09 | End: 2020-09-03 | Stop reason: SDUPTHER

## 2020-07-09 NOTE — TELEPHONE ENCOUNTER
Last visit: 06/08/2020  Last Med refill: UNKNOWN  Does patient have enough medication for 72 hours: UNKNOWN    Next Visit Date:  Future Appointments   Date Time Provider Pradeep Alexander   7/15/2020  9:20 AM Og Vera MD [facility]   7/29/2020 10:45 AM Connie Rose  [address] Maintenance   Topic Date Due    Shingles Vaccine (1 of 2) 05/28/1992    Annual Wellness Visit (AWV)  05/29/2019    Lipid screen  06/03/2020    Potassium monitoring  06/03/2020    Creatinine monitoring  06/03/2020    DTaP/Tdap/Td vaccine (1 - Tdap) 10/18/2026 (Originally 5/28/1959)    Flu vaccine (1) 09/01/2020    DEXA (modify frequency per FRAX score)  Completed    Pneumococcal 65+ years Vaccine  Completed    Hepatitis A vaccine  Aged Out    Hib vaccine  Aged Out    Meningococcal (ACWY) vaccine  Aged Out       Hemoglobin A1C (%)   Date Value   02/25/2020 6.6   10/07/2019 6.5   06/05/2019 6.4             ( goal A1C is < 7)   Microalb/Crt.  Ratio (mcg/mg creat)   Date Value   06/03/2019 CANNOT BE CALCULATED     LDL Cholesterol (mg/dL)   Date Value   06/03/2019 102   02/08/2018 69       (goal LDL is <100)   AST (U/L)   Date Value   06/03/2019 18     ALT (U/L)   Date Value   06/03/2019 14     BUN (mg/dL)   Date Value   06/03/2019 13     BP Readings from Last 3 Encounters:   06/08/20 120/60   04/28/20 110/70   02/25/20 123/82          (goal 120/80)    All Future Testing planned in [facility]  Lab Frequency Next Occurrence   XR Lumbar Spine Flex and Ext Only Once 08/13/2020   XR SPINE ENTIRE (2-3 VWS) Once 08/28/2020   XR Lumbar Spine Ap Lateral Flexion and Extension and Oblique Once 01/16/2020   XR HIP BILATERAL W AP PELVIS (2 VIEWS) Once 02/17/2020   XR LUMBAR SPINE (MIN 4 VIEWS) Once 02/17/2020   UT NJX DX/THER SBST INTRLMNR CRV/THRC W/IMG GDN Once 05/12/2020               Patient Active Problem List:     Osteoarthritis     Essential hypertension     Type 2 diabetes mellitus with diabetic neuropathy, without long-term current use of insulin (HCC)     Pure hypercholesterolemia     Constipation     Rectal pain     Diverticulosis of colon     Tubular adenoma of colon     History of colon polyps     Rectal pressure     Failed neck syndrome     Cervical stenosis of spine     Cervical radicular pain     Lumbar stenosis with neurogenic claudication     JANN on CPAP     Primary osteoarthritis involving multiple joints     Hx of fusion of cervical spine     Chronic use of opiate drugs therapeutic purposes     Myelomalacia (HCC)     Lumbar facet joint syndrome

## 2020-07-17 ENCOUNTER — OFFICE VISIT (OUTPATIENT)
Dept: FAMILY MEDICINE CLINIC | Age: 80
End: 2020-07-17
Payer: MEDICARE

## 2020-07-17 ENCOUNTER — HOSPITAL ENCOUNTER (EMERGENCY)
Age: 80
Discharge: HOME | End: 2020-07-17
Attending: EMERGENCY MEDICINE
Payer: MEDICARE

## 2020-07-17 ENCOUNTER — APPOINTMENT (OUTPATIENT)
Dept: GENERAL RADIOLOGY | Age: 80
End: 2020-07-17
Payer: MEDICARE

## 2020-07-17 VITALS — HEART RATE: 71 BPM | TEMPERATURE: 98 F | OXYGEN SATURATION: 96 % | BODY MASS INDEX: 27.25 KG/M2 | WEIGHT: 149 LBS

## 2020-07-17 VITALS
TEMPERATURE: 98.1 F | HEART RATE: 64 BPM | BODY MASS INDEX: 27.42 KG/M2 | DIASTOLIC BLOOD PRESSURE: 51 MMHG | SYSTOLIC BLOOD PRESSURE: 104 MMHG | HEIGHT: 62 IN | WEIGHT: 149 LBS | RESPIRATION RATE: 16 BRPM | OXYGEN SATURATION: 100 %

## 2020-07-17 LAB
ABSOLUTE EOS #: 0.27 K/UL (ref 0–0.44)
ABSOLUTE IMMATURE GRANULOCYTE: 0.02 K/UL (ref 0–0.3)
ABSOLUTE LYMPH #: 2.91 K/UL (ref 1.1–3.7)
ABSOLUTE MONO #: 0.55 K/UL (ref 0.1–1.2)
ANION GAP SERPL CALCULATED.3IONS-SCNC: 13 MMOL/L (ref 9–17)
BASOPHILS # BLD: 1 % (ref 0–2)
BASOPHILS ABSOLUTE: 0.05 K/UL (ref 0–0.2)
BNP INTERPRETATION: NORMAL
BUN BLDV-MCNC: 15 MG/DL (ref 8–23)
BUN/CREAT BLD: 15 (ref 9–20)
CALCIUM SERPL-MCNC: 8.6 MG/DL (ref 8.6–10.4)
CHLORIDE BLD-SCNC: 103 MMOL/L (ref 98–107)
CO2: 24 MMOL/L (ref 20–31)
CREAT SERPL-MCNC: 0.98 MG/DL (ref 0.5–0.9)
DIFFERENTIAL TYPE: ABNORMAL
EOSINOPHILS RELATIVE PERCENT: 4 % (ref 1–4)
GFR AFRICAN AMERICAN: >60 ML/MIN
GFR NON-AFRICAN AMERICAN: 55 ML/MIN
GFR SERPL CREATININE-BSD FRML MDRD: ABNORMAL ML/MIN/{1.73_M2}
GFR SERPL CREATININE-BSD FRML MDRD: ABNORMAL ML/MIN/{1.73_M2}
GLUCOSE BLD-MCNC: 82 MG/DL (ref 70–99)
HCT VFR BLD CALC: 32.7 % (ref 36.3–47.1)
HEMOGLOBIN: 10 G/DL (ref 11.9–15.1)
IMMATURE GRANULOCYTES: 0 %
LYMPHOCYTES # BLD: 48 % (ref 24–43)
MCH RBC QN AUTO: 25.5 PG (ref 25.2–33.5)
MCHC RBC AUTO-ENTMCNC: 30.6 G/DL (ref 28.4–34.8)
MCV RBC AUTO: 83.4 FL (ref 82.6–102.9)
MONOCYTES # BLD: 9 % (ref 3–12)
NRBC AUTOMATED: 0 PER 100 WBC
PDW BLD-RTO: 17 % (ref 11.8–14.4)
PLATELET # BLD: 223 K/UL (ref 138–453)
PLATELET ESTIMATE: ABNORMAL
PMV BLD AUTO: 10 FL (ref 8.1–13.5)
POTASSIUM SERPL-SCNC: 4.1 MMOL/L (ref 3.7–5.3)
PRO-BNP: 194 PG/ML
RBC # BLD: 3.92 M/UL (ref 3.95–5.11)
RBC # BLD: ABNORMAL 10*6/UL
SEG NEUTROPHILS: 38 % (ref 36–65)
SEGMENTED NEUTROPHILS ABSOLUTE COUNT: 2.36 K/UL (ref 1.5–8.1)
SODIUM BLD-SCNC: 140 MMOL/L (ref 135–144)
THYROXINE, FREE: 1.01 NG/DL (ref 0.93–1.7)
TSH SERPL DL<=0.05 MIU/L-ACNC: 2.79 MIU/L (ref 0.3–5)
WBC # BLD: 6.2 K/UL (ref 3.5–11.3)
WBC # BLD: ABNORMAL 10*3/UL

## 2020-07-17 PROCEDURE — 84443 ASSAY THYROID STIM HORMONE: CPT

## 2020-07-17 PROCEDURE — 80048 BASIC METABOLIC PNL TOTAL CA: CPT

## 2020-07-17 PROCEDURE — 99214 OFFICE O/P EST MOD 30 MIN: CPT | Performed by: NURSE PRACTITIONER

## 2020-07-17 PROCEDURE — 99284 EMERGENCY DEPT VISIT MOD MDM: CPT

## 2020-07-17 PROCEDURE — 84439 ASSAY OF FREE THYROXINE: CPT

## 2020-07-17 PROCEDURE — 71045 X-RAY EXAM CHEST 1 VIEW: CPT

## 2020-07-17 PROCEDURE — 85025 COMPLETE CBC W/AUTO DIFF WBC: CPT

## 2020-07-17 PROCEDURE — 83880 ASSAY OF NATRIURETIC PEPTIDE: CPT

## 2020-07-17 RX ORDER — FUROSEMIDE 20 MG/1
20 TABLET ORAL DAILY
Qty: 30 TABLET | Refills: 2 | Status: SHIPPED | OUTPATIENT
Start: 2020-07-17 | End: 2020-09-30 | Stop reason: SDUPTHER

## 2020-07-17 ASSESSMENT — ENCOUNTER SYMPTOMS
FACIAL SWELLING: 0
SHORTNESS OF BREATH: 0
BACK PAIN: 0
CHEST TIGHTNESS: 0
EYE PAIN: 0
COUGH: 0
ABDOMINAL DISTENTION: 0
ABDOMINAL PAIN: 0
EYE DISCHARGE: 0
SHORTNESS OF BREATH: 0

## 2020-07-17 NOTE — PROGRESS NOTES
Patient is present complaining of b/l leg swelling  States she feels the swelling has gotten worse she the last visit  States the lasix helped, but states the swelling came back  States she does not use compression stockings

## 2020-07-17 NOTE — PROGRESS NOTES
Bilateral 2019    EPIDURAL STEROID INJECTION BILATERAL S1 (Bilateral )    PAIN MANAGEMENT PROCEDURE Bilateral 2020    LUMBAR FACET L2-L5 performed by Lester Orellana MD at [address] ANES/STEROID FORAMEN LUMBAR/SACRAL W IMG GUIDE ,1 LEVEL Bilateral 2018    BILATERAL L4 VANITA performed by Lester Orellana MD at [address] DX/THER SBST INTRLMNR CRV/THRC W/IMG GDN N/A 10/1/2018    EPIDURAL STEROID INJECTION CERVICAL C7-T1 performed by Lester Orellana MD at [facility] History   Problem Relation Age of Onset    Breast Cancer Sister     Cancer Sister         liver    Diabetes Daughter      Social History     Tobacco Use    Smoking status: Former Smoker     Packs/day: 0.50     Years: 15.00     Pack years: 7.50     Types: Cigarettes     Last attempt to quit: 10/5/2001     Years since quittin.8    Smokeless tobacco: Never Used   Substance Use Topics    Alcohol use: Yes     Alcohol/week: 0.0 standard drinks     Comment: on occasion      No Known Allergies    Subjective:      Review of Systems   Constitutional: Negative for chills and fever. Respiratory: Negative for cough and shortness of breath. Cardiovascular: Negative for chest pain, palpitations and leg swelling. Neurological: Positive for weakness. Other pertinent ROS in HPI  Objective:     Pulse 71   Temp 98 °F (36.7 °C)   Wt 149 lb (67.6 kg)   SpO2 96%   BMI 27.25 kg/m²    Physical Exam  Constitutional:       Appearance: She is well-developed. HENT:      Right Ear: External ear normal.      Left Ear: External ear normal.      Nose: Nose normal.   Neck:      Musculoskeletal: Full passive range of motion without pain and normal range of motion. Cardiovascular:      Rate and Rhythm: Normal rate and regular rhythm. Heart sounds: Normal heart sounds, S1 normal and S2 normal.   Pulmonary:      Effort: Pulmonary effort is normal. No respiratory distress. Breath sounds: Normal breath sounds. Musculoskeletal: Normal range of motion. General: No deformity. Skin:     General: Skin is warm and dry. Neurological:      Mental Status: She is alert and oriented to person, place, and time. Motor: Motor function is intact. Assessment/PLAN   1. Hypotension, unspecified hypotension type  *unable t get pt bp despite multiple attempts- pt state she feels weak, dtr will take to er for eval.     2. Peripheral edema  Do not start until bp is evaluated. - furosemide (LASIX) 20 MG tablet; Take 1 tablet by mouth daily  Dispense: 30 tablet; Refill: 2      RTO if symptoms worsen or fail to improve  Pt agreeable with plan      Patient given educational materials - see patientinstructions. Discussed use, benefit, and side effects of prescribed medications. All patient questions answered. Pt voiced understanding. Reviewed health maintenance. Instructed to continue current medications, diet andexercise. 1.  Rubia Guerrero received counseling on the following healthy behaviors: nutrition, exercise and medication adherence  2. Patient given educationalmaterials when available - see patient instructions when applicable  3. Discussed use, benefit, and side effects of prescribed medications. Barriersto medication compliance addressed. All patient questions answered. Pt voiced understanding. 4.  Reviewed prior labs and health maintenance when applicable. 5.  Continue current medications, diet and exercise. CompletedRefills   Requested Prescriptions     Signed Prescriptions Disp Refills    furosemide (LASIX) 20 MG tablet 30 tablet 2     Sig: Take 1 tablet by mouth daily       This note was transcribed using dictation with Dragon services. Efforts were made to correct any errors but some words may be misinterpreted.     Electronically signed by Ayden Lim CNP on 7/29/2020 at 10:42 AM

## 2020-07-17 NOTE — ED PROVIDER NOTES
EMERGENCY DEPARTMENT ENCOUNTER    Pt Name: Moe Valadez  MRN: [id number]  [place] 1940  Date of evaluation: 7/17/20  CHIEF COMPLAINT       Chief Complaint   Patient presents with    Hypotension     HISTORY OF PRESENT ILLNESS   HPI   The patient is a 80-year-old female who presented to the emergency department on advice of her primary care doctor secondary to hypotension. Patient was a routine visit however they were not able to get a blood pressure manually or with a blood pressure machine and was subsequent advised to present to the emerge part for further evaluation treatment. Patient denied a previous history of tension she states she has a history of hypertension has been current with her medications. No previous history of thyroid disorder. Patient denied fevers or chills. She denied weakness, chest pain or shortness of breath. REVIEW OF SYSTEMS     Review of Systems   Constitutional: Negative for chills, diaphoresis and fever. HENT: Negative for congestion, ear pain and facial swelling. Eyes: Negative for pain, discharge and visual disturbance. Respiratory: Negative for chest tightness and shortness of breath. Cardiovascular: Negative for chest pain and palpitations. Gastrointestinal: Negative for abdominal distention and abdominal pain. Genitourinary: Negative for difficulty urinating and flank pain. Musculoskeletal: Negative for back pain. Skin: Negative for wound. Neurological: Negative for dizziness, light-headedness and headaches.      PASTMEDICAL HISTORY     Past Medical History:   Diagnosis Date    Arthritis     Cataract     Cataracts, bilateral     Diverticulosis of colon 2015    Headache(784.0)     History of colon polyps 2015    Insomnia     Neck pain     Osteoarthrosis, unspecified whether generalized or localized, unspecified site 10/5/2012    Pure hypercholesterolemia 10/5/2012    Shoulder blade pain     right  side    Stroke (HCC)     Tubular adenoma of colon 2015    Type II or unspecified type diabetes mellitus without mention of complication, not stated as uncontrolled 10/5/2012    Unspecified essential hypertension 10/5/2012    Unspecified sleep apnea      SURGICAL HISTORY       Past Surgical History:   Procedure Laterality Date    COLONOSCOPY  8/23/06    Dr Ailne López  5 12 15    extensive diverticulosis, tubular adenoma    EPIDURAL STEROID INJECTION N/A 8/25/2017    EPIDURAL STEROID INJECTION cervical performed by Abebe Goodrich MD at [address] 11/20/2017    EPIDURAL STEROID INJECTION L5S1 performed by Abebe Goodrich MD at [address] Bilateral 5/20/2019    EPIDURAL STEROID INJECTION BILATERAL S1 performed by Abebe Goodrich MD at [address]  10/1/06    NECK SURGERY  5/2011 & 2001    NERVE BLOCK Bilateral 02/17/2020    Lumbar Facet L2-L5    NERVE SURGERY Bilateral 9/5/2019    BILATERAL LUMBAR FACET STEROID INJECTION L2-L5 performed by Abebe Goodrich MD at [address]  08/25/2017    L5-S1 steroid injection    OTHER SURGICAL HISTORY  11/20/2017    VANITA L5-S1    OTHER SURGICAL HISTORY  10/01/2018    cervical steroid injection    OTHER SURGICAL HISTORY Bilateral 05/20/2019    EPIDURAL STEROID INJECTION BILATERAL S1 (Bilateral )    PAIN MANAGEMENT PROCEDURE Bilateral 2/17/2020    LUMBAR FACET L2-L5 performed by Abebe Goodrich MD at [address] ANES/STEROID [address] [facility] ,1 LEVEL Bilateral 11/19/2018    BILATERAL L4 VANITA performed by Abebe Goodrich MD at [address] DX/THER SBST INTRLMNR CRV/THRC W/IMG GDN N/A 10/1/2018    EPIDURAL STEROID INJECTION CERVICAL C7-T1 performed by Abebe [facility]rich MD at [facility]       Previous Medications    ALLOPURINOL (ZYLOPRIM) 100 MG TABLET    take 1 tablet by mouth once daily    AMITRIPTYLINE (ELAVIL) 25 MG TABLET take 2 tablets by mouth at bedtime    AMLODIPINE (NORVASC) 5 MG TABLET    Take 1 tablet by mouth daily    ASPIRIN 325 MG TABLET    Take 325 mg by mouth daily    ATORVASTATIN (LIPITOR) 40 MG TABLET    Take 1 tablet by mouth daily    BLOOD GLUCOSE MONITORING SUPPL KIT    1 blood glucose monitoring supplies kit. Test strips, lancets, alcohol swabs    BLOOD GLUCOSE TEST STRIPS (ONE TOUCH ULTRA TEST) STRIP    TEST once daily    CARVEDILOL (COREG) 3.125 MG TABLET    take 1 tablet by mouth twice a day    CLONIDINE (CATAPRES) 0.2 MG TABLET    Take 0.5 tablets by mouth 2 times daily    FLUTICASONE (FLONASE) 50 MCG/ACT NASAL SPRAY    instill 1 spray into each nostril once daily    FUROSEMIDE (LASIX) 20 MG TABLET    Take 1 tablet by mouth daily    GABAPENTIN (NEURONTIN) 600 MG TABLET    Take 1 tablet by mouth 3 times daily for 190 days. GLIMEPIRIDE (AMARYL) 2 MG TABLET    take 1 tablet once daily WIT BREAKFAST    HYDROCODONE-ACETAMINOPHEN (NORCO) 5-325 MG PER TABLET    Take 1 tablet by mouth 2 times daily as needed for Pain for up to 30 days. KETOCONAZOLE (NIZORAL) 2 % CREAM    Apply topically daily. LORATADINE (CLARITIN) 10 MG TABLET    take 1 tablet by mouth once daily    METFORMIN (GLUCOPHAGE) 1000 MG TABLET    take 1 tablet by mouth twice a day with meals    PANTOPRAZOLE (PROTONIX) 40 MG TABLET    take 1 tablet by mouth every morning BEFORE BREAKFAST    POLYETHYLENE GLYCOL (GLYCOLAX) POWDER    take 17GM (DISSOLVED IN WATER) by mouth once daily    TIZANIDINE (ZANAFLEX) 2 MG TABLET    take 2 tablets by mouth AT BEDTIME AND ONCE DURING THE DAY IF NEEDED     ALLERGIES     has No Known Allergies. FAMILY HISTORY     She indicated that her mother is . She indicated that her father is . She indicated that only one of her two sisters is alive. She indicated that her brother is . She indicated that the status of her daughter is unknown.      SOCIAL HISTORY       Social History     Tobacco Use    Smoking status: Former Smoker     Packs/day: 0.50     Years: 15.00     Pack years: 7.50     Types: Cigarettes     Last attempt to quit: 10/5/2001     Years since quittin.7    Smokeless tobacco: Never Used   Substance Use Topics    Alcohol use: Yes     Alcohol/week: 0.0 standard drinks     Comment: on occasion    Drug use: No     PHYSICAL EXAM     INITIAL VITALS: BP (!) 104/58   Pulse 77   Temp 98.1 °F (36.7 °C) (Oral)   Resp 16   Ht 5' 2\" (1.575 m)   Wt 149 lb (67.6 kg)   SpO2 100%   BMI 27.25 kg/m²    Physical Exam  Vitals signs and nursing note reviewed. Constitutional:       General: She is not in acute distress. Appearance: She is well-developed. She is not diaphoretic. HENT:      Head: Normocephalic and atraumatic. Eyes:      Pupils: Pupils are equal, round, and reactive to light. Neck:      Musculoskeletal: Normal range of motion and neck supple. Cardiovascular:      Rate and Rhythm: Normal rate and regular rhythm. Pulmonary:      Effort: Pulmonary effort is normal.      Breath sounds: Normal breath sounds. Abdominal:      General: Bowel sounds are normal.      Palpations: Abdomen is soft. Musculoskeletal: Normal range of motion. Skin:     General: Skin is warm. Capillary Refill: Capillary refill takes less than 2 seconds. Neurological:      Mental Status: She is alert and oriented to person, place, and time. MEDICAL DECISION MAKING:   The patient is a 30-year-old female who presented to the emergency department secondary to hypo-tension, blood pressure obtained on arrival 104/58. Chest x-ray labs obtained and patient will be reevaluated. Urinalysis unremarkable. Review of electronic medical record reveals baseline normal blood pressure 542 systolic. Results discussed with patient. Discharged home with continued outpatient follow-up and given parameters to return to the emergency department.          All patient's question's and concerns were answered prior to disposition and patient and/or family expressed understanding and agreement of treatment plan. CRITICAL CARE:              NIH STROKE SCALE:            PROCEDURES:    Procedures    DIAGNOSTIC RESULTS   EKG:All EKG's are interpreted by the Emergency Department Physician who either signs or Co-signs this chart in the absence of a cardiologist.        RADIOLOGY:All plain film, CT, MRI, and formal ultrasound images (except ED bedside ultrasound) are read by the radiologist, see reports below, unless otherwisenoted in MDM or here. XR CHEST PORTABLE   Final Result   Haziness left lower lung presumably soft tissue attenuation but small   effusion and atelectasis is also considered. LABS: All lab results were reviewed by myself, and all abnormals are listed below. Labs Reviewed   CBC WITH AUTO DIFFERENTIAL - Abnormal; Notable for the following components:       Result Value    RBC 3.92 (*)     Hemoglobin 10.0 (*)     Hematocrit 32.7 (*)     RDW 17.0 (*)     Lymphocytes 48 (*)     All other components within normal limits   BASIC METABOLIC PANEL W/ REFLEX TO MG FOR LOW K - Abnormal; Notable for the following components:    CREATININE 0.98 (*)     GFR Non- 55 (*)     All other components within normal limits   BRAIN NATRIURETIC PEPTIDE   TSH WITHOUT REFLEX   T4, FREE       EMERGENCY DEPARTMENTCOURSE:         Vitals:    Vitals:    07/17/20 1324   BP: (!) 104/58   Pulse: 77   Resp: 16   Temp: 98.1 °F (36.7 °C)   TempSrc: Oral   SpO2: 100%   Weight: 149 lb (67.6 kg)   Height: 5' 2\" (1.575 m)       The patient was given the following medications while in the emergency department:  No orders of the defined types were placed in this encounter. CONSULTS:  None    FINAL IMPRESSION      1.  Other specified hypotension          DISPOSITION/PLAN   DISPOSITION        PATIENT REFERRED TO:  Connie Hardin, [id number] [facility] [address] [phone]          DISCHARGE MEDICATIONS:  New Prescriptions    No medications on file     Nu Russo MD  Attending Emergency Physician    This note was created with the assistance of a speech-recognition program. While intending to generate a document that actually reflects the content of the visit, no guarantees can be provided that every mistake has been identified and corrected by editing.                     Nu Russo MD  28/04/04 [id number]

## 2020-07-27 RX ORDER — ZOLPIDEM TARTRATE 10 MG/1
TABLET ORAL
Qty: 15 TABLET | Refills: 1 | Status: SHIPPED | OUTPATIENT
Start: 2020-07-27 | End: 2020-09-22 | Stop reason: SDUPTHER

## 2020-07-27 NOTE — TELEPHONE ENCOUNTER
Last visit: 7/17/20  Last Med refill: 4/28/20  Does patient have enough medication for 72 hours: No:     Next Visit Date:  Future Appointments   Date Time Provider Pradeep Catherine   7/29/2020 10:45 AM Connie Alcaraz MD [facility]   8/12/2020  8:50 AM Heidy Liang MD [facility] Maintenance   Topic Date Due    Shingles Vaccine (1 of 2) 05/28/1992    Annual Wellness Visit (AWV)  05/29/2019    Lipid screen  06/03/2020    DTaP/Tdap/Td vaccine (1 - Tdap) 10/18/2026 (Originally 5/28/1959)    Flu vaccine (1) 09/01/2020    Potassium monitoring  07/17/2021    Creatinine monitoring  07/17/2021    DEXA (modify frequency per FRAX score)  Completed    Pneumococcal 65+ years Vaccine  Completed    Hepatitis A vaccine  Aged Out    Hib vaccine  Aged Out    Meningococcal (ACWY) vaccine  Aged Out       Hemoglobin A1C (%)   Date Value   02/25/2020 6.6   10/07/2019 6.5   06/05/2019 6.4             ( goal A1C is < 7)   Microalb/Crt.  Ratio (mcg/mg creat)   Date Value   06/03/2019 CANNOT BE CALCULATED     LDL Cholesterol (mg/dL)   Date Value   06/03/2019 102   02/08/2018 69       (goal LDL is <100)   AST (U/L)   Date Value   06/03/2019 18     ALT (U/L)   Date Value   06/03/2019 14     BUN (mg/dL)   Date Value   07/17/2020 15     BP Readings from Last 3 Encounters:   07/17/20 (!) 104/51   06/08/20 120/60   04/28/20 110/70          (goal 120/80)    All Future Testing planned in [facility]  Lab Frequency Next Occurrence   XR Lumbar Spine Flex and Ext Only Once 08/13/2020   XR SPINE ENTIRE (2-3 VWS) Once 08/28/2020   XR Lumbar Spine Ap Lateral Flexion and Extension and Oblique Once 01/16/2020   XR HIP BILATERAL W AP PELVIS (2 VIEWS) Once 02/17/2020   XR LUMBAR SPINE (MIN 4 VIEWS) Once 02/17/2020   NC NJX DX/THER SBST INTRLMNR CRV/THRC W/IMG GDN Once 05/12/2020               Patient Active Problem List:     Osteoarthritis     Essential hypertension     Type 2 diabetes mellitus with diabetic

## 2020-08-12 RX ORDER — HYDROCODONE BITARTRATE AND ACETAMINOPHEN 5; 325 MG/1; MG/1
1 TABLET ORAL 2 TIMES DAILY PRN
Qty: 60 TABLET | Refills: 0 | Status: SHIPPED | OUTPATIENT
Start: 2020-08-12 | End: 2020-09-30 | Stop reason: SDUPTHER

## 2020-08-12 NOTE — TELEPHONE ENCOUNTER
Last visit: 07/17/2020  Last Med refill: 05/28/2020  Dr Jennie Xavier  Does patient have enough medication for 72 hours: No:     [address]    Next Visit Date:  Future Appointments   Date Time Provider Pradeep Alexander   8/26/2020 10:15 AM Connie Stevens  [address] Maintenance   Topic Date Due    Shingles Vaccine (1 of 2) 05/28/1992    Annual Wellness Visit (AWV)  05/29/2019    Lipid screen  06/03/2020    DTaP/Tdap/Td vaccine (1 - Tdap) 10/18/2026 (Originally 5/28/1959)    Flu vaccine (1) 09/01/2020    Potassium monitoring  07/17/2021    Creatinine monitoring  07/17/2021    DEXA (modify frequency per FRAX score)  Completed    Pneumococcal 65+ years Vaccine  Completed    Hepatitis A vaccine  Aged Out    Hib vaccine  Aged Out    Meningococcal (ACWY) vaccine  Aged Out       Hemoglobin A1C (%)   Date Value   02/25/2020 6.6   10/07/2019 6.5   06/05/2019 6.4             ( goal A1C is < 7)   Microalb/Crt.  Ratio (mcg/mg creat)   Date Value   06/03/2019 CANNOT BE CALCULATED     LDL Cholesterol (mg/dL)   Date Value   06/03/2019 102   02/08/2018 69       (goal LDL is <100)   AST (U/L)   Date Value   06/03/2019 18     ALT (U/L)   Date Value   06/03/2019 14     BUN (mg/dL)   Date Value   07/17/2020 15     BP Readings from Last 3 Encounters:   07/17/20 (!) 104/51   06/08/20 120/60   04/28/20 110/70          (goal 120/80)    All Future Testing planned in [facility]  Lab Frequency Next Occurrence   XR Lumbar Spine Flex and Ext Only Once 08/13/2020   XR SPINE ENTIRE (2-3 VWS) Once 08/28/2020   XR Lumbar Spine Ap Lateral Flexion and Extension and Oblique Once 01/16/2020   XR HIP BILATERAL W AP PELVIS (2 VIEWS) Once 02/17/2020   XR LUMBAR SPINE (MIN 4 VIEWS) Once 02/17/2020               Patient Active Problem List:     Osteoarthritis     Essential hypertension     Type 2 diabetes mellitus with diabetic neuropathy, without long-term current use of insulin ([address]     Pure hypercholesterolemia     Constipation     Rectal pain     Diverticulosis of colon     Tubular adenoma of colon     History of colon polyps     Rectal pressure     Failed neck syndrome     Cervical stenosis of spine     Cervical radicular pain     Lumbar stenosis with neurogenic claudication     JANN on CPAP     Primary osteoarthritis involving multiple joints     Hx of fusion of cervical spine     Chronic use of opiate drugs therapeutic purposes     Myelomalacia (HCC)     Lumbar facet joint syndrome

## 2020-08-19 ENCOUNTER — TELEPHONE (OUTPATIENT)
Dept: FAMILY MEDICINE CLINIC | Age: 80
End: 2020-08-19

## 2020-08-20 RX ORDER — GABAPENTIN 600 MG/1
600 TABLET ORAL 3 TIMES DAILY
Qty: 90 TABLET | Refills: 5 | Status: SHIPPED | OUTPATIENT
Start: 2020-08-20 | End: 2021-04-13 | Stop reason: SDUPTHER

## 2020-08-20 NOTE — TELEPHONE ENCOUNTER
Patient called stating she has switching pharmacies and is asking if gabapentin can be sent in to [place].

## 2020-09-03 ENCOUNTER — TELEPHONE (OUTPATIENT)
Dept: FAMILY MEDICINE CLINIC | Age: 80
End: 2020-09-03

## 2020-09-03 RX ORDER — TIZANIDINE 2 MG/1
TABLET ORAL
Qty: 90 TABLET | Refills: 0 | Status: SHIPPED | OUTPATIENT
Start: 2020-09-03 | End: 2020-09-30 | Stop reason: SDUPTHER

## 2020-09-03 RX ORDER — ALLOPURINOL 100 MG/1
TABLET ORAL
Qty: 90 TABLET | Refills: 1 | Status: SHIPPED | OUTPATIENT
Start: 2020-09-03 | End: 2020-09-18 | Stop reason: SDUPTHER

## 2020-09-03 NOTE — TELEPHONE ENCOUNTER
pressure     Failed neck syndrome     Cervical stenosis of spine     Cervical radicular pain     Lumbar stenosis with neurogenic claudication     JANN on CPAP     Primary osteoarthritis involving multiple joints     Hx of fusion of cervical spine     Chronic use of opiate drugs therapeutic purposes     Myelomalacia (HCC)     Lumbar facet joint syndrome

## 2020-09-16 ENCOUNTER — TELEPHONE (OUTPATIENT)
Dept: FAMILY MEDICINE CLINIC | Age: 80
End: 2020-09-16

## 2020-09-18 RX ORDER — ALLOPURINOL 100 MG/1
TABLET ORAL
Qty: 90 TABLET | Refills: 1 | Status: SHIPPED | OUTPATIENT
Start: 2020-09-18 | End: 2020-09-21 | Stop reason: SDUPTHER

## 2020-09-21 RX ORDER — CARVEDILOL 3.12 MG/1
TABLET ORAL
Qty: 180 TABLET | Refills: 1 | Status: SHIPPED | OUTPATIENT
Start: 2020-09-21 | End: 2021-04-13 | Stop reason: SDUPTHER

## 2020-09-21 RX ORDER — ALLOPURINOL 100 MG/1
TABLET ORAL
Qty: 90 TABLET | Refills: 1 | Status: SHIPPED | OUTPATIENT
Start: 2020-09-21 | End: 2021-04-13 | Stop reason: SDUPTHER

## 2020-09-21 NOTE — TELEPHONE ENCOUNTER
Patient called stating allopurinol needs to be sent to [address] [place] [address]. Also asking for refill on carvedilol.

## 2020-09-23 RX ORDER — ZOLPIDEM TARTRATE 10 MG/1
TABLET ORAL
Qty: 15 TABLET | Refills: 1 | Status: SHIPPED | OUTPATIENT
Start: 2020-09-25 | End: 2020-12-02 | Stop reason: SDUPTHER

## 2020-09-30 ENCOUNTER — OFFICE VISIT (OUTPATIENT)
Dept: FAMILY MEDICINE CLINIC | Age: 80
End: 2020-09-30

## 2020-09-30 ENCOUNTER — HOSPITAL ENCOUNTER (OUTPATIENT)
Age: 80
Setting detail: SPECIMEN
Discharge: HOME | End: 2020-09-30
Payer: MEDICARE

## 2020-09-30 VITALS
OXYGEN SATURATION: 96 % | TEMPERATURE: 97.1 F | SYSTOLIC BLOOD PRESSURE: 106 MMHG | DIASTOLIC BLOOD PRESSURE: 72 MMHG | HEART RATE: 83 BPM

## 2020-09-30 LAB
ABSOLUTE EOS #: 0.28 K/UL (ref 0–0.44)
ABSOLUTE IMMATURE GRANULOCYTE: <0.03 K/UL (ref 0–0.3)
ABSOLUTE LYMPH #: 3.13 K/UL (ref 1.1–3.7)
ABSOLUTE MONO #: 0.54 K/UL (ref 0.1–1.2)
ALBUMIN SERPL-MCNC: 3.6 G/DL (ref 3.5–5.2)
ALBUMIN/GLOBULIN RATIO: 1.4 (ref 1–2.5)
ALP BLD-CCNC: 73 U/L (ref 35–104)
ALT SERPL-CCNC: 13 U/L (ref 5–33)
ANION GAP SERPL CALCULATED.3IONS-SCNC: 12 MMOL/L (ref 9–17)
AST SERPL-CCNC: 20 U/L
BASOPHILS # BLD: 1 % (ref 0–2)
BASOPHILS ABSOLUTE: 0.04 K/UL (ref 0–0.2)
BILIRUB SERPL-MCNC: 0.35 MG/DL (ref 0.3–1.2)
BUN BLDV-MCNC: 9 MG/DL (ref 8–23)
BUN/CREAT BLD: ABNORMAL (ref 9–20)
CALCIUM SERPL-MCNC: 8.6 MG/DL (ref 8.6–10.4)
CHLORIDE BLD-SCNC: 104 MMOL/L (ref 98–107)
CHOLESTEROL/HDL RATIO: 2.1
CHOLESTEROL: 126 MG/DL
CO2: 24 MMOL/L (ref 20–31)
CREAT SERPL-MCNC: 0.5 MG/DL (ref 0.5–0.9)
DIFFERENTIAL TYPE: ABNORMAL
EOSINOPHILS RELATIVE PERCENT: 4 % (ref 1–4)
FERRITIN: 40 UG/L (ref 13–150)
GFR AFRICAN AMERICAN: >60 ML/MIN
GFR NON-AFRICAN AMERICAN: >60 ML/MIN
GFR SERPL CREATININE-BSD FRML MDRD: ABNORMAL ML/MIN/{1.73_M2}
GFR SERPL CREATININE-BSD FRML MDRD: ABNORMAL ML/MIN/{1.73_M2}
GLUCOSE BLD-MCNC: 92 MG/DL (ref 70–99)
HBA1C MFR BLD: 5.7 %
HCT VFR BLD CALC: 35 % (ref 36.3–47.1)
HDLC SERPL-MCNC: 59 MG/DL
HEMOGLOBIN: 10.7 G/DL (ref 11.9–15.1)
IMMATURE GRANULOCYTES: 0 %
IRON SATURATION: 17 % (ref 20–55)
IRON: 46 UG/DL (ref 37–145)
LDL CHOLESTEROL: 48 MG/DL (ref 0–130)
LYMPHOCYTES # BLD: 49 % (ref 24–43)
MCH RBC QN AUTO: 25.8 PG (ref 25.2–33.5)
MCHC RBC AUTO-ENTMCNC: 30.6 G/DL (ref 28.4–34.8)
MCV RBC AUTO: 84.3 FL (ref 82.6–102.9)
MONOCYTES # BLD: 9 % (ref 3–12)
NRBC AUTOMATED: 0 PER 100 WBC
PDW BLD-RTO: 17.1 % (ref 11.8–14.4)
PLATELET # BLD: 336 K/UL (ref 138–453)
PLATELET ESTIMATE: ABNORMAL
PMV BLD AUTO: 10.6 FL (ref 8.1–13.5)
POTASSIUM SERPL-SCNC: 4.2 MMOL/L (ref 3.7–5.3)
RBC # BLD: 4.15 M/UL (ref 3.95–5.11)
RBC # BLD: ABNORMAL 10*6/UL
SEG NEUTROPHILS: 37 % (ref 36–65)
SEGMENTED NEUTROPHILS ABSOLUTE COUNT: 2.31 K/UL (ref 1.5–8.1)
SODIUM BLD-SCNC: 140 MMOL/L (ref 135–144)
TOTAL IRON BINDING CAPACITY: 277 UG/DL (ref 250–450)
TOTAL PROTEIN: 6.1 G/DL (ref 6.4–8.3)
TRIGL SERPL-MCNC: 93 MG/DL
UNSATURATED IRON BINDING CAPACITY: 231 UG/DL (ref 112–347)
VLDLC SERPL CALC-MCNC: NORMAL MG/DL (ref 1–30)
WBC # BLD: 6.3 K/UL (ref 3.5–11.3)
WBC # BLD: ABNORMAL 10*3/UL

## 2020-09-30 PROCEDURE — G0008 ADMIN INFLUENZA VIRUS VAC: HCPCS | Performed by: INTERNAL MEDICINE

## 2020-09-30 PROCEDURE — 83036 HEMOGLOBIN GLYCOSYLATED A1C: CPT | Performed by: INTERNAL MEDICINE

## 2020-09-30 PROCEDURE — 99214 OFFICE O/P EST MOD 30 MIN: CPT | Performed by: INTERNAL MEDICINE

## 2020-09-30 PROCEDURE — 90694 VACC AIIV4 NO PRSRV 0.5ML IM: CPT | Performed by: INTERNAL MEDICINE

## 2020-09-30 RX ORDER — ATORVASTATIN CALCIUM 40 MG/1
40 TABLET, FILM COATED ORAL DAILY
Qty: 90 TABLET | Refills: 1 | Status: SHIPPED | OUTPATIENT
Start: 2020-09-30 | End: 2021-04-13 | Stop reason: SDUPTHER

## 2020-09-30 RX ORDER — HYDROCODONE BITARTRATE AND ACETAMINOPHEN 5; 325 MG/1; MG/1
1 TABLET ORAL 2 TIMES DAILY PRN
Qty: 60 TABLET | Refills: 0 | Status: SHIPPED | OUTPATIENT
Start: 2020-09-30 | End: 2020-11-13 | Stop reason: SDUPTHER

## 2020-09-30 RX ORDER — AMLODIPINE BESYLATE 5 MG/1
5 TABLET ORAL DAILY
Qty: 90 TABLET | Refills: 1 | Status: CANCELLED | OUTPATIENT
Start: 2020-09-30

## 2020-09-30 RX ORDER — CLONIDINE HYDROCHLORIDE 0.2 MG/1
0.1 TABLET ORAL 2 TIMES DAILY
Qty: 90 TABLET | Refills: 1 | Status: SHIPPED | OUTPATIENT
Start: 2020-09-30 | End: 2021-04-13 | Stop reason: SDUPTHER

## 2020-09-30 RX ORDER — FUROSEMIDE 20 MG/1
20 TABLET ORAL DAILY
Qty: 30 TABLET | Refills: 3 | Status: SHIPPED | OUTPATIENT
Start: 2020-09-30 | End: 2020-12-02 | Stop reason: SDUPTHER

## 2020-09-30 RX ORDER — PANTOPRAZOLE SODIUM 40 MG/1
TABLET, DELAYED RELEASE ORAL
Qty: 90 TABLET | Refills: 1 | Status: SHIPPED | OUTPATIENT
Start: 2020-09-30 | End: 2021-04-13 | Stop reason: SDUPTHER

## 2020-09-30 RX ORDER — LORATADINE 10 MG/1
TABLET ORAL
Qty: 90 TABLET | Refills: 1 | Status: SHIPPED | OUTPATIENT
Start: 2020-09-30 | End: 2021-04-13 | Stop reason: SDUPTHER

## 2020-09-30 RX ORDER — GLIMEPIRIDE 2 MG/1
TABLET ORAL
Qty: 90 TABLET | Refills: 1 | Status: CANCELLED | OUTPATIENT
Start: 2020-09-30

## 2020-09-30 RX ORDER — AMITRIPTYLINE HYDROCHLORIDE 25 MG/1
25 TABLET, FILM COATED ORAL NIGHTLY
Qty: 60 TABLET | Refills: 5 | Status: SHIPPED | OUTPATIENT
Start: 2020-09-30 | End: 2021-04-13 | Stop reason: SDUPTHER

## 2020-09-30 RX ORDER — TIZANIDINE 2 MG/1
TABLET ORAL
Qty: 90 TABLET | Refills: 0 | Status: SHIPPED | OUTPATIENT
Start: 2020-09-30 | End: 2020-12-02 | Stop reason: SDUPTHER

## 2020-09-30 ASSESSMENT — ENCOUNTER SYMPTOMS
VISUAL CHANGE: 0
ORTHOPNEA: 0
BLURRED VISION: 0
SHORTNESS OF BREATH: 1

## 2020-09-30 NOTE — PROGRESS NOTES
Pt is here today for DM follow up. She has been having bilateral leg swelling for months. After obtaining consent, and per orders of Dr. Gokul Urias, injection of Influenza given in Left deltoid by Layla Garcia. Patient instructed to remain in clinic for 20 minutes afterwards, and to report any adverse reaction to me immediately. Vaccine Information Sheet, \"Influenza - Inactivated\"  given to Ladonna Mann, or parent/legal guardian of  Ladonna Mann and verbalized understanding. Patient responses:    Have you ever had a reaction to a flu vaccine? No  Do you have any current illness? No  Have you ever had Guillian Wildwood Syndrome? No  Do you have a serious allergy to any of the following: Neomycin, Polymyxin, Thimerosal, eggs or egg products? No    Flu vaccine given per order. Please see immunization tab. Risks and benefits explained. Current VIS given.

## 2020-09-30 NOTE — PROGRESS NOTES
polyethylene glycol (GLYCOLAX) powder take 17GM (DISSOLVED IN WATER) by mouth once daily Yes Connie Bhatti MD   fluticasone (FLONASE) 50 MCG/ACT nasal spray instill 1 spray into each nostril once daily Yes Ermwilmarda Shock, APRN - CNP   aspirin 325 MG tablet Take 325 mg by mouth daily Yes Historical Provider, MD   Blood Glucose Monitoring Suppl KIT 1 blood glucose monitoring supplies kit. Test strips, lancets, alcohol swabs Yes Irene Hdez MD   furosemide (LASIX) 20 MG tablet Take 1 tablet by mouth daily  Patient not taking: Reported on 9/30/2020  Ermalinda Shock, APRN - CNP     Review of Systems  Review of Systems   Constitutional: Positive for fatigue and malaise/fatigue. Negative for weight loss. Eyes: Negative for blurred vision. Respiratory: Positive for shortness of breath. Cardiovascular: Negative for chest pain, palpitations, orthopnea and PND. Endocrine: Negative for polydipsia, polyphagia and polyuria. Musculoskeletal: Negative for neck pain. Neurological: Positive for weakness and headaches (not new). Objective:       Physical Exam:  /72 (Site: Left Upper Arm, Position: Sitting, Cuff Size: Large Adult)   Pulse 83   Temp 97.1 °F (36.2 °C) (Temporal)   SpO2 96%   Physical Exam  Vitals signs and nursing note reviewed. Constitutional:       General: She is not in acute distress. Appearance: Normal appearance. She is well-developed. She is not ill-appearing. Comments: Seated in wheelchair     Eyes:      General: Lids are normal. Vision grossly intact. Cardiovascular:      Rate and Rhythm: Normal rate and regular rhythm. Heart sounds: Normal heart sounds, S1 normal and S2 normal. No murmur. No friction rub. No gallop. Pulmonary:      Effort: Pulmonary effort is normal. No respiratory distress. Breath sounds: Normal breath sounds. No wheezing. Abdominal:      General: Bowel sounds are normal.      Palpations: Abdomen is soft. There is no mass. Tenderness: There is no abdominal tenderness. There is no guarding. Musculoskeletal: Normal range of motion. Skin:     General: Skin is warm and dry. Capillary Refill: Capillary refill takes less than 2 seconds. Neurological:      General: No focal deficit present. Mental Status: She is alert and oriented to person, place, and time. Data Review  No visits with results within 2 Month(s) from this visit.    Latest known visit with results is:   Admission on 07/17/2020, Discharged on 07/17/2020   Component Date Value    WBC 07/17/2020 6.2     RBC 07/17/2020 3.92*    Hemoglobin 07/17/2020 10.0*    Hematocrit 07/17/2020 32.7*    MCV 07/17/2020 83.4     MCH 07/17/2020 25.5     MCHC 07/17/2020 30.6     RDW 07/17/2020 17.0*    Platelets 97/56/4046 223     MPV 07/17/2020 10.0     NRBC Automated 07/17/2020 0.0     Differential Type 07/17/2020 NOT REPORTED     Seg Neutrophils 07/17/2020 38     Lymphocytes 07/17/2020 48*    Monocytes 07/17/2020 9     Eosinophils % 07/17/2020 4     Basophils 07/17/2020 1     Immature Granulocytes 07/17/2020 0     Segs Absolute 07/17/2020 2.36     Absolute Lymph # 07/17/2020 2.91     Absolute Mono # 07/17/2020 0.55     Absolute Eos # 07/17/2020 0.27     Basophils Absolute 07/17/2020 0.05     Absolute Immature Granul* 07/17/2020 0.02     WBC Morphology 07/17/2020 NOT REPORTED     RBC Morphology 07/17/2020 ANISOCYTOSIS PRESENT     Platelet Estimate 92/38/0196 NOT REPORTED     Glucose 07/17/2020 82     BUN 07/17/2020 15     CREATININE 07/17/2020 0.98*    Bun/Cre Ratio 07/17/2020 15     Calcium 07/17/2020 8.6     Sodium 07/17/2020 140     Potassium 07/17/2020 4.1     Chloride 07/17/2020 103     CO2 07/17/2020 24     Anion Gap 07/17/2020 13     GFR Non- 07/17/2020 55*    GFR  07/17/2020 >60     GFR Comment 07/17/2020          GFR Staging 07/17/2020 NOT REPORTED     Pro-BNP 07/17/2020 194     BNP Interpretation 07/17/2020 Pro-BNP Reference Range:     TSH 07/17/2020 2.79     Thyroxine, Free 07/17/2020 1.01           Assessment/Plan:      1. Need for influenza vaccination  - INFLUENZA, QUADV, ADJUVANTED, 65 YRS =, IM, PF, PREFILL SYR, 0.5ML (FLUAD)    2. Cervical radicular pain  - tiZANidine (ZANAFLEX) 2 MG tablet; take 2 tablets by mouth AT BEDTIME AND ONCE DURING THE DAY IF NEEDED  Dispense: 90 tablet; Refill: 0  - amitriptyline (ELAVIL) 25 MG tablet; Take 1 tablet by mouth nightly  Dispense: 60 tablet; Refill: 5    3. Cervical stenosis of spine  - HYDROcodone-acetaminophen (NORCO) 5-325 MG per tablet; Take 1 tablet by mouth 2 times daily as needed for Pain for up to 30 days. Dispense: 60 tablet; Refill: 0    4. Primary osteoarthritis involving multiple joints  - HYDROcodone-acetaminophen (NORCO) 5-325 MG per tablet; Take 1 tablet by mouth 2 times daily as needed for Pain for up to 30 days. Dispense: 60 tablet; Refill: 0    5. Lumbar stenosis with neurogenic claudication  - HYDROcodone-acetaminophen (NORCO) 5-325 MG per tablet; Take 1 tablet by mouth 2 times daily as needed for Pain for up to 30 days. Dispense: 60 tablet; Refill: 0    6. Type 2 diabetes mellitus with diabetic neuropathy, without long-term current use of insulin (HCC)  - metFORMIN (GLUCOPHAGE) 1000 MG tablet; take 1 tablet by mouth twice a day with meals  Dispense: 180 tablet; Refill: 1  - POCT glycosylated hemoglobin (Hb A1C)  - Microalbumin / Creatinine Urine Ratio; Future    7. Pure hypercholesterolemia  - atorvastatin (LIPITOR) 40 MG tablet; Take 1 tablet by mouth daily  Dispense: 90 tablet; Refill: 1  - Lipid Panel; Future  - Comprehensive Metabolic Panel; Future    8. Essential hypertension  - cloNIDine (CATAPRES) 0.2 MG tablet; Take 0.5 tablets by mouth 2 times daily  Dispense: 90 tablet; Refill: 1  - Comprehensive Metabolic Panel; Future  - furosemide (LASIX) 20 MG tablet;  Take 1 tablet by mouth daily  Dispense: 30 tablet; Refill: 3    9. Acute non-seasonal allergic rhinitis  - loratadine (CLARITIN) 10 MG tablet; take 1 tablet by mouth once daily  Dispense: 90 tablet; Refill: 1    10. Gastroesophageal reflux disease, esophagitis presence not specified  - pantoprazole (PROTONIX) 40 MG tablet; take 1 tablet by mouth every morning BEFORE BREAKFAST  Dispense: 90 tablet; Refill: 1    11. Anemia, unspecified type  - CBC With Auto Differential; Future  - Iron And TIBC; Future  - Ferritin; Future       PDMP Monitoring:    Last PDMP Wilda Schwartz as Reviewed [facility]):  Review User Review Instant Review Result   ELAINE ROMERO 9/30/2020  9:25 AM Reviewed PDMP [1]     Last Controlled Substance Monitoring Documentation      Office Visit from 9/30/2020 in [address] [place] [address]   Periodic Controlled Substance Monitoring  No signs of potential drug abuse or diversion identified. , Possible medication side effects, risk of tolerance/dependence & alternative treatments discussed., Obtaining appropriate analgesic effect of treatment.  filed at 09/30/2020 3912        Urine Drug Screenings (1 yr)     Drugs of Abuse, Urine  Collected: 7/24/2017 11:34 AM (Final result)    Complete Results          Urine Drug Screen  Resulted: 8/10/2018 (Final result)    Complete Results          Urine Drug Screen  Resulted: 12/13/2017 (Final result)    Complete Results          Urine Drug Screen  Collected: 6/15/2017  4:20 PM (Final result)    Complete Results          Pain Management Drug Screen  Collected: 1/4/2019 12:49 PM (Final result)    Complete Results              Medication Contract and Consent for Opioid Use Documents Filed     Patient Documents       Type of Document Status Date Received Received By Description     Medication Contract [Status Missing]  Darnell CHANG [facility]      Medication Contract Received 10/31/2017 12:53 PM José Miguel Morel      Consent for Opioid Use Received 10/1/2020  2:05 PM Divina STEPHENSON 9/30/2020 Medication Contract Health Maintenance Due   Topic Date Due    Shingles Vaccine (1 of 2) 05/28/1992    Annual Wellness Visit (AWV)  05/29/2019    Lipid screen  06/03/2020    Flu vaccine (1) 09/01/2020       Electronically signed by Kaylie Kwong MD on 9/30/2020 at 9:14 AM

## 2020-09-30 NOTE — LETTER
CONTROLLED SUBSTANCE MEDICATION AGREEMENT     Patient Name: Kina Anthony  Patient YOB: 1940   I understand, that controlled substance medications may be used to help better manage my symptoms and to improve my ability to function at home, work and in social settings. However, I also understand that these medications do have risks, which have been discussed with me, including possible development of physical or psychological dependence. I understand that successful treatment requires mutual trust and honesty between me and my provider. I understand and agree that following this Medication Agreement is necessary in continuing my provider-patient relationship and the success of my treatment plan. Explanation from my Provider: Benefits and Goals of Controlled Substance Medications: There are two potential goals for your treatment: (1) decreased pain and suffering (2) improved daily life functions. There are many possible treatments for your chronic condition(s). Alternatives such as physical therapy, yoga, massage, home daily exercise, meditation, relaxation techniques, injections, chiropractic manipulations, surgery, cognitive therapy, hypnosis and many medications that are not habit-forming may be used. Use of controlled substance medications may be helpful, but they are unlikely to resolve all symptoms or restore all function. Explanation from my Provider: Risks of Controlled Substance Medications:  Opioid pain medications: These medications can lead to problems such as addiction/dependence, sedation, lightheadedness/dizziness, memory issues, falls, constipation, nausea, or vomiting. They may also impair the ability to drive or operate machinery. Additionally, these medications may lower testosterone levels, leading to loss of bone strength, stamina and sex drive.   They may cause problems with breathing, sleep apnea and irritability, and headaches. These risks may increase when these medications are combined with other stimulants, such as caffeine pills or energy drinks, certain weight loss supplements and oral decongestants. Dependence withdrawal symptoms may include depressed mood, loss of interest, suicidal thoughts, anxiety, fatigue, appetite changes and agitation. Testosterone replacement therapy:  Potential side effects include increased risk of stroke and heart attack, blood clots, increased blood pressure, increased cholesterol, enlarged prostate, sleep apnea, irritability/aggression and other mood disorders, and decreased fertility. I agree and understand that I and my prescriber have the following rights and responsibilities regarding my treatment plan:     1. MY RIGHTS:  To be informed of my treatment and medication plan. To be an active participant in my health and wellbeing. 2. MY RESPONSIBILITY AND UNDERSTANDING FOR USE OF MEDICATIONS  ? I will take medications at the dose and frequency as directed. For my safety, I will not increase or change how I take my medications without the recommendation of my healthcare provider. ? I will actively participate in any program recommended by my provider which may improve function, including social, physical, psychological programs. ? I will not take my medications with alcohol or other drugs not prescribed to me. I understand that drinking alcohol with my medications increases the chances of side effects, including reduced breathing rate and could lead to personal injury when operating machinery. ? I understand that if I have a history of substance use disorders, including alcohol or other illicit drugs, that I may be at increased risk of addiction to my medications. ? I agree to notify my provider immediately if I should become pregnant so that my treatment plan can be adjusted.   ? I agree and understand that I shall only receive controlled substance medications from the prescriber that signed this agreement unless there is written agreement among other prescribers of controlled substances outlining the responsibility of the medications being prescribed. ? I understand that the if the controlled medication is not helping to achieve goals, the dosage may be tapered and no longer prescribed. 3. MY RESPONSIBILITY FOR COMMUNICATION / PRESCRIPTION RENEWALS  ? I agree that all controlled substance medications that I take will be prescribed only by my provider. If another healthcare provider prescribes me medication in an emergency, I will notify my provider within seventy-two (72) hours. ? I will arrange for refills at the prescribed interval ONLY during regular office hours. I will not ask for refills earlier than agreed, after-hours, on holidays or weekends. Refills may take up to 72 hours for processing and prescriptions to reach the pharmacy. ? I will inform my other health care providers that I am taking these medications and of the existence of this [id number]. In the event of an emergency, I will provide the same information to the emergency department prescribers. ? I will keep my provider updated on the pharmacy I am using for controlled medication prescription filling. [address] (), [address] [place] [address] [phone]   Initials:_______  4. MY RESPONSIBILITY FOR PROTECTING MEDICATIONS  ? I will protect my prescriptions and medications. I understand that lost or misplaced prescriptions will not be replaced. ? I will keep medications only for my own use and will not share them with others. I will keep all medications away from children. ? I agree that if my medications are adjusted or discontinued, I will properly dispose of any remaining medications.   I understand that I will be required to dispose of any remaining controlled medications as, directed by my prescriber, prior to being provided with any prescriptions for other controlled medications. Medication drop box locations can be found at: [email or web address] About Us    5. MY RESPONSIBILITY WITH ILLEGAL DRUGS   ? I will not use illegal or street drugs or another person's prescription medications not prescribed to me.   ? If there are identified addiction type symptoms, then referral to a program may be provided by my provider and I agree to follow through with this recommendation. 6. MY RESPONSIBILITY FOR COOPERATION WITH INVESTIGATIONS  ? I understand that my provider will comply with any applicable law and may discuss my use and/or possible misuse/abuse of controlled substances and alcohol, as appropriate, with any health care provider involved in my care, pharmacist, or legal authority. ? I authorize my provider and pharmacy to cooperate fully with law enforcement agencies (as permitted by law) in the investigation of any possible misuse, sale, or other diversion of my controlled substances. ? I agree to waive any applicable privilege or right of privacy or confidentiality with respect to these authorizations. 7. PROVIDERS RIGHT TO MONITOR FOR SAFETY: PRESCRIPTION MONITORING / DRUG TESTING  ? I consent to drug/toxicology screening and will submit to a drug screen upon my providers request to assure I am only taking the prescribed drugs for my safety monitoring. I understand that a drug screen is a laboratory test in which a sample of my urine, blood or saliva is checked to see what drugs I have been taking. This may entail an observed urine specimen, which means that a nurse or other health care provider may watch me provide urine, and I will cooperate if I am asked to provide an observed specimen.   ? I understand that my provider will check a copy of my State Prescription Monitoring Program () Report in order to safely prescribe medications. ? Pill Counts: I consent to pill counts when requested. I may be asked to bring all my prescribed controlled substance medications, in their original bottles, to all of my scheduled appointments. In addition, my provider may ask me to come to the practice at any time for a random pill count. 8. TERMINATION OF THIS AGREEMENT  For my safety, my prescriber has the right to stop prescribing controlled substance medications and may end this agreement. Initials:_______  ? Conditions that may result in termination of this agreement:  a. I do not show any improvement in pain, or my activity has not improved. b. I develop rapid tolerance or loss of improvement, as described in my treatment plan.  c. I develop significant side effects from the medication. d. My behavior is not consistent with the responsibilities outlined above, thereby causing safety concerns to continue prescribing controlled substance medications. e. I fail to follow the terms of this agreement. f. Other:____________________________       UNDERSTANDING THIS MEDICATION AGREEMENT:    I have read the above and have had all my questions answered. For chronic disease management, I know that my symptoms can be managed with many types of treatments. A chronic medication trial may be part of my treatment, but I must be an active participant in my care. Medication therapy is only one part of my symptom management plan. In some cases, there may be limited scientific evidence to support the chronic use of certain medications to improve symptoms and daily function. Furthermore, in certain circumstances, there may be scientific information that suggests that the use of chronic controlled substances may worsen my symptoms and increase my risk of unintentional death directly related to this medication therapy.   I know that if my provider feels my risk from controlled medications is greater than my benefit, I will have my controlled substance medication(s) compassionately lowered or removed altogether. I further agree to allow this office to contact my HIPAA contact if there are concerns about my safety and use of the controlled medications. I have agreed to use the prescribed controlled substance medications to me as instructed by my provider and as stated in this Medication Agreement. My initial on each page and my signature below shows that I have read each page and I have had the opportunity to ask questions with answers provided by my provider.     Patient Name (Printed): _____________________________________  Patient Signature:  ______________________   Date: _____________    Prescriber Name (Printed): ___________________________________  Prescriber Signature: _____________________  Date: _____________

## 2020-10-05 ASSESSMENT — VISUAL ACUITY: OU: 1

## 2020-10-06 ENCOUNTER — HOSPITAL ENCOUNTER (OUTPATIENT)
Age: 80
Setting detail: SPECIMEN
Discharge: HOME | End: 2020-10-06
Payer: MEDICARE

## 2020-10-06 LAB
CREATININE URINE: 175.2 MG/DL (ref 28–217)
MICROALBUMIN/CREAT 24H UR: <12 MG/L
MICROALBUMIN/CREAT UR-RTO: NORMAL MCG/MG CREAT

## 2020-11-05 NOTE — TELEPHONE ENCOUNTER
Last visit: 09/30/2020  Last Med refill: 09/30/2020  Does patient have enough medication for 72 hours: Yes    Patient only has 1 tab left    Next Visit Date:  Future Appointments   Date Time Provider Pradeep Catherine   12/2/2020 11:00 AM Connie Bruno  [address] Maintenance   Topic Date Due    Shingles Vaccine (1 of 2) 05/28/1992    Annual Wellness Visit (AWV)  05/29/2019    DTaP/Tdap/Td vaccine (1 - Tdap) 10/18/2026 (Originally 5/28/1959)    Lipid screen  09/30/2021    Potassium monitoring  09/30/2021    Creatinine monitoring  09/30/2021    DEXA (modify frequency per FRAX score)  Completed    Flu vaccine  Completed    Pneumococcal 65+ years Vaccine  Completed    Hepatitis A vaccine  Aged Out    Hib vaccine  Aged Out    Meningococcal (ACWY) vaccine  Aged Out       Hemoglobin A1C (%)   Date Value   09/30/2020 5.7   02/25/2020 6.6   10/07/2019 6.5             ( goal A1C is < 7)   Microalb/Crt.  Ratio (mcg/mg creat)   Date Value   10/06/2020 CANNOT BE CALCULATED     LDL Cholesterol (mg/dL)   Date Value   09/30/2020 48   06/03/2019 102       (goal LDL is <100)   AST (U/L)   Date Value   09/30/2020 20     ALT (U/L)   Date Value   09/30/2020 13     BUN (mg/dL)   Date Value   09/30/2020 9     BP Readings from Last 3 Encounters:   09/30/20 106/72   07/17/20 (!) 104/51   06/08/20 120/60          (goal 120/80)    All Future Testing planned in [facility]  Lab Frequency Next Occurrence   XR Lumbar Spine Ap Lateral Flexion and Extension and Oblique Once 01/16/2020   XR HIP BILATERAL W AP PELVIS (2 VIEWS) Once 02/17/2020   XR LUMBAR SPINE (MIN 4 VIEWS) Once 02/17/2020               Patient Active Problem List:     Osteoarthritis     Essential hypertension     Type 2 diabetes mellitus with diabetic neuropathy, without long-term current use of insulin (HCC)     Pure hypercholesterolemia     Constipation     Rectal pain     Diverticulosis of colon     Tubular adenoma of colon     History of colon polyps     Rectal pressure     Failed neck syndrome     Cervical stenosis of spine     Cervical radicular pain     Lumbar stenosis with neurogenic claudication     JANN on CPAP     Primary osteoarthritis involving multiple joints     Hx of fusion of cervical spine     Chronic use of opiate drugs therapeutic purposes     Myelomalacia (HCC)     Lumbar facet joint syndrome

## 2020-11-12 NOTE — TELEPHONE ENCOUNTER
Daughter called due to mom being totally out of medication. She has to go to work by 5 and she is the one that has to  the medication for her.

## 2020-11-13 RX ORDER — HYDROCODONE BITARTRATE AND ACETAMINOPHEN 5; 325 MG/1; MG/1
1 TABLET ORAL 2 TIMES DAILY PRN
Qty: 60 TABLET | Refills: 0 | Status: SHIPPED | OUTPATIENT
Start: 2020-11-13 | End: 2020-12-02 | Stop reason: SDUPTHER

## 2020-12-02 ENCOUNTER — OFFICE VISIT (OUTPATIENT)
Dept: FAMILY MEDICINE CLINIC | Age: 80
End: 2020-12-02
Payer: MEDICARE

## 2020-12-02 VITALS
SYSTOLIC BLOOD PRESSURE: 136 MMHG | OXYGEN SATURATION: 94 % | TEMPERATURE: 97.9 F | HEART RATE: 82 BPM | DIASTOLIC BLOOD PRESSURE: 82 MMHG

## 2020-12-02 PROCEDURE — 99214 OFFICE O/P EST MOD 30 MIN: CPT | Performed by: INTERNAL MEDICINE

## 2020-12-02 RX ORDER — HYDROCODONE BITARTRATE AND ACETAMINOPHEN 5; 325 MG/1; MG/1
1 TABLET ORAL 2 TIMES DAILY PRN
Qty: 60 TABLET | Refills: 0 | Status: SHIPPED | OUTPATIENT
Start: 2020-12-02 | End: 2021-04-05 | Stop reason: SDUPTHER

## 2020-12-02 RX ORDER — FUROSEMIDE 20 MG/1
20 TABLET ORAL 2 TIMES DAILY
Qty: 180 TABLET | Refills: 1 | Status: ON HOLD | OUTPATIENT
Start: 2020-12-02 | End: 2021-04-29 | Stop reason: ALTCHOICE

## 2020-12-02 RX ORDER — TIZANIDINE 2 MG/1
TABLET ORAL
Qty: 90 TABLET | Refills: 0 | Status: SHIPPED | OUTPATIENT
Start: 2020-12-02 | End: 2021-01-20

## 2020-12-02 RX ORDER — ZOLPIDEM TARTRATE 10 MG/1
TABLET ORAL
Qty: 15 TABLET | Refills: 1 | Status: SHIPPED | OUTPATIENT
Start: 2020-12-02 | End: 2021-01-29

## 2020-12-02 NOTE — PROGRESS NOTES
Subjective:       Patient ID:     Marie Shay is a 68 y.o. female who presents for   Chief Complaint   Patient presents with    Follow-up    Diabetes    Medication Refill       HPI:  Nursing note reviewed and discussed with patient. Here for follow-up today. Diabetes-stable on current regimen. Denies hyperglycemia, hypoglycemia, paresthesias, vision changes, fatigue, polyuria, polydipsia. Up-to-date with eye exam and foot exam.  On gabapentin for peripheral neuropathy. Hypertension-stable on current regimen without chest pain, palpitations, dyspnea on exertion, peripheral edema, orthopnea, paroxysmal nocturnal dyspnea. Hyperlipidemia-tolerating statin without myalgias, dyspepsia, jaundice. Chronic back pain-we will be getting new insurance plan in January and will resume seeing her usual pain management physician again at that time. Due for refill on Norco in a few days, will refill it today due to upcoming holiday season. Patient agreeable to plan. Denies constipation, nausea, somnolence with the [place]. Using her wheelchair whenever she leaves the house and walker inside the house to help  Sleeping well with Elavil. Patient's medications, allergies, past medical, surgical, social and family histories were reviewed and updated as appropriate.     Past Medical History:   Diagnosis Date    Arthritis     Cataract     Cataracts, bilateral     Diverticulosis of colon 2015    Headache(784.0)     History of colon polyps 2015    Insomnia     Neck pain     Osteoarthrosis, unspecified whether generalized or localized, unspecified site 10/5/2012    Pure hypercholesterolemia 10/5/2012    Shoulder blade pain     right  side    Stroke ([facility] [address]     Tubular adenoma of colon 2015    Type II or unspecified type diabetes mellitus without mention of complication, not stated as uncontrolled 10/5/2012    Unspecified essential hypertension 10/5/2012    Unspecified sleep apnea Past Surgical History:   Procedure Laterality Date    COLONOSCOPY  06    Dr Mckenna Rivera  5 12 15    extensive diverticulosis, tubular adenoma    EPIDURAL STEROID INJECTION N/A 2017    EPIDURAL STEROID INJECTION cervical performed by Nayeli Tan MD at [address] N/A 2017    EPIDURAL STEROID INJECTION L5S1 performed by Nayeli Tan MD at [address] Bilateral 2019    EPIDURAL STEROID INJECTION BILATERAL S1 performed by Nayeli Tan MD at [address]  10/1/06    NECK SURGERY  2011 &     NERVE BLOCK Bilateral 2020    Lumbar Facet L2-L5    NERVE SURGERY Bilateral 2019    BILATERAL LUMBAR FACET STEROID INJECTION L2-L5 performed by Nayeli Tan MD at [place] [address]  2017    L5-S1 steroid injection    OTHER SURGICAL HISTORY  2017    VANITA L5-S1    OTHER SURGICAL HISTORY  10/01/2018    cervical steroid injection    OTHER SURGICAL HISTORY Bilateral 2019    EPIDURAL STEROID INJECTION BILATERAL S1 (Bilateral )    PAIN MANAGEMENT PROCEDURE Bilateral 2020    LUMBAR FACET L2-L5 performed by Nayeli Tan MD at [place] [address] ANES/STEROID FORAMEN LUMBAR/SACRAL [address] [place] [address] ,1 LEVEL Bilateral 2018    BILATERAL L4 VANITA performed by Nayeli Tan MD at [address] DX/THER SBST INTRLMNR CRV/THRC W/IMG GDN N/A 10/1/2018    EPIDURAL STEROID INJECTION CERVICAL C7-T1 performed by Nayeli Tan MD at [place] [address] History     Tobacco Use    Smoking status: Former Smoker     Packs/day: 0.50     Years: 15.00     Pack years: 7.50     Types: Cigarettes     Last attempt to quit: 10/5/2001     Years since quittin.1    Smokeless tobacco: Never Used   Substance Use Topics    Alcohol use:  Yes     Alcohol/week: 0.0 standard drinks     Comment: on occasion      Patient Active Problem List   Diagnosis  Osteoarthritis    Essential hypertension    Type 2 diabetes mellitus with diabetic neuropathy, without long-term current use of insulin (HCC)    Pure hypercholesterolemia    Constipation    Rectal pain    Diverticulosis of colon    Tubular adenoma of colon    History of colon polyps    Rectal pressure    Failed neck syndrome    Cervical stenosis of spine    Cervical radicular pain    Lumbar stenosis with neurogenic claudication    JANN on CPAP    Primary osteoarthritis involving multiple joints    Hx of fusion of cervical spine    Chronic use of opiate drugs therapeutic purposes    Myelomalacia (HCC)    Lumbar facet joint syndrome         Prior to Visit Medications    Medication Sig Taking? Authorizing Provider   HYDROcodone-acetaminophen (NORCO) 5-325 MG per tablet Take 1 tablet by mouth 2 times daily as needed for Pain for up to 30 days.  Yes Nicholas Aleman MD   tiZANidine (ZANAFLEX) 2 MG tablet take 2 tablets by mouth AT BEDTIME AND ONCE DURING THE DAY IF NEEDED Yes Nicholas Aleman MD   metFORMIN (GLUCOPHAGE) 1000 MG tablet take 1 tablet by mouth twice a day with meals Yes Nicholas Aleman MD   atorvastatin (LIPITOR) 40 MG tablet Take 1 tablet by mouth daily Yes Nicholas Aleman MD   amitriptyline (ELAVIL) 25 MG tablet Take 1 tablet by mouth nightly Yes Connie Hamlin MD   loratadine (CLARITIN) 10 MG tablet take 1 tablet by mouth once daily Yes Connie Hamlin MD   cloNIDine (CATAPRES) 0.2 MG tablet Take 0.5 tablets by mouth 2 times daily Yes Connie Hamlin MD   pantoprazole (PROTONIX) 40 MG tablet take 1 tablet by mouth every morning BEFORE BREAKFAST Yes Connie Hamlin MD   furosemide (LASIX) 20 MG tablet Take 1 tablet by mouth daily Yes Connie Hamlin MD   allopurinol (ZYLOPRIM) 100 MG tablet take 1 tablet by mouth once daily Yes Connie Hamlin MD   carvedilol (COREG) 3.125 MG tablet take 1 tablet by mouth twice a day Yes Nicholas Aleman MD gabapentin (NEURONTIN) 600 MG tablet Take 1 tablet by mouth 3 times daily for 190 days. Yes Nini Jansen MD   polyethylene glycol (GLYCOLAX) powder take 17GM (DISSOLVED IN WATER) by mouth once daily Yes Connie Melgar MD   fluticasone (FLONASE) 50 MCG/ACT nasal spray instill 1 spray into each nostril once daily Yes Kathya Olsen APRN - CNP   aspirin 325 MG tablet Take 325 mg by mouth daily Yes Historical Provider, MD   blood glucose test strips (ONE TOUCH ULTRA TEST) strip TEST once daily  Patient not taking: Reported on 12/2/2020  Nini Jansen MD   Blood Glucose Monitoring Suppl KIT 1 blood glucose monitoring supplies kit. Test strips, lancets, alcohol swabs  Patient not taking: Reported on 12/2/2020  Mayuri Banegas MD     Review of Systems  Review of Systems   Constitutional: Negative for fatigue, fever and unexpected weight change. Respiratory: Negative for cough, choking, chest tightness, shortness of breath and wheezing. Cardiovascular: Negative for chest pain, palpitations and leg swelling. Gastrointestinal: Negative for abdominal pain, anal bleeding, blood in stool, constipation, diarrhea, nausea and vomiting. Endocrine: Negative. Musculoskeletal: Negative for joint swelling and myalgias. Skin: Negative. Neurological: Negative for dizziness. Psychiatric/Behavioral: Negative for sleep disturbance. All other systems reviewed and are negative. Objective:       Physical Exam:  BP (!) 140/84 (Site: Left Upper Arm, Position: Sitting, Cuff Size: Medium Adult)   Pulse 82   Temp 97.9 °F (36.6 °C) (Temporal)   SpO2 94%   Physical Exam  Vitals signs and nursing note reviewed. Constitutional:       General: She is not in acute distress. Appearance: She is well-developed. She is not ill-appearing. Comments: Seated in wheelchair. Eyes:      General: Lids are normal. Vision grossly intact.    Cardiovascular: Rate and Rhythm: Normal rate and regular rhythm. Heart sounds: Normal heart sounds, S1 normal and S2 normal. No murmur. No friction rub. No gallop. Pulmonary:      Effort: Pulmonary effort is normal. No respiratory distress. Breath sounds: Normal breath sounds. No wheezing. Abdominal:      General: Bowel sounds are normal.      Palpations: Abdomen is soft. There is no mass. Tenderness: There is no abdominal tenderness. There is no guarding. Musculoskeletal: Normal range of motion. Skin:     General: Skin is warm and dry. Capillary Refill: Capillary refill takes less than 2 seconds. Neurological:      General: No focal deficit present. Mental Status: She is alert and oriented to person, place, and time. Data Review  Lab Results   Component Value Date    LABA1C 5.7 09/30/2020     Lab Results   Component Value Date     01/12/2017            Assessment/Plan:      1. Cervical radicular pain  - tiZANidine (ZANAFLEX) 2 MG tablet; take 2 tablets by mouth AT BEDTIME AND ONCE DURING THE DAY IF NEEDED  Dispense: 90 tablet; Refill: 0    2. Sleeping difficulty  - zolpidem (AMBIEN) 10 MG tablet; take 0.5 tablet by mouth nightly if needed for sleep  Dispense: 15 tablet; Refill: 1    3. Essential hypertension  - furosemide (LASIX) 20 MG tablet; Take 1 tablet by mouth 2 times daily  Dispense: 180 tablet; Refill: 1    4. Cervical stenosis of spine  - HYDROcodone-acetaminophen (NORCO) 5-325 MG per tablet; Take 1 tablet by mouth 2 times daily as needed for Pain for up to 30 days. Dispense: 60 tablet; Refill: 0    5. Primary osteoarthritis involving multiple joints  - HYDROcodone-acetaminophen (NORCO) 5-325 MG per tablet; Take 1 tablet by mouth 2 times daily as needed for Pain for up to 30 days. Dispense: 60 tablet; Refill: 0  - Lift Chair MISC; by Does not apply route  Dispense: 1 each; Refill: 0    6.  Lumbar stenosis with neurogenic claudication - HYDROcodone-acetaminophen (NORCO) 5-325 MG per tablet; Take 1 tablet by mouth 2 times daily as needed for Pain for up to 30 days. Dispense: 60 tablet; Refill: 0  - Lift Chair MISC; by Does not apply route  Dispense: 1 each; Refill: 0    7. Generalized weakness  - Lift Chair MISC; by Does not apply route  Dispense: 1 each; Refill: 0    8. Type 2 diabetes mellitus with diabetic neuropathy, without long-term current use of insulin (HCC)  Continue current regimen     9. Chronic use of opiate drugs therapeutic purposes  Has upcoming visit with pain management     PDMP Monitoring:    Last PDMP [place] as Reviewed [facility]):  Review User Review Instant Review Result   ELAINE ROMERO 12/2/2020 11:39 AM Reviewed PDMP [1]     Last Controlled Substance Monitoring Documentation      Office Visit from 12/2/2020 in [address] [place] [address]   Periodic Controlled Substance Monitoring  Possible medication side effects, risk of tolerance/dependence & alternative treatments discussed., No signs of potential drug abuse or diversion identified. , Assessed functional status., Obtaining appropriate analgesic effect of treatment.  filed at 12/02/2020 1139        Urine Drug Screenings (1 yr)     Drugs of Abuse, Urine  Collected: 7/24/2017 11:34 AM (Final result)    Complete Results          Urine Drug Screen  Resulted: 8/10/2018 (Final result)    Complete Results          Urine Drug Screen  Resulted: 12/13/2017 (Final result)    Complete Results          Urine Drug Screen  Collected: 6/15/2017  4:20 PM (Final result)    Complete Results          Pain Management Drug Screen  Collected: 1/4/2019 12:49 PM (Final result)    Complete Results              Medication Contract and Consent for Opioid Use Documents Filed     Patient Documents       Type of Document Status Date Received Received By Description     Medication Contract [Status Missing]  Emerald Acosta      Medication Contract Received 10/31/2017 12:53 PM Emerald Acosta Consent for Opioid Use Received 10/1/2020  2:05 PM SHERLYN STEPHENSON 9/30/2020 Medication Contract                       Health Maintenance Due   Topic Date Due    Shingles Vaccine (1 of 2) 05/28/1992    Annual Wellness Visit (AWV)  05/29/2019       Electronically signed by Manjinder Branham MD on 12/2/2020 at 11:34 AM

## 2020-12-12 ASSESSMENT — ENCOUNTER SYMPTOMS
ABDOMINAL PAIN: 0
COUGH: 0
BLOOD IN STOOL: 0
NAUSEA: 0
DIARRHEA: 0
CONSTIPATION: 0
VOMITING: 0
CHOKING: 0
CHEST TIGHTNESS: 0
ANAL BLEEDING: 0
SHORTNESS OF BREATH: 0
WHEEZING: 0

## 2020-12-12 ASSESSMENT — VISUAL ACUITY: OU: 1

## 2020-12-17 ENCOUNTER — TELEPHONE (OUTPATIENT)
Dept: FAMILY MEDICINE CLINIC | Age: 80
End: 2020-12-17

## 2020-12-17 RX ORDER — POLYETHYLENE GLYCOL 3350 17 G/17G
POWDER, FOR SOLUTION ORAL
Qty: 510 G | Refills: 1 | Status: SHIPPED | OUTPATIENT
Start: 2020-12-17 | End: 2021-04-15 | Stop reason: SDUPTHER

## 2020-12-17 NOTE — TELEPHONE ENCOUNTER
Last visit: 12/2/20  Last Med refill: 3/2019  Does patient have enough medication for 72 hours: Yes    Next Visit Date:  Future Appointments   Date Time Provider Pradeep Catherine   4/13/2021 11:15 AM Connie Mullen  [address] Maintenance   Topic Date Due    Shingles Vaccine (1 of 2) 05/28/1992    Annual Wellness Visit (AWV)  05/29/2019    DTaP/Tdap/Td vaccine (1 - Tdap) 10/18/2026 (Originally 5/28/1959)    Lipid screen  09/30/2021    Potassium monitoring  09/30/2021    Creatinine monitoring  09/30/2021    DEXA (modify frequency per FRAX score)  Completed    Flu vaccine  Completed    Pneumococcal 65+ years Vaccine  Completed    Hepatitis A vaccine  Aged Out    Hib vaccine  Aged Out    Meningococcal (ACWY) vaccine  Aged Out       Hemoglobin A1C (%)   Date Value   09/30/2020 5.7   02/25/2020 6.6   10/07/2019 6.5             ( goal A1C is < 7)   Microalb/Crt.  Ratio (mcg/mg creat)   Date Value   10/06/2020 CANNOT BE CALCULATED     LDL Cholesterol (mg/dL)   Date Value   09/30/2020 48   06/03/2019 102       (goal LDL is <100)   AST (U/L)   Date Value   09/30/2020 20     ALT (U/L)   Date Value   09/30/2020 13     BUN (mg/dL)   Date Value   09/30/2020 9     BP Readings from Last 3 Encounters:   12/02/20 136/82   09/30/20 106/72   07/17/20 (!) 104/51          (goal 120/80)    All Future Testing planned in [facility]  Lab Frequency Next Occurrence   XR Lumbar Spine Ap Lateral Flexion and Extension and Oblique Once 01/16/2020   XR HIP BILATERAL W AP PELVIS (2 VIEWS) Once 02/17/2020   XR LUMBAR SPINE (MIN 4 VIEWS) Once 02/17/2020               Patient Active Problem List:     Osteoarthritis     Essential hypertension     Type 2 diabetes mellitus with diabetic neuropathy, without long-term current use of insulin (HCC)     Pure hypercholesterolemia     Constipation     Rectal pain     Diverticulosis of colon     Tubular adenoma of colon     History of colon polyps     Rectal pressure Failed neck syndrome     Cervical stenosis of spine     Cervical radicular pain     Lumbar stenosis with neurogenic claudication     JANN on CPAP     Primary osteoarthritis involving multiple joints     Hx of fusion of cervical spine     Chronic use of opiate drugs therapeutic purposes     Myelomalacia (HCC)     Lumbar facet joint syndrome

## 2020-12-17 NOTE — TELEPHONE ENCOUNTER
Received call from Eliseo at [address] in regards to patients Rupa Dana states when script was wrote, it was too soon.  Now it is 14 days past. Asking for permission to fill    [address]: [phone]

## 2021-01-19 DIAGNOSIS — M54.12 CERVICAL RADICULAR PAIN: Chronic | ICD-10-CM

## 2021-01-19 NOTE — TELEPHONE ENCOUNTER
Last visit: 12/02/2020  Last Med refill: 12/02/2020  Does patient have enough medication for 72 hours: No:     Next Visit Date:  Future Appointments   Date Time Provider Pradeep Alexander   3/2/2021 10:30 AM STA SCR MAMMO RM 2 STAZ MAMMO STA Radiolog   4/13/2021 11:15 AM Connie Simental  [address] Maintenance   Topic Date Due    Hepatitis C screen  1940    Shingles Vaccine (1 of 2) 05/28/1992    Annual Wellness Visit (AWV)  05/29/2019    DTaP/Tdap/Td vaccine (1 - Tdap) 10/18/2026 (Originally 5/28/1959)    Lipid screen  09/30/2021    Potassium monitoring  09/30/2021    Creatinine monitoring  09/30/2021    DEXA (modify frequency per FRAX score)  Completed    Flu vaccine  Completed    Pneumococcal 65+ years Vaccine  Completed    Hepatitis A vaccine  Aged Out    Hib vaccine  Aged Out    Meningococcal (ACWY) vaccine  Aged Out       Hemoglobin A1C (%)   Date Value   09/30/2020 5.7   02/25/2020 6.6   10/07/2019 6.5             ( goal A1C is < 7)   Microalb/Crt.  Ratio (mcg/mg creat)   Date Value   10/06/2020 CANNOT BE CALCULATED     LDL Cholesterol (mg/dL)   Date Value   09/30/2020 48   06/03/2019 102       (goal LDL is <100)   AST (U/L)   Date Value   09/30/2020 20     ALT (U/L)   Date Value   09/30/2020 13     BUN (mg/dL)   Date Value   09/30/2020 9     BP Readings from Last 3 Encounters:   12/02/20 136/82   09/30/20 106/72   07/17/20 (!) 104/51          (goal 120/80)    All Future Testing planned in [facility]  Lab Frequency Next Occurrence   XR HIP BILATERAL W AP PELVIS (2 VIEWS) Once 02/17/2020   XR LUMBAR SPINE (MIN 4 VIEWS) Once 02/17/2020               Patient Active Problem List:     Osteoarthritis     Essential hypertension     Type 2 diabetes mellitus with diabetic neuropathy, without long-term current use of insulin (HCC)     Pure hypercholesterolemia     Constipation     Rectal pain     Diverticulosis of colon     Tubular adenoma of colon     History of colon polyps     Rectal pressure     Failed neck syndrome     Cervical stenosis of spine     Cervical radicular pain     Lumbar stenosis with neurogenic claudication     JANN on CPAP     Primary osteoarthritis involving multiple joints     Hx of fusion of cervical spine     Chronic use of opiate drugs therapeutic purposes     Myelomalacia (HCC)     Lumbar facet joint syndrome

## 2021-01-20 RX ORDER — TIZANIDINE 2 MG/1
TABLET ORAL
Qty: 90 TABLET | Refills: 0 | Status: SHIPPED | OUTPATIENT
Start: 2021-01-20 | End: 2021-03-18

## 2021-02-03 ENCOUNTER — OFFICE VISIT (OUTPATIENT)
Dept: PAIN MANAGEMENT | Age: 81
End: 2021-02-03
Payer: MEDICARE

## 2021-02-03 ENCOUNTER — NURSE TRIAGE (OUTPATIENT)
Dept: OTHER | Facility: CLINIC | Age: 81
End: 2021-02-03

## 2021-02-03 VITALS
HEART RATE: 67 BPM | HEIGHT: 62 IN | OXYGEN SATURATION: 98 % | TEMPERATURE: 97.3 F | DIASTOLIC BLOOD PRESSURE: 70 MMHG | SYSTOLIC BLOOD PRESSURE: 110 MMHG | BODY MASS INDEX: 29.81 KG/M2 | WEIGHT: 162 LBS

## 2021-02-03 DIAGNOSIS — Z79.891 CHRONIC USE OF OPIATE DRUG FOR THERAPEUTIC PURPOSE: Primary | ICD-10-CM

## 2021-02-03 DIAGNOSIS — Z99.89 OSA ON CPAP: ICD-10-CM

## 2021-02-03 DIAGNOSIS — Z98.890 HX OF CERVICAL SPINE SURGERY: ICD-10-CM

## 2021-02-03 DIAGNOSIS — M54.12 CERVICAL RADICULAR PAIN: ICD-10-CM

## 2021-02-03 DIAGNOSIS — M15.9 PRIMARY OSTEOARTHRITIS INVOLVING MULTIPLE JOINTS: ICD-10-CM

## 2021-02-03 DIAGNOSIS — G47.33 OSA ON CPAP: ICD-10-CM

## 2021-02-03 DIAGNOSIS — M48.062 LUMBAR STENOSIS WITH NEUROGENIC CLAUDICATION: Chronic | ICD-10-CM

## 2021-02-03 DIAGNOSIS — M48.02 CERVICAL STENOSIS OF SPINE: Chronic | ICD-10-CM

## 2021-02-03 PROCEDURE — 1123F ACP DISCUSS/DSCN MKR DOCD: CPT | Performed by: ANESTHESIOLOGY

## 2021-02-03 PROCEDURE — 1090F PRES/ABSN URINE INCON ASSESS: CPT | Performed by: ANESTHESIOLOGY

## 2021-02-03 PROCEDURE — G8427 DOCREV CUR MEDS BY ELIG CLIN: HCPCS | Performed by: ANESTHESIOLOGY

## 2021-02-03 PROCEDURE — G8417 CALC BMI ABV UP PARAM F/U: HCPCS | Performed by: ANESTHESIOLOGY

## 2021-02-03 PROCEDURE — 4040F PNEUMOC VAC/ADMIN/RCVD: CPT | Performed by: ANESTHESIOLOGY

## 2021-02-03 PROCEDURE — G8484 FLU IMMUNIZE NO ADMIN: HCPCS | Performed by: ANESTHESIOLOGY

## 2021-02-03 PROCEDURE — G8399 PT W/DXA RESULTS DOCUMENT: HCPCS | Performed by: ANESTHESIOLOGY

## 2021-02-03 PROCEDURE — 1036F TOBACCO NON-USER: CPT | Performed by: ANESTHESIOLOGY

## 2021-02-03 PROCEDURE — 99214 OFFICE O/P EST MOD 30 MIN: CPT | Performed by: ANESTHESIOLOGY

## 2021-02-03 RX ORDER — HYDROCODONE BITARTRATE AND ACETAMINOPHEN 5; 325 MG/1; MG/1
1 TABLET ORAL 2 TIMES DAILY PRN
Qty: 60 TABLET | Refills: 0 | Status: SHIPPED | OUTPATIENT
Start: 2021-02-03 | End: 2021-04-30 | Stop reason: SDUPTHER

## 2021-02-03 ASSESSMENT — ENCOUNTER SYMPTOMS
RESPIRATORY NEGATIVE: 1
BACK PAIN: 1

## 2021-02-03 NOTE — TELEPHONE ENCOUNTER
Reason for Disposition   Caller has already spoken with the PCP (or office), and has no further questions    Protocols used: NO CONTACT OR DUPLICATE CONTACT CALL-ADULT-[place]    Patient called Elaine Reyes  at [facility])  with red flag complaint. No Triage. Patient reports that she is light headed and this is not a new symptom and not worse that she's had. She would like to know how late she can be to pain management appointment at 10:30 am. Confirmed 10 min adonay period with [facility]. Patient wants to possible reschedule. Care advice provided, patient verbalizes understanding; denies any other questions or concerns; instructed to call back for any new or worsening symptoms. Writer provided warm transfer [facility] for appointment re-scheduling. Attention Provider: Thank you for allowing me to participate in the care of your patient. The patient was connected to triage in response to information provided to the E[facility]. Please do not respond through this encounter as the response is not directed to a shared pool.

## 2021-02-03 NOTE — PROGRESS NOTES
The patient is a 68 y. o. Non-/non  female. Chief Complaint   Patient presents with    Neck Pain    Back Pain    Medication Refill        HPI    79-year-old pleasant female with history of chronic neck and chronic lower back pain    Neck pain   Is chronic onset many years ago located over the cervical spine  Extension of pain over right arm  Associated with right arm numbness  Aggravated with neck movement  MRI cervical spine in 2019 showed a C6 level cervical spinal stenosis and myelomalacia changes at C3-C4 and C6-C7 level  Patient is not interested in any open cervical spine surgery    Back pain  Pain is chronic onset many years ago located in the lumbar area across midline aggravates with standing  Radiates down both legs  Report urinary incontinence  No bowel incontinence    Currently taking Norco as needed for pain along with tizanidine  Report no side effect  Finds the medication helpful  Had epidural injection in past with good intermediate term relief  Last injection was almost 1 year ago      Medication Refill: Norco  Pain score Today:  4  Adverse effects (Constipation / Nausea / Sedation / sexual Dysfunction / others) : none  Mood: fair  Sleep pattern and quality: poor  Activity level: poor    Pill count Today:0  Last dose taken  2/3/21  OARRS report reviewed today: yes  ER/Hospitalizations/PCP visit related to pain since last visit:no   Any legal problems e.g. DUI etc.:No  Satisfied with current management: Yes    Opioid Contract:8/10/18  Last Urine Dug screen dated:8/10/18    Lab Results   Component Value Date    LABA1C 5.7 09/30/2020     Lab Results   Component Value Date     01/12/2017       Past Medical History, Past Surgical History, Social History, Allergies and Medications reviewed and updated in EPIC as indicated    Family History reviewed and is noncontributory.         Past Medical History:   Diagnosis Date    Arthritis     Cataract     Cataracts, bilateral  Diverticulosis of colon 2015    Headache(784.0)     History of colon polyps 2015    Insomnia     Neck pain     Osteoarthrosis, unspecified whether generalized or localized, unspecified site 10/5/2012    Pure hypercholesterolemia 10/5/2012    Shoulder blade pain     right  side    Stroke ([facility] [address]     Tubular adenoma of colon 2015    Type II or unspecified type diabetes mellitus without mention of complication, not stated as uncontrolled 10/5/2012    Unspecified essential hypertension 10/5/2012    Unspecified sleep apnea       Past Surgical History:   Procedure Laterality Date    COLONOSCOPY  8/23/06    Dr Hong Burton  5 12 15    extensive diverticulosis, tubular adenoma    EPIDURAL STEROID INJECTION N/A 8/25/2017    EPIDURAL STEROID INJECTION cervical performed by Sabina Hopson MD at [facility] 11/20/2017    EPIDURAL STEROID INJECTION L5S1 performed by Sabina Hopson MD at [facility] Bilateral 5/20/2019    EPIDURAL STEROID INJECTION BILATERAL S1 performed by Sabina Hopson MD at [id number] [place] [address]  10/1/06    NECK SURGERY  5/2011 & 2001    NERVE BLOCK Bilateral 02/17/2020    Lumbar Facet L2-L5    NERVE SURGERY Bilateral 9/5/2019    BILATERAL LUMBAR FACET STEROID INJECTION L2-L5 performed by Sabina Hopson MD at [facility]  08/25/2017    L5-S1 steroid injection    OTHER SURGICAL HISTORY  11/20/2017    VANITA L5-S1    OTHER SURGICAL HISTORY  10/01/2018    cervical steroid injection    OTHER SURGICAL HISTORY Bilateral 05/20/2019    EPIDURAL STEROID INJECTION BILATERAL S1 (Bilateral )    PAIN MANAGEMENT PROCEDURE Bilateral 2/17/2020    LUMBAR FACET L2-L5 performed by Sabina Hopson MD at [id number] [place] [address] [address] AA&/STRD TFRML EPI LUMBAR/SACRAL 1 LEVEL Bilateral 11/19/2018    BILATERAL L4 VANITA performed by Sabina Hopson MD at [address] [place] [address]  MT NJX DX/THER SBST INTRLMNR CRV/THRC W/IMG GDN N/A 10/1/2018    EPIDURAL STEROID INJECTION CERVICAL C7-T1 performed by Ced Penaloza MD at [facility] History     Socioeconomic History    Marital status: Single     Spouse name: None    Number of children: None    Years of education: None    Highest education level: None   Occupational History    None   Social Needs    Financial resource strain: None    Food insecurity     Worry: None     Inability: None    Transportation needs     Medical: None     Non-medical: None   Tobacco Use    Smoking status: Former Smoker     Packs/day: 0.50     Years: 15.00     Pack years: 7.50     Types: Cigarettes     Quit date: 10/5/2001     Years since quittin.3    Smokeless tobacco: Never Used   Substance and Sexual Activity    Alcohol use: Yes     Alcohol/week: 0.0 standard drinks     Comment: on occasion    Drug use: No    Sexual activity: None   Lifestyle    Physical activity     Days per week: None     Minutes per session: None    Stress: None   Relationships    Social connections     Talks on phone: None     Gets together: None     Attends Faith service: None     Active member of club or organization: None     Attends meetings of clubs or organizations: None     Relationship status: None    Intimate partner violence     Fear of current or ex partner: None     Emotionally abused: None     Physically abused: None     Forced sexual activity: None   Other Topics Concern    None   Social History Narrative    None     Family History   Problem Relation Age of Onset    Breast Cancer Sister     Cancer Sister         liver    Diabetes Daughter      No Known Allergies  Patient has no known allergies.    Vitals:    21 1220   BP: 110/70   Pulse: 67   Temp: 97.3 °F (36.3 °C)   SpO2: 98%     Current Outpatient Medications   Medication Sig Dispense Refill  HYDROcodone-acetaminophen (NORCO) 5-325 MG per tablet Take 1 tablet by mouth 2 times daily as needed for Pain for up to 30 days. 60 tablet 0    tiZANidine (ZANAFLEX) 2 MG tablet TAKE 2 TABLETS BY MOUTH AT BEDTIME AND  TAKE  ONCE  DURING  THE  DAY  IF  NEEDED 90 tablet 0    polyethylene glycol (GLYCOLAX) 17 GM/SCOOP powder take 17GM (DISSOLVED IN WATER) by mouth once daily 510 g 1    furosemide (LASIX) 20 MG tablet Take 1 tablet by mouth 2 times daily 180 tablet 1    metFORMIN (GLUCOPHAGE) 1000 MG tablet take 1 tablet by mouth twice a day with meals 180 tablet 1    atorvastatin (LIPITOR) 40 MG tablet Take 1 tablet by mouth daily 90 tablet 1    amitriptyline (ELAVIL) 25 MG tablet Take 1 tablet by mouth nightly 60 tablet 5    loratadine (CLARITIN) 10 MG tablet take 1 tablet by mouth once daily 90 tablet 1    cloNIDine (CATAPRES) 0.2 MG tablet Take 0.5 tablets by mouth 2 times daily 90 tablet 1    pantoprazole (PROTONIX) 40 MG tablet take 1 tablet by mouth every morning BEFORE BREAKFAST 90 tablet 1    allopurinol (ZYLOPRIM) 100 MG tablet take 1 tablet by mouth once daily 90 tablet 1    carvedilol (COREG) 3.125 MG tablet take 1 tablet by mouth twice a day 180 tablet 1    gabapentin (NEURONTIN) 600 MG tablet Take 1 tablet by mouth 3 times daily for 190 days. 90 tablet 5    aspirin 325 MG tablet Take 325 mg by mouth daily      HYDROcodone-acetaminophen (NORCO) 5-325 MG per tablet Take 1 tablet by mouth 2 times daily as needed for Pain for up to 30 days.  60 tablet 0    Lift Chair MISC by Does not apply route 1 each 0    blood glucose test strips (ONE TOUCH ULTRA TEST) strip TEST once daily (Patient not taking: Reported on 12/2/2020) 100 strip 3    fluticasone (FLONASE) 50 MCG/ACT nasal spray instill 1 spray into each nostril once daily (Patient not taking: Reported on 2/3/2021) 16 g 2  Blood Glucose Monitoring Suppl KIT 1 blood glucose monitoring supplies kit. Test strips, lancets, alcohol swabs (Patient not taking: Reported on 12/2/2020) 1 kit 0     No current facility-administered medications for this visit. Review of Systems   Constitutional: Positive for fatigue. Negative for fever. Respiratory: Negative. Musculoskeletal: Positive for arthralgias, back pain, joint swelling, myalgias, neck pain and neck stiffness. Neurological: Positive for dizziness, weakness, light-headedness, numbness and headaches. Objective:  General Appearance:  Uncomfortable. Vital signs: (most recent): Blood pressure 110/70, pulse 67, temperature 97.3 °F (36.3 °C), temperature source Infrared, height 5' 2\" (1.575 m), weight 162 lb (73.5 kg), SpO2 98 %, not currently breastfeeding. Vital signs are normal.  No fever. Output: Producing urine and producing stool. HEENT: Normal HEENT exam.    Lungs:  Normal effort and normal respiratory rate. Breath sounds clear to auscultation. She is not in respiratory distress. Heart: Normal rate. Extremities: Normal range of motion. There is no deformity. Neurological: Patient is alert and oriented to person, place and time. Patient has normal coordination. Pupils:  Pupils are equal, round, and reactive to light. Pupils are equal.   Skin:  Warm and dry. No rash or cyanosis. Assessment & Plan     EXAMINATION: MRI OF THE CERVICAL SPINE WITHOUT CONTRAST 7/31/2019 8:26 am     Impression Reversal of the normal cervical lordosis with redemonstration of postsurgical change from C3-C6. Canal stenosis most pronounced at C3-4 and C6-7. There is evidence of myelomalacia at C3-4 and C6-7 as described above. Mild right foraminal narrowing at C2-3, mild left and moderate right foraminal narrowing at C3-4, moderate to severe right and mild-to-moderate left foraminal narrowing at C5-6, severe bilateral foraminal narrowing at C6-7. EXAMINATION: MRI OF THE LUMBAR SPINE WITHOUT CONTRAST, 7/31/2019 8:26 am     Impression Multilevel degenerative disc disease and associated multilevel degenerative facet and ligamentous hypertrophy with canal stenosis that is severe at L4-5. Foraminal narrowing bilaterally throughout the lower thoracic and lumbar spine as described above. L1-L2: There is a circumferential disc bulge with facet and ligamentous hypertrophy. There is no canal stenosis or left foraminal narrowing. There is mild right foraminal narrowing. L2-L3: There is a mild circumferential disc bulge with facet and ligamentous hypertrophy. There is no canal stenosis or left foraminal narrowing. There is mild right foraminal narrowing. L3-L4: There is a circumferential disc bulge with facet and ligamentous hypertrophy and large bilateral facet effusions. There is canal stenosis measuring 7 mm in AP dimension. There is mild bilateral foraminal narrowing. L4-L5: There is a circumferential disc bulge with mild uncovering of the disc space posteriorly. There is associated facet hypertrophy. There is severe canal stenosis. There is severe bilateral foraminal narrowing. L5-S1: There is a circumferential disc bulge with facet and ligamentous hypertrophy. There is canal stenosis measuring 8 mm in AP dimension. There is moderate to severe left and severe right foraminal narrowing. 1. Chronic use of opiate drugs therapeutic purposes    2. Cervical stenosis of spine    3. Primary osteoarthritis involving multiple joints    4. Lumbar stenosis with neurogenic claudication    5. JANN on CPAP    6. Hx of cervical spine surgery    7.  Cervical radicular pain        Orders Placed This Encounter   Procedures    Drugs of Abuse, Urine    LA NJX DX/THER SBST INTRLMNR CRV/THRC W/IMG GDN      Orders Placed This Encounter   Medications    HYDROcodone-acetaminophen (NORCO) 5-325 MG per tablet Sig: Take 1 tablet by mouth 2 times daily as needed for Pain for up to 30 days. Dispense:  60 tablet     Refill:  0     Reduce doses taken as pain becomes manageable      Controlled Substance Monitoring:    Acute and Chronic Pain Monitoring:   RX Monitoring 2/3/2021   Attestation -   Periodic Controlled Substance Monitoring No signs of potential drug abuse or diversion identified. ;Possible medication side effects, risk of tolerance/dependence & alternative treatments discussed. ;Assessed functional status. Chronic Pain > 50 MEDD Obtained or confirmed \"Consent for Opioid Use\" on file.    Chronic Pain > 80 MEDD -           Electronically signed by Juan Luis Walker MD on 2/3/2021 at 12:49 PM     Urine toxicology February 3, 2021  Validity testing satisfactory  Positive for hydrocodone and metabolite consistent with treatment  Positive for alcohol  Will discussed with patient that concomitant use of alcohol with opioids significantly increase the risk of respiratory depression and fall considering advanced age particularly

## 2021-02-09 ENCOUNTER — HOSPITAL ENCOUNTER (OUTPATIENT)
Age: 81
Setting detail: SPECIMEN
Discharge: HOME | End: 2021-02-09
Payer: MEDICARE

## 2021-02-09 ENCOUNTER — OFFICE VISIT (OUTPATIENT)
Dept: OBGYN CLINIC | Age: 81
End: 2021-02-09
Payer: MEDICARE

## 2021-02-09 VITALS
WEIGHT: 145.4 LBS | DIASTOLIC BLOOD PRESSURE: 86 MMHG | HEIGHT: 62 IN | TEMPERATURE: 98.1 F | SYSTOLIC BLOOD PRESSURE: 122 MMHG | BODY MASS INDEX: 26.76 KG/M2

## 2021-02-09 DIAGNOSIS — R30.0 DYSURIA: ICD-10-CM

## 2021-02-09 DIAGNOSIS — N89.8 VAGINAL ODOR: ICD-10-CM

## 2021-02-09 DIAGNOSIS — Z01.419 ENCOUNTER FOR GYNECOLOGICAL EXAMINATION: Primary | ICD-10-CM

## 2021-02-09 DIAGNOSIS — Z12.31 ENCOUNTER FOR SCREENING MAMMOGRAM FOR BREAST CANCER: ICD-10-CM

## 2021-02-09 LAB
BILIRUBIN URINE: NEGATIVE
COLOR: YELLOW
COMMENT UA: ABNORMAL
GLUCOSE URINE: NEGATIVE
KETONES, URINE: NEGATIVE
LEUKOCYTE ESTERASE, URINE: NEGATIVE
NITRITE, URINE: NEGATIVE
PH UA: >9 (ref 5–8)
PROTEIN UA: NEGATIVE
SPECIFIC GRAVITY UA: 1.02 (ref 1–1.03)
TURBIDITY: CLEAR
URINE HGB: NEGATIVE
UROBILINOGEN, URINE: NORMAL

## 2021-02-09 PROCEDURE — G0101 CA SCREEN;PELVIC/BREAST EXAM: HCPCS | Performed by: NURSE PRACTITIONER

## 2021-02-09 PROCEDURE — G8484 FLU IMMUNIZE NO ADMIN: HCPCS | Performed by: NURSE PRACTITIONER

## 2021-02-09 PROCEDURE — 81003 URINALYSIS AUTO W/O SCOPE: CPT | Performed by: NURSE PRACTITIONER

## 2021-02-09 ASSESSMENT — ENCOUNTER SYMPTOMS
ABDOMINAL PAIN: 0
COUGH: 0
ABDOMINAL DISTENTION: 0
SHORTNESS OF BREATH: 0
BACK PAIN: 0
CONSTIPATION: 0
DIARRHEA: 0

## 2021-02-09 NOTE — PROGRESS NOTES
mouth 2 times daily 90 tablet 1    pantoprazole (PROTONIX) 40 MG tablet take 1 tablet by mouth every morning BEFORE BREAKFAST 90 tablet 1    allopurinol (ZYLOPRIM) 100 MG tablet take 1 tablet by mouth once daily 90 tablet 1    carvedilol (COREG) 3.125 MG tablet take 1 tablet by mouth twice a day 180 tablet 1    gabapentin (NEURONTIN) 600 MG tablet Take 1 tablet by mouth 3 times daily for 190 days. 90 tablet 5    blood glucose test strips (ONE TOUCH ULTRA TEST) strip TEST once daily 100 strip 3    fluticasone (FLONASE) 50 MCG/ACT nasal spray instill 1 spray into each nostril once daily 16 g 2    aspirin 325 MG tablet Take 325 mg by mouth daily      Blood Glucose Monitoring Suppl KIT 1 blood glucose monitoring supplies kit. Test strips, lancets, alcohol swabs 1 kit 0    HYDROcodone-acetaminophen (NORCO) 5-325 MG per tablet Take 1 tablet by mouth 2 times daily as needed for Pain for up to 30 days. 60 tablet 0     No current facility-administered medications for this visit.       No Known Allergies    Past Medical History:   Diagnosis Date    Arthritis     Cataract     Cataracts, bilateral     Diverticulosis of colon 2015    Headache(784.0)     History of colon polyps 2015    Insomnia     Neck pain     Osteoarthrosis, unspecified whether generalized or localized, unspecified site 10/5/2012    Pure hypercholesterolemia 10/5/2012    Shoulder blade pain     right  side    Stroke ([facility] [address]     Tubular adenoma of colon 2015    Type II or unspecified type diabetes mellitus without mention of complication, not stated as uncontrolled 10/5/2012    Unspecified essential hypertension 10/5/2012    Unspecified sleep apnea      Denies family history of breast, ovarian, uterus, colon CA     Past Surgical History:   Procedure Laterality Date    COLONOSCOPY  8/23/06    Dr Cassidy Lorenzo  5 12 15    extensive diverticulosis, tubular adenoma    EPIDURAL STEROID INJECTION N/A 8/25/2017    EPIDURAL STEROID INJECTION cervical performed by Conrad Stuart MD at [facility] 2017    EPIDURAL STEROID INJECTION L5S1 performed by Conrad Stuart MD at [facility] Bilateral 2019    EPIDURAL STEROID INJECTION BILATERAL S1 performed by Conrad Stuart MD at [id number] [place] [address]  10/1/06    NECK SURGERY  2011 & 2001    NERVE BLOCK Bilateral 2020    Lumbar Facet L2-L5    NERVE SURGERY Bilateral 2019    BILATERAL LUMBAR FACET STEROID INJECTION L2-L5 performed by Conrad Stuart MD at [address]  2017    L5-S1 steroid injection    OTHER SURGICAL HISTORY  2017    VANITA L5-S1    OTHER SURGICAL HISTORY  10/01/2018    cervical steroid injection    OTHER SURGICAL HISTORY Bilateral 2019    EPIDURAL STEROID INJECTION BILATERAL S1 (Bilateral )    PAIN MANAGEMENT PROCEDURE Bilateral 2020    LUMBAR FACET L2-L5 performed by Conrad Stuart MD at [address] AA&/STRD TFRML EPI LUMBAR/SACRAL 1 LEVEL Bilateral 2018    BILATERAL L4 VANITA performed by Conrad Stuart MD at [address] DX/THER SBST INTRLMNR CRV/THRC W/IMG GDN N/A 10/1/2018    EPIDURAL STEROID INJECTION CERVICAL C7-T1 performed by Conrad Stuart MD at [facility] History   Problem Relation Age of Onset    Breast Cancer Sister     Cancer Sister         liver    Diabetes Daughter      Social History     Tobacco Use    Smoking status: Former Smoker     Packs/day: 0.50     Years: 15.00     Pack years: 7.50     Types: Cigarettes     Quit date: 10/5/2001     Years since quittin.3    Smokeless tobacco: Never Used   Substance Use Topics    Alcohol use: Yes     Alcohol/week: 0.0 standard drinks     Comment: on occasion        Subjective:      Review of Systems   Constitutional: Negative for appetite change and fatigue. HENT: Negative for congestion and hearing loss.     Eyes: Negative for visual disturbance. Respiratory: Negative for cough and shortness of breath. Cardiovascular: Negative for chest pain and palpitations. Gastrointestinal: Negative for abdominal distention, abdominal pain, constipation and diarrhea. Genitourinary: Positive for difficulty urinating (dysuria) and vaginal discharge (odor). Negative for flank pain, frequency, menstrual problem and pelvic pain. Musculoskeletal: Negative for back pain. Neurological: Negative for syncope and headaches. Psychiatric/Behavioral: Negative for behavioral problems. Objective:     /86 (Site: Right Upper Arm, Position: Sitting, Cuff Size: Medium Adult)   Temp 98.1 °F (36.7 °C)   Ht 5' 2\" (1.575 m)   Wt 145 lb 6.4 oz (66 kg)   Breastfeeding No   BMI 26.59 kg/m²   Physical Exam  Constitutional:       Appearance: She is well-developed. Eyes:      Pupils: Pupils are equal, round, and reactive to light. Neck:      Thyroid: No thyroid mass or thyromegaly. Cardiovascular:      Rate and Rhythm: Normal rate and regular rhythm. Heart sounds: Normal heart sounds. No murmur. Pulmonary:      Breath sounds: Normal breath sounds. Chest:      Chest wall: No tenderness. Breasts:         Right: No inverted nipple, mass, nipple discharge, skin change or tenderness. Left: No inverted nipple, mass, nipple discharge, skin change or tenderness. Abdominal:      General: There is no distension. Palpations: Abdomen is soft. There is no mass. Tenderness: There is no abdominal tenderness. There is no rebound. Hernia: No hernia is present. Genitourinary:     Labia:         Right: No lesion. Left: No lesion. Vagina: Normal. No vaginal discharge. Adnexa:         Right: No mass or tenderness. Left: No mass or tenderness. Comments: Vaginal Cuff is intact and well supported, without any lesions  Lymphadenopathy:      Cervical: No cervical adenopathy.    Skin: General: Skin is dry. Neurological:      Mental Status: She is alert and oriented to person, place, and time. Psychiatric:         Behavior: Behavior normal.           Assessment:     1. Encounter for gynecological examination    2. Encounter for screening mammogram for breast cancer    3. Dysuria          Plan:   1. Discussed new pap smear guidelines. 2. Screening mammogram discussed and advised yearly if within normal limits. 3. Calcium and Vitamin D dosing reviewed. 4. Colonoscopy screening reviewed. 5. Bone density testing reviewed.    UA  Affirm  Electronicallysigned by Laz Sethi on 2/9/2021

## 2021-02-10 LAB
DIRECT EXAM: NORMAL
Lab: NORMAL
SPECIMEN DESCRIPTION: NORMAL

## 2021-02-11 DIAGNOSIS — G47.9 SLEEPING DIFFICULTY: ICD-10-CM

## 2021-02-11 RX ORDER — ZOLPIDEM TARTRATE 10 MG/1
TABLET ORAL
Qty: 15 TABLET | Refills: 1 | Status: SHIPPED | OUTPATIENT
Start: 2021-02-11 | End: 2021-04-07

## 2021-02-11 NOTE — TELEPHONE ENCOUNTER
Last visit: 12/02/2020  Last Med refill: 01/02/2021    Does patient have enough medication for 72 hours: No:     Next Visit Date:  Future Appointments   Date Time Provider Pradeep Catherine   3/2/2021 10:30 AM STA SCR MAMMO RM 2 STAZ MAMMO STA Radiolog   3/16/2021  9:00 AM Meg Barlow MD [facility]   4/13/2021 11:15 AM Connie Donnelly  [address] Maintenance   Topic Date Due    Hepatitis C screen  1940    COVID-19 Vaccine (1 of 2) 05/28/1958    Shingles Vaccine (1 of 2) 05/28/1992    Annual Wellness Visit (AWV)  02/03/2021    DTaP/Tdap/Td vaccine (1 - Tdap) 10/18/2026 (Originally 5/28/1961)    Lipid screen  09/30/2021    Potassium monitoring  09/30/2021    Creatinine monitoring  09/30/2021    DEXA (modify frequency per FRAX score)  Completed    Flu vaccine  Completed    Pneumococcal 65+ years Vaccine  Completed    Hepatitis A vaccine  Aged Out    Hib vaccine  Aged Out    Meningococcal (ACWY) vaccine  Aged Out       Hemoglobin A1C (%)   Date Value   09/30/2020 5.7   02/25/2020 6.6   10/07/2019 6.5             ( goal A1C is < 7)   Microalb/Crt.  Ratio (mcg/mg creat)   Date Value   10/06/2020 CANNOT BE CALCULATED     LDL Cholesterol (mg/dL)   Date Value   09/30/2020 48   06/03/2019 102       (goal LDL is <100)   AST (U/L)   Date Value   09/30/2020 20     ALT (U/L)   Date Value   09/30/2020 13     BUN (mg/dL)   Date Value   09/30/2020 9     BP Readings from Last 3 Encounters:   02/09/21 122/86   02/03/21 110/70   12/02/20 136/82          (goal 120/80)    All Future Testing planned in [facility]  Lab Frequency Next Occurrence   XR HIP BILATERAL W AP PELVIS (2 VIEWS) Once 02/17/2020   XR LUMBAR SPINE (MIN 4 VIEWS) Once 02/17/2020   Drugs of Abuse, Urine Once 02/03/2021   SD NJX DX/THER SBST INTRLMNR CRV/THRC W/IMG GDN Once 02/04/2021   SHAKIR DIGITAL SCREEN W OR WO CAD BILATERAL Once 03/08/2021               Patient Active Problem List:     Osteoarthritis Essential hypertension     Type 2 diabetes mellitus with diabetic neuropathy, without long-term current use of insulin (HCC)     Pure hypercholesterolemia     Constipation     Rectal pain     Diverticulosis of colon     Tubular adenoma of colon     History of colon polyps     Rectal pressure     Failed neck syndrome     Cervical stenosis of spine     Cervical radicular pain     Lumbar stenosis with neurogenic claudication     JANN on CPAP     Primary osteoarthritis involving multiple joints     Hx of cervical spine surgery     Chronic use of opiate drugs therapeutic purposes     Myelomalacia (HCC)     Lumbar facet joint syndrome

## 2021-02-26 ENCOUNTER — TELEPHONE (OUTPATIENT)
Dept: PAIN MANAGEMENT | Age: 81
End: 2021-02-26

## 2021-02-26 NOTE — TELEPHONE ENCOUNTER
Pt daughter Dominic Ames states they are unable to get pt to [facility]. Kenya's on 3/1/21 for procedure and need to reschedule after the 10th of March.  Pt procedure on 3/1/21 cancelled and rescheduled to 3/22/21

## 2021-03-05 DIAGNOSIS — E11.40 TYPE 2 DIABETES MELLITUS WITH DIABETIC NEUROPATHY, WITHOUT LONG-TERM CURRENT USE OF INSULIN (HCC): ICD-10-CM

## 2021-03-05 NOTE — TELEPHONE ENCOUNTER
Last visit: 12/2/20  Last Med refill: 9/30/20  Does patient have enough medication for 72 hours: Yes    Next Visit Date:  Future Appointments   Date Time Provider Pradeep Alexander   3/16/2021  9:00 AM Ced Penaloza MD [facility]   4/13/2021 11:15 AM Connie Simental  [address] [facility]   Topic Date Due    Hepatitis C screen  Never done    COVID-19 Vaccine (1 of 2) Never done    Shingles Vaccine (1 of 2) Never done    Annual Wellness Visit (AWV)  Never done    DTaP/Tdap/Td vaccine (1 - Tdap) 10/18/2026 (Originally 5/28/1959)    Lipid screen  09/30/2021    Potassium monitoring  09/30/2021    Creatinine monitoring  09/30/2021    DEXA (modify frequency per FRAX score)  Completed    Flu vaccine  Completed    Pneumococcal 65+ years Vaccine  Completed    Hepatitis A vaccine  Aged Out    Hib vaccine  Aged Out    Meningococcal (ACWY) vaccine  Aged Out       Hemoglobin A1C (%)   Date Value   09/30/2020 5.7   02/25/2020 6.6   10/07/2019 6.5             ( goal A1C is < 7)   Microalb/Crt.  Ratio (mcg/mg creat)   Date Value   10/06/2020 CANNOT BE CALCULATED     LDL Cholesterol (mg/dL)   Date Value   09/30/2020 48   06/03/2019 102       (goal LDL is <100)   AST (U/L)   Date Value   09/30/2020 20     ALT (U/L)   Date Value   09/30/2020 13     BUN (mg/dL)   Date Value   09/30/2020 9     BP Readings from Last 3 Encounters:   02/09/21 122/86   02/03/21 110/70   12/02/20 136/82          (goal 120/80)    All Future Testing planned in [facility]  Lab Frequency Next Occurrence   Drugs of Abuse, Urine Once 02/03/2021   AL NJX DX/THER SBST INTRLMNR CRV/THRC W/IMG GDN Once 02/04/2021   SHAKIR DIGITAL SCREEN W OR WO CAD BILATERAL Once 03/08/2021               Patient Active Problem List:     Osteoarthritis     Essential hypertension     Type 2 diabetes mellitus with diabetic neuropathy, without long-term current use of insulin (HCC)     Pure hypercholesterolemia     Constipation     Rectal pain Diverticulosis of colon     Tubular adenoma of colon     History of colon polyps     Rectal pressure     Failed neck syndrome     Cervical stenosis of spine     Cervical radicular pain     Lumbar stenosis with neurogenic claudication     JANN on CPAP     Primary osteoarthritis involving multiple joints     Hx of cervical spine surgery     Chronic use of opiate drugs therapeutic purposes     Myelomalacia (HCC)     Lumbar facet joint syndrome

## 2021-03-18 DIAGNOSIS — M54.12 CERVICAL RADICULAR PAIN: Chronic | ICD-10-CM

## 2021-03-18 RX ORDER — TIZANIDINE 2 MG/1
TABLET ORAL
Qty: 90 TABLET | Refills: 0 | Status: SHIPPED | OUTPATIENT
Start: 2021-03-18 | End: 2021-05-12

## 2021-03-18 NOTE — TELEPHONE ENCOUNTER
Last visit: 12/2/20  Last Med refill: 1/20/21  Does patient have enough medication for 72 hours: Yes    Next Visit Date:  Future Appointments   Date Time Provider Pradeep Catherine   4/7/2021  9:20 Marina Voss MD [facility]   4/13/2021 11:15 AM Connie Baltazar Cera,  [address] Maintenance   Topic Date Due    Hepatitis C screen  Never done    COVID-19 Vaccine (1) Never done    Shingles Vaccine (1 of 2) Never done    Annual Wellness Visit (AWV)  Never done    DTaP/Tdap/Td vaccine (1 - Tdap) 10/18/2026 (Originally 5/28/1959)    Lipid screen  09/30/2021    Potassium monitoring  09/30/2021    Creatinine monitoring  09/30/2021    DEXA (modify frequency per FRAX score)  Completed    Flu vaccine  Completed    Pneumococcal 65+ years Vaccine  Completed    Hepatitis A vaccine  Aged Out    Hib vaccine  Aged Out    Meningococcal (ACWY) vaccine  Aged Out       Hemoglobin A1C (%)   Date Value   09/30/2020 5.7   02/25/2020 6.6   10/07/2019 6.5             ( goal A1C is < 7)   Microalb/Crt.  Ratio (mcg/mg creat)   Date Value   10/06/2020 CANNOT BE CALCULATED     LDL Cholesterol (mg/dL)   Date Value   09/30/2020 48   06/03/2019 102       (goal LDL is <100)   AST (U/L)   Date Value   09/30/2020 20     ALT (U/L)   Date Value   09/30/2020 13     BUN (mg/dL)   Date Value   09/30/2020 9     BP Readings from Last 3 Encounters:   02/09/21 122/86   02/03/21 110/70   12/02/20 136/82          (goal 120/80)    All Future Testing planned in [facility]  Lab Frequency Next Occurrence   Drugs of Abuse, Urine Once 02/03/2021   [place] NJX DX/THER SBST INTRLMNR CRV/THRC W/IMG GDN Once 02/04/2021   SHAKIR DIGITAL SCREEN W OR WO CAD BILATERAL Once 03/08/2021               Patient Active Problem List:     Osteoarthritis     Essential hypertension     Type 2 diabetes mellitus with diabetic neuropathy, without long-term current use of insulin (HCC)     Pure hypercholesterolemia     Constipation     Rectal pain Diverticulosis of colon     Tubular adenoma of colon     History of colon polyps     Rectal pressure     Failed neck syndrome     Cervical stenosis of spine     Cervical radicular pain     Lumbar stenosis with neurogenic claudication     JANN on CPAP     Primary osteoarthritis involving multiple joints     Hx of cervical spine surgery     Chronic use of opiate drugs therapeutic purposes     Myelomalacia (HCC)     Lumbar facet joint syndrome

## 2021-03-22 ENCOUNTER — APPOINTMENT (OUTPATIENT)
Dept: GENERAL RADIOLOGY | Age: 81
End: 2021-03-22
Attending: ANESTHESIOLOGY
Payer: MEDICARE

## 2021-03-22 ENCOUNTER — HOSPITAL ENCOUNTER (OUTPATIENT)
Age: 81
Setting detail: OUTPATIENT SURGERY
Discharge: HOME | End: 2021-03-22
Attending: ANESTHESIOLOGY | Admitting: ANESTHESIOLOGY
Payer: MEDICARE

## 2021-03-22 VITALS
TEMPERATURE: 98 F | OXYGEN SATURATION: 96 % | RESPIRATION RATE: 18 BRPM | SYSTOLIC BLOOD PRESSURE: 155 MMHG | HEART RATE: 91 BPM | WEIGHT: 159 LBS | BODY MASS INDEX: 29.26 KG/M2 | DIASTOLIC BLOOD PRESSURE: 70 MMHG | HEIGHT: 62 IN

## 2021-03-22 LAB — GLUCOSE BLD-MCNC: 119 MG/DL (ref 65–105)

## 2021-03-22 PROCEDURE — 2709999900 HC NON-CHARGEABLE SUPPLY: Performed by: ANESTHESIOLOGY

## 2021-03-22 PROCEDURE — 7100000011 HC PHASE II RECOVERY - ADDTL 15 MIN: Performed by: ANESTHESIOLOGY

## 2021-03-22 PROCEDURE — 2580000003 HC RX 258: Performed by: ANESTHESIOLOGY

## 2021-03-22 PROCEDURE — 99152 MOD SED SAME PHYS/QHP 5/>YRS: CPT | Performed by: ANESTHESIOLOGY

## 2021-03-22 PROCEDURE — 3600000051 HC PAIN LEVEL 1 ADDL 15 MIN: Performed by: ANESTHESIOLOGY

## 2021-03-22 PROCEDURE — 3600000050 HC PAIN LEVEL 1 BASE: Performed by: ANESTHESIOLOGY

## 2021-03-22 PROCEDURE — 82947 ASSAY GLUCOSE BLOOD QUANT: CPT

## 2021-03-22 PROCEDURE — 2500000003 HC RX 250 WO HCPCS: Performed by: ANESTHESIOLOGY

## 2021-03-22 PROCEDURE — 7100000010 HC PHASE II RECOVERY - FIRST 15 MIN: Performed by: ANESTHESIOLOGY

## 2021-03-22 PROCEDURE — 62321 NJX INTERLAMINAR CRV/THRC: CPT | Performed by: ANESTHESIOLOGY

## 2021-03-22 PROCEDURE — 6360000002 HC RX W HCPCS: Performed by: ANESTHESIOLOGY

## 2021-03-22 PROCEDURE — 3209999900 FLUORO FOR SURGICAL PROCEDURES

## 2021-03-22 PROCEDURE — 6360000004 HC RX CONTRAST MEDICATION: Performed by: ANESTHESIOLOGY

## 2021-03-22 RX ORDER — SODIUM CHLORIDE 0.9 % (FLUSH) 0.9 %
10 SYRINGE (ML) INJECTION EVERY 12 HOURS SCHEDULED
Status: DISCONTINUED | OUTPATIENT
Start: 2021-03-22 | End: 2021-03-22 | Stop reason: HOSPADM

## 2021-03-22 RX ORDER — SODIUM CHLORIDE 0.9 % (FLUSH) 0.9 %
10 SYRINGE (ML) INJECTION PRN
Status: DISCONTINUED | OUTPATIENT
Start: 2021-03-22 | End: 2021-03-22 | Stop reason: HOSPADM

## 2021-03-22 RX ORDER — MIDAZOLAM HYDROCHLORIDE 1 MG/ML
INJECTION INTRAMUSCULAR; INTRAVENOUS PRN
Status: DISCONTINUED | OUTPATIENT
Start: 2021-03-22 | End: 2021-03-22 | Stop reason: ALTCHOICE

## 2021-03-22 RX ORDER — DEXAMETHASONE SODIUM PHOSPHATE 10 MG/ML
INJECTION INTRAMUSCULAR; INTRAVENOUS PRN
Status: DISCONTINUED | OUTPATIENT
Start: 2021-03-22 | End: 2021-03-22 | Stop reason: ALTCHOICE

## 2021-03-22 RX ORDER — FENTANYL CITRATE 50 UG/ML
INJECTION, SOLUTION INTRAMUSCULAR; INTRAVENOUS PRN
Status: DISCONTINUED | OUTPATIENT
Start: 2021-03-22 | End: 2021-03-22 | Stop reason: ALTCHOICE

## 2021-03-22 RX ORDER — LIDOCAINE HYDROCHLORIDE 10 MG/ML
INJECTION, SOLUTION EPIDURAL; INFILTRATION; INTRACAUDAL; PERINEURAL PRN
Status: DISCONTINUED | OUTPATIENT
Start: 2021-03-22 | End: 2021-03-22 | Stop reason: ALTCHOICE

## 2021-03-22 RX ADMIN — SODIUM CHLORIDE, PRESERVATIVE FREE 10 ML: 5 INJECTION INTRAVENOUS at 08:30

## 2021-03-22 ASSESSMENT — PAIN SCALES - GENERAL: PAINLEVEL_OUTOF10: 4

## 2021-03-22 ASSESSMENT — PAIN DESCRIPTION - DESCRIPTORS: DESCRIPTORS: ACHING;DISCOMFORT

## 2021-03-22 NOTE — H&P
History and Physical Service   [facility] [place] - INPATIENT    HISTORY AND PHYSICAL EXAMINATION            Date of Evaluation: 3/22/2021  Patient name:  Shalonda Cui  MRN:   [id number]  YOB: 1940  PCP:    Jennifer Strickland MD    History Obtained From:     Patient, medical records    History of Present Illness: This is Shalonda Cui a 68 y.o. female who presents today for a CERVICAL EPIDURAL STEROID INJECTION AT C/7 - T/1.  by Dr. Lasha Ziegler  for TREATMENT OF CHRONIC SPINAL PAIN THE PATIENT HAS BOTH CHRONIC NECK PAIN ASSOCIATED WITH RIGHT ARM PAIN , NUMBNESS AND TINGLING. SHE HAS BEEN DIAGNOSED WITH CERVICAL STENOSIS AND MYELOMALACIA . SHE PRESENTS FOR CERVICAL   INJECTION. . SHE STOPPED ASA ONE WEEK AGO, SHE  HAS DIABETES. HER BLOOD SUGAR .        Past Medical History:     Past Medical History:   Diagnosis Date    Arthritis     Cataract     Cataracts, bilateral     Diverticulosis of colon 2015    Headache(784.0)     History of colon polyps 2015    Insomnia     Neck pain     Osteoarthrosis, unspecified whether generalized or localized, unspecified site 10/5/2012    Pure hypercholesterolemia 10/5/2012    Shoulder blade pain     right  side    [address]roke ([facility] [address]     Tubular adenoma of colon 2015    Type II or unspecified type diabetes mellitus without mention of complication, not stated as uncontrolled 10/5/2012    Unspecified essential hypertension 10/5/2012    Unspecified sleep apnea         Past Surgical History:     Past Surgical History:   Procedure Laterality Date    COLONOSCOPY  8/23/06    Dr Bess Najjar  5 12 15    extensive diverticulosis, tubular adenoma    EPIDURAL STEROID INJECTION N/A 8/25/2017    EPIDURAL STEROID INJECTION cervical performed by Suzan Schroeder MD at [address] 11/20/2017    EPIDURAL STEROID INJECTION L5S1 performed by Suzan Schroeder MD at [address] Bilateral 5/20/2019    EPIDURAL STEROID INJECTION BILATERAL S1 performed by Aydin Gates MD at [address] [facility] [address]  10/1/06    NECK SURGERY  5/2011 & 2001    NERVE BLOCK Bilateral 02/17/2020    Lumbar Facet L2-L5    NERVE SURGERY Bilateral 9/5/2019    BILATERAL LUMBAR FACET STEROID INJECTION L2-L5 performed by Aydin Gates MD at [address] [facility]  08/25/2017    L5-S1 steroid injection    OTHER SURGICAL HISTORY  11/20/2017    VANITA L5-S1    OTHER SURGICAL HISTORY  10/01/2018    cervical steroid injection    OTHER SURGICAL HISTORY Bilateral 05/20/2019    EPIDURAL STEROID INJECTION BILATERAL S1 (Bilateral )    PAIN MANAGEMENT PROCEDURE Bilateral 2/17/2020    LUMBAR FACET L2-L5 performed by Aydin Gates MD at [address] AA&/STRD TFRML EPI LUMBAR/SACRAL 1 LEVEL Bilateral 11/19/2018    BILATERAL L4 VANITA performed by Aydin Gates MD at [address] DX/THER SBST INTRLMNR CRV/THRC W/IMG GDN N/A 10/1/2018    EPIDURAL STEROID INJECTION CERVICAL C7-T1 performed by Aydin Gates MD at [address] [facility]        Medications Prior to Admission:     Prior to Admission medications    Medication Sig Start Date End Date Taking? Authorizing Provider   metFORMIN (GLUCOPHAGE) 1000 MG tablet take 1 tablet by mouth twice a day with meals 3/5/21  Yes Connie Decker MD   zolpidem (AMBIEN) 10 MG tablet take 1/2 tablet by mouth nightly if needed for sleep 2/11/21 4/12/21 Yes Connie Decker MD   HYDROcodone-acetaminophen (NORCO) 5-325 MG per tablet Take 1 tablet by mouth 2 times daily as needed for Pain for up to 30 days.  2/3/21 3/22/21 Yes Aydni Gates MD   furosemide (LASIX) 20 MG tablet Take 1 tablet by mouth 2 times daily 12/2/20  Yes Connie Decker MD   atorvastatin (LIPITOR) 40 MG tablet Take 1 tablet by mouth daily 9/30/20  Yes Connie Decker MD   amitriptyline (ELAVIL) 25 MG tablet Take 1 tablet by mouth nightly 9/30/20  Yes Boaz Moseley MD   loratadine (CLARITIN) 10 MG no carotid bruits, thyroid not palpable  Lungs: Bilateral equal air entry, clear to ausculation, no wheezing, rales or rhonchi, normal effort  Cardiovascular: HR  normal rate, regular rhythm,OCCASIONAL PVC 3/30 SECONDS  no murmur, gallop, rub. Abdomen: Soft, nontender, nondistended, normal bowel sounds  Neurologic: There are no new focal motor or sensory deficits, normal muscle tone and bulk, no abnormal sensation, normal speech, cranial nerves II through XII grossly intact  Skin: No gross lesions, rashes, bruising or bleeding on exposed skin area  Extremities:  peripheral pulses palpable, no pedal edema or calf pain with palpation  Psych: normal affect     Investigations:      Laboratory Testing:  No results found for this or any previous visit (from the past 24 hour(s)). No results for input(s): HGB, HCT, WBC, MCV, PLATELET, NA, K, CL, CO2, BUN, CREATININE, GLUCOSE, INR, PROTIME, APTT, AST, ALT, LABALBU, HCG in the last 720 hours. No results for input(s): COVID19 in the last 720 hours. Imaging/Diagnostics:    No results found. Diagnosis:      1.  CERVICAL STENOSIS     CERVICAL      1. CERVICAL EPIDURAL STEROID INJECTION C-7 TO T/1      Rose Hammans, APRN - CNP  3/22/2021  9:06 AM

## 2021-03-22 NOTE — OP NOTE
Operative Note      Patient: Mayra Magana  YOB: 1940  MRN: [id number]    Date of Procedure: 3/22/2021    Pre-Op Diagnosis: DX CERVICAL STENOSIS OF SPINE         CERVICAL RADICULAR PAIN    Post-Op Diagnosis: Same       Procedure(s):  CERVICAL EPIDURAL STEROID INJECTION C7-T1    Surgeon(s):  Lucy Peña MD    Assistant:   * No surgical staff found *    Anesthesia: IV Sedation    Estimated Blood Loss (mL): Minimal    Complications: None    Specimens:   * No specimens in log *    Implants:  * No implants in log *      Drains: * No LDAs found *    Findings: N/A    Detailed Description of Procedure:   Preoperative Diagnosis: Cervical disc herniation and radiculitis  Postoperative Diagnosis:  Cervical disc herniation and radiculitis    Procedure Performed:  Cervical epidural steroid injection under fluoroscopy guidance  AT c7-t1    BLOOD LOSS: NONE    Procedure: The Patient was seen in the preop area, chart was reviewed, informed consent was obtained. Patient was taken to procedure room and was placed in prone position. Vital signs were monitored through out the  Procedure. A time out was completed. The site was prepped and draped in sterile manner. The target point was marked at The interlaminar space at C7/T1 . Skin and deep tissues were anesthetized with 1 % lidocaine. A  19-gauge Tuohy epidural needlele was advanced  under fluoroscopy guidance in AP view. Epidural space was identified using CODY technique. A 19 G catheter was threaded up in epidural space for 2 levels. Position was confirmed in Lateral view. Then after negative aspiration contrast dye was injected with live fluoroscopy in AP views that showed  spread of the contrast in the epidural space  and no vascular runoff or intrathecal spread. Finally 5 ml of treatment solution containing 4 ml of PF NS and 1 ml of dexamethasone 10 mg / ml was injected.   The needle was removed and a Band-Aid was placed over the needle  insertion site.  The patient's vital signs remained stable and the patient tolerated the procedure well. Electronically signed by Haydee Ochoa MD on 3/22/2021 at 9:26 AM  SEDATION NOTE:    ASA CLASSIFICATION  3  MP   CLASSIFICATION  3    · Moderate intravenous conscious sedation was supervised by Dr. Audie Dasilva  . The patient was independently monitored by a Registered Nurse assigned to the Procedure Room  . Monitoring included automated blood pressure, continuous EKG, Capnography and continuous pulse oximetry. . The detailed Conscious Record is permanently stored in the [place] [id number].      The following is the conscious sedation record;  Start Time:  0909  End times:  0925  Duration:  16 minutes  MEDS GIVEN 1 MG VERSED AND 50 MCG FENTANYL

## 2021-03-31 DIAGNOSIS — M48.02 CERVICAL STENOSIS OF SPINE: Chronic | ICD-10-CM

## 2021-03-31 DIAGNOSIS — M48.062 LUMBAR STENOSIS WITH NEUROGENIC CLAUDICATION: Chronic | ICD-10-CM

## 2021-03-31 DIAGNOSIS — M15.9 PRIMARY OSTEOARTHRITIS INVOLVING MULTIPLE JOINTS: ICD-10-CM

## 2021-04-05 RX ORDER — HYDROCODONE BITARTRATE AND ACETAMINOPHEN 5; 325 MG/1; MG/1
1 TABLET ORAL 2 TIMES DAILY PRN
Qty: 60 TABLET | Refills: 0 | Status: SHIPPED | OUTPATIENT
Start: 2021-04-05 | End: 2021-04-13 | Stop reason: ALTCHOICE

## 2021-04-07 ENCOUNTER — OFFICE VISIT (OUTPATIENT)
Dept: PAIN MANAGEMENT | Age: 81
End: 2021-04-07
Payer: MEDICARE

## 2021-04-07 VITALS
TEMPERATURE: 97.1 F | SYSTOLIC BLOOD PRESSURE: 165 MMHG | OXYGEN SATURATION: 97 % | BODY MASS INDEX: 30.36 KG/M2 | HEIGHT: 62 IN | DIASTOLIC BLOOD PRESSURE: 96 MMHG | HEART RATE: 94 BPM | WEIGHT: 165 LBS

## 2021-04-07 DIAGNOSIS — M48.02 CERVICAL STENOSIS OF SPINE: Chronic | ICD-10-CM

## 2021-04-07 DIAGNOSIS — Z79.891 CHRONIC USE OF OPIATE DRUG FOR THERAPEUTIC PURPOSE: ICD-10-CM

## 2021-04-07 DIAGNOSIS — M48.062 LUMBAR STENOSIS WITH NEUROGENIC CLAUDICATION: Primary | Chronic | ICD-10-CM

## 2021-04-07 DIAGNOSIS — G47.9 SLEEPING DIFFICULTY: ICD-10-CM

## 2021-04-07 DIAGNOSIS — M15.9 PRIMARY OSTEOARTHRITIS INVOLVING MULTIPLE JOINTS: ICD-10-CM

## 2021-04-07 PROCEDURE — 1123F ACP DISCUSS/DSCN MKR DOCD: CPT | Performed by: ANESTHESIOLOGY

## 2021-04-07 PROCEDURE — 1036F TOBACCO NON-USER: CPT | Performed by: ANESTHESIOLOGY

## 2021-04-07 PROCEDURE — G8417 CALC BMI ABV UP PARAM F/U: HCPCS | Performed by: ANESTHESIOLOGY

## 2021-04-07 PROCEDURE — 4040F PNEUMOC VAC/ADMIN/RCVD: CPT | Performed by: ANESTHESIOLOGY

## 2021-04-07 PROCEDURE — 1090F PRES/ABSN URINE INCON ASSESS: CPT | Performed by: ANESTHESIOLOGY

## 2021-04-07 PROCEDURE — G8399 PT W/DXA RESULTS DOCUMENT: HCPCS | Performed by: ANESTHESIOLOGY

## 2021-04-07 PROCEDURE — 99213 OFFICE O/P EST LOW 20 MIN: CPT | Performed by: ANESTHESIOLOGY

## 2021-04-07 PROCEDURE — G8427 DOCREV CUR MEDS BY ELIG CLIN: HCPCS | Performed by: ANESTHESIOLOGY

## 2021-04-07 RX ORDER — ZOLPIDEM TARTRATE 10 MG/1
5 TABLET ORAL NIGHTLY PRN
Qty: 15 TABLET | Refills: 1 | Status: SHIPPED | OUTPATIENT
Start: 2021-04-07 | End: 2021-06-16

## 2021-04-07 ASSESSMENT — ENCOUNTER SYMPTOMS: SHORTNESS OF BREATH: 1

## 2021-04-07 NOTE — PROGRESS NOTES
The patient is a 68 y. o. Non-/non  female. Chief Complaint   Patient presents with    Neck Pain    Shoulder Pain        HPI    72-year-old woman with history of chronic neck and chronic lower back pain  She is diagnosed with cervical spinal stenosis and lumbar spinal stenosis  For her chronic neck and cervical radicular symptoms she had a recent cervical epidural injection  Today here for postprocedure follow-up  Report more than 50% improvement in neck and arm pain with recent injection and do not report any side effect    Patient is on chronic low-dose opioid therapy with Norco 5 mg twice a day along with gabapentin  Finds the medication helpful allowing her to do daily life activities and become independent  No history of illicit substance use  Recent alcohol detected on urine toxicology discussed with patient  Patient states she occasionally use a very small amount of alcohol and plans to stop it now on a recommendation    Today main issue is lower back pain located in the lumbar area with radiation down both legs and  Pain aggravated with standing and walking  No changes in bladder or bowel control  She has tried conservative measures with therapy in past  Not a candidate for NSAIDs because of renal insufficiency and advanced age  Had epidural injection in past with good outcome    Patient is here for a follow up and medication was refilled on 4/5/21. Pain is aggravated more when she gets up out of a chair or from bed. Alleviating factors are the pain medications. Pain is sharp and shooting from the neck to the right shoulder and there feels like thwre is a knot in the shoulder     Past Medical History:   Diagnosis Date    Arthritis     Cataract     Cataracts, bilateral     Diverticulosis of colon 2015    Headache(784.0)     History of colon polyps 2015    Insomnia     Neck pain     Osteoarthrosis, unspecified whether generalized or localized, unspecified site 10/5/2012    Pure hypercholesterolemia 10/5/2012    Shoulder blade pain     right  side    Stroke ([address]     Tubular adenoma of colon 2015    Type II or unspecified type diabetes mellitus without mention of complication, not stated as uncontrolled 10/5/2012    Unspecified essential hypertension 10/5/2012    Unspecified sleep apnea       Past Surgical History:   Procedure Laterality Date    COLONOSCOPY  8/23/06    Dr Ayala Bras  5 12 15    extensive diverticulosis, tubular adenoma    EPIDURAL STEROID INJECTION N/A 8/25/2017    EPIDURAL STEROID INJECTION cervical performed by Edda Coates MD at [address] N/A 11/20/2017    EPIDURAL STEROID INJECTION L5S1 performed by Edda Coates MD at [address] Bilateral 5/20/2019    EPIDURAL STEROID INJECTION BILATERAL S1 performed by Edda Coates MD at [address] [address]  10/1/06    NECK SURGERY  5/2011 & 2001    NERVE BLOCK Bilateral 02/17/2020    Lumbar Facet L2-L5    NERVE SURGERY Bilateral 9/5/2019    BILATERAL LUMBAR FACET STEROID INJECTION L2-L5 performed by Edda Coates MD at [address]  08/25/2017    L5-S1 steroid injection    OTHER SURGICAL HISTORY  11/20/2017    VANITA L5-S1    OTHER SURGICAL HISTORY  10/01/2018    cervical steroid injection    OTHER SURGICAL HISTORY Bilateral 05/20/2019    EPIDURAL STEROID INJECTION BILATERAL S1 (Bilateral )    PAIN MANAGEMENT PROCEDURE Bilateral 2/17/2020    LUMBAR FACET L2-L5 performed by Edda Coates MD at [address] [facility] N/A 3/22/2021    CERVICAL EPIDURAL STEROID INJECTION C7-T1 performed by Edda Coates MD at [address] AA&/STRD TFRML EPI LUMBAR/SACRAL 1 LEVEL Bilateral 11/19/2018    BILATERAL L4 VANITA performed by Edda Coates MD at [address] DX/THER SBST INTRLMNR CRV/THRC W/IMG GDN N/A 10/1/2018    EPIDURAL STEROID INJECTION CERVICAL C7-T1 performed by Javier Krueger MD at [address] [place] [address] History     Socioeconomic History    Marital status: Single     Spouse name: None    Number of children: None    Years of education: None    Highest education level: None   Occupational History    None   Social Needs    Financial resource strain: None    Food insecurity     Worry: None     Inability: None    Transportation needs     Medical: None     Non-medical: None   Tobacco Use    Smoking status: Former Smoker     Packs/day: 0.50     Years: 15.00     Pack years: 7.50     Types: Cigarettes     Quit date: 10/5/2001     Years since quittin.5    Smokeless tobacco: Never Used   Substance and Sexual Activity    Alcohol use: Yes     Alcohol/week: 0.0 standard drinks     Comment: on occasion    Drug use: No    Sexual activity: Not Currently   Lifestyle    Physical activity     Days per week: None     Minutes per session: None    Stress: None   Relationships    Social connections     Talks on phone: None     Gets together: None     Attends Hoahaoism service: None     Active member of club or organization: None     Attends meetings of clubs or organizations: None     Relationship status: None    Intimate partner violence     Fear of current or ex partner: None     Emotionally abused: None     Physically abused: None     Forced sexual activity: None   Other Topics Concern    None   Social History Narrative    None     Family History   Problem Relation Age of Onset    Breast Cancer Sister     Cancer Sister         liver    Diabetes Daughter      No Known Allergies  Patient has no known allergies. Vitals:    21 0940   BP: (!) 165/96   Pulse: 94   Temp:    SpO2: 97%     Current Outpatient Medications   Medication Sig Dispense Refill    HYDROcodone-acetaminophen (NORCO) 5-325 MG per tablet Take 1 tablet by mouth 2 times daily as needed for Pain for up to 30 days.  60 tablet 0    tiZANidine (ZANAFLEX) 2 MG tablet take 2 tablets by mouth at bedtime AND ONCE DURING THE DAY  if needed 90 tablet 0    metFORMIN (GLUCOPHAGE) 1000 MG tablet take 1 tablet by mouth twice a day with meals 180 tablet 1    zolpidem (AMBIEN) 10 MG tablet take 1/2 tablet by mouth nightly if needed for sleep 15 tablet 1    HYDROcodone-acetaminophen (NORCO) 5-325 MG per tablet Take 1 tablet by mouth 2 times daily as needed for Pain for up to 30 days. 60 tablet 0    polyethylene glycol (GLYCOLAX) 17 GM/SCOOP powder take 17GM (DISSOLVED IN WATER) by mouth once daily 510 g 1    furosemide (LASIX) 20 MG tablet Take 1 tablet by mouth 2 times daily 180 tablet 1    Lift Chair MISC by Does not apply route 1 each 0    atorvastatin (LIPITOR) 40 MG tablet Take 1 tablet by mouth daily 90 tablet 1    amitriptyline (ELAVIL) 25 MG tablet Take 1 tablet by mouth nightly 60 tablet 5    loratadine (CLARITIN) 10 MG tablet take 1 tablet by mouth once daily 90 tablet 1    cloNIDine (CATAPRES) 0.2 MG tablet Take 0.5 tablets by mouth 2 times daily 90 tablet 1    pantoprazole (PROTONIX) 40 MG tablet take 1 tablet by mouth every morning BEFORE BREAKFAST 90 tablet 1    allopurinol (ZYLOPRIM) 100 MG tablet take 1 tablet by mouth once daily 90 tablet 1    carvedilol (COREG) 3.125 MG tablet take 1 tablet by mouth twice a day 180 tablet 1    gabapentin (NEURONTIN) 600 MG tablet Take 1 tablet by mouth 3 times daily for 190 days. 90 tablet 5    blood glucose test strips (ONE TOUCH ULTRA TEST) strip TEST once daily 100 strip 3    fluticasone (FLONASE) 50 MCG/ACT nasal spray instill 1 spray into each nostril once daily 16 g 2    aspirin 325 MG tablet Take 325 mg by mouth daily      Blood Glucose Monitoring Suppl KIT 1 blood glucose monitoring supplies kit. Test strips, lancets, alcohol swabs 1 kit 0     No current facility-administered medications for this visit. Review of Systems   Constitutional: Positive for fatigue. Negative for fever. Respiratory: Positive for shortness of breath.     Musculoskeletal: Positive for neck pain and neck stiffness. Shoulder pain   Neurological: Positive for weakness and numbness. Tingling       Objective:  General Appearance:  Well-appearing and in no acute distress. Vital signs: (most recent): Blood pressure (!) 165/96, pulse 94, temperature 97.1 °F (36.2 °C), height 5' 2\" (1.575 m), weight 165 lb (74.8 kg), SpO2 97 %, not currently breastfeeding. Vital signs are normal.  No fever. Output: Producing urine and producing stool. HEENT: Normal HEENT exam.    Lungs:  Normal effort and normal respiratory rate. Breath sounds clear to auscultation. She is not in respiratory distress. Heart: Normal rate. Extremities: Normal range of motion. There is no deformity. Neurological: Patient is alert and oriented to person, place and time. Patient has normal coordination. Pupils:  Pupils are equal, round, and reactive to light. Pupils are equal.   Skin:  Warm and dry. No rash or cyanosis.       Assessment & Plan     68-year-old woman with history of chronic neck and chronic lower back pain  She is diagnosed with cervical spinal stenosis and lumbar spinal stenosis  For her chronic neck and cervical radicular symptoms she had a recent cervical epidural injection  Today here for postprocedure follow-up  Report more than 50% improvement in neck and arm pain with recent injection and do not report any side effect    Patient is on chronic low-dose opioid therapy with Norco 5 mg twice a day along with gabapentin  Finds the medication helpful allowing her to do daily life activities and become independent  No history of illicit substance use  Recent alcohol detected on urine toxicology discussed with patient  Patient states she occasionally use a very small amount of alcohol and plans to stop it now on a recommendation    Today main issue is lower back pain located in the lumbar area with radiation down both legs and  Pain aggravated with standing and walking  No changes in bladder or bowel control  She has tried conservative measures with therapy in past  Not a candidate for NSAIDs because of renal insufficiency and advanced age  Had epidural injection in past with good outcome      1. Lumbar stenosis with neurogenic claudication    2. Cervical stenosis of spine    3. Chronic use of opiate drugs therapeutic purposes    4. Primary osteoarthritis involving multiple joints        Orders Placed This Encounter   Procedures    [place] NJX AA&/STRD TFRML EPI LUMBAR/SACRAL 1 LEVEL      No orders of the defined types were placed in this encounter.      We will schedule patient for lumbar epidural steroid injection    Automated prescription report reviewed  Recent urine toxicology report reviewed  Safe use of medication discussed  Patient will get the next month refill when she come for her procedure and we will see her back in 2 months in clinic for medication management      Electronically signed by Edda Coates MD on 4/7/2021 at 9:59 AM

## 2021-04-07 NOTE — TELEPHONE ENCOUNTER
insulin ([address]     Pure hypercholesterolemia     Constipation     Rectal pain     Diverticulosis of colon     Tubular adenoma of colon     History of colon polyps     Rectal pressure     Failed neck syndrome     Cervical stenosis of spine     Cervical radicular pain     Lumbar stenosis with neurogenic claudication     JANN on CPAP     Primary osteoarthritis involving multiple joints     Hx of cervical spine surgery     Chronic use of opiate drugs therapeutic purposes     Myelomalacia (HCC)     Lumbar facet joint syndrome

## 2021-04-13 ENCOUNTER — OFFICE VISIT (OUTPATIENT)
Dept: FAMILY MEDICINE CLINIC | Age: 81
End: 2021-04-13
Payer: MEDICARE

## 2021-04-13 VITALS
OXYGEN SATURATION: 98 % | SYSTOLIC BLOOD PRESSURE: 104 MMHG | TEMPERATURE: 98.2 F | HEART RATE: 80 BPM | DIASTOLIC BLOOD PRESSURE: 66 MMHG

## 2021-04-13 DIAGNOSIS — E11.40 TYPE 2 DIABETES MELLITUS WITH DIABETIC NEUROPATHY, WITHOUT LONG-TERM CURRENT USE OF INSULIN (HCC): Primary | ICD-10-CM

## 2021-04-13 DIAGNOSIS — K21.9 GASTROESOPHAGEAL REFLUX DISEASE, UNSPECIFIED WHETHER ESOPHAGITIS PRESENT: ICD-10-CM

## 2021-04-13 DIAGNOSIS — M1A.09X0 CHRONIC GOUT OF MULTIPLE SITES, UNSPECIFIED CAUSE: ICD-10-CM

## 2021-04-13 DIAGNOSIS — J30.89 CHRONIC NON-SEASONAL ALLERGIC RHINITIS: ICD-10-CM

## 2021-04-13 DIAGNOSIS — R29.898 LEFT LEG WEAKNESS: ICD-10-CM

## 2021-04-13 DIAGNOSIS — I10 ESSENTIAL HYPERTENSION: ICD-10-CM

## 2021-04-13 DIAGNOSIS — Z91.81 AT HIGH RISK FOR FALLS: ICD-10-CM

## 2021-04-13 DIAGNOSIS — M54.12 CERVICAL RADICULAR PAIN: Chronic | ICD-10-CM

## 2021-04-13 DIAGNOSIS — E78.00 PURE HYPERCHOLESTEROLEMIA: ICD-10-CM

## 2021-04-13 PROCEDURE — 1123F ACP DISCUSS/DSCN MKR DOCD: CPT | Performed by: INTERNAL MEDICINE

## 2021-04-13 PROCEDURE — 1036F TOBACCO NON-USER: CPT | Performed by: INTERNAL MEDICINE

## 2021-04-13 PROCEDURE — G8427 DOCREV CUR MEDS BY ELIG CLIN: HCPCS | Performed by: INTERNAL MEDICINE

## 2021-04-13 PROCEDURE — G8417 CALC BMI ABV UP PARAM F/U: HCPCS | Performed by: INTERNAL MEDICINE

## 2021-04-13 PROCEDURE — 1090F PRES/ABSN URINE INCON ASSESS: CPT | Performed by: INTERNAL MEDICINE

## 2021-04-13 PROCEDURE — 4040F PNEUMOC VAC/ADMIN/RCVD: CPT | Performed by: INTERNAL MEDICINE

## 2021-04-13 PROCEDURE — 99214 OFFICE O/P EST MOD 30 MIN: CPT | Performed by: INTERNAL MEDICINE

## 2021-04-13 PROCEDURE — G8399 PT W/DXA RESULTS DOCUMENT: HCPCS | Performed by: INTERNAL MEDICINE

## 2021-04-13 RX ORDER — GLUCOSAMINE HCL/CHONDROITIN SU 500-400 MG
CAPSULE ORAL
Qty: 50 STRIP | Refills: 6 | Status: SHIPPED | OUTPATIENT
Start: 2021-04-13 | End: 2021-12-13 | Stop reason: SDUPTHER

## 2021-04-13 RX ORDER — GLUCOSAMINE HCL/CHONDROITIN SU 500-400 MG
1 CAPSULE ORAL DAILY
Qty: 100 EACH | Refills: 3 | Status: SHIPPED | OUTPATIENT
Start: 2021-04-13 | End: 2021-12-13 | Stop reason: SDUPTHER

## 2021-04-13 RX ORDER — LORATADINE 10 MG/1
TABLET ORAL
Qty: 90 TABLET | Refills: 1 | Status: SHIPPED | OUTPATIENT
Start: 2021-04-13 | End: 2021-10-07 | Stop reason: SDUPTHER

## 2021-04-13 RX ORDER — AMITRIPTYLINE HYDROCHLORIDE 25 MG/1
25 TABLET, FILM COATED ORAL NIGHTLY
Qty: 90 TABLET | Refills: 1 | Status: SHIPPED | OUTPATIENT
Start: 2021-04-13 | End: 2021-10-07 | Stop reason: SDUPTHER

## 2021-04-13 RX ORDER — ALLOPURINOL 100 MG/1
TABLET ORAL
Qty: 90 TABLET | Refills: 1 | Status: SHIPPED | OUTPATIENT
Start: 2021-04-13 | End: 2021-10-07 | Stop reason: SDUPTHER

## 2021-04-13 RX ORDER — LANCETS 30 GAUGE
1 EACH MISCELLANEOUS DAILY
Qty: 100 EACH | Refills: 3 | Status: SHIPPED | OUTPATIENT
Start: 2021-04-13 | End: 2021-12-13 | Stop reason: SDUPTHER

## 2021-04-13 RX ORDER — GABAPENTIN 600 MG/1
600 TABLET ORAL 3 TIMES DAILY
Qty: 90 TABLET | Refills: 2 | Status: SHIPPED | OUTPATIENT
Start: 2021-04-13 | End: 2021-09-14

## 2021-04-13 RX ORDER — CLONIDINE HYDROCHLORIDE 0.1 MG/1
0.1 TABLET ORAL 2 TIMES DAILY
Qty: 180 TABLET | Refills: 1 | Status: SHIPPED | OUTPATIENT
Start: 2021-04-13 | End: 2021-10-07 | Stop reason: SDUPTHER

## 2021-04-13 RX ORDER — FLUTICASONE PROPIONATE 50 MCG
SPRAY, SUSPENSION (ML) NASAL
Qty: 16 G | Refills: 2 | Status: SHIPPED | OUTPATIENT
Start: 2021-04-13 | End: 2021-12-13 | Stop reason: SDUPTHER

## 2021-04-13 RX ORDER — ATORVASTATIN CALCIUM 40 MG/1
40 TABLET, FILM COATED ORAL DAILY
Qty: 90 TABLET | Refills: 1 | Status: SHIPPED | OUTPATIENT
Start: 2021-04-13 | End: 2021-10-07 | Stop reason: SDUPTHER

## 2021-04-13 RX ORDER — PANTOPRAZOLE SODIUM 40 MG/1
TABLET, DELAYED RELEASE ORAL
Qty: 90 TABLET | Refills: 1 | Status: SHIPPED | OUTPATIENT
Start: 2021-04-13 | End: 2021-10-07 | Stop reason: SDUPTHER

## 2021-04-13 RX ORDER — CARVEDILOL 3.12 MG/1
TABLET ORAL
Qty: 180 TABLET | Refills: 1 | Status: SHIPPED | OUTPATIENT
Start: 2021-04-13 | End: 2021-10-07 | Stop reason: SDUPTHER

## 2021-04-13 SDOH — ECONOMIC STABILITY: TRANSPORTATION INSECURITY
IN THE PAST 12 MONTHS, HAS LACK OF TRANSPORTATION KEPT YOU FROM MEETINGS, WORK, OR FROM GETTING THINGS NEEDED FOR DAILY LIVING?: YES

## 2021-04-13 SDOH — ECONOMIC STABILITY: TRANSPORTATION INSECURITY
IN THE PAST 12 MONTHS, HAS THE LACK OF TRANSPORTATION KEPT YOU FROM MEDICAL APPOINTMENTS OR FROM GETTING MEDICATIONS?: YES

## 2021-04-13 SDOH — ECONOMIC STABILITY: FOOD INSECURITY: WITHIN THE PAST 12 MONTHS, THE FOOD YOU BOUGHT JUST DIDN'T LAST AND YOU DIDN'T HAVE MONEY TO GET MORE.: NEVER TRUE

## 2021-04-13 SDOH — ECONOMIC STABILITY: INCOME INSECURITY: HOW HARD IS IT FOR YOU TO PAY FOR THE VERY BASICS LIKE FOOD, HOUSING, MEDICAL CARE, AND HEATING?: NOT VERY HARD

## 2021-04-13 ASSESSMENT — ENCOUNTER SYMPTOMS
VOMITING: 0
DIARRHEA: 0
SHORTNESS OF BREATH: 0
ABDOMINAL PAIN: 0
WHEEZING: 0
CONSTIPATION: 0
COUGH: 0
ANAL BLEEDING: 0
CHOKING: 0
NAUSEA: 0
BLOOD IN STOOL: 0
CHEST TIGHTNESS: 0

## 2021-04-13 ASSESSMENT — PATIENT HEALTH QUESTIONNAIRE - PHQ9
SUM OF ALL RESPONSES TO PHQ QUESTIONS 1-9: 2
SUM OF ALL RESPONSES TO PHQ QUESTIONS 1-9: 2
SUM OF ALL RESPONSES TO PHQ9 QUESTIONS 1 & 2: 2
1. LITTLE INTEREST OR PLEASURE IN DOING THINGS: 1

## 2021-04-13 ASSESSMENT — VISUAL ACUITY: OU: 1

## 2021-04-13 NOTE — PROGRESS NOTES
Subjective:       Patient ID:     Melo Gonzales is a 68 y.o. female who presents for   Chief Complaint   Patient presents with    Leg Swelling     both but LT is worse    Medication Refill       HPI:  Nursing note reviewed and discussed with patient. Here for follow-up today     Diabetes - doing well with current regimen - metformin. Denies hypoglycemia, hyperglycemia, fatigue, polyuria, polydipsia, paresthesias, vision changes, dizziness. Eye exam was done . Not following with podiatry. Patient is taking ACE inhibitor/ARB. Complications of diabetes include peripheral neuropathy. Hypertension-tolerating current regimen without chest pain, palpitations, dizziness, dyspnea on exertion, orthopnea, paroxysmal nocturnal dyspnea. peripheral edema in ankles and feet, has tried compression stockings but states they hurt too much   Hyperlipidemia-tolerating current regimen without myalgias, dyspepsia, jaundice. Mostly compliant with diet recommendations for low carb diet, not very compliant with exercise recommendations. Cardiovascular risk factors besides obesity: advanced age (older than 54 for men, 72 for women), diabetes mellitus, dyslipidemia, hypertension and sedentary lifestyle  Left leg weakness - drags behind her, ongoing for years but worse now. Patient's medications, allergies, past medical, surgical, social and family histories were reviewed and updated as appropriate.     Past Medical History:   Diagnosis Date    Arthritis     Cataract     Cataracts, bilateral     Diverticulosis of colon 2015    Headache(784.0)     History of colon polyps 2015    Insomnia     Neck pain     Osteoarthrosis, unspecified whether generalized or localized, unspecified site 10/5/2012    Pure hypercholesterolemia 10/5/2012    Shoulder blade pain     right  side    Stroke ([facility] [address]     Tubular adenoma of colon 2015    Type II or unspecified type diabetes mellitus without mention of complication, not stated as uncontrolled 10/5/2012    Unspecified essential hypertension 10/5/2012    Unspecified sleep apnea      Past Surgical History:   Procedure Laterality Date    COLONOSCOPY  8/23/06    Dr Michelle Xavier  5 12 15    extensive diverticulosis, tubular adenoma    EPIDURAL STEROID INJECTION N/A 8/25/2017    EPIDURAL STEROID INJECTION cervical performed by Clifton Dueñas MD at [address] N/A 11/20/2017    EPIDURAL STEROID INJECTION L5S1 performed by Clifton Dueñas MD at [address] Bilateral 5/20/2019    EPIDURAL STEROID INJECTION BILATERAL S1 performed by Clifton Dueñas MD at [address]  10/1/06    NECK SURGERY  5/2011 & 2001    NERVE BLOCK Bilateral 02/17/2020    Lumbar Facet L2-L5    NERVE SURGERY Bilateral 9/5/2019    BILATERAL LUMBAR FACET STEROID INJECTION L2-L5 performed by Clifton Dueñas MD at [facility]  08/25/2017    L5-S1 steroid injection    OTHER SURGICAL HISTORY  11/20/2017    VANITA L5-S1    OTHER SURGICAL HISTORY  10/01/2018    cervical steroid injection    OTHER SURGICAL HISTORY Bilateral 05/20/2019    EPIDURAL STEROID INJECTION BILATERAL S1 (Bilateral )    PAIN MANAGEMENT PROCEDURE Bilateral 2/17/2020    LUMBAR FACET L2-L5 performed by Clifton Dueñas MD at [address] N/A 3/22/2021    CERVICAL EPIDURAL STEROID INJECTION C7-T1 performed by Clifton Dueñas MD at [address] AA&/STRD TFRML EPI LUMBAR/SACRAL 1 LEVEL Bilateral 11/19/2018    BILATERAL L4 VANITA performed by Clifton Dueñas MD at [address] DX/THER SBST INTRLMNR CRV/THRC W/IMG GDN N/A 10/1/2018    EPIDURAL STEROID INJECTION CERVICAL C7-T1 performed by Clifton Dueñas MD at [facility] History     Tobacco Use    Smoking status: Former Smoker     Packs/day: 0.50     Years: 15.00     Pack years: 7.50     Types: Cigarettes     Quit date: 10/5/2001 Years since quittin.5    Smokeless tobacco: Never Used   Substance Use Topics    Alcohol use: Yes     Alcohol/week: 0.0 standard drinks     Comment: on occasion      Patient Active Problem List   Diagnosis    Osteoarthritis    Essential hypertension    Type 2 diabetes mellitus with diabetic neuropathy, without long-term current use of insulin ([facility]    Pure hypercholesterolemia    Constipation    Rectal pain    Diverticulosis of colon    Tubular adenoma of colon    History of colon polyps    Rectal pressure    Failed neck syndrome    Cervical stenosis of spine    Cervical radicular pain    Lumbar stenosis with neurogenic claudication    JANN on CPAP    Primary osteoarthritis involving multiple joints    Hx of cervical spine surgery    Chronic use of opiate drugs therapeutic purposes    Myelomalacia ([facility] [address]    Lumbar facet joint syndrome         Prior to Visit Medications    Medication Sig Taking? Authorizing Provider   zolpidem (AMBIEN) 10 MG tablet Take 0.5 tablets by mouth nightly as needed for Sleep for up to 60 days. Yes Connie Quinteros MD   tiZANidine (ZANAFLEX) 2 MG tablet take 2 tablets by mouth at bedtime AND ONCE DURING THE DAY  if needed Yes Connie Quinteros MD   metFORMIN (GLUCOPHAGE) 1000 MG tablet take 1 tablet by mouth twice a day with meals Yes Dwight Solorzano MD   HYDROcodone-acetaminophen (NORCO) 5-325 MG per tablet Take 1 tablet by mouth 2 times daily as needed for Pain for up to 30 days.  Yes Luis Daniel Butts MD   polyethylene glycol [facility]) 17 GM/SCOOP powder take 17GM (DISSOLVED IN WATER) by mouth once daily Yes Connie Quinteros MD   furosemide (LASIX) 20 MG tablet Take 1 tablet by mouth 2 times daily Yes Dwight Solorzano MD   Lift Chair MISC by Does not apply route Yes Connie Quinteros MD   atorvastatin (LIPITOR) 40 MG tablet Take 1 tablet by mouth daily Yes Dwight Solorzano MD   amitriptyline (ELAVIL) 25 MG tablet Take 1 tablet by mouth nightly Yes Connie Maurice Broussard MD   loratadine (CLARITIN) 10 MG tablet take 1 tablet by mouth once daily Yes Connie Broussard MD   cloNIDine (CATAPRES) 0.2 MG tablet Take 0.5 tablets by mouth 2 times daily Yes Connie Broussard MD   pantoprazole (PROTONIX) 40 MG tablet take 1 tablet by mouth every morning BEFORE BREAKFAST Yes Connie Broussard MD   allopurinol (ZYLOPRIM) 100 MG tablet take 1 tablet by mouth once daily Yes Connie Broussard MD   carvedilol (COREG) 3.125 MG tablet take 1 tablet by mouth twice a day Yes Connie Broussard MD   gabapentin (NEURONTIN) 600 MG tablet Take 1 tablet by mouth 3 times daily for 190 days. Yes Genesis Alva MD   blood glucose test strips (ONE TOUCH ULTRA TEST) strip TEST once daily Yes Connie Broussard MD   fluticasone (FLONASE) 50 MCG/ACT nasal spray instill 1 spray into each nostril once daily Yes YOLANDA Robison CNP   aspirin 325 MG tablet Take 325 mg by mouth daily Yes Radha Liu MD   Blood Glucose Monitoring Suppl KIT 1 blood glucose monitoring supplies kit. Test strips, lancets, alcohol swabs Yes Jalyn Bermudez MD     Review of Systems  Review of Systems   Constitutional: Negative for fatigue, fever and unexpected weight change. Respiratory: Negative for cough, choking, chest tightness, shortness of breath and wheezing. Cardiovascular: Negative for chest pain, palpitations and leg swelling. Gastrointestinal: Negative for abdominal pain, anal bleeding, blood in stool, constipation, diarrhea, nausea and vomiting. Endocrine: Negative. Musculoskeletal: Negative for joint swelling and myalgias. Skin: Negative. Neurological: Negative for dizziness. Psychiatric/Behavioral: Negative for sleep disturbance. All other systems reviewed and are negative.          Objective:       Physical Exam:  /66 (Site: Right Upper Arm, Position: Sitting, Cuff Size: Large Adult)   Pulse 80   Temp 98.2 °F (36.8 °C) (Temporal)   SpO2 98%   Physical Exam  Vitals signs and nursing note reviewed. Constitutional:       General: She is not in acute distress. Appearance: She is well-developed. She is not ill-appearing. Eyes:      General: Lids are normal. Vision grossly intact. Cardiovascular:      Rate and Rhythm: Normal rate and regular rhythm. Heart sounds: Normal heart sounds, S1 normal and S2 normal. No murmur. No friction rub. No gallop. Pulmonary:      Effort: Pulmonary effort is normal. No respiratory distress. Breath sounds: Normal breath sounds. No wheezing. Abdominal:      General: Bowel sounds are normal.      Palpations: Abdomen is soft. There is no mass. Tenderness: There is no abdominal tenderness. There is no guarding. Musculoskeletal: Normal range of motion. Right lower leg: Edema (ankles and lower legs) present. Left lower leg: Edema (ankle and lower leg) present. Skin:     General: Skin is warm and dry. Capillary Refill: Capillary refill takes less than 2 seconds. Neurological:      General: No focal deficit present. Mental Status: She is alert and oriented to person, place, and time. Data Review  Admission on 03/22/2021, Discharged on 03/22/2021   Component Date Value    POC Glucose 03/22/2021 119*       A1c 5.7% today     Assessment/Plan:      1. Type 2 diabetes mellitus with diabetic neuropathy, without long-term current use of insulin (HCC)  - gabapentin (NEURONTIN) 600 MG tablet; Take 1 tablet by mouth 3 times daily for 90 days. Dispense: 90 tablet; Refill: 2  - blood glucose monitor kit and supplies; use check blood sugar as directed  Dispense: 1 kit; Refill: 0  - blood glucose monitor strips; Check blood sugars daily as directed. Dispense: 50 strip; Refill: 6  - Lancets MISC; 1 each by Does not apply route daily  Dispense: 100 each; Refill: 3  - Alcohol Swabs 70 % PADS; 1 each by Does not apply route daily  Dispense: 100 each; Refill: 3  D/c metformin     2.  Pure hypercholesterolemia  - atorvastatin (LIPITOR) 40 MG tablet; Take 1 tablet by mouth daily  Dispense: 90 tablet; Refill: 1    3. Cervical radicular pain  - amitriptyline (ELAVIL) 25 MG tablet; Take 1 tablet by mouth nightly  Dispense: 90 tablet; Refill: 1    4. Essential hypertension  - cloNIDine (CATAPRES) 0.1 MG tablet; Take 1 tablet by mouth 2 times daily  Dispense: 180 tablet; Refill: 1  - carvedilol (COREG) 3.125 MG tablet; take 1 tablet by mouth twice a day  Dispense: 180 tablet; Refill: 1    5. Gastroesophageal reflux disease, unspecified whether esophagitis present  - pantoprazole (PROTONIX) 40 MG tablet; take 1 tablet by mouth every morning BEFORE BREAKFAST  Dispense: 90 tablet; Refill: 1    6. Chronic non-seasonal allergic rhinitis  - loratadine (CLARITIN) 10 MG tablet; take 1 tablet by mouth once daily  Dispense: 90 tablet; Refill: 1  - fluticasone (FLONASE) 50 MCG/ACT nasal spray; instill 1 spray into each nostril once daily  Dispense: 16 g; Refill: 2    7. Chronic gout of multiple sites, unspecified cause  - allopurinol (ZYLOPRIM) 100 MG tablet; take 1 tablet by mouth once daily  Dispense: 90 tablet; Refill: 1    8. At high risk for falls  - [facility] Physical Therapy [facility]    9. Left leg weakness  - [facility] Physical [facility]           Health Maintenance Due   Topic Date Due    Hepatitis C screen  Never done    COVID-19 Vaccine (1) Never done    Shingles Vaccine (1 of 2) Never done   [employer] Visit (AWV)  Never done       Electronically signed by Cheryl Stallworth MD on 4/13/2021 at 11:37 AM  On the basis of positive falls risk screening, assessment and plan is as follows: referral to physical therapy provided for strength and balance training.

## 2021-04-15 DIAGNOSIS — K59.03 DRUG INDUCED CONSTIPATION: ICD-10-CM

## 2021-04-15 RX ORDER — POLYETHYLENE GLYCOL 3350 17 G/17G
POWDER, FOR SOLUTION ORAL
Qty: 510 G | Refills: 1 | Status: SHIPPED | OUTPATIENT
Start: 2021-04-15 | End: 2021-10-07 | Stop reason: SDUPTHER

## 2021-04-15 NOTE — TELEPHONE ENCOUNTER
Last visit: 4/13/21  Last Med refill: 12/17/20  Does patient have enough medication for 72 hours: No:     Next Visit Date:  Future Appointments   Date Time Provider Pradeep Alexander   5/17/2021  8:00 AM STA SCR MAMMO RM 2 STAZ MAMMO STA Radiolog   5/24/2021  9:00 AM YOLANDA Norman - CNP [facility]OLP   8/16/2021 11:15 AM Connie Griffiths MD [facility] FP TOLP   12/13/2021 10:30 AM Connie Griffiths  [address] Maintenance   Topic Date Due    Hepatitis C screen  Never done    COVID-19 Vaccine (1) Never done    Shingles Vaccine (1 of 2) Never done    Annual Wellness Visit (AWV)  Never done    DTaP/Tdap/Td vaccine (1 - Tdap) 10/18/2026 (Originally 5/28/1959)    Lipid screen  09/30/2021    Potassium monitoring  09/30/2021    Creatinine monitoring  09/30/2021    DEXA (modify frequency per FRAX score)  Completed    Flu vaccine  Completed    Pneumococcal 65+ years Vaccine  Completed    Hepatitis A vaccine  Aged Out    Hib vaccine  Aged Out    Meningococcal (ACWY) vaccine  Aged Out       Hemoglobin A1C (%)   Date Value   09/30/2020 5.7   02/25/2020 6.6   10/07/2019 6.5             ( goal A1C is < 7)   Microalb/Crt.  Ratio (mcg/mg creat)   Date Value   10/06/2020 CANNOT BE CALCULATED     LDL Cholesterol (mg/dL)   Date Value   09/30/2020 48   06/03/2019 102       (goal LDL is <100)   AST (U/L)   Date Value   09/30/2020 20     ALT (U/L)   Date Value   09/30/2020 13     BUN (mg/dL)   Date Value   09/30/2020 9     BP Readings from Last 3 Encounters:   04/13/21 104/66   04/07/21 (!) 165/96   03/22/21 (!) 155/70          (goal 120/80)    All Future Testing planned in [facility]  Lab Frequency Next Occurrence   Drugs of Abuse, Urine Once 02/03/2021   DC NJX DX/THER SBST INTRLMNR CRV/THRC W/IMG GDN Once 02/04/2021   SHAKIR DIGITAL SCREEN W OR WO CAD BILATERAL Once 09/23/2021   DC NJX AA&/STRD TFRML EPI LUMBAR/SACRAL 1 LEVEL Once 04/08/2021               Patient Active Problem List: Osteoarthritis     Essential hypertension     Type 2 diabetes mellitus with diabetic neuropathy, without long-term current use of insulin ([address]     Pure hypercholesterolemia     Constipation     Rectal pain     Diverticulosis of colon     Tubular adenoma of colon     History of colon polyps     Rectal pressure     Failed neck syndrome     Cervical stenosis of spine     Cervical radicular pain     Lumbar stenosis with neurogenic claudication     JANN on CPAP     Primary osteoarthritis involving multiple joints     Hx of cervical spine surgery     Chronic use of opiate drugs therapeutic purposes     Myelomalacia (HCC)     Lumbar facet joint syndrome     Gastroesophageal reflux disease

## 2021-04-26 ENCOUNTER — TELEPHONE (OUTPATIENT)
Dept: PAIN MANAGEMENT | Age: 81
End: 2021-04-26

## 2021-04-26 NOTE — TELEPHONE ENCOUNTER
Patient called into office to cancel appt today at 2:40pm. For \"bilatbral\", patient's transportation   Did not show up for her today, therefore, patient did not have a ride to appt.

## 2021-04-26 NOTE — TELEPHONE ENCOUNTER
Spoke with pt and rescheduled her procedure to this Thursday 4/29/21 at 11:40 am. Pt reminded of prep instructions and given arrival time of 10:20 am

## 2021-04-29 ENCOUNTER — HOSPITAL ENCOUNTER (OUTPATIENT)
Age: 81
Setting detail: OUTPATIENT SURGERY
Discharge: HOME | End: 2021-04-29
Attending: ANESTHESIOLOGY | Admitting: ANESTHESIOLOGY
Payer: MEDICARE

## 2021-04-29 ENCOUNTER — APPOINTMENT (OUTPATIENT)
Dept: GENERAL RADIOLOGY | Age: 81
End: 2021-04-29
Attending: ANESTHESIOLOGY
Payer: MEDICARE

## 2021-04-29 VITALS
DIASTOLIC BLOOD PRESSURE: 82 MMHG | WEIGHT: 161 LBS | OXYGEN SATURATION: 94 % | HEART RATE: 71 BPM | SYSTOLIC BLOOD PRESSURE: 177 MMHG | HEIGHT: 62 IN | TEMPERATURE: 98.2 F | RESPIRATION RATE: 16 BRPM | BODY MASS INDEX: 29.63 KG/M2

## 2021-04-29 LAB — GLUCOSE BLD-MCNC: 98 MG/DL (ref 65–105)

## 2021-04-29 PROCEDURE — 3600000051 HC PAIN LEVEL 1 ADDL 15 MIN: Performed by: ANESTHESIOLOGY

## 2021-04-29 PROCEDURE — 7100000011 HC PHASE II RECOVERY - ADDTL 15 MIN: Performed by: ANESTHESIOLOGY

## 2021-04-29 PROCEDURE — 2580000003 HC RX 258: Performed by: ANESTHESIOLOGY

## 2021-04-29 PROCEDURE — 7100000010 HC PHASE II RECOVERY - FIRST 15 MIN: Performed by: ANESTHESIOLOGY

## 2021-04-29 PROCEDURE — 6360000002 HC RX W HCPCS: Performed by: ANESTHESIOLOGY

## 2021-04-29 PROCEDURE — 99152 MOD SED SAME PHYS/QHP 5/>YRS: CPT | Performed by: ANESTHESIOLOGY

## 2021-04-29 PROCEDURE — 3209999900 FLUORO FOR SURGICAL PROCEDURES

## 2021-04-29 PROCEDURE — 6360000004 HC RX CONTRAST MEDICATION: Performed by: ANESTHESIOLOGY

## 2021-04-29 PROCEDURE — 64484 NJX AA&/STRD TFRM EPI L/S EA: CPT | Performed by: ANESTHESIOLOGY

## 2021-04-29 PROCEDURE — 3600000050 HC PAIN LEVEL 1 BASE: Performed by: ANESTHESIOLOGY

## 2021-04-29 PROCEDURE — 2500000003 HC RX 250 WO HCPCS: Performed by: ANESTHESIOLOGY

## 2021-04-29 PROCEDURE — 2709999900 HC NON-CHARGEABLE SUPPLY: Performed by: ANESTHESIOLOGY

## 2021-04-29 PROCEDURE — 82947 ASSAY GLUCOSE BLOOD QUANT: CPT

## 2021-04-29 PROCEDURE — 64483 NJX AA&/STRD TFRM EPI L/S 1: CPT | Performed by: ANESTHESIOLOGY

## 2021-04-29 PROCEDURE — 99153 MOD SED SAME PHYS/QHP EA: CPT | Performed by: ANESTHESIOLOGY

## 2021-04-29 RX ORDER — FENTANYL CITRATE 50 UG/ML
INJECTION, SOLUTION INTRAMUSCULAR; INTRAVENOUS PRN
Status: DISCONTINUED | OUTPATIENT
Start: 2021-04-29 | End: 2021-04-29 | Stop reason: ALTCHOICE

## 2021-04-29 RX ORDER — LIDOCAINE HYDROCHLORIDE 10 MG/ML
INJECTION, SOLUTION INFILTRATION; PERINEURAL PRN
Status: DISCONTINUED | OUTPATIENT
Start: 2021-04-29 | End: 2021-04-29 | Stop reason: ALTCHOICE

## 2021-04-29 RX ORDER — DEXAMETHASONE SODIUM PHOSPHATE 10 MG/ML
INJECTION INTRAMUSCULAR; INTRAVENOUS PRN
Status: DISCONTINUED | OUTPATIENT
Start: 2021-04-29 | End: 2021-04-29 | Stop reason: ALTCHOICE

## 2021-04-29 RX ORDER — SODIUM CHLORIDE 0.9 % (FLUSH) 0.9 %
5-40 SYRINGE (ML) INJECTION PRN
Status: DISCONTINUED | OUTPATIENT
Start: 2021-04-29 | End: 2021-04-29 | Stop reason: HOSPADM

## 2021-04-29 RX ORDER — MIDAZOLAM HYDROCHLORIDE 1 MG/ML
INJECTION INTRAMUSCULAR; INTRAVENOUS PRN
Status: DISCONTINUED | OUTPATIENT
Start: 2021-04-29 | End: 2021-04-29 | Stop reason: ALTCHOICE

## 2021-04-29 RX ADMIN — SODIUM CHLORIDE, PRESERVATIVE FREE 5 ML: 5 INJECTION INTRAVENOUS at 12:00

## 2021-04-29 ASSESSMENT — PAIN - FUNCTIONAL ASSESSMENT: PAIN_FUNCTIONAL_ASSESSMENT: 0-10

## 2021-04-29 ASSESSMENT — PAIN DESCRIPTION - LOCATION: LOCATION: BACK

## 2021-04-29 ASSESSMENT — PAIN DESCRIPTION - PAIN TYPE: TYPE: CHRONIC PAIN

## 2021-04-29 ASSESSMENT — PAIN SCALES - GENERAL: PAINLEVEL_OUTOF10: 3

## 2021-04-29 ASSESSMENT — PAIN DESCRIPTION - DESCRIPTORS: DESCRIPTORS: ACHING

## 2021-04-29 NOTE — PROGRESS NOTES
Pt stood up at bedside to test out leg function. She states it feels like it is coming back to her \"normal baseline\" but when standing she said it definitely doesn't feel right and is still very weak. Will continue to monitor.

## 2021-04-29 NOTE — H&P
History and Physical Update    Pt Name: Roberta Titus  MRN: [id number]  YOB: 1940  Date of evaluation: 4/29/2021      [x] I have reviewed the Progress Note by Dr Indu Baker dated 4/7/21n in epic which meets the criteria for an Interval History and Physical note and is attached below. [x] I have examined  Roberta Titus  There are no changes to the patient who is scheduled for a bilateral L5 epidural steroid injections by Dr Jeffry Isaac for lumbar stenosis with neurogenic claudication. C/o aching intermittent lower back pain rated 4/10 today. The patient denies new health changes, fever, chills, wheezing, cough, increased SOB, chest pain, open sores or wounds. Last ASA 325mg 4/18/21  +DM II POC BS 98     Vital signs: BP (!) 184/87   Pulse 71   Temp 97.9 °F (36.6 °C) (Temporal)   Resp 16   Ht 5' 2\" (1.575 m)   Wt 161 lb (73 kg)   SpO2 96%   BMI 29.45 kg/m²     Allergies:  Patient has no known allergies. Medications:    Prior to Admission medications    Medication Sig Start Date End Date Taking? Authorizing Provider   atorvastatin (LIPITOR) 40 MG tablet Take 1 tablet by mouth daily 4/13/21  Yes Connie Dias MD   amitriptyline (ELAVIL) 25 MG tablet Take 1 tablet by mouth nightly 4/13/21  Yes Connie Dias MD   cloNIDine (CATAPRES) 0.1 MG tablet Take 1 tablet by mouth 2 times daily 4/13/21  Yes Connie Dias MD   pantoprazole (PROTONIX) 40 MG tablet take 1 tablet by mouth every morning BEFORE BREAKFAST 4/13/21  Yes Connie Dias MD   allopurinol (ZYLOPRIM) 100 MG tablet take 1 tablet by mouth once daily 4/13/21  Yes Connie Dias MD   carvedilol (COREG) 3.125 MG tablet take 1 tablet by mouth twice a day 4/13/21  Yes Connie Dias MD   gabapentin (NEURONTIN) 600 MG tablet Take 1 tablet by mouth 3 times daily for 90 days. 4/13/21 7/12/21 Yes Connie Dias MD   zolpidem (AMBIEN) 10 MG tablet Take 0.5 tablets by mouth nightly as needed for Sleep for up to 60 days.  4/7/21 6/6/21 Yes Connie Griffiths MD   tiZANidine (ZANAFLEX) 2 MG tablet take 2 tablets by mouth at bedtime AND ONCE DURING THE DAY  if needed 3/18/21  Yes Connie Griffiths MD   HYDROcodone-acetaminophen (NORCO) 5-325 MG per tablet Take 1 tablet by mouth 2 times daily as needed for Pain for up to 30 days. 2/3/21 4/29/21 Yes Holly Guaman MD   polyethylene glycol [facility]) 17 GM/SCOOP powder take 17GM (DISSOLVED IN WATER) by mouth once daily 4/15/21   Connie Griffiths MD   loratadine (CLARITIN) 10 MG tablet take 1 tablet by mouth once daily 4/13/21   Connie Griffiths MD   fluticasone [facility]) 50 MCG/ACT nasal spray instill 1 spray into each nostril once daily 4/13/21   Connie Griffiths MD   blood glucose monitor kit and supplies use check blood sugar as directed 4/13/21   Connie Griffiths MD   blood glucose monitor strips Check blood sugars daily as directed. 4/13/21   Connie Griffiths MD   Lancets MISC 1 each by Does not apply route daily 4/13/21   Connie Griffiths MD   Alcohol Swabs 70 % PADS 1 each by Does not apply route daily 4/13/21   Connie Griffiths MD   Lift Chair MISC by Does not apply route 12/2/20   Connie Griffiths MD   aspirin 325 MG tablet Take 325 mg by mouth daily    Historical Provider, MD   Blood Glucose Monitoring Suppl KIT 1 blood glucose monitoring supplies kit. Test strips, lancets, alcohol swabs 8/26/14   Deepti Isidro MD         This is a 68 y.o. female who is pleasant, cooperative, alert and oriented x3, in no acute distress. Heart: Heart sounds are normal.  HR 71  irregular rhythm (rare extra systole) no murmur, gallop or rub.    Lungs: Normal respiratory effort with equal expansion, unlabored at rest w/o use of accessory muscles, wheezes or rales bilaterally   Abdomen: round, soft, nontender, nondistended with bowel sounds   Extremities: Plantar flexion 3/5 left 5/5right  Dorsi flexion 5/5 bilaterally     Labs:  No results for input(s): HGB, HCT, WBC, MCV, PLT, NA, K, CL, CO2, BUN, CREATININE, GLUCOSE, INR, PROTIME, APTT, AST, ALT, LABALBU, HCG in the last 720 hours. No results for input(s): COVID19 in the last 720 hours. Arie GUERRERO, LITZY-BC  Electronically signed 4/29/2021 at 11:53 Derrick Abel MD   Physician   Specialty:  Pain Management   Progress Notes   Signed   Encounter Date:  4/7/2021         Related encounter: Office Visit from 4/7/2021 in [facility] [place]         Signed        Expand AllCollapse All      The patient is a 68 y. o. Non-/non  female. Chief Complaint   Patient presents with    Neck Pain    Shoulder Pain         HPI     72-year-old woman with history of chronic neck and chronic lower back pain  She is diagnosed with cervical spinal stenosis and lumbar spinal stenosis  For her chronic neck and cervical radicular symptoms she had a recent cervical epidural injection  Today here for postprocedure follow-up  Report more than 50% improvement in neck and arm pain with recent injection and do not report any side effect     Patient is on chronic low-dose opioid therapy with Norco 5 mg twice a day along with gabapentin  Finds the medication helpful allowing her to do daily life activities and become independent  No history of illicit substance use  Recent alcohol detected on urine toxicology discussed with patient  Patient states she occasionally use a very small amount of alcohol and plans to stop it now on a recommendation     Today main issue is lower back pain located in the lumbar area with radiation down both legs and  Pain aggravated with standing and walking  No changes in bladder or bowel control  She has tried conservative measures with therapy in past  Not a candidate for NSAIDs because of renal insufficiency and advanced age  Had epidural injection in past with good outcome     Patient is here for a follow up and medication was refilled on 4/5/21.  Pain is aggravated more when she gets up out of a chair or from bed. Alleviating factors are the pain medications. Pain is sharp and shooting from the neck to the right shoulder and there feels like thwre is a knot in the shoulder      Past Medical History        Past Medical History:   Diagnosis Date    Arthritis      Cataract      Cataracts, bilateral      Diverticulosis of colon 2015    Headache(784.0)      History of colon polyps 2015    Insomnia      Neck pain      Osteoarthrosis, unspecified whether generalized or localized, unspecified site 10/5/2012    Pure hypercholesterolemia 10/5/2012    Shoulder blade pain       right  side    Stroke ([facility] [address]      Tubular adenoma of colon 2015    Type II or unspecified type diabetes mellitus without mention of complication, not stated as uncontrolled 10/5/2012    Unspecified essential hypertension 10/5/2012    Unspecified sleep apnea           Past Surgical History         Past Surgical History:   Procedure Laterality Date    COLONOSCOPY   8/23/06     Dr Varma Quest   5 12 15     extensive diverticulosis, tubular adenoma    EPIDURAL STEROID INJECTION N/A 8/25/2017     EPIDURAL STEROID INJECTION cervical performed by Jeffry Isaac MD at [facility] 11/20/2017     EPIDURAL STEROID INJECTION L5S1 performed by Jeffry Isaac MD at [facility] Bilateral 5/20/2019     EPIDURAL STEROID INJECTION BILATERAL S1 performed by Jeffry Isaac MD at [id number] [place] [address]   10/1/06    NECK SURGERY   5/2011 & 2001    NERVE BLOCK Bilateral 02/17/2020     Lumbar Facet L2-L5    NERVE SURGERY Bilateral 9/5/2019     BILATERAL LUMBAR FACET STEROID INJECTION L2-L5 performed by Jeffry Isaac MD at [address]   08/25/2017     L5-S1 steroid injection    OTHER SURGICAL HISTORY   11/20/2017     VANITA L5-S1    OTHER SURGICAL HISTORY   10/01/2018     cervical steroid injection    OTHER SURGICAL HISTORY Bilateral 2019     EPIDURAL STEROID INJECTION BILATERAL S1 (Bilateral )    PAIN MANAGEMENT PROCEDURE Bilateral 2020     LUMBAR FACET L2-L5 performed by Tony Smith MD at [facility] N/A 3/22/2021     CERVICAL EPIDURAL STEROID INJECTION C7-T1 performed by Tony Smith MD at [address] AA&/STRD TFRML EPI LUMBAR/SACRAL 1 LEVEL Bilateral 2018     BILATERAL L4 VANITA performed by Tony Smith MD at [address] DX/THER SBST INTRLMNR CRV/THRC W/IMG GDN N/A 10/1/2018     EPIDURAL STEROID INJECTION CERVICAL C7-T1 performed by Tony Smith MD at [facility]. 18. History            Socioeconomic History    Marital status: Single       Spouse name: None    Number of children: None    Years of education: None    Highest education level: None   Occupational History    None   Social Needs    Financial resource strain: None    Food insecurity       Worry: None       Inability: None    Transportation needs       Medical: None       Non-medical: None   Tobacco Use    Smoking status: Former Smoker       Packs/day: 0.50       Years: 15.00       Pack years: 7.50       Types: Cigarettes       Quit date: 10/5/2001       Years since quittin.5    Smokeless tobacco: Never Used   Substance and Sexual Activity    Alcohol use:  Yes       Alcohol/week: 0.0 standard drinks       Comment: on occasion    Drug use: No    Sexual activity: Not Currently   Lifestyle    Physical activity       Days per week: None       Minutes per session: None    Stress: None   Relationships    Social connections       Talks on phone: None       Gets together: None       Attends Oriental orthodox service: None       Active member of club or organization: None       Attends meetings of clubs or organizations: None       Relationship status: None    Intimate partner violence       Fear of current or ex partner: None       Emotionally abused: None       Physically abused: None       Forced sexual activity: None   Other Topics Concern    None   Social History Narrative    None         Family History         Family History   Problem Relation Age of Onset    Breast Cancer Sister      Cancer Sister           liver    Diabetes Daughter           No Known Allergies  Patient has no known allergies. Vitals:     04/07/21 0940   BP: (!) 165/96   Pulse: 94   Temp:     SpO2: 97%      Current Facility-Administered Medications          Current Outpatient Medications   Medication Sig Dispense Refill    HYDROcodone-acetaminophen (NORCO) 5-325 MG per tablet Take 1 tablet by mouth 2 times daily as needed for Pain for up to 30 days. 60 tablet 0    tiZANidine (ZANAFLEX) 2 MG tablet take 2 tablets by mouth at bedtime AND ONCE DURING THE DAY  if needed 90 tablet 0    metFORMIN (GLUCOPHAGE) 1000 MG tablet take 1 tablet by mouth twice a day with meals 180 tablet 1    zolpidem (AMBIEN) 10 MG tablet take 1/2 tablet by mouth nightly if needed for sleep 15 tablet 1    HYDROcodone-acetaminophen (NORCO) 5-325 MG per tablet Take 1 tablet by mouth 2 times daily as needed for Pain for up to 30 days.  60 tablet 0    polyethylene glycol (GLYCOLAX) 17 GM/SCOOP powder take 17GM (DISSOLVED IN WATER) by mouth once daily 510 g 1    furosemide (LASIX) 20 MG tablet Take 1 tablet by mouth 2 times daily 180 tablet 1    Lift Chair MISC by Does not apply route 1 each 0    atorvastatin (LIPITOR) 40 MG tablet Take 1 tablet by mouth daily 90 tablet 1    amitriptyline (ELAVIL) 25 MG tablet Take 1 tablet by mouth nightly 60 tablet 5    loratadine (CLARITIN) 10 MG tablet take 1 tablet by mouth once daily 90 tablet 1    cloNIDine (CATAPRES) 0.2 MG tablet Take 0.5 tablets by mouth 2 times daily 90 tablet 1    pantoprazole (PROTONIX) 40 MG tablet take 1 tablet by mouth every morning BEFORE BREAKFAST 90 tablet 1    allopurinol (ZYLOPRIM) 100 MG tablet take 1 tablet by mouth once daily 90 tablet 1    carvedilol (COREG) 3.125 MG tablet take 1 tablet by mouth twice a day 180 tablet 1    gabapentin (NEURONTIN) 600 MG tablet Take 1 tablet by mouth 3 times daily for 190 days. 90 tablet 5    blood glucose test strips (ONE TOUCH ULTRA TEST) strip TEST once daily 100 strip 3    fluticasone (FLONASE) 50 MCG/ACT nasal spray instill 1 spray into each nostril once daily 16 g 2    aspirin 325 MG tablet Take 325 mg by mouth daily        Blood Glucose Monitoring Suppl KIT 1 blood glucose monitoring supplies kit. Test strips, lancets, alcohol swabs 1 kit 0      No current facility-administered medications for this visit. Review of Systems   Constitutional: Positive for fatigue. Negative for fever. Respiratory: Positive for shortness of breath. Musculoskeletal: Positive for neck pain and neck stiffness. Shoulder pain   Neurological: Positive for weakness and numbness. Tingling       Objective:  General Appearance:  Well-appearing and in no acute distress. Vital signs: (most recent): Blood pressure (!) 165/96, pulse 94, temperature 97.1 °F (36.2 °C), height 5' 2\" (1.575 m), weight 165 lb (74.8 kg), SpO2 97 %, not currently breastfeeding. Vital signs are normal.  No fever. Output: Producing urine and producing stool. HEENT: Normal HEENT exam.    Lungs:  Normal effort and normal respiratory rate. Breath sounds clear to auscultation. She is not in respiratory distress. Heart: Normal rate. Extremities: Normal range of motion. There is no deformity. Neurological: Patient is alert and oriented to person, place and time. Patient has normal coordination. Pupils:  Pupils are equal, round, and reactive to light. Pupils are equal.   Skin:  Warm and dry. No rash or cyanosis.       Assessment & Plan      53-year-old woman with history of chronic neck and chronic lower back pain  She is diagnosed with cervical spinal stenosis and lumbar spinal stenosis  For her chronic neck and cervical radicular symptoms she had a recent cervical epidural injection  Today here for postprocedure follow-up  Report more than 50% improvement in neck and arm pain with recent injection and do not report any side effect     Patient is on chronic low-dose opioid therapy with Norco 5 mg twice a day along with gabapentin  Finds the medication helpful allowing her to do daily life activities and become independent  No history of illicit substance use  Recent alcohol detected on urine toxicology discussed with patient  Patient states she occasionally use a very small amount of alcohol and plans to stop it now on a recommendation     Today main issue is lower back pain located in the lumbar area with radiation down both legs and  Pain aggravated with standing and walking  No changes in bladder or bowel control  She has tried conservative measures with therapy in past  Not a candidate for NSAIDs because of renal insufficiency and advanced age  Had epidural injection in past with good outcome        1. Lumbar stenosis with neurogenic claudication    2. Cervical stenosis of spine    3. Chronic use of opiate drugs therapeutic purposes    4. Primary osteoarthritis involving multiple joints        Orders Placed This Encounter   Procedures    DC NJX AA&/STRD TFRML EPI LUMBAR/SACRAL 1 LEVEL        Encounter Medications    No orders of the defined types were placed in this encounter.       We will schedule patient for lumbar epidural steroid injection     Automated prescription report reviewed  Recent urine toxicology report reviewed  Safe use of medication discussed  Patient will get the next month refill when she come for her procedure and we will see her back in 2 months in clinic for medication management        Electronically signed by Roberta Spears MD on 4/7/2021 at 9:59 AM               Revision History

## 2021-04-29 NOTE — PROGRESS NOTES
Pt states numbness to bilateral legs which is normal and was present pre-procedure. Pt is having bilateral weakness to the legs post-procedure with no movement currently in the left leg. Dr. Tone Rodriguez updated on pt status.

## 2021-04-29 NOTE — OP NOTE
views that showed  spread of the contrast in the epidural space  and no vascular runoff or intrathecal spread. Finally 3 ml of treatment solution containing 5 ml of  1 % PF lidocaine and 1 ml of dexamethasone 10 mg / ml was injected. The needle was removed and a Band-Aid was placed over the needle  insertion site. The patient's vital signs remained stable and the patient tolerated the procedure well. The same procedure was then repeated at left at s1 level with same technique. The remaining 3 ml of treatment solution was injected at that. The total dose of steroid injected today was dexamethasone 10 mg. Electronically signed by Samson Sheppard MD on 4/29/2021 at 1:58 PM  SEDATION NOTE:    ASA CLASSIFICATION  3  MP   CLASSIFICATION  3    · Moderate intravenous conscious sedation was supervised by Dr. Hodan Núñez  . The patient was independently monitored by a Registered Nurse assigned to the Procedure Room  . Monitoring included automated blood pressure, continuous EKG, Capnography and continuous pulse oximetry. . The detailed Conscious Record is permanently stored in the [place] [id number].      The following is the conscious sedation record;  Start Time:  1226  End times:  1246  Duration:  20 MINUTES  MEDS GIVEN 1 MG VERSED AND 50 [address] 43          Electronically signed by Samson Sheppard MD on 4/29/2021 at 1:58 PM

## 2021-04-30 DIAGNOSIS — M15.9 PRIMARY OSTEOARTHRITIS INVOLVING MULTIPLE JOINTS: ICD-10-CM

## 2021-04-30 DIAGNOSIS — M48.062 LUMBAR STENOSIS WITH NEUROGENIC CLAUDICATION: Chronic | ICD-10-CM

## 2021-04-30 DIAGNOSIS — M48.02 CERVICAL STENOSIS OF SPINE: Chronic | ICD-10-CM

## 2021-04-30 RX ORDER — HYDROCODONE BITARTRATE AND ACETAMINOPHEN 5; 325 MG/1; MG/1
1 TABLET ORAL 2 TIMES DAILY PRN
Qty: 60 TABLET | Refills: 0 | Status: SHIPPED | OUTPATIENT
Start: 2021-05-05 | End: 2021-06-28 | Stop reason: SDUPTHER

## 2021-04-30 NOTE — TELEPHONE ENCOUNTER
Casie Flynn is requesting a refill on the following medications:   Requested Prescriptions     Pending Prescriptions Disp Refills    HYDROcodone-acetaminophen (NORCO) 5-325 MG per tablet 60 tablet 0     Sig: Take 1 tablet by mouth 2 times daily as needed for Pain for up to 30 days. Last OV 4/7/21. Had VANITA yesterday    Future Appointments   Date Time Provider Pradeep Alexander   5/6/2021  9:30 AM Darrick John, PT [facility]   5/17/2021  8:00 AM STA SCR MAMMO RM 2 STAZ MAMMO STA Radiolog   5/24/2021  9:00 AM Virginia Martines, APRN - CNP [facility]   8/16/2021 11:15 AM Connie Garza MD [facility] FP [facility]   12/13/2021 10:30 AM Connie Garza MD [facility]       OARRS report sent to Dr. Morgan Mcgovern through alternative route for review.

## 2021-05-06 ENCOUNTER — HOSPITAL ENCOUNTER (OUTPATIENT)
Dept: PHYSICAL THERAPY | Facility: CLINIC | Age: 81
Setting detail: THERAPIES SERIES
Discharge: HOME | End: 2021-05-06
Payer: MEDICARE

## 2021-05-06 NOTE — FLOWSHEET NOTE
[] [facility]) [facility] &  Therapy  [address]    [phone]  F: [phone]   [x] [address]  [facility]   [address]  P: [phone]  F: [phone]  [] [place]  [address]  P: [phone]  F: [phone] [] [address]  P: [phone]  F: [phone]  [] [address]  [facility]   [address]   [place]: [phone]  F: [phone]   [] [address]  [place]: [phone]   F: [phone]     Physical Therapy Cancel/No Show note    Date: 2021  Patient: Joshua Farris  : 1940  MRN: [id number]    Cancels/No Shows to date: 1 cx/ 0 ns    For today's appointment patient:    [x]  Cancelled    [x] Rescheduled appointment    [] No-show     Reason given by patient:    []  Patient ill    []  Conflicting appointment    [] No transportation      [] Conflict with work    [x] No reason given    [] Weather related    [] COVID-19    [] Other:      Comments:        [x] Next appointment was confirmed    Electronically signed by: Latha Gomez, PT

## 2021-05-12 ENCOUNTER — HOSPITAL ENCOUNTER (OUTPATIENT)
Dept: PHYSICAL THERAPY | Facility: CLINIC | Age: 81
Setting detail: THERAPIES SERIES
Discharge: HOME | End: 2021-05-12
Payer: MEDICARE

## 2021-05-12 DIAGNOSIS — M54.12 CERVICAL RADICULAR PAIN: Chronic | ICD-10-CM

## 2021-05-12 PROCEDURE — 97162 PT EVAL MOD COMPLEX 30 MIN: CPT

## 2021-05-12 PROCEDURE — 97116 GAIT TRAINING THERAPY: CPT

## 2021-05-12 PROCEDURE — 97110 THERAPEUTIC EXERCISES: CPT

## 2021-05-12 RX ORDER — TIZANIDINE 2 MG/1
TABLET ORAL
Qty: 90 TABLET | Refills: 0 | Status: SHIPPED | OUTPATIENT
Start: 2021-05-12 | End: 2021-07-21

## 2021-05-12 NOTE — TELEPHONE ENCOUNTER
Last visit: 04/13/2021  Last Med refill: 03/18/2021  Does patient have enough medication for 72 hours: No:     Next Visit Date:  Future Appointments   Date Time Provider Pradeep Alexander   5/17/2021  8:00 AM STA SCR MAMMO RM 2 STAZ MAMMO STA Radiolog   5/24/2021  9:00 AM Milus Agent, APRN - CNP [facility]   8/16/2021 11:15 AM Connie Villatoro MD [facility] FP [facility]   12/13/2021 10:30 AM Connie Villatoro  [address] [facility]   Topic Date Due    Hepatitis C screen  Never done    COVID-19 Vaccine (1) Never done    Shingles Vaccine (1 of 2) Never done    Annual Wellness Visit (AWV)  Never done    DTaP/Tdap/Td vaccine (1 - Tdap) 10/18/2026 (Originally 5/28/1959)    Lipid screen  09/30/2021    Potassium monitoring  09/30/2021    Creatinine monitoring  09/30/2021    DEXA (modify frequency per FRAX score)  Completed    Flu vaccine  Completed    Pneumococcal 65+ years Vaccine  Completed    Hepatitis A vaccine  Aged Out    Hib vaccine  Aged Out    Meningococcal (ACWY) vaccine  Aged Out       Hemoglobin A1C (%)   Date Value   09/30/2020 5.7   02/25/2020 6.6   10/07/2019 6.5             ( goal A1C is < 7)   Microalb/Crt.  Ratio (mcg/mg creat)   Date Value   10/06/2020 CANNOT BE CALCULATED     LDL Cholesterol (mg/dL)   Date Value   09/30/2020 48   06/03/2019 102       (goal LDL is <100)   AST (U/L)   Date Value   09/30/2020 20     ALT (U/L)   Date Value   09/30/2020 13     BUN (mg/dL)   Date Value   09/30/2020 9     BP Readings from Last 3 Encounters:   04/29/21 (!) 177/82   04/13/21 104/66   04/07/21 (!) 165/96          (goal 120/80)    All Future Testing planned in [facility]  Lab Frequency Next Occurrence   Drugs of Abuse, Urine Once 02/03/2021   SHAKIR DIGITAL SCREEN W OR WO CAD BILATERAL Once 09/23/2021   [facility] NJX AA&/STRD TFRML EPI LUMBAR/SACRAL 1 LEVEL Once 04/08/2021               Patient Active Problem List:     Osteoarthritis     Essential hypertension     Type 2 diabetes mellitus with diabetic neuropathy, without long-term current use of insulin (HCC)     Pure hypercholesterolemia     Constipation     Rectal pain     Diverticulosis of colon     Tubular adenoma of colon     History of colon polyps     Rectal pressure     Failed neck syndrome     Cervical stenosis of spine     Cervical radicular pain     Lumbar stenosis with neurogenic claudication     JANN on CPAP     Primary osteoarthritis involving multiple joints     Hx of cervical spine surgery     Chronic use of opiate drugs therapeutic purposes     Myelomalacia (HCC)     Lumbar facet joint syndrome     Gastroesophageal reflux disease

## 2021-05-12 NOTE — CONSULTS
[] [facility]) - [facility] &  Therapy  [address]  [phone]  F: [phone] [x] [address]  [facility]   [address]  P: [phone]  F: [phone] [] [address] &  Therapy  [address]  P: [phone]  F: [phone] [] [address]  P: [phone]  F: [phone] [] [address]  [facility]   [address]   [place]: [phone]  F: [phone]      Physical Therapy Lower Extremity Evaluation    Date:  2021  Patient: Ruthy Beebe    : 1940  MRN: [id number]  Physician: Dr. Renae Franklin MD  Insurance: [facility] after misaelal, $40 copay)  Medical Diagnosis: at risk for falls, left leg weakness   Rehab Codes: Z91.81, M62.81, M25.662, G71.0H94.566,B07.813  Onset date: ~ 3-    Next 's appt.: 2021    Estimated Medicare Amount = $129.04    Subjective:   CC: Patient reports left leg is \"heavy, I have to drag it when I walk\"  She reports numbness lateral lower leg. Comes to PT in wheelchair. No knee or hip injuries  Issues with back  Comes to PT in wheel chair. States she uses walker at home and wheel chair for coumunity ambulation    HPI: Started around 2 months ago, no specific injury per patient. PMHx: [] Unremarkable [x] Diabetes \"for a long time\" - taking medication [x] HTN  [] Pacemaker   [] MI/Heart Problems [] Cancer [] Arthritis [] Other:              [x] Refer to full medical chart  In EPIC     Patient reports numbness and tingling in bilateral hands, is able to raise arms overhead.      Comorbidities:   [] Obesity [] Dialysis  [] N/A   [] Asthma/COPD [] Dementia [x] Other:neck surgery from a \"nerve pinching on a bone\" ~    [] Stroke [] Sleep apnea stroke   [] Vascular disease [] Rheumatic disease [] Other:     Tests: [] X-Ray: [] MRI:  [] Other:    Medications: [x] Refer to full medical record [] None [] Other:  Allergies:      [x] Refer to full medical record  [] None [] Other:    Function:  Hand Dominance  [x] Right  [] Left  Patient lives with: Alone, dtr helps her   In what type of home [x]  One story   [] Two story   [] Split level   Number of stairs to enter none   With handrail on the []  Right to enter   [] Left to enter   Bathroom has a []  Tub only  [] Tub/shower combo   [x] Walk in shower    [x]  Grab bars   Washing machine is on [x]  Main level      Employer    Job Status []  [x]  Retired - hotel laundry   Work activities/duties interests - puzzles       ADL/IADL Previous level of function Current level of function Who currently assists the patient with task   Bathing  [x] Independent  [] Assist [x] Independent  [] Assist    Dress/grooming [x] Independent  [] Assist [x] Independent  [] Assist    Transfer/mobility [x] Independent  [] Assist [x] Independent  [] Assist    Feeding [x] Independent  [] Assist [x] Independent  [] Assist    Toileting [x] Independent  [] Assist [x] Independent  [] Assist    Driving [] Independent  [] Assist [] Independent  [] Assist Non , N/A   Housekeeping [x] Independent  [] Assist [] Independent  [x] Assist dtr   Grocery shop/meal prep [x] Independent  [] Assist [] Independent  [x] Assist dtr     Gait Prior level of function Current level of function    [x] Independent  [] Assist [] Independent  [x] Assist   Device: [x] Independent [] Independent    [] Straight Cane [] Quad cane [] Straight Cane [] Quad cane    [] Standard walker [] Rolling walker   [x] 4 wheeled walker, seat [] Standard walker [] Rolling walker   [] 4 wheeled walker    [] Wheelchair [x] Wheelchair x 1 month   Using walker most of the time at home  Wheelchair the past month for community ambulation    Pain:  [] Yes  [x] No Location: none now, has had in legs and stand 31 seconds  Hands pulling up and staying on walker in front of her entire time    Timed up and Go- 1 min 17 seconds, using 2 wheeled walker  Forward flexed at trunk  Feet in plastic bottomed slippers, sliding on floor  \"tiresome\"    Patient states she is not walking much at home, fell a few times and is scared. Patient reports she did not trip, left leg \"gave out\"  Had walker with her but still fell to the ground. Functional Test: LEFI Score: 85% functionally impaired     Comments:    Assessment:  Patient would benefit from skilled physical therapy services in order to: improve LE strength, core strength, gait mechanics, and balance to help decrease risk of falls. Problems:    [x] ? Pain:with standing and walking tasks  [x] ? ROM:bilateral knee extension  [x] ? Strength:bilateral LE, hips, core  [x] ? Function:limited with standing and walking tasks  [x] Other: poor balance      STG: (to be met in 5 treatments)  1. ? Strength: 1 grade in LE's to improve activity tolerance  2. ? Function:Demonstrate equal knee extension with gait using walker. 3. Patient to be independent with home exercise program as demonstrated by performance with correct form without cues. 4. Demonstrate Knowledge of fall prevention, met 5-12-21 issued and education on fall prevention  5. LTG: (to be met in 10 treatments)  1. 5 sit to stand from 31 seconds to 20 sec to demonstrate improved mobility and quad strength to decrease fall risk.   2.  Demonstrate 300 ft of gait in clinic, using walker correctly with upright posturing  Improve by 2 levels on LEFI for in and out of car, walking between rooms and getting in and out of car (cab)                 Patient goals: \"to help heaviness in leg\"    Rehab Potential:  [x] Good  [] Fair  [] Poor   Suggested Professional Referral:  [] No  [x] Yes:ongoing with specialists,   Barriers to Goal Achievement:  [] No  [x] Yes:hx of issues with back  Domestic Concerns:  [] No  [x] Yes:lives alone, falls in recent past    Pt. Education:  [x] Plans/Goals, Risks/Benefits discussed  [x] Home exercise program    Method of Education: [x] Verbal  [x] Demo  [x] Written  5- fall prevention handout  Access Code: [email or web address]  URL: [email or web address]  Date: 05/12/2021  Prepared by: David Mccloud    Exercises  Seated March - 2 x daily - 7 x weekly - 1 sets - 10-20 reps - slow hold  Seated Long Arc Quad - 2 x daily - 7 x weekly - 1 sets - 10-20 reps - slow hold  Seated Hip Adduction Isometrics with Ball - 2 x daily - 7 x weekly - 1 sets - 10-20 reps    Patient Education  Sit to Stand with [facility] [address] of Education:  [] [employer] understanding. [] Demonstrates understanding. [x] Needs Review. [] Demonstrates/verbalizes understanding of HEP/Ed previously given.     Treatment Plan:  [x] Therapeutic Exercise   49121  [] Iontophoresis: 4 mg/mL Dexamethasone Sodium Phosphate  mAmin  48721   [x] Therapeutic Activity  66819 [] Vasopneumatic cold with compression  41068    [x] Gait Training   10298 [] Ultrasound   77970   [x] Neuromuscular Re-education  36601 [] Electrical Stimulation Unattended  86341   [] Manual Therapy  00580 [] Electrical Stimulation Attended  24954   [x] Instruction in HEP  [] Lumbar/Cervical Traction  14609   [] Aquatic Therapy   96000 [x] Cold/hotpack    [] Massage   20512         [] Biofeedback, first 15 minutes   17655  [] Biofeedback, additional 15 minutes   56031          Frequency:  1 x/week for 10 visits      Todays Treatment:  Modalities:   Precautions:  Exercises:  Exercise Reps/ Time Weight/ Level Comments   sitting      marching 12x each     LAQ 15x each     Hip add ball squeeze 15           Sit to stand 5x                 Gait with walker   Cueing on heel to toes, larger steps, walking from room 1 to first [employer] ~ 40 feet   Other:    Specific Instructions for next treatment:  LE strengthening, balance  Asked pt to bring in her walker from home

## 2021-05-13 NOTE — CONSULTS
[facility] Fall Risk Assessment    Patient Name:  Merilee Severance  : 1940        Risk Factor Scale  Score   History of Falls [x] Yes  [] No 25  0 25   Secondary Diagnosis [x] Yes  [] No 15  0 15   Ambulatory Aid [] Furniture  [x] Crutches/cane/walker  [] None/bedrest/wheelchair/nurse 30  15  0 15   IV/Heparin Lock [] Yes  [x] No 20  0 0   Gait/Transferring [x] Impaired  [] Weak  [] Normal/bedrest/immobile 20  10  0 20   Mental Status [] Forgets limitations  [] Oriented to own ability 15  0 0      Total:75     Based on the Assessment score: check the appropriate box.     []  No intervention needed   Low =   Score of 0-24    []  Use standard prevention interventions Moderate =  Score of 24-44   [] Give patient handout and discuss fall prevention strategies   [] Establish goal of education for patient/family RE: fall prevention strategies    [x]  Use high risk prevention interventions High = Score of 45 and higher   [x] Give patient handout and discuss fall prevention strategies   [x] Establish goal of education for patient/family Re: fall prevention strategies   [x] Discuss lifeline / other resources    Electronically signed by:   Finesse George PT  Date: 2021

## 2021-05-17 ENCOUNTER — HOSPITAL ENCOUNTER (OUTPATIENT)
Dept: MAMMOGRAPHY | Age: 81
Discharge: HOME | End: 2021-05-19
Payer: MEDICARE

## 2021-05-17 DIAGNOSIS — Z12.31 ENCOUNTER FOR SCREENING MAMMOGRAM FOR BREAST CANCER: ICD-10-CM

## 2021-05-17 PROCEDURE — 77063 BREAST TOMOSYNTHESIS BI: CPT

## 2021-05-20 DIAGNOSIS — M54.12 CERVICAL RADICULAR PAIN: Chronic | ICD-10-CM

## 2021-05-20 RX ORDER — TIZANIDINE 2 MG/1
TABLET ORAL
Qty: 90 TABLET | Refills: 0 | OUTPATIENT
Start: 2021-05-20

## 2021-06-16 DIAGNOSIS — G47.9 SLEEPING DIFFICULTY: ICD-10-CM

## 2021-06-16 RX ORDER — ZOLPIDEM TARTRATE 10 MG/1
5 TABLET ORAL NIGHTLY PRN
Qty: 15 TABLET | Refills: 1 | Status: SHIPPED | OUTPATIENT
Start: 2021-06-16 | End: 2021-08-23

## 2021-06-16 NOTE — TELEPHONE ENCOUNTER
Last visit: 4/13/21  Last Med refill: 4/7/21  Does patient have enough medication for 72 hours: No:     Next Visit Date:  Future Appointments   Date Time Provider Pradeep Catherine   6/21/2021 10:00 AM Mumtaz Caceres, PTA [facility]   6/28/2021  9:00 AM Kaylie Bee, APRN - CNP [facility]   8/16/2021 11:15 AM Connie Griffiths MD [facility]   12/13/2021 10:30 AM Connie Griffiths  [address] [facility]   Topic Date Due    Hepatitis C screen  Never done    COVID-19 Vaccine (1) Never done    Shingles Vaccine (1 of 2) Never done    Annual Wellness Visit (AWV)  Never done    DTaP/Tdap/Td vaccine (1 - Tdap) 10/18/2026 (Originally 5/28/1959)    Lipid screen  09/30/2021    Potassium monitoring  09/30/2021    Creatinine monitoring  09/30/2021    DEXA (modify frequency per FRAX score)  Completed    Flu vaccine  Completed    Pneumococcal 65+ years Vaccine  Completed    Hepatitis A vaccine  Aged Out    Hib vaccine  Aged Out    Meningococcal (ACWY) vaccine  Aged Out       Hemoglobin A1C (%)   Date Value   09/30/2020 5.7   02/25/2020 6.6   10/07/2019 6.5             ( goal A1C is < 7)   Microalb/Crt.  Ratio (mcg/mg creat)   Date Value   10/06/2020 CANNOT BE CALCULATED     LDL Cholesterol (mg/dL)   Date Value   09/30/2020 48   06/03/2019 102       (goal LDL is <100)   AST (U/L)   Date Value   09/30/2020 20     ALT (U/L)   Date Value   09/30/2020 13     BUN (mg/dL)   Date Value   09/30/2020 9     BP Readings from Last 3 Encounters:   04/29/21 (!) 177/82   04/13/21 104/66   04/07/21 (!) 165/96          (goal 120/80)    All Future Testing planned in [facility]  Lab Frequency Next Occurrence   Drugs of Abuse, Urine Once 02/03/2021   [place] NJX AA&/STRD TFRML EPI LUMBAR/SACRAL 1 LEVEL Once 04/08/2021               Patient Active Problem List:     Osteoarthritis     Essential hypertension     Type 2 diabetes mellitus with diabetic neuropathy, without long-term current use of insulin ([address]     Pure hypercholesterolemia     Constipation     Rectal pain     Diverticulosis of colon     Tubular adenoma of colon     History of colon polyps     Rectal pressure     Failed neck syndrome     Cervical stenosis of spine     Cervical radicular pain     Lumbar stenosis with neurogenic claudication     JANN on CPAP     Primary osteoarthritis involving multiple joints     Hx of cervical spine surgery     Chronic use of opiate drugs therapeutic purposes     Myelomalacia (HCC)     Lumbar facet joint syndrome     Gastroesophageal reflux disease

## 2021-06-21 ENCOUNTER — HOSPITAL ENCOUNTER (OUTPATIENT)
Dept: PHYSICAL THERAPY | Facility: CLINIC | Age: 81
Setting detail: THERAPIES SERIES
Discharge: HOME | End: 2021-06-21
Payer: MEDICARE

## 2021-06-21 NOTE — FLOWSHEET NOTE
[] [address]  [address]    [phone]  F: [phone]   [x] [address] [facility] [address]   [address]  P: [phone]  F: [phone]  [] [address] &  Therapy  [address] [facility]  P: [phone]  F: [phone] [] [address] [place] [address]  P: [phone]  F: [phone]  [] [address]  [address] [address] [facility] [address]   [address]   [place]: [phone]  F: [phone]   [] [place] [address] [facility] [address]  [place]: [phone]   F: [phone]     Physical Therapy Cancel/No Show note    Date: 2021  Patient: Franchesca Moser  : 1940  MRN: [id number]    Cancels/No Shows to date: 0    For today's appointment patient:    [x]  Cancelled    [] Rescheduled appointment    [] No-show     Reason given by patient:    [x]  Patient ill    []  Conflicting appointment    [] No transportation      [] Conflict with work    [] No reason given    [] Weather related    [] [id number]    [x] Other:      Comments: No other appointments scheduled. States she will call back.          [] Next appointment was confirmed    Electronically signed by: Kaylene Ahumada PTA

## 2021-06-28 ENCOUNTER — VIRTUAL VISIT (OUTPATIENT)
Dept: PAIN MANAGEMENT | Age: 81
End: 2021-06-28
Payer: MEDICARE

## 2021-06-28 DIAGNOSIS — Z79.891 CHRONIC USE OF OPIATE DRUG FOR THERAPEUTIC PURPOSE: Primary | ICD-10-CM

## 2021-06-28 DIAGNOSIS — M48.062 LUMBAR STENOSIS WITH NEUROGENIC CLAUDICATION: Chronic | ICD-10-CM

## 2021-06-28 DIAGNOSIS — M15.9 PRIMARY OSTEOARTHRITIS INVOLVING MULTIPLE JOINTS: ICD-10-CM

## 2021-06-28 DIAGNOSIS — M48.02 CERVICAL STENOSIS OF SPINE: Chronic | ICD-10-CM

## 2021-06-28 PROCEDURE — 1090F PRES/ABSN URINE INCON ASSESS: CPT | Performed by: NURSE PRACTITIONER

## 2021-06-28 PROCEDURE — 99213 OFFICE O/P EST LOW 20 MIN: CPT | Performed by: NURSE PRACTITIONER

## 2021-06-28 PROCEDURE — G8427 DOCREV CUR MEDS BY ELIG CLIN: HCPCS | Performed by: NURSE PRACTITIONER

## 2021-06-28 PROCEDURE — G8399 PT W/DXA RESULTS DOCUMENT: HCPCS | Performed by: NURSE PRACTITIONER

## 2021-06-28 PROCEDURE — 1036F TOBACCO NON-USER: CPT | Performed by: NURSE PRACTITIONER

## 2021-06-28 PROCEDURE — 1123F ACP DISCUSS/DSCN MKR DOCD: CPT | Performed by: NURSE PRACTITIONER

## 2021-06-28 PROCEDURE — 4040F PNEUMOC VAC/ADMIN/RCVD: CPT | Performed by: NURSE PRACTITIONER

## 2021-06-28 PROCEDURE — G8417 CALC BMI ABV UP PARAM F/U: HCPCS | Performed by: NURSE PRACTITIONER

## 2021-06-28 RX ORDER — HYDROCODONE BITARTRATE AND ACETAMINOPHEN 5; 325 MG/1; MG/1
1 TABLET ORAL 2 TIMES DAILY PRN
Qty: 60 TABLET | Refills: 0 | Status: SHIPPED | OUTPATIENT
Start: 2021-06-28 | End: 2021-07-26 | Stop reason: SDUPTHER

## 2021-06-28 ASSESSMENT — ENCOUNTER SYMPTOMS
RESPIRATORY NEGATIVE: 1
SHORTNESS OF BREATH: 0
BACK PAIN: 1
COUGH: 0
CONSTIPATION: 0

## 2021-06-28 NOTE — PROGRESS NOTES
Pursuant to the emergency declaration under the [address] [id number] [facility] authority and the [employer] and [employer] Act, this Virtual  Visit was conducted, with patient's consent, to reduce the patient's risk of exposure to COVID-19 and provide continuity of care for an established patient. Services were provided through a telephone discussion virtually to substitute for in-person clinic visit. Chief Complaint   Patient presents with    Back Pain    Medication Refill         [facility]    history of chronic neck and chronic lower back pain  She is diagnosed with cervical spinal stenosis and lumbar spinal stenosis     Today main issue is neck pain midline into upper trapezius. Reports constant finger numbness, denies HA    Her back pain is minimal at this time but at times located in lower back pain in the lumbar area with radiation down both legs. aggravated with standing and walking    Patient is on chronic low-dose opioid therapy with Norco 5 mg twice a day along with gabapentin  Finds the medication helpful allowing her to do daily life activities and become independent  She has tried conservative measures with therapy in past  Not a candidate for NSAIDs because of renal insufficiency and advanced age     Currently in PT    Procedures  4/29/21  BILATERAL L5 EPIDURAL STEROID INJECTION reports 70% relief with improved function for 2 weeks     3/22/21  CERVICAL EPIDURAL STEROID INJECTION C7-T1 more than 50% relief    HPI:   Back Pain  This is a chronic problem. The current episode started more than 1 year ago. The problem occurs constantly. The pain is present in the lumbar spine. The quality of the pain is described as aching. The pain does not radiate. The pain is at a severity of 0/10. The patient is experiencing no pain. Associated symptoms include headaches and numbness (fingers).  Pertinent negatives include no chest pain or fever. Risk factors include obesity. She has tried analgesics for the symptoms. The treatment provided moderate relief. Neck Pain   This is a chronic problem. The current episode started more than 1 year ago. The problem occurs constantly. The pain is associated with nothing. The pain is at a severity of 4/10. Associated symptoms include headaches and numbness (fingers). Pertinent negatives include no chest pain or fever. Medication Refill: Norco    Pain score Today:  4  Adverse effects (Constipation / Nausea / Sedation / sexual Dysfunction / others) : none  Mood: poor  Sleep pattern and quality: poor  Activity level: poor    Pill count Today:1 was due 6/4  Last dose taken  6/27/21  OARRS report reviewed today: yes  ER/Hospitalizations/PCP visit related to pain since last visit:no   Any legal problems e.g. DUI etc.:No  Satisfied with current management: Yes    Opioid Contract:2/3/21  Last Urine Dug screen dated:2/17/21    Lab Results   Component Value Date    LABA1C 5.7 09/30/2020     Lab Results   Component Value Date     01/12/2017       Past Medical History, Past Surgical History, Social History, Allergies and Medications reviewed and updated in EPIC as indicated    Family History reviewed and is noncontributory. Controlled Substance Monitoring:    Acute and Chronic Pain Monitoring:   RX Monitoring 6/28/2021   Attestation -   Periodic Controlled Substance Monitoring Possible medication side effects, risk of tolerance/dependence & alternative treatments discussed. ;No signs of potential drug abuse or diversion identified. ;Assessed functional status. ;Obtaining appropriate analgesic effect of treatment. Chronic Pain > 50 MEDD -   Chronic Pain > 80 MEDD -         Periodic Controlled Substance Monitoring: Possible medication side effects, risk of tolerance/dependence & alternative treatments discussed., No signs of potential drug abuse or diversion identified. , Assessed functional status., Obtaining appropriate analgesic effect of treatment.  Em Gautam, APRN - CNP)      Past Medical History:   Diagnosis Date    Arthritis     Cataract     Cataracts, bilateral     Diverticulosis of colon 2015    Gastroesophageal reflux disease 4/13/2021    Headache(784.0)     History of colon polyps 2015    Insomnia     Neck pain     Osteoarthrosis, unspecified whether generalized or localized, unspecified site 10/5/2012    Pure hypercholesterolemia 10/5/2012    Shoulder blade pain     right  side    Stroke ([facility] [address]     Tubular adenoma of colon 2015    Type II or unspecified type diabetes mellitus without mention of complication, not stated as uncontrolled 10/5/2012    Unspecified essential hypertension 10/5/2012    Unspecified sleep apnea        Past Surgical History:   Procedure Laterality Date    COLONOSCOPY  8/23/06    Dr Miryam Perez  5 12 15    extensive diverticulosis, tubular adenoma    EPIDURAL STEROID INJECTION N/A 8/25/2017    EPIDURAL STEROID INJECTION cervical performed by Zenaida Ruby MD at [address] [place], [place] [address]e [facility] 11/20/2017    EPIDURAL STEROID INJECTION L5S1 performed by Zenaida Ruby MD at [address] [place] [address] Bilateral 5/20/2019    EPIDURAL STEROID INJECTION BILATERAL S1 performed by Zenaida Ruby MD at [address] [place] [address]  10/1/06    NECK SURGERY  5/2011 & 2001    NERVE BLOCK Bilateral 02/17/2020    Lumbar Facet L2-L5    NERVE SURGERY Bilateral 9/5/2019    BILATERAL LUMBAR FACET STEROID INJECTION L2-L5 performed by Zenaida Ruby MD at [address] [address]  08/25/2017    L5-S1 steroid injection    OTHER SURGICAL HISTORY  11/20/2017    VANITA L5-S1    OTHER SURGICAL HISTORY  10/01/2018    cervical steroid injection    OTHER SURGICAL HISTORY Bilateral 05/20/2019    EPIDURAL STEROID INJECTION BILATERAL S1 (Bilateral )    OTHER SURGICAL HISTORY  04/29/2021    lumbar VANITA    PAIN MANAGEMENT PROCEDURE Bilateral 2/17/2020    LUMBAR FACET L2-L5 performed by Stuart Harris MD at [address] [place] [address] N/A 3/22/2021    CERVICAL EPIDURAL STEROID INJECTION C7-T1 performed by Stuart Harris MD at [address] [place] [address] Bilateral 4/29/2021    BILATERAL L5 EPIDURAL STEROID INJECTION performed by Stuart Harris MD at [address] AA&/STRD TFRML EPI LUMBAR/SACRAL 1 LEVEL Bilateral 11/19/2018    BILATERAL L4 VANITA performed by Stuart Harris MD at [address] DX/THER SBST INTRLMNR CRV/THRC W/IMG GDN N/A 10/1/2018    EPIDURAL STEROID INJECTION CERVICAL C7-T1 performed by Stuart Harris MD at [address] [place] [address]       No Known Allergies      Current Outpatient Medications:     HYDROcodone-acetaminophen (NORCO) 5-325 MG per tablet, Take 1 tablet by mouth 2 times daily as needed for Pain for up to 30 days. , Disp: 60 tablet, Rfl: 0    zolpidem (AMBIEN) 10 MG tablet, Take 0.5 tablets by mouth nightly as needed for Sleep for up to 60 days. , Disp: 15 tablet, Rfl: 1    tiZANidine (ZANAFLEX) 2 MG tablet, take 2 tablets by mouth at bedtime AND ONCE DURING THE DAY  if needed, Disp: 90 tablet, Rfl: 0    polyethylene glycol (GLYCOLAX) 17 GM/SCOOP powder, take 17GM (DISSOLVED IN WATER) by mouth once daily, Disp: 510 g, Rfl: 1    atorvastatin (LIPITOR) 40 MG tablet, Take 1 tablet by mouth daily, Disp: 90 tablet, Rfl: 1    amitriptyline (ELAVIL) 25 MG tablet, Take 1 tablet by mouth nightly, Disp: 90 tablet, Rfl: 1    loratadine (CLARITIN) 10 MG tablet, take 1 tablet by mouth once daily, Disp: 90 tablet, Rfl: 1    cloNIDine (CATAPRES) 0.1 MG tablet, Take 1 tablet by mouth 2 times daily, Disp: 180 tablet, Rfl: 1    pantoprazole (PROTONIX) 40 MG tablet, take 1 tablet by mouth every morning BEFORE BREAKFAST, Disp: 90 tablet, Rfl: 1    allopurinol (ZYLOPRIM) 100 MG tablet, take 1 tablet by mouth once daily, Disp: 90 tablet, Rfl: 1    carvedilol (COREG) 3.125 MG tablet, take 1 tablet by mouth twice a day, Disp: 180 tablet, Rfl: 1    gabapentin (NEURONTIN) 600 MG tablet, Take 1 tablet by mouth 3 times daily for 90 days. , Disp: 90 tablet, Rfl: 2    fluticasone (FLONASE) 50 MCG/ACT nasal spray, instill 1 spray into each nostril once daily, Disp: 16 g, Rfl: 2    aspirin 325 MG tablet, Take 325 mg by mouth daily, Disp: , Rfl:     blood glucose monitor kit and supplies, use check blood sugar as directed, Disp: 1 kit, Rfl: 0    blood glucose monitor strips, Check blood sugars daily as directed., Disp: 50 strip, Rfl: 6    Lancets MISC, 1 each by Does not apply route daily, Disp: 100 each, Rfl: 3    Alcohol Swabs 70 % PADS, 1 each by Does not apply route daily, Disp: 100 each, Rfl: 3    Lift Chair MISC, by Does not apply route, Disp: 1 each, Rfl: 0    Blood Glucose Monitoring Suppl KIT, 1 blood glucose monitoring supplies kit. Test strips, lancets, alcohol swabs, Disp: 1 kit, Rfl: 0    Family History   Problem Relation Age of Onset    Breast Cancer Sister     Cancer Sister         liver    Diabetes Daughter        Social History     Socioeconomic History    Marital status: Single     Spouse name: Not on file    Number of children: Not on file    Years of education: Not on file    Highest education level: Not on file   Occupational History    Not on file   Tobacco Use    Smoking status: Former Smoker     Packs/day: 0.50     Years: 15.00     Pack years: 7.50     Types: Cigarettes     Quit date: 10/5/2001     Years since quittin.7    Smokeless tobacco: Never Used   Vaping Use    Vaping Use: Never used   Substance and Sexual Activity    Alcohol use:  Yes     Alcohol/week: 0.0 standard drinks     Comment: on occasion    Drug use: No    Sexual activity: Not Currently   Other Topics Concern    Not on file   Social History Narrative    Not on file     Social Determinants of Health     Financial Resource Strain: Low Risk     Difficulty of Paying Living Expenses: Not very hard   Food Insecurity: No Food Insecurity    Worried About Running Out of Food in the Last Year: Never true    Ran Out of Food in the Last Year: Never true   Transportation Needs: Unmet Transportation Needs    Lack of Transportation (Medical): Yes    Lack of Transportation (Non-Medical): Yes   Physical Activity:     Days of Exercise per Week:     Minutes of Exercise per Session:    Stress:     Feeling of Stress :    Social Connections:     Frequency of Communication with Friends and Family:     Frequency of Social Gatherings with Friends and Family:     Attends Zoroastrianism Services:     Active Member of Clubs or Organizations:     Attends Club or Organization Meetings:     Marital Status:    Intimate Partner Violence:     Fear of Current or Ex-Partner:     Emotionally Abused:     Physically Abused:     Sexually Abused:        Review of Systems:  Review of Systems   Constitutional: Negative. Negative for chills and fever. Cardiovascular: Negative for chest pain. Respiratory: Negative. Negative for cough and shortness of breath. Musculoskeletal: Positive for back pain, muscle weakness and neck pain. Gastrointestinal: Negative for constipation. Neurological: Positive for headaches, numbness (fingers) and tremors. Physical Exam:  There were no vitals taken for this visit. Physical Exam        Assessment:  Problem List Items Addressed This Visit     Cervical stenosis of spine (Chronic)    Relevant Medications    HYDROcodone-acetaminophen (NORCO) 5-325 MG per tablet    Lumbar stenosis with neurogenic claudication (Chronic)    Relevant Medications    HYDROcodone-acetaminophen (NORCO) 5-325 MG per tablet    Primary osteoarthritis involving multiple joints    Relevant Medications    HYDROcodone-acetaminophen (NORCO) 5-325 MG per tablet    Chronic use of opiate drugs therapeutic purposes - Primary             Treatment Plan:  Patient relates current medications are helping the pain.  Patient reports taking pain medications as prescribed, denies obtaining medications from different sources and denies use of illegal drugs. Patient denies side effects from medications like nausea, vomiting, constipation or drowsiness. Patient reports current activities of daily living are possible due to medications and would like to continue them. As always, we encourage daily stretching and strengthening exercises, and recommend minimizing use of pain medications unless patient cannot get through daily activities due to pain. Contract requirements met. Continue opioid therapy.  Script written for norco   Follow up appointment made for 4 weeks

## 2021-07-21 DIAGNOSIS — M54.12 CERVICAL RADICULAR PAIN: Chronic | ICD-10-CM

## 2021-07-21 RX ORDER — TIZANIDINE 2 MG/1
TABLET ORAL
Qty: 90 TABLET | Refills: 0 | Status: SHIPPED | OUTPATIENT
Start: 2021-07-21 | End: 2021-09-30

## 2021-07-21 NOTE — TELEPHONE ENCOUNTER
adenoma of colon     History of colon polyps     Rectal pressure     Failed neck syndrome     Cervical stenosis of spine     Cervical radicular pain     Lumbar stenosis with neurogenic claudication     JANN on CPAP     Primary osteoarthritis involving multiple joints     Hx of cervical spine surgery     Chronic use of opiate drugs therapeutic purposes     Myelomalacia (HCC)     Lumbar facet joint syndrome     Gastroesophageal reflux disease

## 2021-07-26 ENCOUNTER — OFFICE VISIT (OUTPATIENT)
Dept: PAIN MANAGEMENT | Age: 81
End: 2021-07-26
Payer: MEDICARE

## 2021-07-26 VITALS
HEIGHT: 62 IN | SYSTOLIC BLOOD PRESSURE: 180 MMHG | HEART RATE: 78 BPM | DIASTOLIC BLOOD PRESSURE: 103 MMHG | BODY MASS INDEX: 28.89 KG/M2 | WEIGHT: 157 LBS

## 2021-07-26 DIAGNOSIS — M48.062 LUMBAR STENOSIS WITH NEUROGENIC CLAUDICATION: Chronic | ICD-10-CM

## 2021-07-26 DIAGNOSIS — M15.9 PRIMARY OSTEOARTHRITIS INVOLVING MULTIPLE JOINTS: ICD-10-CM

## 2021-07-26 DIAGNOSIS — Z79.891 CHRONIC USE OF OPIATE DRUG FOR THERAPEUTIC PURPOSE: Primary | ICD-10-CM

## 2021-07-26 DIAGNOSIS — M54.12 CERVICAL RADICULAR PAIN: Chronic | ICD-10-CM

## 2021-07-26 DIAGNOSIS — M48.02 CERVICAL STENOSIS OF SPINE: Chronic | ICD-10-CM

## 2021-07-26 PROCEDURE — 4040F PNEUMOC VAC/ADMIN/RCVD: CPT | Performed by: NURSE PRACTITIONER

## 2021-07-26 PROCEDURE — 1123F ACP DISCUSS/DSCN MKR DOCD: CPT | Performed by: NURSE PRACTITIONER

## 2021-07-26 PROCEDURE — G8417 CALC BMI ABV UP PARAM F/U: HCPCS | Performed by: NURSE PRACTITIONER

## 2021-07-26 PROCEDURE — G8427 DOCREV CUR MEDS BY ELIG CLIN: HCPCS | Performed by: NURSE PRACTITIONER

## 2021-07-26 PROCEDURE — 1090F PRES/ABSN URINE INCON ASSESS: CPT | Performed by: NURSE PRACTITIONER

## 2021-07-26 PROCEDURE — G8399 PT W/DXA RESULTS DOCUMENT: HCPCS | Performed by: NURSE PRACTITIONER

## 2021-07-26 PROCEDURE — 99213 OFFICE O/P EST LOW 20 MIN: CPT | Performed by: NURSE PRACTITIONER

## 2021-07-26 PROCEDURE — 1036F TOBACCO NON-USER: CPT | Performed by: NURSE PRACTITIONER

## 2021-07-26 RX ORDER — TIZANIDINE 2 MG/1
TABLET ORAL
Qty: 90 TABLET | Refills: 0 | Status: CANCELLED | OUTPATIENT
Start: 2021-07-26

## 2021-07-26 RX ORDER — HYDROCODONE BITARTRATE AND ACETAMINOPHEN 5; 325 MG/1; MG/1
1 TABLET ORAL 2 TIMES DAILY PRN
Qty: 60 TABLET | Refills: 0 | Status: SHIPPED | OUTPATIENT
Start: 2021-07-28 | End: 2021-09-20 | Stop reason: SDUPTHER

## 2021-07-26 RX ORDER — GABAPENTIN 600 MG/1
600 TABLET ORAL 3 TIMES DAILY
Qty: 90 TABLET | Refills: 2 | Status: CANCELLED | OUTPATIENT
Start: 2021-07-26 | End: 2021-10-24

## 2021-07-26 ASSESSMENT — ENCOUNTER SYMPTOMS
CONSTIPATION: 0
SHORTNESS OF BREATH: 0
BACK PAIN: 1
COUGH: 0

## 2021-07-26 NOTE — PROGRESS NOTES
Patient is here today to review medication contract. Chief Complaint: back pain   Medication Refill: norco     [facility]     history of chronic neck and chronic lower back pain  She is diagnosed with cervical spinal stenosis and lumbar spinal stenosis     Today main issue is neck pain midline into upper trapezius. Reports constant bilat. finger/hand numbness, denies HA     Her back pain is minimal at this time but at times located in lower back pain in the lumbar area with radiation down both legs. aggravated with standing and walking and does notice bilat ankel and foot swelling     Patient is on chronic low-dose opioid therapy with Norco 5 mg twice a day along with gabapentin  Finds the medication helpful allowing her to do daily life activities and become independent  She has tried conservative measures with therapy in past  Not a candidate for NSAIDs because of renal insufficiency and advanced age     Ordered PT only completed consult in May d/t getting sick and agrees to call and restart      Procedures  4/29/21  BILATERAL L5 EPIDURAL STEROID INJECTION reports 70% relief with improved function for 2 weeks      3/22/21  CERVICAL EPIDURAL STEROID INJECTION C7-T1 more than 50% relief       HPI:   Back Pain  This is a chronic problem. The current episode started more than 1 year ago. The problem occurs constantly. The problem is unchanged. The pain is present in the lumbar spine. The quality of the pain is described as aching. The pain does not radiate. The pain is at a severity of 4/10. The pain is mild. The pain is worse during the day. The symptoms are aggravated by bending, position and standing. Associated symptoms include numbness, paresthesias (hands fingers feet and toes), tingling and weakness. Pertinent negatives include no chest pain, dysuria or fever. Risk factors include poor posture, lack of exercise, obesity and sedentary lifestyle. She has tried analgesics for the symptoms.  The treatment provided mild relief. Neck Pain   This is a chronic problem. The current episode started more than 1 year ago. The problem occurs constantly. The problem has been unchanged. The pain is associated with nothing. The pain is present in the left side, midline and right side (upper trapezius). The pain is at a severity of 4/10. The pain is mild. Nothing aggravates the symptoms. The pain is same all the time. Associated symptoms include numbness, tingling and weakness. Pertinent negatives include no chest pain or fever. She has tried bed rest, muscle relaxants and oral narcotics for the symptoms. The treatment provided mild relief. Pain score Today:  4  Adverse effects (Constipation / Nausea / Sedation / sexual Dysfunction / others) : none   Mood: good  Sleep pattern and quality: poor  Activity level: fair    Pill count Today:  Pt instructed to bring pills to appt in order to document compliance, verbalized understanding  Last dose taken  7/25/2021  OARRS report reviewed today: yes  Morphine equivalent: 10  ER/Hospitalizations/PCP visit related to pain since last visit:no   Any legal problems e.g. DUI etc.:No  Satisfied with current management: Yes    Opioid Contract: 2/3/2021  Last Urine Dug screen dated: 2/17/21    Lab Results   Component Value Date    LABA1C 5.7 09/30/2020     Lab Results   Component Value Date     01/12/2017       Past Medical History, Past Surgical History, Social History, Allergies and Medications reviewed and updated in EPIC as indicated    Family History reviewed and is noncontributory. Controlled Substance Monitoring:    Acute and Chronic Pain Monitoring:   RX Monitoring 7/26/2021   Attestation -   Periodic Controlled Substance Monitoring Possible medication side effects, risk of tolerance/dependence & alternative treatments discussed. ;No signs of potential drug abuse or diversion identified. ;Assessed functional status. ;Obtaining appropriate analgesic effect of treatment.    Chronic Pain > 50 MEDD -   Chronic Pain > 80 MEDD -             Periodic Controlled Substance Monitoring: Possible medication side effects, risk of tolerance/dependence & alternative treatments discussed., No signs of potential drug abuse or diversion identified. , Assessed functional status., Obtaining appropriate analgesic effect of treatment.  Yoselin Marshall, APRN - CNP)      Past Medical History:   Diagnosis Date    Arthritis     Cataract     Cataracts, bilateral     Diverticulosis of colon 2015    Gastroesophageal reflux disease 4/13/2021    Headache(784.0)     History of colon polyps 2015    Insomnia     Neck pain     Osteoarthrosis, unspecified whether generalized or localized, unspecified site 10/5/2012    Pure hypercholesterolemia 10/5/2012    Shoulder blade pain     right  side    Stroke ([facility] [address]     Tubular adenoma of colon 2015    Type II or unspecified type diabetes mellitus without mention of complication, not stated as uncontrolled 10/5/2012    Unspecified essential hypertension 10/5/2012    Unspecified sleep apnea        Past Surgical History:   Procedure Laterality Date    COLONOSCOPY  8/23/06    Dr Artie Rodriguez  5 12 15    extensive diverticulosis, tubular adenoma    EPIDURAL STEROID INJECTION N/A 8/25/2017    EPIDURAL STEROID INJECTION cervical performed by Annamarie Ludwig MD at [facility] 11/20/2017    EPIDURAL STEROID INJECTION L5S1 performed by Annamarie Ludwig MD at [facility] Bilateral 5/20/2019    EPIDURAL STEROID INJECTION BILATERAL S1 performed by Annamarie Ludwig MD at [id number] [place] [address]  10/1/06    NECK SURGERY  5/2011 & 2001    NERVE BLOCK Bilateral 02/17/2020    Lumbar Facet L2-L5    NERVE SURGERY Bilateral 9/5/2019    BILATERAL LUMBAR FACET STEROID INJECTION L2-L5 performed by Annamarie Ludwig MD at [address]  08/25/2017    L5-S1 steroid injection    OTHER SURGICAL HISTORY  11/20/2017    VAINTA L5-S1    OTHER SURGICAL HISTORY  10/01/2018    cervical steroid injection    OTHER SURGICAL HISTORY Bilateral 05/20/2019    EPIDURAL STEROID INJECTION BILATERAL S1 (Bilateral )    OTHER SURGICAL HISTORY  04/29/2021    lumbar VANITA    PAIN MANAGEMENT PROCEDURE Bilateral 2/17/2020    LUMBAR FACET L2-L5 performed by Leah Godinez MD at [facility] N/A 3/22/2021    CERVICAL EPIDURAL STEROID INJECTION C7-T1 performed by Leah Godinez MD at [facility] Bilateral 4/29/2021    BILATERAL L5 EPIDURAL STEROID INJECTION performed by Leah Godinez MD at [address] AA&/STRD TFRML EPI LUMBAR/SACRAL 1 LEVEL Bilateral 11/19/2018    BILATERAL L4 VANITA performed by Leah Godinez MD at [address] DX/THER SBST INTRLMNR CRV/THRC W/IMG GDN N/A 10/1/2018    EPIDURAL STEROID INJECTION CERVICAL C7-T1 performed by Leha Godinez MD at [address] [place] [address]       No Known Allergies      Current Outpatient Medications:     tiZANidine (ZANAFLEX) 2 MG tablet, take 2 tablets by mouth at bedtime AND ONCE DURING THE DAY  if needed, Disp: 90 tablet, Rfl: 0    HYDROcodone-acetaminophen (NORCO) 5-325 MG per tablet, Take 1 tablet by mouth 2 times daily as needed for Pain for up to 30 days. , Disp: 60 tablet, Rfl: 0    zolpidem (AMBIEN) 10 MG tablet, Take 0.5 tablets by mouth nightly as needed for Sleep for up to 60 days. , Disp: 15 tablet, Rfl: 1    polyethylene glycol (GLYCOLAX) 17 GM/SCOOP powder, take 17GM (DISSOLVED IN WATER) by mouth once daily, Disp: 510 g, Rfl: 1    atorvastatin (LIPITOR) 40 MG tablet, Take 1 tablet by mouth daily, Disp: 90 tablet, Rfl: 1    amitriptyline (ELAVIL) 25 MG tablet, Take 1 tablet by mouth nightly, Disp: 90 tablet, Rfl: 1    loratadine (CLARITIN) 10 MG tablet, take 1 tablet by mouth once daily, Disp: 90 tablet, Rfl: 1    cloNIDine (CATAPRES) 0.1 MG tablet, Take 1 tablet by mouth 2 times daily, Disp: 180 tablet, Rfl: 1    pantoprazole (PROTONIX) 40 MG tablet, take 1 tablet by mouth every morning BEFORE BREAKFAST, Disp: 90 tablet, Rfl: 1    allopurinol (ZYLOPRIM) 100 MG tablet, take 1 tablet by mouth once daily, Disp: 90 tablet, Rfl: 1    carvedilol (COREG) 3.125 MG tablet, take 1 tablet by mouth twice a day, Disp: 180 tablet, Rfl: 1    gabapentin (NEURONTIN) 600 MG tablet, Take 1 tablet by mouth 3 times daily for 90 days. , Disp: 90 tablet, Rfl: 2    fluticasone (FLONASE) 50 MCG/ACT nasal spray, instill 1 spray into each nostril once daily, Disp: 16 g, Rfl: 2    blood glucose monitor kit and supplies, use check blood sugar as directed, Disp: 1 kit, Rfl: 0    blood glucose monitor strips, Check blood sugars daily as directed., Disp: 50 strip, Rfl: 6    Lancets MISC, 1 each by Does not apply route daily, Disp: 100 each, Rfl: 3    Alcohol Swabs 70 % PADS, 1 each by Does not apply route daily, Disp: 100 each, Rfl: 3    Lift Chair MISC, by Does not apply route, Disp: 1 each, Rfl: 0    aspirin 325 MG tablet, Take 325 mg by mouth daily, Disp: , Rfl:     Blood Glucose Monitoring Suppl KIT, 1 blood glucose monitoring supplies kit. Test strips, lancets, alcohol swabs, Disp: 1 kit, Rfl: 0    Family History   Problem Relation Age of Onset    Breast Cancer Sister     Cancer Sister         liver    Diabetes Daughter        Social History     Socioeconomic History    Marital status: Single     Spouse name: Not on file    Number of children: Not on file    Years of education: Not on file    Highest education level: Not on file   Occupational History    Not on file   Tobacco Use    Smoking status: Former Smoker     Packs/day: 0.50     Years: 15.00     Pack years: 7.50     Types: Cigarettes     Quit date: 10/5/2001     Years since quittin.8    Smokeless tobacco: Never Used   Vaping Use    Vaping Use: Never used   Substance and Sexual Activity    Alcohol use:  Yes     Alcohol/week: 0.0 standard drinks     Comment: on occasion    Drug use: No    Sexual activity: Not Currently   Other Topics Concern    Not on file   Social History Narrative    Not on file     Social Determinants of Health     Financial Resource Strain: Low Risk     Difficulty of Paying Living Expenses: Not very hard   Food Insecurity: No Food Insecurity    Worried About Running Out of Food in the Last Year: Never true    Mai of Food in the Last Year: Never true   Transportation Needs: Unmet Transportation Needs    Lack of Transportation (Medical): Yes    Lack of Transportation (Non-Medical): Yes   Physical Activity:     Days of Exercise per Week:     Minutes of Exercise per Session:    Stress:     Feeling of Stress :    Social Connections:     Frequency of Communication with Friends and Family:     Frequency of Social Gatherings with Friends and Family:     Attends Latter day Services:     Active Member of Clubs or Organizations:     Attends Club or Organization Meetings:     Marital Status:    Intimate Partner Violence:     Fear of Current or Ex-Partner:     Emotionally Abused:     Physically Abused:     Sexually Abused:        Review of Systems:  Review of Systems   Constitutional: Negative for chills and fever. Cardiovascular: Positive for leg swelling (bilat hands and ankles and feet at times ). Negative for chest pain. Respiratory: Negative for cough and shortness of breath. Musculoskeletal: Positive for back pain and neck pain. Gastrointestinal: Negative for constipation. Genitourinary: Negative for dysuria. Neurological: Positive for numbness, paresthesias (hands fingers feet and toes), tingling and weakness. Physical Exam:  BP (!) 180/103   Pulse 78   Ht 5' 2\" (1.575 m)   Wt 157 lb (71.2 kg)   BMI 28.72 kg/m²     Physical Exam  Cardiovascular:      Rate and Rhythm: Normal rate. Pulmonary:      Effort: Pulmonary effort is normal.   Musculoskeletal:      Cervical back: Decreased range of motion. Lumbar back: Decreased range of motion. Comments: In w/c for visit    Skin:     General: Skin is warm and dry. Neurological:      Mental Status: She is alert and oriented to person, place, and time. Assessment:  Problem List Items Addressed This Visit     Cervical stenosis of spine (Chronic)    Cervical radicular pain (Chronic)    Lumbar stenosis with neurogenic claudication (Chronic)    Primary osteoarthritis involving multiple joints    Chronic use of opiate drugs therapeutic purposes - Primary             Treatment Plan:  Patient relates current medications are helping the pain. Patient reports taking pain medications as prescribed, denies obtaining medications from different sources and denies use of illegal drugs. Patient denies side effects from medications like nausea, vomiting, constipation or drowsiness. Patient reports current activities of daily living are possible due to medications and would like to continue them. As always, we encourage daily stretching and strengthening exercises, and recommend minimizing use of pain medications unless patient cannot get through daily activities due to pain. Contract requirements met. Continue opioid therapy.  Script written for norco and tizanidine   Follow up appointment made for 4 weeks

## 2021-08-20 DIAGNOSIS — G47.9 SLEEPING DIFFICULTY: ICD-10-CM

## 2021-08-20 NOTE — TELEPHONE ENCOUNTER
Last visit: 04/13/2021  Last Med refill: 07/22/2021  Does patient have enough medication for 72 hours: yes    Next Visit Date:  Future Appointments   Date Time Provider Pradeep Alexander   8/23/2021 10:00 AM YOLANDA Dawn - CNP [facility]   10/7/2021 10:15 AM Arti Sheryle Danas, MD [facility]   12/13/2021 10:30 AM Arti Sheryle Danas,  [address] [facility]   Topic Date Due    Hepatitis C screen  Never done    Shingles Vaccine (1 of 2) Never done    Annual Wellness Visit (AWV)  Never done    DTaP/Tdap/Td vaccine (1 - Tdap) 10/18/2026 (Originally 5/28/1959)    Flu vaccine (1) 09/01/2021    Lipid screen  09/30/2021    Potassium monitoring  09/30/2021    Creatinine monitoring  09/30/2021    DEXA (modify frequency per FRAX score)  Completed    Pneumococcal 65+ years Vaccine  Completed    COVID-19 Vaccine  Completed    Hepatitis A vaccine  Aged Out    Hib vaccine  Aged Out    Meningococcal (ACWY) vaccine  Aged Out       Hemoglobin A1C (%)   Date Value   09/30/2020 5.7   02/25/2020 6.6   10/07/2019 6.5             ( goal A1C is < 7)   Microalb/Crt.  Ratio (mcg/mg creat)   Date Value   10/06/2020 CANNOT BE CALCULATED     LDL Cholesterol (mg/dL)   Date Value   09/30/2020 48   06/03/2019 102       (goal LDL is <100)   AST (U/L)   Date Value   09/30/2020 20     ALT (U/L)   Date Value   09/30/2020 13     BUN (mg/dL)   Date Value   09/30/2020 9     BP Readings from Last 3 Encounters:   07/26/21 (!) 180/103   04/29/21 (!) 177/82   04/13/21 104/66          (goal 120/80)    All Future Testing planned in [facility]  Lab Frequency Next Occurrence   Drugs of Abuse, Urine Once 02/03/2021               Patient Active Problem List:     Osteoarthritis     Essential hypertension     Type 2 diabetes mellitus with diabetic neuropathy, without long-term current use of insulin ([address]     Pure hypercholesterolemia     Constipation     Rectal pain     Diverticulosis of colon     Tubular adenoma of colon     History of colon polyps     Rectal pressure     Failed neck syndrome     Cervical stenosis of spine     Cervical radicular pain     Lumbar stenosis with neurogenic claudication     JANN on CPAP     Primary osteoarthritis involving multiple joints     Hx of cervical spine surgery     Chronic use of opiate drugs therapeutic purposes     Myelomalacia (HCC)     Lumbar facet joint syndrome     Gastroesophageal reflux disease

## 2021-08-23 ENCOUNTER — OFFICE VISIT (OUTPATIENT)
Dept: PAIN MANAGEMENT | Age: 81
End: 2021-08-23
Payer: MEDICARE

## 2021-08-23 VITALS
HEIGHT: 62 IN | SYSTOLIC BLOOD PRESSURE: 144 MMHG | BODY MASS INDEX: 28.89 KG/M2 | OXYGEN SATURATION: 94 % | DIASTOLIC BLOOD PRESSURE: 79 MMHG | WEIGHT: 157 LBS | HEART RATE: 70 BPM

## 2021-08-23 DIAGNOSIS — M15.9 PRIMARY OSTEOARTHRITIS INVOLVING MULTIPLE JOINTS: Primary | ICD-10-CM

## 2021-08-23 DIAGNOSIS — M48.02 CERVICAL STENOSIS OF SPINE: Chronic | ICD-10-CM

## 2021-08-23 DIAGNOSIS — M48.062 LUMBAR STENOSIS WITH NEUROGENIC CLAUDICATION: Chronic | ICD-10-CM

## 2021-08-23 DIAGNOSIS — Z79.891 CHRONIC USE OF OPIATE DRUG FOR THERAPEUTIC PURPOSE: ICD-10-CM

## 2021-08-23 PROCEDURE — 1090F PRES/ABSN URINE INCON ASSESS: CPT | Performed by: NURSE PRACTITIONER

## 2021-08-23 PROCEDURE — 99213 OFFICE O/P EST LOW 20 MIN: CPT | Performed by: NURSE PRACTITIONER

## 2021-08-23 PROCEDURE — G8417 CALC BMI ABV UP PARAM F/U: HCPCS | Performed by: NURSE PRACTITIONER

## 2021-08-23 PROCEDURE — G8427 DOCREV CUR MEDS BY ELIG CLIN: HCPCS | Performed by: NURSE PRACTITIONER

## 2021-08-23 PROCEDURE — G8399 PT W/DXA RESULTS DOCUMENT: HCPCS | Performed by: NURSE PRACTITIONER

## 2021-08-23 PROCEDURE — 1036F TOBACCO NON-USER: CPT | Performed by: NURSE PRACTITIONER

## 2021-08-23 PROCEDURE — 1123F ACP DISCUSS/DSCN MKR DOCD: CPT | Performed by: NURSE PRACTITIONER

## 2021-08-23 PROCEDURE — 4040F PNEUMOC VAC/ADMIN/RCVD: CPT | Performed by: NURSE PRACTITIONER

## 2021-08-23 RX ORDER — HYDROCODONE BITARTRATE AND ACETAMINOPHEN 5; 325 MG/1; MG/1
1 TABLET ORAL 2 TIMES DAILY PRN
Qty: 60 TABLET | Refills: 0 | Status: CANCELLED | OUTPATIENT
Start: 2021-08-23 | End: 2021-09-22

## 2021-08-23 RX ORDER — ZOLPIDEM TARTRATE 10 MG/1
5 TABLET ORAL NIGHTLY PRN
Qty: 15 TABLET | Refills: 1 | Status: SHIPPED | OUTPATIENT
Start: 2021-08-23 | End: 2021-11-04

## 2021-08-23 ASSESSMENT — ENCOUNTER SYMPTOMS
BACK PAIN: 1
CONSTIPATION: 0
COUGH: 0
SHORTNESS OF BREATH: 1

## 2021-08-23 NOTE — PROGRESS NOTES
Patient is here today to review medication contract. Chief Complaint   Patient presents with    Back Pain    Medication Refill    Neck Pain         PMH     history of chronic neck and chronic lower back pain  She is diagnosed with cervical spinal stenosis and lumbar spinal stenosis     Today main issue is neck pain midline into upper trapezius. Reports constant bilat. finger/hand numbness, denies HA     Her back pain is minimal at this time but at times located in lower back pain in the lumbar area with radiation down both legs. aggravated with standing and walking and does notice bilat ankel and foot swelling     Patient is on chronic low-dose opioid therapy with Norco 5 mg twice a day along with gabapentin  Finds the medication helpful allowing her to do daily life activities and become independent  She has tried conservative measures with therapy in past  Not a candidate for NSAIDs because of renal insufficiency and advanced age     Ordered PT only completed consult in May d/t getting sick     Procedures  4/29/21  BILATERAL L5 EPIDURAL STEROID INJECTION reports 70% relief with improved function for 2 weeks      3/22/21  CERVICAL EPIDURAL STEROID INJECTION C7-T1 more than 50% relief    HPI:   Back Pain  This is a chronic problem. The current episode started more than 1 year ago. The problem occurs constantly. The problem is unchanged. The pain is present in the lumbar spine. The quality of the pain is described as aching. The pain radiates to the left foot and right foot. The pain is at a severity of 8/10. The pain is moderate. The pain is worse during the day. The symptoms are aggravated by bending, position and standing. Associated symptoms include tingling (hands fingers feet and toes). Pertinent negatives include no chest pain or fever. Risk factors include poor posture, obesity and menopause. She has tried analgesics, bed rest and heat for the symptoms. The treatment provided mild relief.    Neck Pain   This is a chronic problem. The current episode started more than 1 year ago. The problem occurs constantly. The problem has been unchanged. The pain is associated with nothing. The quality of the pain is described as aching and stabbing. The pain is at a severity of 8/10. The pain is moderate. Nothing aggravates the symptoms. The pain is same all the time. Associated symptoms include tingling (hands fingers feet and toes). Pertinent negatives include no chest pain or fever. She has tried oral narcotics for the symptoms. The treatment provided mild relief. Medication Refill: Norco     Pain score Today:  8  Adverse effects (Constipation / Nausea / Sedation / sexual Dysfunction / others) : no  Mood: good  Sleep pattern and quality: poor  Activity level: poor    Pill count Today:Norco 35 due 8/27 12 extra days prescription adjusted appropriately to 9/7  Last dose taken  8/22/21  OARRS report reviewed today: yes  Morphine equivalent: 10  ER/Hospitalizations/PCP visit related to pain since last visit:no   Any legal problems e.g. DUI etc.:No  Satisfied with current management: Yes    Opioid Contract 2/3/21  Last Urine Dug screen dated:2/17/21      Past Medical History, Past Surgical History, Social History, Allergies and Medications reviewed and updated in EPIC as indicated    Family History reviewed and is noncontributory. Controlled Substance Monitoring:    Acute and Chronic Pain Monitoring:   RX Monitoring 8/23/2021   Attestation -   Periodic Controlled Substance Monitoring Possible medication side effects, risk of tolerance/dependence & alternative treatments discussed. ;No signs of potential drug abuse or diversion identified. ;Assessed functional status. ;Obtaining appropriate analgesic effect of treatment.    Chronic Pain > 50 MEDD -   Chronic Pain > 80 MEDD -             Periodic Controlled Substance Monitoring: Possible medication side effects, risk of tolerance/dependence & alternative treatments discussed., No signs of potential drug abuse or diversion identified. , Assessed functional status., Obtaining appropriate analgesic effect of treatment.  Jarrett Jo, APRN - CNP)      Past Medical History:   Diagnosis Date    Arthritis     Cataract     Cataracts, bilateral     Diverticulosis of colon 2015    Gastroesophageal reflux disease 4/13/2021    Headache(784.0)     History of colon polyps 2015    Insomnia     Neck pain     Osteoarthrosis, unspecified whether generalized or localized, unspecified site 10/5/2012    Pure hypercholesterolemia 10/5/2012    Shoulder blade pain     right  side    Stroke ([facility] [address]     Tubular adenoma of colon 2015    Type II or unspecified type diabetes mellitus without mention of complication, not stated as uncontrolled 10/5/2012    Unspecified essential hypertension 10/5/2012    Unspecified sleep apnea        Past Surgical History:   Procedure Laterality Date    COLONOSCOPY  8/23/06    Dr Marilee Wiggins  5 12 15    extensive diverticulosis, tubular adenoma    EPIDURAL STEROID INJECTION N/A 8/25/2017    EPIDURAL STEROID INJECTION cervical performed by Danielle Ceja MD at [facility] 11/20/2017    EPIDURAL STEROID INJECTION L5S1 performed by Danielle Ceja MD at [place] [address] Bilateral 5/20/2019    EPIDURAL STEROID INJECTION BILATERAL S1 performed by Danielle Ceja MD at [address] [place] [address]  10/1/06    NECK SURGERY  5/2011 & 2001    NERVE BLOCK Bilateral 02/17/2020    Lumbar Facet L2-L5    NERVE SURGERY Bilateral 9/5/2019    BILATERAL LUMBAR FACET STEROID INJECTION L2-L5 performed by Danielle Ceja MD at [facility]  08/25/2017    L5-S1 steroid injection    OTHER SURGICAL HISTORY  11/20/2017    VANITA L5-S1    OTHER SURGICAL HISTORY  10/01/2018    cervical steroid injection    OTHER SURGICAL HISTORY Bilateral 05/20/2019    EPIDURAL STEROID INJECTION BILATERAL S1 (Bilateral )    OTHER SURGICAL HISTORY  04/29/2021    lumbar VANITA    PAIN MANAGEMENT PROCEDURE Bilateral 2/17/2020    LUMBAR FACET L2-L5 performed by Martinez Irizarry MD at [address] [place] [address] N/A 3/22/2021    CERVICAL EPIDURAL STEROID INJECTION C7-T1 performed by Martinez Irizarry MD at [address] [place] [address] Bilateral 4/29/2021    BILATERAL L5 EPIDURAL STEROID INJECTION performed by Martinez Irizarry MD at [address] AA&/STRD TFRML EPI LUMBAR/SACRAL 1 LEVEL Bilateral 11/19/2018    BILATERAL L4 VANITA performed by Martinez Irizarry MD at [address] DX/THER SBST INTRLMNR CRV/THRC W/IMG GDN N/A 10/1/2018    EPIDURAL STEROID INJECTION CERVICAL C7-T1 performed by Martinez Irizarry MD at [address] [place] [address]       No Known Allergies      Current Outpatient Medications:     HYDROcodone-acetaminophen (NORCO) 5-325 MG per tablet, Take 1 tablet by mouth 2 times daily as needed for Pain for up to 30 days. , Disp: 60 tablet, Rfl: 0    tiZANidine (ZANAFLEX) 2 MG tablet, take 2 tablets by mouth at bedtime AND ONCE DURING THE DAY  if needed, Disp: 90 tablet, Rfl: 0    polyethylene glycol (GLYCOLAX) 17 GM/SCOOP powder, take 17GM (DISSOLVED IN WATER) by mouth once daily, Disp: 510 g, Rfl: 1    atorvastatin (LIPITOR) 40 MG tablet, Take 1 tablet by mouth daily, Disp: 90 tablet, Rfl: 1    amitriptyline (ELAVIL) 25 MG tablet, Take 1 tablet by mouth nightly, Disp: 90 tablet, Rfl: 1    loratadine (CLARITIN) 10 MG tablet, take 1 tablet by mouth once daily, Disp: 90 tablet, Rfl: 1    cloNIDine (CATAPRES) 0.1 MG tablet, Take 1 tablet by mouth 2 times daily, Disp: 180 tablet, Rfl: 1    pantoprazole (PROTONIX) 40 MG tablet, take 1 tablet by mouth every morning BEFORE BREAKFAST, Disp: 90 tablet, Rfl: 1    allopurinol (ZYLOPRIM) 100 MG tablet, take 1 tablet by mouth once daily, Disp: 90 tablet, Rfl: 1    carvedilol (COREG) 3.125 MG tablet, take 1 tablet by mouth twice a day, Disp: 180 tablet, Rfl: 1    fluticasone (FLONASE) 50 MCG/ACT nasal spray, instill 1 spray into each nostril once daily, Disp: 16 g, Rfl: 2    blood glucose monitor kit and supplies, use check blood sugar as directed, Disp: 1 kit, Rfl: 0    blood glucose monitor strips, Check blood sugars daily as directed., Disp: 50 strip, Rfl: 6    Lancets MISC, 1 each by Does not apply route daily, Disp: 100 each, Rfl: 3    Alcohol Swabs 70 % PADS, 1 each by Does not apply route daily, Disp: 100 each, Rfl: 3    Lift Chair MISC, by Does not apply route, Disp: 1 each, Rfl: 0    aspirin 325 MG tablet, Take 325 mg by mouth daily, Disp: , Rfl:     Blood Glucose Monitoring Suppl KIT, 1 blood glucose monitoring supplies kit. Test strips, lancets, alcohol swabs, Disp: 1 kit, Rfl: 0    gabapentin (NEURONTIN) 600 MG tablet, Take 1 tablet by mouth 3 times daily for 90 days. , Disp: 90 tablet, Rfl: 2    Family History   Problem Relation Age of Onset    Breast Cancer Sister     Cancer Sister         liver    Diabetes Daughter        Social History     Socioeconomic History    Marital status: Single     Spouse name: Not on file    Number of children: Not on file    Years of education: Not on file    Highest education level: Not on file   Occupational History    Not on file   Tobacco Use    Smoking status: Former Smoker     Packs/day: 0.50     Years: 15.00     Pack years: 7.50     Types: Cigarettes     Quit date: 10/5/2001     Years since quittin.8    Smokeless tobacco: Never Used   Vaping Use    Vaping Use: Never used   Substance and Sexual Activity    Alcohol use:  Yes     Alcohol/week: 0.0 standard drinks     Comment: on occasion    Drug use: No    Sexual activity: Not Currently   Other Topics Concern    Not on file   Social History Narrative    Not on file     Social Determinants of Health     Financial Resource Strain: Low Risk     Difficulty of Paying Living Expenses: Not very hard   Food Insecurity: No Food Insecurity  Worried About Running Out of Food in the Last Year: Never true    Mai of Food in the Last Year: Never true   Transportation Needs: Unmet Transportation Needs    Lack of Transportation (Medical): Yes    Lack of Transportation (Non-Medical): Yes   Physical Activity:     Days of Exercise per Week:     Minutes of Exercise per Session:    Stress:     Feeling of Stress :    Social Connections:     Frequency of Communication with Friends and Family:     Frequency of Social Gatherings with Friends and Family:     Attends [employer] Services:     Active Member of Clubs or Organizations:     Attends Club or Organization Meetings:     Marital Status:    Intimate Partner Violence:     Fear of Current or Ex-Partner:     Emotionally Abused:     Physically Abused:     Sexually Abused:        Review of Systems:  Review of Systems   Constitutional: Negative. Negative for chills and fever. Cardiovascular: Negative. Negative for chest pain. Respiratory: Positive for shortness of breath. Negative for cough. Musculoskeletal: Positive for back pain, neck pain and stiffness. Gastrointestinal: Negative for constipation. Neurological: Positive for tingling (hands fingers feet and toes). Physical Exam:  BP (!) 144/79 Comment: patient stated that she didnt take her bp medications  Pulse 70   Ht 5' 2\" (1.575 m)   Wt 157 lb (71.2 kg)   SpO2 94%   BMI 28.72 kg/m²     Physical Exam  Cardiovascular:      Rate and Rhythm: Normal rate. Pulmonary:      Effort: Pulmonary effort is normal.   Musculoskeletal:      Cervical back: Tenderness present. Decreased range of motion. Lumbar back: Decreased range of motion. Comments: In w/c for visit    Skin:     General: Skin is warm and dry. Neurological:      Mental Status: She is alert and oriented to person, place, and time.              Assessment:  Problem List Items Addressed This Visit     Cervical stenosis of spine (Chronic)    Lumbar stenosis with neurogenic claudication (Chronic)    Primary osteoarthritis involving multiple joints - Primary    Chronic use of opiate drugs therapeutic purposes             Treatment Plan:  Patient relates current medications are helping the pain. Patient reports taking pain medications as prescribed, denies obtaining medications from different sources and denies use of illegal drugs. Patient denies side effects from medications like nausea, vomiting, constipation or drowsiness. Patient reports current activities of daily living are possible due to medications and would like to continue them. As always, we encourage daily stretching and strengthening exercises, and recommend minimizing use of pain medications unless patient cannot get through daily activities due to pain. Contract requirements met. Continue opioid therapy.  Script not needed pt to  call   Follow up appointment made for 4 weeks

## 2021-09-12 DIAGNOSIS — E11.40 TYPE 2 DIABETES MELLITUS WITH DIABETIC NEUROPATHY, WITHOUT LONG-TERM CURRENT USE OF INSULIN (HCC): ICD-10-CM

## 2021-09-14 RX ORDER — GABAPENTIN 600 MG/1
600 TABLET ORAL 3 TIMES DAILY
Qty: 90 TABLET | Refills: 2 | Status: SHIPPED | OUTPATIENT
Start: 2021-09-14 | End: 2022-01-10

## 2021-09-20 DIAGNOSIS — M48.062 LUMBAR STENOSIS WITH NEUROGENIC CLAUDICATION: Chronic | ICD-10-CM

## 2021-09-20 DIAGNOSIS — M15.9 PRIMARY OSTEOARTHRITIS INVOLVING MULTIPLE JOINTS: ICD-10-CM

## 2021-09-20 DIAGNOSIS — M48.02 CERVICAL STENOSIS OF SPINE: Chronic | ICD-10-CM

## 2021-09-20 RX ORDER — HYDROCODONE BITARTRATE AND ACETAMINOPHEN 5; 325 MG/1; MG/1
1 TABLET ORAL 2 TIMES DAILY PRN
Qty: 14 TABLET | Refills: 0 | Status: SHIPPED | OUTPATIENT
Start: 2021-09-20 | End: 2021-10-07 | Stop reason: ALTCHOICE

## 2021-09-20 NOTE — TELEPHONE ENCOUNTER
Arlen Wen is requesting a refill on the following medications:   Requested Prescriptions     Pending Prescriptions Disp Refills    HYDROcodone-acetaminophen (NORCO) 5-325 MG per tablet 60 tablet 0     Sig: Take 1 tablet by mouth 2 times daily as needed for Pain for up to 30 days. Last OV 8/23/21    Future Appointments   Date Time Provider Pradeep Alexander   9/27/2021 10:40 AM YOLANDA Shannon Cera - CNP [facility]   10/7/2021 10:15 AM Connie Hilario MD [facility]   12/13/2021 10:30 AM Connie Hilario MD [facility]       OARRS report sent to Dr. Becky Antonio through alternative route for review.

## 2021-09-24 NOTE — PROGRESS NOTES
Deya Moctezuma is a 70 y.o. female evaluated via telephone on 5/11/2020. Consent:  She and/or health care decision maker is aware that that she may receive a bill for this telephone service, depending on her insurance coverage, and has provided verbal consent to proceed: Yes      Documentation:  I communicated with the patient and/or health care decision maker about     1. Encounter for chronic pain management    2. Chronic use of opiate drugs therapeutic purposes    3. Cervical stenosis of spine    4. Hx of fusion of cervical spine    5. Lumbar stenosis with neurogenic claudication    6. Myelomalacia ([address]    7. JANN on CPAP    8. Primary osteoarthritis involving multiple joints      . Details of this discussion including any medical advice provided:   Orders Placed This Encounter   Procedures    CT NJX DX/THER SBST INTRLMNR CRV/THRC W/IMG GDN     Cervical VANITA C7-T1     Standing Status:   Future     Standing Expiration Date:   8/11/2020     Orders Placed This Encounter   Medications    HYDROcodone-acetaminophen (NORCO) 5-325 MG per tablet     Sig: Take 1 tablet by mouth 2 times daily as needed for Pain for up to 30 days. Dispense:  60 tablet     Refill:  0     Reduce doses taken as pain becomes manageable     Controlled Substance Monitoring:    Acute and Chronic Pain Monitoring:   RX Monitoring 4/28/2020   Attestation -   Periodic Controlled Substance Monitoring No signs of potential drug abuse or diversion identified. Chronic Pain > 50 MEDD -   Chronic Pain > 80 MEDD -             I affirm this is a Patient Initiated Episode with a Patient who has not had a related appointment within my department in the past 7 days or scheduled within the next 24 hours.     Patient identification was verified at the start of the visit: Yes    Total Time: minutes: 11-20 minutes    Note: not billable if this call serves to triage the patient into an appointment for the relevant concern      Tamra Patel 71

## 2021-09-27 ENCOUNTER — OFFICE VISIT (OUTPATIENT)
Dept: PAIN MANAGEMENT | Age: 81
End: 2021-09-27
Payer: MEDICARE

## 2021-09-27 VITALS
BODY MASS INDEX: 28.72 KG/M2 | HEART RATE: 81 BPM | HEIGHT: 62 IN | SYSTOLIC BLOOD PRESSURE: 99 MMHG | DIASTOLIC BLOOD PRESSURE: 64 MMHG | OXYGEN SATURATION: 96 %

## 2021-09-27 DIAGNOSIS — Z79.891 CHRONIC USE OF OPIATE DRUG FOR THERAPEUTIC PURPOSE: Primary | ICD-10-CM

## 2021-09-27 DIAGNOSIS — M48.062 LUMBAR STENOSIS WITH NEUROGENIC CLAUDICATION: Chronic | ICD-10-CM

## 2021-09-27 DIAGNOSIS — M15.9 PRIMARY OSTEOARTHRITIS INVOLVING MULTIPLE JOINTS: ICD-10-CM

## 2021-09-27 DIAGNOSIS — M48.02 CERVICAL STENOSIS OF SPINE: Chronic | ICD-10-CM

## 2021-09-27 PROCEDURE — 4040F PNEUMOC VAC/ADMIN/RCVD: CPT | Performed by: NURSE PRACTITIONER

## 2021-09-27 PROCEDURE — 1036F TOBACCO NON-USER: CPT | Performed by: NURSE PRACTITIONER

## 2021-09-27 PROCEDURE — G8427 DOCREV CUR MEDS BY ELIG CLIN: HCPCS | Performed by: NURSE PRACTITIONER

## 2021-09-27 PROCEDURE — 99213 OFFICE O/P EST LOW 20 MIN: CPT | Performed by: NURSE PRACTITIONER

## 2021-09-27 PROCEDURE — 1123F ACP DISCUSS/DSCN MKR DOCD: CPT | Performed by: NURSE PRACTITIONER

## 2021-09-27 PROCEDURE — 1090F PRES/ABSN URINE INCON ASSESS: CPT | Performed by: NURSE PRACTITIONER

## 2021-09-27 PROCEDURE — G8417 CALC BMI ABV UP PARAM F/U: HCPCS | Performed by: NURSE PRACTITIONER

## 2021-09-27 PROCEDURE — G8399 PT W/DXA RESULTS DOCUMENT: HCPCS | Performed by: NURSE PRACTITIONER

## 2021-09-27 RX ORDER — HYDROCODONE BITARTRATE AND ACETAMINOPHEN 5; 325 MG/1; MG/1
1 TABLET ORAL 2 TIMES DAILY PRN
Qty: 60 TABLET | Refills: 0 | Status: SHIPPED | OUTPATIENT
Start: 2021-09-29 | End: 2021-11-05 | Stop reason: SDUPTHER

## 2021-09-27 ASSESSMENT — ENCOUNTER SYMPTOMS
BACK PAIN: 1
CONSTIPATION: 0
COUGH: 0
SHORTNESS OF BREATH: 1

## 2021-09-27 NOTE — PROGRESS NOTES
Patient is here today to review medication contract. Chief Complaint   Patient presents with    Neck Pain    Medication Refill         [facility]     history of chronic neck and chronic lower back pain  She is diagnosed with cervical spinal stenosis and lumbar spinal stenosis     Today main issue is neck pain midline into upper trapezius. Reports constant bilat. finger/hand numbness, denies HA     Her back pain is minimal at this time but at times located in lower back pain in the lumbar area with radiation down both legs. aggravated with standing and walking and does notice bilat ankel and foot swelling     Patient is on chronic low-dose opioid therapy with Norco 5 mg twice a day along with gabapentin  Finds the medication helpful allowing her to do daily life activities and become independent  She has tried conservative measures with therapy in past  Not a candidate for NSAIDs because of renal insufficiency and advanced age        Procedures  4/29/21  BILATERAL L5 EPIDURAL STEROID INJECTION reports 70% relief with improved function for 2 weeks      3/22/21  CERVICAL EPIDURAL STEROID INJECTION C7-T1 more than 50% relief    HPI:   Neck Pain   This is a chronic problem. The current episode started more than 1 year ago. The problem occurs constantly. The problem has been unchanged. The pain is present in the midline. The quality of the pain is described as cramping and aching. The pain is at a severity of 4/10. The pain is mild. The symptoms are aggravated by position and twisting. Associated symptoms include numbness and tingling (hands). Pertinent negatives include no chest pain or fever. She has tried oral narcotics for the symptoms. The treatment provided mild relief.      Medication Refill: Norco    Pain score Today:  4  Adverse effects (Constipation / Nausea / Sedation / sexual Dysfunction / others) : none  Mood:fair  Sleep pattern and quality: poor  Activity level: poor    Pill count Today:Pt did not bring medication due today   Last dose taken  9/27/21  OARRS report reviewed today: yes  Morphine equivalent: 10  ER/Hospitalizations/PCP visit related to pain since last visit:no   Any legal problems e.g. DUI etc.:No  Satisfied with current management: Yes    Opioid Contract:2/3/21  Last Urine Dug screen dated:2/17/21    Lab Results   Component Value Date    LABA1C 5.7 09/30/2020     Lab Results   Component Value Date     01/12/2017       Past Medical History, Past Surgical History, Social History, Allergies and Medications reviewed and updated in EPIC as indicated    Family History reviewed and is noncontributory. Controlled Substance Monitoring:    Acute and Chronic Pain Monitoring:   RX Monitoring 9/27/2021   Attestation -   Periodic Controlled Substance Monitoring Possible medication side effects, risk of tolerance/dependence & alternative treatments discussed. ;No signs of potential drug abuse or diversion identified. ;Assessed functional status. ;Obtaining appropriate analgesic effect of treatment. Chronic Pain > 50 MEDD -   Chronic Pain > 80 MEDD -           Periodic Controlled Substance Monitoring: Possible medication side effects, risk of tolerance/dependence & alternative treatments discussed., No signs of potential drug abuse or diversion identified. , Assessed functional status., Obtaining appropriate analgesic effect of treatment.  Barbara Matias, APRN - CNP)      Past Medical History:   Diagnosis Date    Arthritis     Cataract     Cataracts, bilateral     Diverticulosis of colon 2015    Gastroesophageal reflux disease 4/13/2021    Headache(784.0)     History of colon polyps 2015    Insomnia     Neck pain     Osteoarthrosis, unspecified whether generalized or localized, unspecified site 10/5/2012    Pure hypercholesterolemia 10/5/2012    Shoulder blade pain     right  side    Stroke ([facility] [address]     Tubular adenoma of colon 2015    Type II or unspecified type diabetes mellitus without mention of complication, not stated as uncontrolled 10/5/2012    Unspecified essential hypertension 10/5/2012    Unspecified sleep apnea        Past Surgical History:   Procedure Laterality Date    COLONOSCOPY  8/23/06    Dr Keanu Reilly  5 12 15    extensive diverticulosis, tubular adenoma    EPIDURAL STEROID INJECTION N/A 8/25/2017    EPIDURAL STEROID INJECTION cervical performed by Martinez Irizarry MD at [address] N/A 11/20/2017    EPIDURAL STEROID INJECTION L5S1 performed by Martinez Irizarry MD at [address] Bilateral 5/20/2019    EPIDURAL STEROID INJECTION BILATERAL S1 performed by Martinez Irizarry MD at [address]  10/1/06    NECK SURGERY  5/2011 & 2001    NERVE BLOCK Bilateral 02/17/2020    Lumbar Facet L2-L5    NERVE SURGERY Bilateral 9/5/2019    BILATERAL LUMBAR FACET STEROID INJECTION L2-L5 performed by Martinez Irizarry MD at [address] [facility]  08/25/2017    L5-S1 steroid injection    OTHER SURGICAL HISTORY  11/20/2017    VANITA L5-S1    OTHER SURGICAL HISTORY  10/01/2018    cervical steroid injection    OTHER SURGICAL HISTORY Bilateral 05/20/2019    EPIDURAL STEROID INJECTION BILATERAL S1 (Bilateral )    OTHER SURGICAL HISTORY  04/29/2021    lumbar VANITA    PAIN MANAGEMENT PROCEDURE Bilateral 2/17/2020    LUMBAR FACET L2-L5 performed by Martinez Irizarry MD at [address] N/A 3/22/2021    CERVICAL EPIDURAL STEROID INJECTION C7-T1 performed by Martinez Irizarry MD at [address] Bilateral 4/29/2021    BILATERAL L5 EPIDURAL STEROID INJECTION performed by Martinez Irizarry MD at [address] AA&/STRD TFRML EPI LUMBAR/SACRAL 1 LEVEL Bilateral 11/19/2018    BILATERAL L4 VANITA performed by Martinez Irizarry MD at [address] DX/THER SBST INTRLMNR CRV/THRC W/IMG GDN N/A 10/1/2018    EPIDURAL STEROID INJECTION CERVICAL C7-T1 performed by Lupe Mccurdy MD at [address] [place] [address]       No Known Allergies      Current Outpatient Medications:     [START ON 9/29/2021] HYDROcodone-acetaminophen (NORCO) 5-325 MG per tablet, Take 1 tablet by mouth 2 times daily as needed for Pain for up to 30 days. , Disp: 60 tablet, Rfl: 0    HYDROcodone-acetaminophen (NORCO) 5-325 MG per tablet, Take 1 tablet by mouth 2 times daily as needed for Pain for up to 7 days. , Disp: 14 tablet, Rfl: 0    gabapentin (NEURONTIN) 600 MG tablet, Take 1 tablet by mouth 3 times daily for 90 days. , Disp: 90 tablet, Rfl: 2    zolpidem (AMBIEN) 10 MG tablet, Take 0.5 tablets by mouth nightly as needed for Sleep for up to 60 days. , Disp: 15 tablet, Rfl: 1    tiZANidine (ZANAFLEX) 2 MG tablet, take 2 tablets by mouth at bedtime AND ONCE DURING THE DAY  if needed, Disp: 90 tablet, Rfl: 0    polyethylene glycol (GLYCOLAX) 17 GM/SCOOP powder, take 17GM (DISSOLVED IN WATER) by mouth once daily, Disp: 510 g, Rfl: 1    atorvastatin (LIPITOR) 40 MG tablet, Take 1 tablet by mouth daily, Disp: 90 tablet, Rfl: 1    amitriptyline (ELAVIL) 25 MG tablet, Take 1 tablet by mouth nightly, Disp: 90 tablet, Rfl: 1    loratadine (CLARITIN) 10 MG tablet, take 1 tablet by mouth once daily, Disp: 90 tablet, Rfl: 1    cloNIDine (CATAPRES) 0.1 MG tablet, Take 1 tablet by mouth 2 times daily, Disp: 180 tablet, Rfl: 1    pantoprazole (PROTONIX) 40 MG tablet, take 1 tablet by mouth every morning BEFORE BREAKFAST, Disp: 90 tablet, Rfl: 1    allopurinol (ZYLOPRIM) 100 MG tablet, take 1 tablet by mouth once daily, Disp: 90 tablet, Rfl: 1    carvedilol (COREG) 3.125 MG tablet, take 1 tablet by mouth twice a day, Disp: 180 tablet, Rfl: 1    fluticasone (FLONASE) 50 MCG/ACT nasal spray, instill 1 spray into each nostril once daily, Disp: 16 g, Rfl: 2    aspirin 325 MG tablet, Take 325 mg by mouth daily, Disp: , Rfl:     blood glucose monitor kit and supplies, use check blood sugar as directed, Disp: 1 kit, Rfl: 0   blood glucose monitor strips, Check blood sugars daily as directed., Disp: 50 strip, Rfl: 6    Lancets MISC, 1 each by Does not apply route daily, Disp: 100 each, Rfl: 3    Alcohol Swabs 70 % PADS, 1 each by Does not apply route daily, Disp: 100 each, Rfl: 3    Lift Chair MISC, by Does not apply route, Disp: 1 each, Rfl: 0    Blood Glucose Monitoring Suppl KIT, 1 blood glucose monitoring supplies kit. Test strips, lancets, alcohol swabs, Disp: 1 kit, Rfl: 0    Family History   Problem Relation Age of Onset    Breast Cancer Sister     Cancer Sister         liver    Diabetes Daughter        Social History     Socioeconomic History    Marital status: Single     Spouse name: Not on file    Number of children: Not on file    Years of education: Not on file    Highest education level: Not on file   Occupational History    Not on file   Tobacco Use    Smoking status: Former Smoker     Packs/day: 0.50     Years: 15.00     Pack years: 7.50     Types: Cigarettes     Quit date: 10/5/2001     Years since quittin.9    Smokeless tobacco: Never Used   Vaping Use    Vaping Use: Never used   Substance and Sexual Activity    Alcohol use: Yes     Alcohol/week: 0.0 standard drinks     Comment: on occasion    Drug use: No    Sexual activity: Not Currently   Other Topics Concern    Not on file   Social History Narrative    Not on file     Social Determinants of Health     Financial Resource Strain: Low Risk     Difficulty of Paying Living Expenses: Not very hard   Food Insecurity: No Food Insecurity    Worried About Running Out of Food in the Last Year: Never true    Mai of Food in the Last Year: Never true   Transportation Needs: Unmet Transportation Needs    Lack of Transportation (Medical):  Yes    Lack of Transportation (Non-Medical): Yes   Physical Activity:     Days of Exercise per Week:     Minutes of Exercise per Session:    Stress:     Feeling of Stress :    Social Connections:     Frequency of Communication with Friends and Family:     Frequency of Social Gatherings with Friends and Family:     Attends [employer] Services:     Active Member of Clubs or Organizations:     Attends Club or Organization Meetings:     Marital Status:    Intimate Partner Violence:     Fear of Current or Ex-Partner:     Emotionally Abused:     Physically Abused:     Sexually Abused:        Review of Systems:  Review of Systems   Constitutional: Negative. Negative for chills and fever. Cardiovascular: Negative for chest pain. Respiratory: Positive for shortness of breath. Negative for cough. Musculoskeletal: Positive for back pain, joint pain, muscle cramps, neck pain and stiffness. Gastrointestinal: Negative for constipation. Neurological: Positive for numbness and tingling (hands). Physical Exam:  BP 99/64   Pulse 81   Ht 5' 2\" (1.575 m)   SpO2 96%   BMI 28.72 kg/m²     Physical Exam  Cardiovascular:      Rate and Rhythm: Normal rate. Pulmonary:      Effort: Pulmonary effort is normal.   Musculoskeletal:      Cervical back: Spasms and tenderness present. Decreased range of motion. Skin:     General: Skin is warm and dry. Neurological:      Mental Status: She is alert and oriented to person, place, and time. Assessment:  Problem List Items Addressed This Visit     Cervical stenosis of spine (Chronic)    Relevant Medications    HYDROcodone-acetaminophen (NORCO) 5-325 MG per tablet (Start on 9/29/2021)    Lumbar stenosis with neurogenic claudication (Chronic)    Relevant Medications    HYDROcodone-acetaminophen (NORCO) 5-325 MG per tablet (Start on 9/29/2021)    Primary osteoarthritis involving multiple joints    Relevant Medications    HYDROcodone-acetaminophen (NORCO) 5-325 MG per tablet (Start on 9/29/2021)    Chronic use of opiate drugs therapeutic purposes - Primary             Treatment Plan:  Patient relates current medications are helping the pain.  Patient reports taking pain medications as prescribed, denies obtaining medications from different sources and denies use of illegal drugs. Patient denies side effects from medications like nausea, vomiting, constipation or drowsiness. Patient reports current activities of daily living are possible due to medications and would like to continue them. As always, we encourage daily stretching and strengthening exercises, and recommend minimizing use of pain medications unless patient cannot get through daily activities due to pain. Contract requirements met. Continue opioid therapy.  Script written for norco  Follow up appointment made for 4 weeks

## 2021-09-30 DIAGNOSIS — M54.12 CERVICAL RADICULAR PAIN: Chronic | ICD-10-CM

## 2021-09-30 RX ORDER — TIZANIDINE 2 MG/1
TABLET ORAL
Qty: 90 TABLET | Refills: 0 | Status: SHIPPED | OUTPATIENT
Start: 2021-09-30 | End: 2021-12-13 | Stop reason: SDUPTHER

## 2021-09-30 NOTE — TELEPHONE ENCOUNTER
Last visit: 4/13/21  Last Med refill: 7/21/21  Does patient have enough medication for 72 hours: No:     Next Visit Date:  Future Appointments   Date Time Provider Pradeep Catherine   10/7/2021 10:15 AM Connie Kirkland MD [facility]   10/25/2021 11:20 AM Evie Thompson, APRN - CNP [facility]   12/13/2021 10:30 AM Connie Kirkland  [address] [facility]   Topic Date Due    Hepatitis C screen  Never done    Shingles Vaccine (1 of 2) Never done    Annual Wellness Visit (AWV)  Never done    Flu vaccine (1) 09/01/2021    Potassium monitoring  09/30/2021    Creatinine monitoring  09/30/2021    Lipid screen  09/30/2021    DTaP/Tdap/Td vaccine (1 - Tdap) 10/18/2026 (Originally 5/28/1959)    DEXA (modify frequency per FRAX score)  Completed    Pneumococcal 65+ years Vaccine  Completed    COVID-19 Vaccine  Completed    Hepatitis A vaccine  Aged Out    Hib vaccine  Aged Out    Meningococcal (ACWY) vaccine  Aged Out       Hemoglobin A1C (%)   Date Value   09/30/2020 5.7   02/25/2020 6.6   10/07/2019 6.5             ( goal A1C is < 7)   Microalb/Crt.  Ratio (mcg/mg creat)   Date Value   10/06/2020 CANNOT BE CALCULATED     LDL Cholesterol (mg/dL)   Date Value   09/30/2020 48   06/03/2019 102       (goal LDL is <100)   AST (U/L)   Date Value   09/30/2020 20     ALT (U/L)   Date Value   09/30/2020 13     BUN (mg/dL)   Date Value   09/30/2020 9     BP Readings from Last 3 Encounters:   09/27/21 99/64   08/23/21 (!) 144/79   07/26/21 (!) 180/103          (goal 120/80)    All Future Testing planned in [facility]  Lab Frequency Next Occurrence   Drugs of Abuse, Urine Once 02/03/2021               Patient Active Problem List:     Osteoarthritis     Essential hypertension     Type 2 diabetes mellitus with diabetic neuropathy, without long-term current use of insulin (HCC)     Pure hypercholesterolemia     Constipation     Rectal pain     Diverticulosis of colon     Tubular adenoma of colon     History of colon polyps     Rectal pressure     Failed neck syndrome     Cervical stenosis of spine     Cervical radicular pain     Lumbar stenosis with neurogenic claudication     JANN on CPAP     Primary osteoarthritis involving multiple joints     Hx of cervical spine surgery     Chronic use of opiate drugs therapeutic purposes     Myelomalacia (HCC)     Lumbar facet joint syndrome     Gastroesophageal reflux disease

## 2021-10-07 ENCOUNTER — HOSPITAL ENCOUNTER (OUTPATIENT)
Age: 81
Setting detail: SPECIMEN
Discharge: HOME | End: 2021-10-07
Payer: MEDICARE

## 2021-10-07 ENCOUNTER — OFFICE VISIT (OUTPATIENT)
Dept: FAMILY MEDICINE CLINIC | Age: 81
End: 2021-10-07
Payer: MEDICARE

## 2021-10-07 VITALS
DIASTOLIC BLOOD PRESSURE: 70 MMHG | SYSTOLIC BLOOD PRESSURE: 110 MMHG | OXYGEN SATURATION: 96 % | HEART RATE: 66 BPM | TEMPERATURE: 97.1 F

## 2021-10-07 DIAGNOSIS — E11.40 TYPE 2 DIABETES MELLITUS WITH DIABETIC NEUROPATHY, WITHOUT LONG-TERM CURRENT USE OF INSULIN (HCC): Primary | ICD-10-CM

## 2021-10-07 DIAGNOSIS — Z99.89 OSA ON CPAP: ICD-10-CM

## 2021-10-07 DIAGNOSIS — E78.00 PURE HYPERCHOLESTEROLEMIA: ICD-10-CM

## 2021-10-07 DIAGNOSIS — I10 ESSENTIAL HYPERTENSION: ICD-10-CM

## 2021-10-07 DIAGNOSIS — D64.9 ANEMIA, UNSPECIFIED TYPE: ICD-10-CM

## 2021-10-07 DIAGNOSIS — M15.9 PRIMARY OSTEOARTHRITIS INVOLVING MULTIPLE JOINTS: ICD-10-CM

## 2021-10-07 DIAGNOSIS — K59.03 DRUG INDUCED CONSTIPATION: ICD-10-CM

## 2021-10-07 DIAGNOSIS — G47.9 SLEEPING DIFFICULTY: ICD-10-CM

## 2021-10-07 DIAGNOSIS — G47.33 OSA ON CPAP: ICD-10-CM

## 2021-10-07 DIAGNOSIS — J30.89 CHRONIC NON-SEASONAL ALLERGIC RHINITIS: ICD-10-CM

## 2021-10-07 DIAGNOSIS — Z13.29 SCREENING FOR THYROID DISORDER: ICD-10-CM

## 2021-10-07 DIAGNOSIS — Z23 FLU VACCINE NEED: ICD-10-CM

## 2021-10-07 DIAGNOSIS — Z11.59 NEED FOR HEPATITIS C SCREENING TEST: ICD-10-CM

## 2021-10-07 DIAGNOSIS — R30.9 PAINFUL URINATION: ICD-10-CM

## 2021-10-07 DIAGNOSIS — M54.12 CERVICAL RADICULAR PAIN: Chronic | ICD-10-CM

## 2021-10-07 DIAGNOSIS — H92.01 RIGHT EAR PAIN: ICD-10-CM

## 2021-10-07 DIAGNOSIS — E11.40 TYPE 2 DIABETES MELLITUS WITH DIABETIC NEUROPATHY, WITHOUT LONG-TERM CURRENT USE OF INSULIN (HCC): ICD-10-CM

## 2021-10-07 DIAGNOSIS — M48.02 CERVICAL STENOSIS OF SPINE: Chronic | ICD-10-CM

## 2021-10-07 DIAGNOSIS — M1A.09X0 CHRONIC GOUT OF MULTIPLE SITES, UNSPECIFIED CAUSE: ICD-10-CM

## 2021-10-07 DIAGNOSIS — M48.062 LUMBAR STENOSIS WITH NEUROGENIC CLAUDICATION: Chronic | ICD-10-CM

## 2021-10-07 DIAGNOSIS — K21.9 GASTROESOPHAGEAL REFLUX DISEASE, UNSPECIFIED WHETHER ESOPHAGITIS PRESENT: ICD-10-CM

## 2021-10-07 LAB
ALBUMIN SERPL-MCNC: 3.9 G/DL (ref 3.5–5.2)
ALBUMIN/GLOBULIN RATIO: 1.2 (ref 1–2.5)
ALP BLD-CCNC: 84 U/L (ref 35–104)
ALT SERPL-CCNC: 9 U/L (ref 5–33)
ANION GAP SERPL CALCULATED.3IONS-SCNC: 17 MMOL/L (ref 9–17)
AST SERPL-CCNC: 16 U/L
BILIRUB SERPL-MCNC: 0.6 MG/DL (ref 0.3–1.2)
BILIRUBIN, POC: ABNORMAL
BLOOD URINE, POC: ABNORMAL
BUN BLDV-MCNC: 16 MG/DL (ref 8–23)
BUN/CREAT BLD: ABNORMAL (ref 9–20)
CALCIUM SERPL-MCNC: 9.4 MG/DL (ref 8.6–10.4)
CHLORIDE BLD-SCNC: 103 MMOL/L (ref 98–107)
CHOLESTEROL, FASTING: 131 MG/DL
CHOLESTEROL/HDL RATIO: 2.2
CLARITY, POC: ABNORMAL
CO2: 21 MMOL/L (ref 20–31)
COLOR, POC: YELLOW
CREAT SERPL-MCNC: 1.03 MG/DL (ref 0.5–0.9)
ESTIMATED AVERAGE GLUCOSE: 146 MG/DL
GFR AFRICAN AMERICAN: >60 ML/MIN
GFR NON-AFRICAN AMERICAN: 52 ML/MIN
GFR SERPL CREATININE-BSD FRML MDRD: ABNORMAL ML/MIN/{1.73_M2}
GFR SERPL CREATININE-BSD FRML MDRD: ABNORMAL ML/MIN/{1.73_M2}
GLUCOSE BLD-MCNC: 137 MG/DL (ref 70–99)
GLUCOSE URINE, POC: ABNORMAL
HBA1C MFR BLD: 6.7 % (ref 4–6)
HCT VFR BLD CALC: 36.8 % (ref 36.3–47.1)
HDLC SERPL-MCNC: 60 MG/DL
HEMOGLOBIN: 11.1 G/DL (ref 11.9–15.1)
HEPATITIS C ANTIBODY: NONREACTIVE
KETONES, POC: ABNORMAL
LDL CHOLESTEROL: 48 MG/DL (ref 0–130)
LEUKOCYTE EST, POC: ABNORMAL
MCH RBC QN AUTO: 24.7 PG (ref 25.2–33.5)
MCHC RBC AUTO-ENTMCNC: 30.2 G/DL (ref 28.4–34.8)
MCV RBC AUTO: 81.8 FL (ref 82.6–102.9)
NITRITE, POC: ABNORMAL
NRBC AUTOMATED: 0 PER 100 WBC
PDW BLD-RTO: 16.4 % (ref 11.8–14.4)
PH, POC: 5.5
PLATELET # BLD: 274 K/UL (ref 138–453)
PMV BLD AUTO: 10.4 FL (ref 8.1–13.5)
POTASSIUM SERPL-SCNC: 4.1 MMOL/L (ref 3.7–5.3)
PROTEIN, POC: ABNORMAL
RBC # BLD: 4.5 M/UL (ref 3.95–5.11)
SODIUM BLD-SCNC: 141 MMOL/L (ref 135–144)
SPECIFIC GRAVITY, POC: 1.01
THYROXINE, FREE: 1.19 NG/DL (ref 0.93–1.7)
TOTAL PROTEIN: 7.1 G/DL (ref 6.4–8.3)
TRIGLYCERIDE, FASTING: 117 MG/DL
TSH SERPL DL<=0.05 MIU/L-ACNC: 7.47 MIU/L (ref 0.3–5)
UROBILINOGEN, POC: 0.2
VLDLC SERPL CALC-MCNC: NORMAL MG/DL (ref 1–30)
WBC # BLD: 6.5 K/UL (ref 3.5–11.3)

## 2021-10-07 PROCEDURE — G0008 ADMIN INFLUENZA VIRUS VAC: HCPCS | Performed by: INTERNAL MEDICINE

## 2021-10-07 PROCEDURE — 90694 VACC AIIV4 NO PRSRV 0.5ML IM: CPT | Performed by: INTERNAL MEDICINE

## 2021-10-07 PROCEDURE — G8417 CALC BMI ABV UP PARAM F/U: HCPCS | Performed by: INTERNAL MEDICINE

## 2021-10-07 PROCEDURE — G8427 DOCREV CUR MEDS BY ELIG CLIN: HCPCS | Performed by: INTERNAL MEDICINE

## 2021-10-07 PROCEDURE — 99214 OFFICE O/P EST MOD 30 MIN: CPT | Performed by: INTERNAL MEDICINE

## 2021-10-07 PROCEDURE — 1036F TOBACCO NON-USER: CPT | Performed by: INTERNAL MEDICINE

## 2021-10-07 PROCEDURE — G8399 PT W/DXA RESULTS DOCUMENT: HCPCS | Performed by: INTERNAL MEDICINE

## 2021-10-07 PROCEDURE — 1123F ACP DISCUSS/DSCN MKR DOCD: CPT | Performed by: INTERNAL MEDICINE

## 2021-10-07 PROCEDURE — 81003 URINALYSIS AUTO W/O SCOPE: CPT | Performed by: INTERNAL MEDICINE

## 2021-10-07 PROCEDURE — 4040F PNEUMOC VAC/ADMIN/RCVD: CPT | Performed by: INTERNAL MEDICINE

## 2021-10-07 PROCEDURE — 1090F PRES/ABSN URINE INCON ASSESS: CPT | Performed by: INTERNAL MEDICINE

## 2021-10-07 PROCEDURE — G8484 FLU IMMUNIZE NO ADMIN: HCPCS | Performed by: INTERNAL MEDICINE

## 2021-10-07 RX ORDER — CLONIDINE HYDROCHLORIDE 0.1 MG/1
0.1 TABLET ORAL 2 TIMES DAILY
Qty: 180 TABLET | Refills: 1 | Status: SHIPPED | OUTPATIENT
Start: 2021-10-07 | End: 2022-04-04

## 2021-10-07 RX ORDER — LORATADINE 10 MG/1
TABLET ORAL
Qty: 90 TABLET | Refills: 1 | Status: SHIPPED | OUTPATIENT
Start: 2021-10-07 | End: 2022-05-09 | Stop reason: SDUPTHER

## 2021-10-07 RX ORDER — ALLOPURINOL 100 MG/1
TABLET ORAL
Qty: 90 TABLET | Refills: 1 | Status: SHIPPED | OUTPATIENT
Start: 2021-10-07 | End: 2022-05-09 | Stop reason: SDUPTHER

## 2021-10-07 RX ORDER — PANTOPRAZOLE SODIUM 40 MG/1
TABLET, DELAYED RELEASE ORAL
Qty: 90 TABLET | Refills: 1 | Status: SHIPPED | OUTPATIENT
Start: 2021-10-07 | End: 2022-05-09 | Stop reason: SDUPTHER

## 2021-10-07 RX ORDER — AMITRIPTYLINE HYDROCHLORIDE 25 MG/1
25 TABLET, FILM COATED ORAL NIGHTLY
Qty: 90 TABLET | Refills: 1 | Status: SHIPPED | OUTPATIENT
Start: 2021-10-07 | End: 2022-04-26

## 2021-10-07 RX ORDER — HYDROCODONE BITARTRATE AND ACETAMINOPHEN 5; 325 MG/1; MG/1
1 TABLET ORAL 2 TIMES DAILY PRN
Qty: 60 TABLET | Refills: 0 | Status: CANCELLED | OUTPATIENT
Start: 2021-10-07 | End: 2021-11-06

## 2021-10-07 RX ORDER — POLYETHYLENE GLYCOL 3350 17 G/17G
POWDER, FOR SOLUTION ORAL
Qty: 510 G | Refills: 1 | Status: SHIPPED | OUTPATIENT
Start: 2021-10-07 | End: 2022-05-09 | Stop reason: SDUPTHER

## 2021-10-07 RX ORDER — CARVEDILOL 3.12 MG/1
TABLET ORAL
Qty: 180 TABLET | Refills: 1 | Status: SHIPPED | OUTPATIENT
Start: 2021-10-07 | End: 2022-05-03

## 2021-10-07 RX ORDER — ATORVASTATIN CALCIUM 40 MG/1
40 TABLET, FILM COATED ORAL DAILY
Qty: 90 TABLET | Refills: 1 | Status: SHIPPED | OUTPATIENT
Start: 2021-10-07 | End: 2022-05-09 | Stop reason: SDUPTHER

## 2021-10-07 ASSESSMENT — ENCOUNTER SYMPTOMS
CHOKING: 0
WHEEZING: 0
CONSTIPATION: 0
COUGH: 0
CHEST TIGHTNESS: 0
SHORTNESS OF BREATH: 0
ANAL BLEEDING: 0
NAUSEA: 0
VOMITING: 0
BLOOD IN STOOL: 0
ABDOMINAL PAIN: 0
DIARRHEA: 0

## 2021-10-07 ASSESSMENT — VISUAL ACUITY: OU: 1

## 2021-10-07 NOTE — PROGRESS NOTES
Mona Robb (:  1940) is a 80 y.o. female,Established patient, here for evaluation of the following chief complaint(s):  Diabetes and Flu Vaccine         ASSESSMENT/PLAN:  1. Type 2 diabetes mellitus with diabetic neuropathy, without long-term current use of insulin (HCC)  -     Hemoglobin A1C; Future  2. Essential hypertension  -     Comprehensive Metabolic Panel; Future  -     carvedilol (COREG) 3.125 MG tablet; take 1 tablet by mouth twice a day, Disp-180 tablet, R-1Normal  -     cloNIDine (CATAPRES) 0.1 MG tablet; Take 1 tablet by mouth 2 times daily, Disp-180 tablet, R-1Normal  3. Chronic gout of multiple sites, unspecified cause  -     allopurinol (ZYLOPRIM) 100 MG tablet; take 1 tablet by mouth once daily, Disp-90 tablet, R-1Normal  4. Gastroesophageal reflux disease, unspecified whether esophagitis present  -     pantoprazole (PROTONIX) 40 MG tablet; take 1 tablet by mouth every morning BEFORE BREAKFAST, Disp-90 tablet, R-1Normal  5. Cervical radicular pain  -     amitriptyline (ELAVIL) 25 MG tablet; Take 1 tablet by mouth nightly, Disp-90 tablet, R-1Normal  6. Pure hypercholesterolemia  -     Comprehensive Metabolic Panel; Future  -     atorvastatin (LIPITOR) 40 MG tablet; Take 1 tablet by mouth daily, Disp-90 tablet, R-1Normal  -     Lipid, Fasting; Future  7. Sleeping difficulty  8. Cervical stenosis of spine  9. Primary osteoarthritis involving multiple joints  10. Lumbar stenosis with neurogenic claudication  11. Chronic non-seasonal allergic rhinitis  -     loratadine (CLARITIN) 10 MG tablet; take 1 tablet by mouth once daily, Disp-90 tablet, R-1Normal  12. Drug induced constipation  -     polyethylene glycol (GLYCOLAX) 17 GM/SCOOP powder; take 17GM (DISSOLVED IN WATER) by mouth once daily, Disp-510 g, R-1Normal  13. Anemia, unspecified type  -     CBC; Future  14. Screening for thyroid disorder  -     TSH with Reflex; Future  15.  Need for hepatitis C screening test  -     Hepatitis C Antibody; Future  16. Flu vaccine need  -     INFLUENZA, QUADV, ADJUVANTED, 65 YRS =, IM, PF, PREFILL SYR, 0.5ML (FLUAD)  17. Right ear pain  18. JANN on CPAP  19. Painful urination  -     POCT Urinalysis No Micro (Auto)  -     Culture, Urine; Future      No follow-ups on file. Subjective   SUBJECTIVE/OBJECTIVE:  Diabetes - doing well with current regimen. Denies hypoglycemia, hyperglycemia, fatigue, polyuria, polydipsia, paresthesias, vision changes, dizziness. Eye exam was done. Not following with podiatry. Patient is taking ACE inhibitor/ARB. Complications of diabetes include neuropathy. Hypertension-tolerating current regimen without chest pain, palpitations, dizziness, peripheral edema, dyspnea on exertion, orthopnea, paroxysmal nocturnal dyspnea. Hyperlipidemia-tolerating current regimen without myalgias, dyspepsia, jaundice. Mostly compliant with diet recommendations for low carb diet, not very compliant with exercise recommendations. Following with pain clinic, remains on norco for back pain     Cardiovascular risk factors: advanced age (older than 54 for men, 72 for women), diabetes mellitus, dyslipidemia, hypertension and sedentary lifestyle    The symptoms of obstructive sleep apnea have improved on CPAP. The patient is benefiting from therapy and should continue use of the pap device. Review of Systems   Constitutional: Positive for fatigue. Negative for fever and unexpected weight change. Respiratory: Negative for cough, choking, chest tightness, shortness of breath and wheezing. Cardiovascular: Negative for chest pain, palpitations and leg swelling. Gastrointestinal: Negative for abdominal pain, anal bleeding, blood in stool, constipation, diarrhea, nausea and vomiting. Endocrine: Negative. Musculoskeletal: Negative for joint swelling and myalgias. Skin: Negative. Neurological: Negative for dizziness. Psychiatric/Behavioral: Positive for sleep disturbance. All other systems reviewed and are negative.         Past Medical History:   Diagnosis Date    Arthritis     Cataract     Cataracts, bilateral     Diverticulosis of colon 2015    Gastroesophageal reflux disease 4/13/2021    Headache(784.0)     History of colon polyps 2015    Insomnia     Neck pain     Osteoarthrosis, unspecified whether generalized or localized, unspecified site 10/5/2012    Pure hypercholesterolemia 10/5/2012    Shoulder blade pain     right  side    Stroke ([address]     Tubular adenoma of colon 2015    Type II or unspecified type diabetes mellitus without mention of complication, not stated as uncontrolled 10/5/2012    Unspecified essential hypertension 10/5/2012    Unspecified sleep apnea      Past Surgical History:   Procedure Laterality Date    COLONOSCOPY  8/23/06    Dr Tommy Olvera  5 12 15    extensive diverticulosis, tubular adenoma    EPIDURAL STEROID INJECTION N/A 8/25/2017    EPIDURAL STEROID INJECTION cervical performed by Fuad Young MD at [address] [place] [address] 11/20/2017    EPIDURAL STEROID INJECTION L5S1 performed by Fuad Young MD at [address] [place] [address] Bilateral 5/20/2019    EPIDURAL STEROID INJECTION BILATERAL S1 performed by Fuad Young MD at [address] [place] [address]  10/1/06    NECK SURGERY  5/2011 & 2001    NERVE BLOCK Bilateral 02/17/2020    Lumbar Facet L2-L5    NERVE SURGERY Bilateral 9/5/2019    BILATERAL LUMBAR FACET STEROID INJECTION L2-L5 performed by Fuad Young MD at [address] [place] [address]  08/25/2017    L5-S1 steroid injection    OTHER SURGICAL HISTORY  11/20/2017    VANITA L5-S1    OTHER SURGICAL HISTORY  10/01/2018    cervical steroid injection    OTHER SURGICAL HISTORY Bilateral 05/20/2019    EPIDURAL STEROID INJECTION BILATERAL S1 (Bilateral )    OTHER SURGICAL HISTORY  04/29/2021    lumbar VANITA    PAIN MANAGEMENT PROCEDURE Bilateral 2020    LUMBAR FACET L2-L5 performed by Mary Bonilla MD at [address] [facility] N/A 3/22/2021    CERVICAL EPIDURAL STEROID INJECTION C7-T1 performed by Mary Bonilla MD at [address] [facility] Bilateral 2021    BILATERAL L5 EPIDURAL STEROID INJECTION performed by Mary Bonilla MD at [address] AA&/STRD TFRML EPI LUMBAR/SACRAL 1 LEVEL Bilateral 2018    BILATERAL L4 VANITA performed by Mary Bonilla MD at [address] DX/THER SBST INTRLMNR CRV/THRC W/IMG GDN N/A 10/1/2018    EPIDURAL STEROID INJECTION CERVICAL C7-T1 performed by Mary Bonilla MD at [address] History     Tobacco Use    Smoking status: Former Smoker     Packs/day: 0.50     Years: 15.00     Pack years: 7.50     Types: Cigarettes     Quit date: 10/5/2001     Years since quittin.0    Smokeless tobacco: Never Used   Vaping Use    Vaping Use: Never used   Substance Use Topics    Alcohol use: Yes     Alcohol/week: 0.0 standard drinks     Comment: on occasion    Drug use: No         Prior to Visit Medications    Medication Sig Taking? Authorizing Provider   tiZANidine (ZANAFLEX) 2 MG tablet take 2 tablets by mouth at bedtime AND ONCE DURING THE DAY  if needed Yes Connie Garcia MD   HYDROcodone-acetaminophen (NORCO) 5-325 MG per tablet Take 1 tablet by mouth 2 times daily as needed for Pain for up to 30 days. Yes Juanis Negrete, APRN - CNP   gabapentin (NEURONTIN) 600 MG tablet Take 1 tablet by mouth 3 times daily for 90 days. Yes Vincent Bonilla MD   zolpidem (AMBIEN) 10 MG tablet Take 0.5 tablets by mouth nightly as needed for Sleep for up to 60 days.  Yes Vincent Bonilla MD   polyethylene glycol [facility]) 17 GM/SCOOP powder take 17GM (DISSOLVED IN WATER) by mouth once daily Yes Connie Garcia MD   atorvastatin (LIPITOR) 40 MG tablet Take 1 tablet by mouth daily Yes Connie Garcia MD   amitriptyline (ELAVIL) 25 MG tablet Take 1 tablet by mouth nightly Yes Connie Roland Montero MD   loratadine (CLARITIN) 10 MG tablet take 1 tablet by mouth once daily Yes Connie Montero MD   cloNIDine (CATAPRES) 0.1 MG tablet Take 1 tablet by mouth 2 times daily Yes Connie Montero MD   pantoprazole (PROTONIX) 40 MG tablet take 1 tablet by mouth every morning BEFORE BREAKFAST Yes Connie Montero MD   allopurinol (ZYLOPRIM) 100 MG tablet take 1 tablet by mouth once daily Yes Connie Montero MD   carvedilol (COREG) 3.125 MG tablet take 1 tablet by mouth twice a day Yes Connie Montero MD   fluticasone (FLONASE) 50 MCG/ACT nasal spray instill 1 spray into each nostril once daily Yes Rosalva Khan MD   blood glucose monitor kit and supplies use check blood sugar as directed Yes Rosalva Khan MD   blood glucose monitor strips Check blood sugars daily as directed. Yes Rosalva Khan MD   Lancets MISC 1 each by Does not apply route daily Yes Rosalva Khan MD   Alcohol Swabs 70 % PADS 1 each by Does not apply route daily Yes Rosalva Khan MD   Lift Chair MISC by Does not apply route Yes Rosalva Khan MD   aspirin 325 MG tablet Take 325 mg by mouth daily Yes Radha Liu MD         Objective    Blood pressure 110/70, pulse 66, temperature 97.1 °F (36.2 °C), temperature source Temporal, SpO2 96 %, not currently breastfeeding. Physical Exam  Vitals and nursing note reviewed. Constitutional:       General: She is not in acute distress. Appearance: She is well-developed. She is not ill-appearing. Eyes:      General: Lids are normal. Vision grossly intact. Cardiovascular:      Rate and Rhythm: Normal rate and regular rhythm. Heart sounds: Normal heart sounds, S1 normal and S2 normal. No murmur heard. No friction rub. No gallop. Pulmonary:      Effort: Pulmonary effort is normal. No respiratory distress. Breath sounds: Normal breath sounds. No wheezing. Abdominal:      General: Bowel sounds are normal.      Palpations: Abdomen is soft. There is no mass. Tenderness: There is no abdominal tenderness. There is no guarding. Musculoskeletal:         General: Normal range of motion. Skin:     General: Skin is warm and dry. Capillary Refill: Capillary refill takes less than 2 seconds. Neurological:      General: No focal deficit present. Mental Status: She is alert and oriented to person, place, and time. Comments: Very tired and sleepy today, did not sleep more than two hours last night      A1c - will get draw today   UA - strong odor, small leuks, protein 100       On this date 10/7/2021 I have spent 10 minutes reviewing previous notes, test results and face to face with the patient discussing the diagnosis and importance of compliance with the treatment plan as well as documenting on the day of the visit. An electronic signature was used to authenticate this note.     --Neville Romo MD

## 2021-10-07 NOTE — PROGRESS NOTES
Pt is here for a 6 month follow up  She is having a hard time sleeping at night. She is falling a sleep while in the office. Pt states is having some burning when voiding for the past 5 days. Vaccine Information Sheet, \"Influenza - Inactivated\"  given to Paige Ignacio, or parent/legal guardian of  Paige Ignacio and verbalized understanding. Patient responses:    Have you ever had a reaction to a flu vaccine? No  Do you have any current illness? No  Have you ever had [place] Syndrome? No  Do you have a serious allergy to any of the following: Neomycin, Polymyxin, Thimerosal, eggs or egg products? No    Flu vaccine given per order. Please see immunization tab. Risks and benefits explained. Current VIS given. After obtaining consent, and per orders of Dr. Herminia Garcia, injection of HD Influenza given in Left deltoid by Lali Sapp MA. Patient instructed to remain in clinic for 20 minutes afterwards, and to report any adverse reaction to me immediately.

## 2021-10-12 LAB
CULTURE: ABNORMAL
Lab: ABNORMAL
SPECIMEN DESCRIPTION: ABNORMAL

## 2021-10-14 ENCOUNTER — TELEPHONE (OUTPATIENT)
Dept: FAMILY MEDICINE CLINIC | Age: 81
End: 2021-10-14

## 2021-10-15 RX ORDER — AMOXICILLIN 500 MG/1
500 CAPSULE ORAL 2 TIMES DAILY
Qty: 20 CAPSULE | Refills: 0 | Status: SHIPPED | OUTPATIENT
Start: 2021-10-15 | End: 2021-10-25

## 2021-10-18 DIAGNOSIS — E07.9 DISORDER OF THYROID, UNSPECIFIED: ICD-10-CM

## 2021-10-18 DIAGNOSIS — R79.89 ABNORMAL THYROID BLOOD TEST: Primary | ICD-10-CM

## 2021-10-18 NOTE — TELEPHONE ENCOUNTER
Left message on VM letting Lea Marsh know RX sent to pharmacy.  IF any questions can call the office

## 2021-10-27 ENCOUNTER — HOSPITAL ENCOUNTER (OUTPATIENT)
Age: 81
Discharge: HOME | End: 2021-10-27
Payer: MEDICARE

## 2021-10-27 DIAGNOSIS — R79.89 ABNORMAL THYROID BLOOD TEST: ICD-10-CM

## 2021-10-27 DIAGNOSIS — E07.9 DISORDER OF THYROID, UNSPECIFIED: ICD-10-CM

## 2021-10-27 LAB — TSH SERPL DL<=0.05 MIU/L-ACNC: 1.98 MIU/L (ref 0.3–5)

## 2021-10-27 PROCEDURE — 84443 ASSAY THYROID STIM HORMONE: CPT

## 2021-10-27 PROCEDURE — 36415 COLL VENOUS BLD VENIPUNCTURE: CPT

## 2021-11-03 ENCOUNTER — TELEPHONE (OUTPATIENT)
Dept: FAMILY MEDICINE CLINIC | Age: 81
End: 2021-11-03

## 2021-11-03 DIAGNOSIS — R26.89 NEED FOR ASSISTANCE DUE TO UNSTEADY GAIT: ICD-10-CM

## 2021-11-03 DIAGNOSIS — R29.898 SEVERE MUSCLE DECONDITIONING: Primary | ICD-10-CM

## 2021-11-03 NOTE — TELEPHONE ENCOUNTER
Pt is requesting a referral for home PT due to difficulties with walking.  This was suggested from Halima Thorne

## 2021-11-04 DIAGNOSIS — G47.9 SLEEPING DIFFICULTY: ICD-10-CM

## 2021-11-04 RX ORDER — ZOLPIDEM TARTRATE 10 MG/1
TABLET ORAL
Qty: 15 TABLET | Refills: 1 | Status: SHIPPED | OUTPATIENT
Start: 2021-11-04 | End: 2022-01-10

## 2021-11-04 NOTE — TELEPHONE ENCOUNTER
Last visit: 10/7/21  Last Med refill: 8/23/21  Does patient have enough medication for 72 hours: No:     Next Visit Date:  Future Appointments   Date Time Provider Pradeep Catherine   11/5/2021 10:20 AM [facility]   12/13/2021 10:30 AM Connie Aparicio MD [facility]   2/8/2022 10:30 AM Connie Aparicio  [facility]   Topic Date Due    Shingles Vaccine (1 of 2) Never done    Annual Wellness Visit (AWV)  Never done    DTaP/Tdap/Td vaccine (1 - Tdap) 10/18/2026 (Originally 5/28/1959)    Lipid screen  10/07/2022    Potassium monitoring  10/07/2022    Creatinine monitoring  10/07/2022    DEXA (modify frequency per FRAX score)  Completed    Flu vaccine  Completed    Pneumococcal 65+ years Vaccine  Completed    COVID-19 Vaccine  Completed    Hepatitis C screen  Completed    Hepatitis A vaccine  Aged Out    Hib vaccine  Aged Out    Meningococcal (ACWY) vaccine  Aged Out       Hemoglobin A1C (%)   Date Value   10/07/2021 6.7 (H)   09/30/2020 5.7   02/25/2020 6.6             ( goal A1C is < 7)   Microalb/Crt.  Ratio (mcg/mg creat)   Date Value   10/06/2020 CANNOT BE CALCULATED     LDL Cholesterol (mg/dL)   Date Value   10/07/2021 48   09/30/2020 48       (goal LDL is <100)   AST (U/L)   Date Value   10/07/2021 16     ALT (U/L)   Date Value   10/07/2021 9     BUN (mg/dL)   Date Value   10/07/2021 16     BP Readings from Last 3 Encounters:   10/07/21 110/70   09/27/21 99/64   08/23/21 (!) 144/79          (goal 120/80)    All Future Testing planned in [facility]  Lab Frequency Next Occurrence   Drugs of Abuse, Urine Once 02/03/2021               Patient Active Problem List:     Osteoarthritis     Essential hypertension     Type 2 diabetes mellitus with diabetic neuropathy, without long-term current use of insulin (HCC)     Pure hypercholesterolemia     Constipation     Rectal pain     Diverticulosis of colon     Tubular adenoma of colon History of colon polyps     Rectal pressure     Failed neck syndrome     Cervical stenosis of spine     Cervical radicular pain     Lumbar stenosis with neurogenic claudication     JANN on CPAP     Primary osteoarthritis involving multiple joints     Hx of cervical spine surgery     Chronic use of opiate drugs therapeutic purposes     Myelomalacia (HCC)     Lumbar facet joint syndrome     Gastroesophageal reflux disease     Sleeping difficulty

## 2021-11-05 ENCOUNTER — VIRTUAL VISIT (OUTPATIENT)
Dept: PAIN MANAGEMENT | Age: 81
End: 2021-11-05
Payer: MEDICARE

## 2021-11-05 ENCOUNTER — TELEPHONE (OUTPATIENT)
Dept: FAMILY MEDICINE CLINIC | Age: 81
End: 2021-11-05

## 2021-11-05 DIAGNOSIS — M48.02 CERVICAL STENOSIS OF SPINE: Chronic | ICD-10-CM

## 2021-11-05 DIAGNOSIS — M48.062 LUMBAR STENOSIS WITH NEUROGENIC CLAUDICATION: Chronic | ICD-10-CM

## 2021-11-05 DIAGNOSIS — M15.9 PRIMARY OSTEOARTHRITIS INVOLVING MULTIPLE JOINTS: ICD-10-CM

## 2021-11-05 DIAGNOSIS — R30.0 DYSURIA: Primary | ICD-10-CM

## 2021-11-05 PROCEDURE — G8417 CALC BMI ABV UP PARAM F/U: HCPCS | Performed by: NURSE PRACTITIONER

## 2021-11-05 PROCEDURE — G8484 FLU IMMUNIZE NO ADMIN: HCPCS | Performed by: NURSE PRACTITIONER

## 2021-11-05 PROCEDURE — G8399 PT W/DXA RESULTS DOCUMENT: HCPCS | Performed by: NURSE PRACTITIONER

## 2021-11-05 PROCEDURE — 4040F PNEUMOC VAC/ADMIN/RCVD: CPT | Performed by: NURSE PRACTITIONER

## 2021-11-05 PROCEDURE — G8427 DOCREV CUR MEDS BY ELIG CLIN: HCPCS | Performed by: NURSE PRACTITIONER

## 2021-11-05 PROCEDURE — 1036F TOBACCO NON-USER: CPT | Performed by: NURSE PRACTITIONER

## 2021-11-05 PROCEDURE — 1123F ACP DISCUSS/DSCN MKR DOCD: CPT | Performed by: NURSE PRACTITIONER

## 2021-11-05 PROCEDURE — 1090F PRES/ABSN URINE INCON ASSESS: CPT | Performed by: NURSE PRACTITIONER

## 2021-11-05 PROCEDURE — 99213 OFFICE O/P EST LOW 20 MIN: CPT | Performed by: NURSE PRACTITIONER

## 2021-11-05 RX ORDER — HYDROCODONE BITARTRATE AND ACETAMINOPHEN 5; 325 MG/1; MG/1
1 TABLET ORAL 2 TIMES DAILY PRN
Qty: 60 TABLET | Refills: 0 | Status: SHIPPED | OUTPATIENT
Start: 2021-11-11 | End: 2021-12-20 | Stop reason: SDUPTHER

## 2021-11-05 ASSESSMENT — ENCOUNTER SYMPTOMS
BACK PAIN: 1
COUGH: 0
SHORTNESS OF BREATH: 0
CONSTIPATION: 0

## 2021-11-05 NOTE — TELEPHONE ENCOUNTER
URINARY    Patient calling with symptoms of possible urinary tract infection    Symptoms include burning with urination, pain in vaginal area after urination    Symptoms have persisted for 4 weeks  Patient is also complaining of n/a  When was their last UTI : 10/7/21    *This condition is eligible for an eVisit. If not already active, sign patient up for MyChart to improve access and communication with the provider. *      States the UTI never really went away after 10 days of medication  [facility] [address]

## 2021-11-05 NOTE — PROGRESS NOTES
Pursuant to the emergency declaration under the [facility], [facility] waiver authority and the [employer] Act, this Virtual  Visit was conducted, with patient's consent, to reduce the patient's risk of exposure to COVID-19 and provide continuity of care for an established patient. Services were provided through a telephone discussion virtually to substitute for in-person clinic visit. Chief Complaint   Patient presents with    Back Pain    Neck Pain    Medication Refill         [facility]     history of chronic neck and chronic lower back pain  She is diagnosed with cervical spinal stenosis and lumbar spinal stenosis      neck pain midline into upper trapezius. Reports constant bilat. finger/hand numbness, denies HA     Her back pain is minimal at this time but at times located in lower back pain in the lumbar area with radiation down both legs. aggravated with standing and walking and does notice bilat ankel and foot swelling     Patient is on chronic low-dose opioid therapy with Norco 5 mg twice a day along with gabapentin  Finds the medication helpful allowing her to do daily life activities and become independent  She has tried conservative measures with therapy in past  Not a candidate for NSAIDs because of renal insufficiency and advanced age        Procedures  4/29/21  BILATERAL L5 EPIDURAL STEROID INJECTION reports 70% relief with improved function for 2 weeks      3/22/21  CERVICAL EPIDURAL STEROID INJECTION C7-T1 more than 50% relief       HPI:   Back Pain  This is a chronic problem. The current episode started more than 1 year ago. The problem occurs intermittently. The pain is at a severity of 0/10. The patient is experiencing no pain. Associated symptoms include numbness (hand and feet). Pertinent negatives include no chest pain or fever. Neck Pain   This is a chronic problem.  The current episode started more than 1 year ago. The problem occurs constantly. The problem has been unchanged. The quality of the pain is described as aching, cramping and burning. The pain is at a severity of 4/10. The pain is mild. Associated symptoms include numbness (hand and feet). Pertinent negatives include no chest pain or fever. She has tried oral narcotics for the symptoms. The treatment provided mild relief. Medication Refill: Norco    Pain score Today:  4  Adverse effects (Constipation / Nausea / Sedation / sexual Dysfunction / others) : none  Mood: fair  Sleep pattern and quality: poor  Activity level: poor    Pill count Today:Pt states she has # 12 Norco left and was due to fill 10/28 prescription adjusted appropriately  11/11  Last dose taken  11/3/21  OARRS report reviewed today: yes  ER/Hospitalizations/PCP visit related to pain since last visit:no   Any legal problems e.g. DUI etc.:No  Satisfied with current management: Yes    Opioid Contract:2/3/21  Last Urine Dug screen dated: 2/17/21      Lab Results   Component Value Date    LABA1C 6.7 (H) 10/07/2021     Lab Results   Component Value Date     10/07/2021       Past Medical History, Past Surgical History, Social History, Allergies and Medications reviewed and updated in EPIC as indicated    Family History reviewed and is noncontributory. Periodic Controlled Substance Monitoring: Possible medication side effects, risk of tolerance/dependence & alternative treatments discussed., No signs of potential drug abuse or diversion identified. , Assessed functional status., Obtaining appropriate analgesic effect of treatment.  Darryl Neves, APRN - CNP)      Past Medical History:   Diagnosis Date    Arthritis     Cataract     Cataracts, bilateral     Diverticulosis of colon 2015    Gastroesophageal reflux disease 4/13/2021    Headache(784.0)     History of colon polyps 2015    Insomnia     Neck pain     Osteoarthrosis, unspecified whether generalized or localized, unspecified site 10/5/2012    Pure hypercholesterolemia 10/5/2012    Shoulder blade pain     right  side    Stroke ([facility] [address]     Tubular adenoma of colon 2015    Type II or unspecified type diabetes mellitus without mention of complication, not stated as uncontrolled 10/5/2012    Unspecified essential hypertension 10/5/2012    Unspecified sleep apnea        Past Surgical History:   Procedure Laterality Date    COLONOSCOPY  8/23/06    Dr Madeleine Knox  5 12 15    extensive diverticulosis, tubular adenoma    EPIDURAL STEROID INJECTION N/A 8/25/2017    EPIDURAL STEROID INJECTION cervical performed by Roberta Lorenz MD at [address] N/A 11/20/2017    EPIDURAL STEROID INJECTION L5S1 performed by Roberta Lorenz MD at [address] Bilateral 5/20/2019    EPIDURAL STEROID INJECTION BILATERAL S1 performed by Roberta Lorenz MD at [address] [address]  10/1/06    NECK SURGERY  5/2011 & 2001    NERVE BLOCK Bilateral 02/17/2020    Lumbar Facet L2-L5    NERVE SURGERY Bilateral 9/5/2019    BILATERAL LUMBAR FACET STEROID INJECTION L2-L5 performed by Roberta Lorenz MD at [address] [place] [address] Drive,[facility] [address]  08/25/2017    L5-S1 steroid injection    OTHER SURGICAL HISTORY  11/20/2017    VANITA L5-S1    OTHER SURGICAL HISTORY  10/01/2018    cervical steroid injection    OTHER SURGICAL HISTORY Bilateral 05/20/2019    EPIDURAL STEROID INJECTION BILATERAL S1 (Bilateral )    OTHER SURGICAL HISTORY  04/29/2021    lumbar VANITA    PAIN MANAGEMENT PROCEDURE Bilateral 2/17/2020    LUMBAR FACET L2-L5 performed by Roberta Lorenz MD at [place] [address] N/A 3/22/2021    CERVICAL EPIDURAL STEROID INJECTION C7-T1 performed by Roberta Lorenz MD at [place] [address] Bilateral 4/29/2021    BILATERAL L5 EPIDURAL STEROID INJECTION performed by Roberta Lorenz MD at [address] [facility] [facility] [address] AA&/STRD [facility] EPI LUMBAR/SACRAL 1 LEVEL Bilateral 11/19/2018    BILATERAL L4 VAINTA performed by Lyn Chavez MD at [address] [address] DX/THER SBST INTRLMNR CRV/THRC W/IMG GDN N/A 10/1/2018    EPIDURAL STEROID INJECTION CERVICAL C7-T1 performed by Lyn Chavez MD at [address] [facility]       No Known Allergies      Current Outpatient Medications:     [START ON 11/11/2021] HYDROcodone-acetaminophen (NORCO) 5-325 MG per tablet, Take 1 tablet by mouth 2 times daily as needed for Pain for up to 30 days. , Disp: 60 tablet, Rfl: 0    zolpidem (AMBIEN) 10 MG tablet, take 1/2 tablet by mouth nightly if needed for sleep, Disp: 15 tablet, Rfl: 1    metFORMIN (GLUCOPHAGE) 1000 MG tablet, take 1 tablet by mouth twice a day with meals, Disp: , Rfl:     carvedilol (COREG) 3.125 MG tablet, take 1 tablet by mouth twice a day, Disp: 180 tablet, Rfl: 1    allopurinol (ZYLOPRIM) 100 MG tablet, take 1 tablet by mouth once daily, Disp: 90 tablet, Rfl: 1    pantoprazole (PROTONIX) 40 MG tablet, take 1 tablet by mouth every morning BEFORE BREAKFAST, Disp: 90 tablet, Rfl: 1    cloNIDine (CATAPRES) 0.1 MG tablet, Take 1 tablet by mouth 2 times daily, Disp: 180 tablet, Rfl: 1    loratadine (CLARITIN) 10 MG tablet, take 1 tablet by mouth once daily, Disp: 90 tablet, Rfl: 1    atorvastatin (LIPITOR) 40 MG tablet, Take 1 tablet by mouth daily, Disp: 90 tablet, Rfl: 1    polyethylene glycol (GLYCOLAX) 17 GM/SCOOP powder, take 17GM (DISSOLVED IN WATER) by mouth once daily, Disp: 510 g, Rfl: 1    tiZANidine (ZANAFLEX) 2 MG tablet, take 2 tablets by mouth at bedtime AND ONCE DURING THE DAY  if needed, Disp: 90 tablet, Rfl: 0    gabapentin (NEURONTIN) 600 MG tablet, Take 1 tablet by mouth 3 times daily for 90 days. , Disp: 90 tablet, Rfl: 2    fluticasone (FLONASE) 50 MCG/ACT nasal spray, instill 1 spray into each nostril once daily, Disp: 16 g, Rfl: 2    aspirin 325 MG tablet, Take 325 mg by mouth daily, Disp: , Rfl:     amitriptyline (ELAVIL) 25 MG tablet, Take 1 tablet by mouth nightly, Disp: 90 tablet, Rfl: 1    blood glucose monitor kit and supplies, use check blood sugar as directed, Disp: 1 kit, Rfl: 0    blood glucose monitor strips, Check blood sugars daily as directed., Disp: 50 strip, Rfl: 6    Lancets MISC, 1 each by Does not apply route daily, Disp: 100 each, Rfl: 3    Alcohol Swabs 70 % PADS, 1 each by Does not apply route daily, Disp: 100 each, Rfl: 3    Lift Chair MISC, by Does not apply route, Disp: 1 each, Rfl: 0    Family History   Problem Relation Age of Onset    Breast Cancer Sister     Cancer Sister         liver    Diabetes Daughter        Social History     Socioeconomic History    Marital status: Single     Spouse name: Not on file    Number of children: Not on file    Years of education: Not on file    Highest education level: Not on file   Occupational History    Not on file   Tobacco Use    Smoking status: Former Smoker     Packs/day: 0.50     Years: 15.00     Pack years: 7.50     Types: Cigarettes     Quit date: 10/5/2001     Years since quittin.0    Smokeless tobacco: Never Used   Vaping Use    Vaping Use: Never used   Substance and Sexual Activity    Alcohol use: Yes     Alcohol/week: 0.0 standard drinks     Comment: on occasion    Drug use: No    Sexual activity: Not Currently   Other Topics Concern    Not on file   Social History Narrative    Not on file     Social Determinants of Health     Financial Resource Strain: Low Risk     Difficulty of Paying Living Expenses: Not very hard   Food Insecurity: No Food Insecurity    Worried About Running Out of Food in the Last Year: Never true    Mai of Food in the Last Year: Never true   Transportation Needs: Unmet Transportation Needs    Lack of Transportation (Medical):  Yes    Lack of Transportation (Non-Medical): Yes   Physical Activity:     Days of Exercise per Week:     Minutes of Exercise per Session:    Stress:     Feeling of Stress :    Social Connections:  Frequency of Communication with Friends and Family:     Frequency of Social Gatherings with Friends and Family:     Attends Rastafari Services:     Active Member of Clubs or Organizations:     Attends Club or Organization Meetings:     Marital Status:    Intimate Partner Violence:     Fear of Current or Ex-Partner:     Emotionally Abused:     Physically Abused:     Sexually Abused:        Review of Systems:  Review of Systems   Constitutional: Negative for chills and fever. Cardiovascular: Negative for chest pain. Respiratory: Negative for cough and shortness of breath. Musculoskeletal: Positive for back pain and neck pain. Negative for falls. Gastrointestinal: Negative for constipation. Neurological: Positive for numbness (hand and feet). Physical Exam:  There were no vitals taken for this visit. Physical Exam        Assessment:  Problem List Items Addressed This Visit     Cervical stenosis of spine (Chronic)    Relevant Medications    HYDROcodone-acetaminophen (NORCO) 5-325 MG per tablet (Start on 11/11/2021)    Lumbar stenosis with neurogenic claudication (Chronic)    Relevant Medications    HYDROcodone-acetaminophen (NORCO) 5-325 MG per tablet (Start on 11/11/2021)    Primary osteoarthritis involving multiple joints    Relevant Medications    HYDROcodone-acetaminophen (NORCO) 5-325 MG per tablet (Start on 11/11/2021)             Treatment Plan:  Patient relates current medications are helping the pain. Patient reports taking pain medications as prescribed, denies obtaining medications from different sources and denies use of illegal drugs. Patient denies side effects from medications like nausea, vomiting, constipation or drowsiness. Patient reports current activities of daily living are possible due to medications and would like to continue them.      As always, we encourage daily stretching and strengthening exercises, and recommend minimizing use of pain medications unless patient cannot get through daily activities due to pain. Contract requirements met. Continue opioid therapy.  Script written for norco  Follow up appointment made for 4 weeks

## 2021-11-11 DIAGNOSIS — G47.9 SLEEPING DIFFICULTY: ICD-10-CM

## 2021-11-11 RX ORDER — ZOLPIDEM TARTRATE 10 MG/1
TABLET ORAL
Qty: 15 TABLET | OUTPATIENT
Start: 2021-11-11 | End: 2021-12-11

## 2021-11-12 ENCOUNTER — HOSPITAL ENCOUNTER (OUTPATIENT)
Age: 81
Setting detail: SPECIMEN
Discharge: HOME | End: 2021-11-12
Payer: MEDICARE

## 2021-11-12 DIAGNOSIS — R30.0 DYSURIA: ICD-10-CM

## 2021-11-12 LAB
-: NORMAL
AMORPHOUS: NORMAL
BACTERIA: NORMAL
BILIRUBIN URINE: NEGATIVE
CASTS UA: NORMAL /LPF (ref 0–8)
COLOR: YELLOW
CRYSTALS, UA: NORMAL /HPF
EPITHELIAL CELLS UA: NORMAL /HPF (ref 0–5)
GLUCOSE URINE: NEGATIVE
KETONES, URINE: NEGATIVE
LEUKOCYTE ESTERASE, URINE: NEGATIVE
MUCUS: NORMAL
NITRITE, URINE: NEGATIVE
OTHER OBSERVATIONS UA: NORMAL
PH UA: 7.5 (ref 5–8)
PROTEIN UA: NEGATIVE
RBC UA: NORMAL /HPF (ref 0–4)
RENAL EPITHELIAL, UA: NORMAL /HPF
SPECIFIC GRAVITY UA: 1.02 (ref 1–1.03)
TRICHOMONAS: NORMAL
TURBIDITY: CLEAR
URINE HGB: NEGATIVE
UROBILINOGEN, URINE: NORMAL
WBC UA: NORMAL /HPF (ref 0–5)
YEAST: NORMAL

## 2021-11-12 PROCEDURE — 87086 URINE CULTURE/COLONY COUNT: CPT

## 2021-11-12 PROCEDURE — 81001 URINALYSIS AUTO W/SCOPE: CPT

## 2021-11-13 LAB
CULTURE: NO GROWTH
Lab: NORMAL
SPECIMEN DESCRIPTION: NORMAL

## 2021-11-19 ENCOUNTER — TELEPHONE (OUTPATIENT)
Dept: FAMILY MEDICINE CLINIC | Age: 81
End: 2021-11-19

## 2021-11-19 DIAGNOSIS — E11.40 TYPE 2 DIABETES MELLITUS WITH DIABETIC NEUROPATHY, WITHOUT LONG-TERM CURRENT USE OF INSULIN (HCC): Primary | ICD-10-CM

## 2021-11-19 NOTE — TELEPHONE ENCOUNTER
Received call from Cheryl Wheeler with [employer] [address] ([phone])    Requesting order for new Cpap and supplies,  Patient's machine is over 5yrs old or a referral to pulmonary/sleep med so they can order.      Also requesting referral to Podiatry for DM foot care

## 2021-11-19 NOTE — TELEPHONE ENCOUNTER
[address] called to let provider know they will not be doing OT eval until next week per family.

## 2021-11-23 NOTE — DISCHARGE SUMMARY
[] Orelia Lanes        Outpatient Physical                Therapy       [address]       Phone: [phone]       Fax: [phone] [] [facility]       Promotion at [address] [facility]       Phone: [phone]       Fax: [phone] [] [facility]ail[facility] Pr[facility]      for Health Promotion     [address] [facility]     Phone: [phone]     Fax:  [phone]     Physical Therapy Discharge Note    Date: 2021      Patient: Yesica Daley  : 1940  MRN: [id number]  Physician: Dr. Myke Sheppard MD                     Insurance: [facility] [employer]of[employer] after misaelal, $40 copay)  Medical Diagnosis: at risk for falls, left leg weakness           Rehab Codes: Z91.81, M62.81, M25.662, J89.5O32.653,S02.938  Onset date: ~ 3-                     Next 's appt.: 2021      Date of initial visit: 2021                Date of final visit: 2021       Discharge Status:     Pt failed to make additional appointments for therapy. Cancelled 2 visits, stated she would call back to schedule further visits but did not. Pt. Is now discharged. Electronically signed by: Angelica Barnard PT    If you have any questions or concerns, please don't hesitate to call.   Thank you for your referral.

## 2021-11-24 ENCOUNTER — TELEPHONE (OUTPATIENT)
Dept: FAMILY MEDICINE CLINIC | Age: 81
End: 2021-11-24

## 2021-11-24 ENCOUNTER — OFFICE VISIT (OUTPATIENT)
Dept: FAMILY MEDICINE CLINIC | Age: 81
End: 2021-11-24
Payer: MEDICARE

## 2021-11-24 VITALS
WEIGHT: 157 LBS | DIASTOLIC BLOOD PRESSURE: 80 MMHG | BODY MASS INDEX: 28.72 KG/M2 | SYSTOLIC BLOOD PRESSURE: 122 MMHG | HEART RATE: 76 BPM | OXYGEN SATURATION: 95 %

## 2021-11-24 DIAGNOSIS — R30.0 DYSURIA: Primary | ICD-10-CM

## 2021-11-24 DIAGNOSIS — N89.8 VAGINAL ITCHING: ICD-10-CM

## 2021-11-24 DIAGNOSIS — N89.8 VAGINA ITCHING: ICD-10-CM

## 2021-11-24 PROCEDURE — 1090F PRES/ABSN URINE INCON ASSESS: CPT | Performed by: NURSE PRACTITIONER

## 2021-11-24 PROCEDURE — G8417 CALC BMI ABV UP PARAM F/U: HCPCS | Performed by: NURSE PRACTITIONER

## 2021-11-24 PROCEDURE — G8399 PT W/DXA RESULTS DOCUMENT: HCPCS | Performed by: NURSE PRACTITIONER

## 2021-11-24 PROCEDURE — 4040F PNEUMOC VAC/ADMIN/RCVD: CPT | Performed by: NURSE PRACTITIONER

## 2021-11-24 PROCEDURE — G8427 DOCREV CUR MEDS BY ELIG CLIN: HCPCS | Performed by: NURSE PRACTITIONER

## 2021-11-24 PROCEDURE — G8484 FLU IMMUNIZE NO ADMIN: HCPCS | Performed by: NURSE PRACTITIONER

## 2021-11-24 PROCEDURE — 1123F ACP DISCUSS/DSCN MKR DOCD: CPT | Performed by: NURSE PRACTITIONER

## 2021-11-24 PROCEDURE — 99213 OFFICE O/P EST LOW 20 MIN: CPT | Performed by: NURSE PRACTITIONER

## 2021-11-24 PROCEDURE — 1036F TOBACCO NON-USER: CPT | Performed by: NURSE PRACTITIONER

## 2021-11-24 RX ORDER — MENTHOL 4.5 MG/1
28 LOZENGE ORAL DAILY
Qty: 28 G | Refills: 1 | Status: SHIPPED | OUTPATIENT
Start: 2021-11-24 | End: 2021-12-13

## 2021-11-24 ASSESSMENT — ENCOUNTER SYMPTOMS
VOMITING: 0
EYE PAIN: 0
BACK PAIN: 0
DIARRHEA: 0
SINUS PRESSURE: 0
SHORTNESS OF BREATH: 0
CONSTIPATION: 0
CHEST TIGHTNESS: 0
SINUS PAIN: 0
ABDOMINAL PAIN: 0
NAUSEA: 0
COLOR CHANGE: 0

## 2021-11-24 NOTE — PROGRESS NOTES
Patient is present complaining of urinary issues  Patient states she is having burning and pressure  Patient states this has been going on for 2 months

## 2021-11-24 NOTE — TELEPHONE ENCOUNTER
Pt was seen in office today for dysuria and vaginal issues. Patient was prescribed Vagisil. Her daughter states she has already been using prior to being seen and it did not help at all.  They are requesting some thing else please

## 2021-11-24 NOTE — PROGRESS NOTES
Stephanie Abel (:  1940) is a 80 y.o. female,Established patient, here for evaluation of the following chief complaint(s):  Dysuria         ASSESSMENT/PLAN:  1. Dysuria  Comments:  Urine cup and supplies provided, please bring sample back as soon as possible for evaluation. Drink plenty of fluids, void frequently and maintain good chepe hygiene. Should fever, chills, hematuria or a change in mentation please follow up in office, walk in clinic or seek emergent care. Orders:  -     POCT Urinalysis no Micro  -     Urinalysis Reflex to Culture; Future  -     POCT Urinalysis no Micro  -     Benzocaine-Resorcinol (VAGISIL) 5-2 % CREA; Apply 28 g topically daily, Disp-28 g, R-1Normal      No follow-ups on file. Subjective   SUBJECTIVE/OBJECTIVE:  Presents with daughter for acute visit  Using wheelchair for ambulation     Has been having urinary frequency, burning since 10/14/21 - at that time she was positive for UTI (culture positive for AEROCOCCUS VIRIDANS. Completed full treatment of amoxil  21 - called office with c/o painful urination  21 - completed UA with culture, both negative  No change in vaginal discharge, denies vaginal irritation. Reports it only burns when she pees - and it feels 'full' in her pubic area  Does have hemorrhoids but has always had bowel problems. Alternates between constipation and diarrhea. Uses miralax, fiber to help with this   Reports blood sugars well controlled 112-120  Denies fever, chills, flank pain, lower back pain or generalized ill feelings     Unable to provide urine in office today - she will take cup home and bring back Friday   Denies any other problem/concerns at this time         Review of Systems   Constitutional: Negative for activity change, chills, fatigue and unexpected weight change. HENT: Negative for hearing loss, postnasal drip, sinus pressure and sinus pain. Eyes: Negative for pain and visual disturbance.    Respiratory: Negative for chest tightness and shortness of breath. Cardiovascular: Negative for chest pain and palpitations. Gastrointestinal: Negative for abdominal pain, constipation, diarrhea, nausea and vomiting. Endocrine: Negative for polydipsia, polyphagia and polyuria. Genitourinary: Positive for dysuria and pelvic pain ('pelvic pressure'). Negative for flank pain, frequency, hematuria, urgency, vaginal bleeding, vaginal discharge and vaginal pain. Musculoskeletal: Positive for gait problem. Negative for arthralgias, back pain and myalgias. Skin: Negative for color change and rash. Neurological: Negative for dizziness, weakness, light-headedness, numbness and headaches. Psychiatric/Behavioral: Negative for confusion, hallucinations, self-injury, sleep disturbance and suicidal ideas. The patient is not nervous/anxious. Objective   Physical Exam  Vitals reviewed. Constitutional:       General: She is not in acute distress. Appearance: Normal appearance. She is obese. She is not ill-appearing, toxic-appearing or diaphoretic. HENT:      Head: Normocephalic. Right Ear: Tympanic membrane normal.      Left Ear: Tympanic membrane normal.      Nose: Nose normal.      Mouth/Throat:      Mouth: Mucous membranes are moist.      Pharynx: Oropharynx is clear. Eyes:      Extraocular Movements: Extraocular movements intact. Conjunctiva/sclera: Conjunctivae normal.      Pupils: Pupils are equal, round, and reactive to light. Cardiovascular:      Rate and Rhythm: Normal rate and regular rhythm. Pulses: Normal pulses. Heart sounds: Normal heart sounds. Pulmonary:      Effort: Pulmonary effort is normal.      Breath sounds: Normal breath sounds. Abdominal:      General: Abdomen is flat. Bowel sounds are normal.      Palpations: Abdomen is soft. Genitourinary:     Rectum: Normal.   Musculoskeletal:         General: Normal range of motion.       Cervical back: Normal range of motion. Skin:     General: Skin is warm and dry. Capillary Refill: Capillary refill takes less than 2 seconds. Neurological:      General: No focal deficit present. Mental Status: She is alert and oriented to person, place, and time. Mental status is at baseline. Psychiatric:         Mood and Affect: Mood normal.         Behavior: Behavior normal.         Thought Content: Thought content normal.         Judgment: Judgment normal.                Wt Readings from Last 3 Encounters:   11/24/21 157 lb (71.2 kg)   08/23/21 157 lb (71.2 kg)   07/26/21 157 lb (71.2 kg)     Temp Readings from Last 3 Encounters:   10/07/21 97.1 °F (36.2 °C) (Temporal)   04/29/21 98.2 °F (36.8 °C) (Temporal)   04/13/21 98.2 °F (36.8 °C) (Temporal)     BP Readings from Last 3 Encounters:   11/24/21 122/80   10/07/21 110/70   09/27/21 99/64     Pulse Readings from Last 3 Encounters:   11/24/21 76   10/07/21 66   09/27/21 81       An electronic signature was used to authenticate this note.     --YOLANDA Gonzalez NP

## 2021-11-26 ENCOUNTER — HOSPITAL ENCOUNTER (OUTPATIENT)
Age: 81
Setting detail: SPECIMEN
Discharge: HOME | End: 2021-11-26
Payer: MEDICARE

## 2021-11-26 DIAGNOSIS — R30.0 DYSURIA: ICD-10-CM

## 2021-11-26 LAB
-: ABNORMAL
AMORPHOUS: ABNORMAL
BACTERIA: ABNORMAL
BILIRUBIN URINE: NEGATIVE
CASTS UA: ABNORMAL /LPF (ref 0–2)
COLOR: YELLOW
COMMENT UA: ABNORMAL
CRYSTALS, UA: ABNORMAL /HPF
EPITHELIAL CELLS UA: ABNORMAL /HPF (ref 0–5)
GLUCOSE URINE: NEGATIVE
KETONES, URINE: NEGATIVE
LEUKOCYTE ESTERASE, URINE: ABNORMAL
MUCUS: ABNORMAL
NITRITE, URINE: NEGATIVE
OTHER OBSERVATIONS UA: ABNORMAL
PH UA: 6 (ref 5–8)
PROTEIN UA: NEGATIVE
RBC UA: ABNORMAL /HPF (ref 0–2)
RENAL EPITHELIAL, UA: ABNORMAL /HPF
SPECIFIC GRAVITY UA: 1.01 (ref 1–1.03)
TRICHOMONAS: ABNORMAL
TURBIDITY: CLEAR
URINE HGB: NEGATIVE
UROBILINOGEN, URINE: NORMAL
WBC UA: ABNORMAL /HPF (ref 0–5)
YEAST: ABNORMAL

## 2021-11-26 PROCEDURE — 87086 URINE CULTURE/COLONY COUNT: CPT

## 2021-11-26 PROCEDURE — 81001 URINALYSIS AUTO W/SCOPE: CPT

## 2021-11-27 LAB
CULTURE: NORMAL
Lab: NORMAL
SPECIMEN DESCRIPTION: NORMAL

## 2021-11-29 DIAGNOSIS — R30.0 DYSURIA: Primary | ICD-10-CM

## 2021-11-29 DIAGNOSIS — B37.31 VAGINAL CANDIDIASIS: ICD-10-CM

## 2021-11-29 RX ORDER — NITROFURANTOIN 25; 75 MG/1; MG/1
100 CAPSULE ORAL 2 TIMES DAILY
Qty: 14 CAPSULE | Refills: 0 | Status: SHIPPED | OUTPATIENT
Start: 2021-11-29 | End: 2021-12-06

## 2021-11-29 RX ORDER — FLUCONAZOLE 150 MG/1
150 TABLET ORAL ONCE
Qty: 1 TABLET | Refills: 1 | Status: SHIPPED | OUTPATIENT
Start: 2021-11-29 | End: 2021-11-29

## 2021-12-07 ENCOUNTER — HOSPITAL ENCOUNTER (OUTPATIENT)
Age: 81
Setting detail: SPECIMEN
Discharge: HOME | End: 2021-12-07

## 2021-12-07 DIAGNOSIS — R30.0 DYSURIA: ICD-10-CM

## 2021-12-08 LAB
DIRECT EXAM: NORMAL
Lab: NORMAL
SPECIMEN DESCRIPTION: NORMAL

## 2021-12-13 ENCOUNTER — OFFICE VISIT (OUTPATIENT)
Dept: FAMILY MEDICINE CLINIC | Age: 81
End: 2021-12-13
Payer: MEDICARE

## 2021-12-13 VITALS
SYSTOLIC BLOOD PRESSURE: 138 MMHG | OXYGEN SATURATION: 97 % | DIASTOLIC BLOOD PRESSURE: 82 MMHG | TEMPERATURE: 98.4 F | HEART RATE: 73 BPM

## 2021-12-13 DIAGNOSIS — Z71.89 ACP (ADVANCE CARE PLANNING): ICD-10-CM

## 2021-12-13 DIAGNOSIS — G47.33 OSA (OBSTRUCTIVE SLEEP APNEA): ICD-10-CM

## 2021-12-13 DIAGNOSIS — Z00.00 ROUTINE GENERAL MEDICAL EXAMINATION AT A HEALTH CARE FACILITY: Primary | ICD-10-CM

## 2021-12-13 DIAGNOSIS — G95.89 MYELOMALACIA (HCC): ICD-10-CM

## 2021-12-13 DIAGNOSIS — J30.89 CHRONIC NON-SEASONAL ALLERGIC RHINITIS: ICD-10-CM

## 2021-12-13 DIAGNOSIS — M54.12 CERVICAL RADICULAR PAIN: Chronic | ICD-10-CM

## 2021-12-13 DIAGNOSIS — R30.0 DYSURIA: ICD-10-CM

## 2021-12-13 DIAGNOSIS — E11.40 TYPE 2 DIABETES MELLITUS WITH DIABETIC NEUROPATHY, WITHOUT LONG-TERM CURRENT USE OF INSULIN (HCC): ICD-10-CM

## 2021-12-13 PROCEDURE — 1123F ACP DISCUSS/DSCN MKR DOCD: CPT | Performed by: INTERNAL MEDICINE

## 2021-12-13 PROCEDURE — 99497 ADVNCD CARE PLAN 30 MIN: CPT | Performed by: INTERNAL MEDICINE

## 2021-12-13 PROCEDURE — 4040F PNEUMOC VAC/ADMIN/RCVD: CPT | Performed by: INTERNAL MEDICINE

## 2021-12-13 PROCEDURE — G8484 FLU IMMUNIZE NO ADMIN: HCPCS | Performed by: INTERNAL MEDICINE

## 2021-12-13 PROCEDURE — G0438 PPPS, INITIAL VISIT: HCPCS | Performed by: INTERNAL MEDICINE

## 2021-12-13 RX ORDER — FLUTICASONE PROPIONATE 50 MCG
SPRAY, SUSPENSION (ML) NASAL
Qty: 16 G | Refills: 2 | Status: SHIPPED | OUTPATIENT
Start: 2021-12-13 | End: 2022-05-09 | Stop reason: SDUPTHER

## 2021-12-13 RX ORDER — GLUCOSAMINE HCL/CHONDROITIN SU 500-400 MG
CAPSULE ORAL
Qty: 50 STRIP | Refills: 6 | Status: SHIPPED | OUTPATIENT
Start: 2021-12-13 | End: 2022-05-09 | Stop reason: SDUPTHER

## 2021-12-13 RX ORDER — TIZANIDINE 2 MG/1
TABLET ORAL
Qty: 90 TABLET | Refills: 1 | Status: SHIPPED | OUTPATIENT
Start: 2021-12-13 | End: 2022-05-09 | Stop reason: SDUPTHER

## 2021-12-13 RX ORDER — GLUCOSAMINE HCL/CHONDROITIN SU 500-400 MG
1 CAPSULE ORAL DAILY
Qty: 100 EACH | Refills: 3 | Status: SHIPPED | OUTPATIENT
Start: 2021-12-13 | End: 2022-05-09 | Stop reason: SDUPTHER

## 2021-12-13 RX ORDER — LANCETS 30 GAUGE
1 EACH MISCELLANEOUS DAILY
Qty: 100 EACH | Refills: 3 | Status: SHIPPED | OUTPATIENT
Start: 2021-12-13 | End: 2022-05-09 | Stop reason: SDUPTHER

## 2021-12-13 ASSESSMENT — LIFESTYLE VARIABLES
HOW OFTEN DURING THE LAST YEAR HAVE YOU FAILED TO DO WHAT WAS NORMALLY EXPECTED FROM YOU BECAUSE OF DRINKING: 0
HOW OFTEN DURING THE LAST YEAR HAVE YOU NEEDED AN ALCOHOLIC DRINK FIRST THING IN THE MORNING TO GET YOURSELF GOING AFTER A NIGHT OF HEAVY DRINKING: 0
HOW OFTEN DO YOU HAVE A DRINK CONTAINING ALCOHOL: 1
HOW OFTEN DURING THE LAST YEAR HAVE YOU BEEN UNABLE TO REMEMBER WHAT HAPPENED THE NIGHT BEFORE BECAUSE YOU HAD BEEN DRINKING: 0
AUDIT TOTAL SCORE: 1
HOW MANY STANDARD DRINKS CONTAINING ALCOHOL DO YOU HAVE ON A TYPICAL DAY: 0
AUDIT-C TOTAL SCORE: 1
HOW OFTEN DURING THE LAST YEAR HAVE YOU HAD A FEELING OF GUILT OR REMORSE AFTER DRINKING: 0
HAVE YOU OR SOMEONE ELSE BEEN INJURED AS A RESULT OF YOUR DRINKING: 0
HAS A RELATIVE, FRIEND, DOCTOR, OR ANOTHER HEALTH PROFESSIONAL EXPRESSED CONCERN ABOUT YOUR DRINKING OR SUGGESTED YOU CUT DOWN: 0
HOW OFTEN DURING THE LAST YEAR HAVE YOU FOUND THAT YOU WERE NOT ABLE TO STOP DRINKING ONCE YOU HAD STARTED: 0
HOW OFTEN DO YOU HAVE SIX OR MORE DRINKS ON ONE OCCASION: 0

## 2021-12-13 ASSESSMENT — PATIENT HEALTH QUESTIONNAIRE - PHQ9
1. LITTLE INTEREST OR PLEASURE IN DOING THINGS: 1
2. FEELING DOWN, DEPRESSED OR HOPELESS: 1
SUM OF ALL RESPONSES TO PHQ QUESTIONS 1-9: 2
SUM OF ALL RESPONSES TO PHQ QUESTIONS 1-9: 2
SUM OF ALL RESPONSES TO PHQ9 QUESTIONS 1 & 2: 2
SUM OF ALL RESPONSES TO PHQ QUESTIONS 1-9: 2

## 2021-12-13 NOTE — PROGRESS NOTES
Yes Mercer Bloch, MD   blood glucose monitor kit and supplies use check blood sugar as directed Yes Connie Carias MD   blood glucose monitor strips Check blood sugars daily as directed.  Yes Mercer Bloch, MD   Lancets MISC 1 each by Does not apply route daily Yes Mercer Bloch, MD   Alcohol Swabs 70 % PADS 1 each by Does not apply route daily Yes Mercer Bloch, MD   Lift Chair MISC by Does not apply route Yes Connie Carias MD   aspirin 325 MG tablet Take 325 mg by mouth daily Yes Historical Provider, MD   metFORMIN (GLUCOPHAGE) 1000 MG tablet Pt states she takes 1 a day  Patient not taking: Reported on 12/13/2021  Historical Provider, MD       Past Medical History:   Diagnosis Date    Arthritis     Cataract     Cataracts, bilateral     Diverticulosis of colon 2015    Gastroesophageal reflux disease 4/13/2021    Headache(784.0)     History of colon polyps 2015    Insomnia     Neck pain     Osteoarthrosis, unspecified whether generalized or localized, unspecified site 10/5/2012    Pure hypercholesterolemia 10/5/2012    Shoulder blade pain     right  side    Stroke ([facility] [address]     Tubular adenoma of colon 2015    Type II or unspecified type diabetes mellitus without mention of complication, not stated as uncontrolled 10/5/2012    Unspecified essential hypertension 10/5/2012    Unspecified sleep apnea        Past Surgical History:   Procedure Laterality Date    COLONOSCOPY  8/23/06    Dr Felipe Morin  5 12 15    extensive diverticulosis, tubular adenoma    EPIDURAL STEROID INJECTION N/A 8/25/2017    EPIDURAL STEROID INJECTION cervical performed by Sangeetha Clifton MD at [address] 11/20/2017    EPIDURAL STEROID INJECTION L5S1 performed by Sangeetha Clifton MD at [place] [address] Bilateral 5/20/2019    EPIDURAL STEROID INJECTION BILATERAL S1 performed by Sangeetha Clifton MD at [address] [place] [address] DIAG BI  10/1/06    NECK SURGERY  5/2011 & 2001    NERVE BLOCK Bilateral 02/17/2020    Lumbar Facet L2-L5    NERVE SURGERY Bilateral 9/5/2019    BILATERAL LUMBAR FACET STEROID INJECTION L2-L5 performed by Dusty Beltrán MD at [place] [address].  08/25/2017    L5-S1 steroid injection    OTHER SURGICAL HISTORY  11/20/2017    VANITA L5-S1    OTHER SURGICAL HISTORY  10/01/2018    cervical steroid injection    OTHER SURGICAL HISTORY Bilateral 05/20/2019    EPIDURAL STEROID INJECTION BILATERAL S1 (Bilateral )    OTHER SURGICAL HISTORY  04/29/2021    lumbar VANITA    PAIN MANAGEMENT PROCEDURE Bilateral 2/17/2020    LUMBAR FACET L2-L5 performed by Dusty Beltrán MD at [place] [address] N/A 3/22/2021    CERVICAL EPIDURAL STEROID INJECTION C7-T1 performed by Dusty Beltrán MD at [place] [address] Bilateral 4/29/2021    BILATERAL L5 EPIDURAL STEROID INJECTION performed by Dusty Beltrán MD at [address] AA&/STRD TFRML EPI LUMBAR/SACRAL 1 LEVEL Bilateral 11/19/2018    BILATERAL L4 VANITA performed by Dusty Beltrán MD at [address] DX/THER SBST INTRLMNR CRV/THRC W/IMG GDN N/A 10/1/2018    EPIDURAL STEROID INJECTION CERVICAL C7-T1 performed by Dusty Beltrán MD at [place] History   Problem Relation Age of Onset    Breast Cancer Sister     Cancer Sister         liver    Diabetes Daughter        CareTeam (Including outside providers/suppliers regularly involved in providing care):   Patient Care Team:  Lindsey Herrera MD as PCP - General (Internal Medicine)  Lindsey Herrera MD as PCP - [facility]  Umberto Torres MD as Consulting Physician (Colon and Rectal Surgery)  Dusty Beltrán MD as Consulting Physician (Pain Management)    Wt Readings from Last 3 Encounters:   11/24/21 157 lb (71.2 kg)   08/23/21 157 lb (71.2 kg)   07/26/21 157 lb (71.2 kg)     Vitals:    12/13/21 1048   BP: (!) 142/80   Site: Left Upper Arm   Position: Sitting   Cuff Size: Large Adult   Pulse: 73   Temp: 98.4 °F (36.9 °C)   TempSrc: Temporal   SpO2: 97%     There is no height or weight on file to calculate BMI. Based upon direct observation of the patient, evaluation of cognition reveals recent and remote memory intact. Continues to have dysuria - no other symptoms. The pain is persistent. Not using vagisil any more. Testing was negative for UTI. Still has home health coming out - states not started PT yet     Sleep Apnea Screening Questionnaire ([facility])    Do you SNORE loudly (louder than talking or loud enough to be heard through closed doors)? YES / NO: Yes  Do you often feel TIRED, fatigued, or sleepy during daytime? YES / NO: Yes  Has anyone OBSERVED you stop breathing during your sleep? YES / NO: No  Do you have or are you being treated for high blood PRESSURE? YES / NO: Yes  BMI more than 35kg/m2? YES / NO: No  AGE over 48years old? YES / NO: Yes  NECK circumference > 16 inches (40cm)? YES / NO: No  GENDER: Male? YES / NO: No    Total score      4      General Appearance: alert and oriented to person, place and time, well developed and well- nourished, in no acute distress.  seatedin wheelchair   Skin: warm and dry, no rash or erythema  Head: normocephalic and atraumatic  Eyes: pupils equal, round, and reactive to light, extraocular eye movements intact, conjunctivae normal  ENT: tympanic membrane, external ear and ear canal normal bilaterally, nose without deformity, nasal mucosa and turbinates normal without polyps  Neck: supple and non-tender without mass, no thyromegaly or thyroid nodules, no cervical lymphadenopathy  Pulmonary/Chest: clear to auscultation bilaterally- no wheezes, rales or rhonchi, normal air movement, no respiratory distress  Cardiovascular: normal rate, regular rhythm, normal S1 and S2, no murmurs, rubs, clicks, or gallops, distal pulses intact, no carotid bruits  Abdomen: soft, non-tender, non-distended, normal bowel sounds, no masses or organomegaly  Extremities: no cyanosis, clubbing or edema  Musculoskeletal: normal range of motion, no joint swelling, deformity or tenderness  Neurologic: reflexes normal and symmetric, no cranial nerve deficit, gait, coordination and speech normal    Patient's complete Health Risk Assessment and screening values have been reviewed and are found in Flowsheets. The following problems were reviewed today and where indicated follow up appointments were made and/or referrals ordered. Positive Risk Factor Screenings with Interventions:     Fall Risk:  2 or more falls in past year?: (!) yes  Fall with injury in past year?: no  Fall Risk Interventions:    · Home safety tips provided  · Physical therapy referral ordered for strength and balance training - will be doing through [facility]         [address] [place] [address] Road and ACP:  General  In general, how would you say your health is?: Fair  In the past 7 days, have you experienced any of the following? New or Increased Pain, New or Increased Fatigue, Loneliness, Social Isolation, Stress or Anger?: (!) Social Isolation, Stress, Anger  Do you get the social and emotional support that you need?: (!) No  Do you have a Living Will?: (!) No  Advance Directives     Power of  Living Will ACP-Advance Directive ACP-Power of     Not on File Not on File Not on File Not on File      General Health Risk Interventions:  · Poor self-assessment of health status: patient declines any further evaluation/treatment for this issue  · Pain issues: following with pain management for back pain.  urology referral given for persistent dysuria   · No Living Will: Advance Care Planning addressed with patient today    Health Habits/Nutrition:  Health Habits/Nutrition  Do you exercise for at least 20 minutes 2-3 times per week?: (!) No  Have you lost any weight without trying in the past 3 months?: No  Do you eat only one meal per day?: (!) Yes  Have you seen the dentist within the past year?: Yes     Health Habits/Nutrition Interventions:  · Nutritional issues:  educational materials for healthy, well-balanced diet provided    Hearing/Vision:  No exam data present  Hearing/Vision  Do you or your family notice any trouble with your hearing that hasn't been managed with hearing aids?: No  Do you have difficulty driving, watching TV, or doing any of your daily activities because of your eyesight?: No  Have you had an eye exam within the past year?: (!) No  Hearing/Vision Interventions:  · Vision concerns:  patient encouraged to make appointment with his/her eye specialist    Safety:  Safety  Do you have working smoke detectors?: (!) No  Have all throw rugs been removed or fastened?: Yes  Do you have non-slip mats or surfaces in all bathtubs/showers?: Yes  Do all of your stairways have a railing or banister?: Yes  Are your doorways, halls and stairs free of clutter?: Yes  Do you always fasten your seatbelt when you are in a car?: Yes  Safety Interventions:  · Home safety tips provided    ADL:  ADLs  In the past 7 days, did you need help from others to perform any of the following everyday activities? Eating, dressing, grooming, bathing, toileting, or walking/balance?: None  In the past 7 days, did you need help from others to take care of any of the following?  Laundry, housekeeping, banking/finances, shopping, telephone use, food preparation, transportation, or taking medications?: (!) Laundry, Banking/Finances, Transportation, Shopping  ADL Interventions:  · Patient declines any further evaluation/treatment for this issue    Personalized Preventive Plan   Current Health Maintenance Status  Immunization History   Administered Date(s) Administered    BERRYID-19Alanna, Primary or Immunocompromised, PF, 100mcg/0.5mL 05/19/2021, 06/16/2021    Influenza Vaccine, unspecified formulation 12/19/2014, 09/14/2015, 10/18/2016    Influenza, High Dose (Fluzone 65 yrs and older) 10/05/2012, 09/06/2013, 12/19/2014, 09/14/2015, 10/18/2016, 02/08/2018, 09/27/2018    Influenza, Quadv, adjuvanted, 65 yrs +, IM, PF (Fluad) 09/30/2020, 10/07/2021    Influenza, Triv, inactivated, subunit, adjuvanted, IM (Fluad 65 yrs and older) 10/07/2019    Pneumococcal Conjugate 13-valent ([id number]) 03/30/2015    Pneumococcal Polysaccharide ([id number]) 09/13/2007        Health Maintenance   Topic Date Due    Shingles Vaccine (1 of 2) Never done   [employer] Visit (AWV)  Never done    COVID-19 Vaccine (3 - Booster for Moderna series) 12/16/2021    DTaP/Tdap/Td vaccine (1 - Tdap) 10/18/2026 (Originally 5/28/1959)    Lipid screen  10/07/2022    Potassium monitoring  10/07/2022    Creatinine monitoring  10/07/2022    DEXA (modify frequency per FRAX score)  Completed    Flu vaccine  Completed    Pneumococcal 65+ years Vaccine  Completed    Hepatitis C screen  Completed    Hepatitis A vaccine  Aged Out    Hib vaccine  Aged Out    Meningococcal (ACWY) vaccine  Aged Out     Recommendations for [place] Due: see orders and patient instructions/AVS.  . Recommended screening schedule for the next 5-10 years is provided to the patient in written form: see Patient Instructions/AVS.    Jennifer Morrell was seen today for medicare awv. Diagnoses and all orders for this visit:    Routine general medical examination at a health care facility    Type 2 diabetes mellitus with diabetic neuropathy, without long-term current use of insulin (HCC)  -     Alcohol Swabs 70 % PADS; 1 each by Does not apply route daily  -     Lancets MISC; 1 each by Does not apply route daily  -     blood glucose monitor strips; Check blood sugars daily as directed. Chronic non-seasonal allergic rhinitis  -     fluticasone (FLONASE) 50 MCG/ACT nasal spray; instill 1 spray into each nostril once daily    Cervical radicular pain  -     tiZANidine (ZANAFLEX) 2 MG tablet;  Take 2 tablets by mouth at bedtime and once during the day if needed    Dysuria  -     AFL - Koffi Clay MD, Urology, [facility])    JANN (obstructive sleep apnea)  -     Home Sleep Study; Future    ACP (advance care planning)  -     AR ADVANCED CARE PLAN FACE TO [address], [address] [[id number]]                   Advance Care Planning   Advanced Care Planning: Discussed the patients choices for care and treatment in case of a health event that adversely affects decision-making abilities. Also discussed the patients long-term treatment options. Reviewed with the patient the [address] [place] [address] of [address] [place] [address] Declaration forms  Reviewed the process of designating a competent adult as an Agent (or -in-fact) that could take make health care decisions for the patient if incompetent. Patient was asked to complete the declaration forms, either acknowledge the forms by a public notary or an eligible witness and provide a signed copy to the practice office.   Time spent (minutes): 15

## 2021-12-13 NOTE — PATIENT INSTRUCTIONS
Personalized Preventive Plan for [place] - 12/13/2021  Medicare offers a range of preventive health benefits. Some of the tests and screenings are paid in full while other may be subject to a deductible, co-insurance, and/or copay. Some of these benefits include a comprehensive review of your medical history including lifestyle, illnesses that may run in your family, and various assessments and screenings as appropriate. After reviewing your medical record and screening and assessments performed today your provider may have ordered immunizations, labs, imaging, and/or referrals for you. A list of these orders (if applicable) as well as your Preventive Care list are included within your After Visit Summary for your review. Other Preventive Recommendations:    · A preventive eye exam performed by an eye specialist is recommended every 1-2 years to screen for glaucoma; cataracts, macular degeneration, and other eye disorders. · A preventive dental visit is recommended every 6 months. · Try to get at least 150 minutes of exercise per week or 10,000 steps per day on a pedometer . · Order or download the FREE \"Exercise & Physical Activity: Your Everyday Guide\" from [employer]. Call 0-351.969.4583 or search [employer] online. · You need 6462-2427 mg of calcium and 7030-4685 IU of vitamin D per day. It is possible to meet your calcium requirement with diet alone, but a vitamin D supplement is usually necessary to meet this goal.  · When exposed to the sun, use a sunscreen that protects against both UVA and UVB radiation with an SPF of 30 or greater. Reapply every 2 to 3 hours or after sweating, drying off with a towel, or swimming. · Always wear a seat belt when traveling in a car. Always wear a helmet when riding a bicycle or motorcycle.

## 2021-12-14 ENCOUNTER — TELEPHONE (OUTPATIENT)
Dept: FAMILY MEDICINE CLINIC | Age: 81
End: 2021-12-14

## 2021-12-14 DIAGNOSIS — M15.9 PRIMARY OSTEOARTHRITIS INVOLVING MULTIPLE JOINTS: Primary | ICD-10-CM

## 2021-12-14 DIAGNOSIS — R53.1 GENERALIZED WEAKNESS: ICD-10-CM

## 2021-12-14 DIAGNOSIS — R53.81 PHYSICAL DECONDITIONING: ICD-10-CM

## 2021-12-14 NOTE — TELEPHONE ENCOUNTER
Jalyn from [facility] [employer], [facility]. is requesting an order for in home PT for Sanna for balance and strengthen.      Unique fax # [phone]

## 2021-12-17 ENCOUNTER — TELEPHONE (OUTPATIENT)
Dept: FAMILY MEDICINE CLINIC | Age: 81
End: 2021-12-17

## 2021-12-17 NOTE — TELEPHONE ENCOUNTER
Nain Coon from [facility] called said she just needed to verify pts metformin, said the pt said she originally was taking metformin twice a day but stopped taking it but started feeling \"funny\" not taking it, so pt started taking it again on her own but only once a day. Nain Coon wanted to know if this is okay?

## 2021-12-20 ENCOUNTER — TELEPHONE (OUTPATIENT)
Dept: ADMINISTRATIVE | Age: 81
End: 2021-12-20

## 2021-12-20 DIAGNOSIS — M48.02 CERVICAL STENOSIS OF SPINE: Chronic | ICD-10-CM

## 2021-12-20 DIAGNOSIS — M15.9 PRIMARY OSTEOARTHRITIS INVOLVING MULTIPLE JOINTS: ICD-10-CM

## 2021-12-20 DIAGNOSIS — M48.062 LUMBAR STENOSIS WITH NEUROGENIC CLAUDICATION: Chronic | ICD-10-CM

## 2021-12-20 NOTE — TELEPHONE ENCOUNTER
Once daily Metformin is fine. Please have her monitor her sugars if she feels \"funny\" at any time.

## 2021-12-20 NOTE — TELEPHONE ENCOUNTER
Patient is scheduled to see you on 1/13/2021. Could you send in her medication of Norco before that? She has about 10 days left.   Thank you

## 2021-12-21 ENCOUNTER — HOSPITAL ENCOUNTER (OUTPATIENT)
Dept: SLEEP CENTER | Age: 81
Discharge: HOME | End: 2021-12-23
Payer: MEDICARE

## 2021-12-21 DIAGNOSIS — G47.33 OSA (OBSTRUCTIVE SLEEP APNEA): ICD-10-CM

## 2021-12-21 PROCEDURE — G0399 HOME SLEEP TEST/TYPE 3 PORTA: HCPCS

## 2021-12-27 ENCOUNTER — TELEPHONE (OUTPATIENT)
Dept: FAMILY MEDICINE CLINIC | Age: 81
End: 2021-12-27

## 2021-12-27 RX ORDER — HYDROCODONE BITARTRATE AND ACETAMINOPHEN 5; 325 MG/1; MG/1
1 TABLET ORAL 2 TIMES DAILY PRN
Qty: 60 TABLET | Refills: 0 | Status: SHIPPED | OUTPATIENT
Start: 2021-12-27 | End: 2022-01-31

## 2021-12-27 NOTE — TELEPHONE ENCOUNTER
Please have her  take her medications at a consistent time, check her blood pressures at a consistent time of day. Okay to do every other day if that is easier for her family.

## 2021-12-27 NOTE — TELEPHONE ENCOUNTER
Daughter called stating she was told to call in her mom's bp readings:    121/64- 12/25 AM  489/81-81/55 AM-states she did not take her meds yet  172/85-12/26 PM  149/83-today, states she did not take her meds yet. States she is not having any symptoms. States she did have a headache the other day but states it did not last long.

## 2021-12-27 NOTE — TELEPHONE ENCOUNTER
Late entry-spoke to patient's home health provider on Friday, December 24, 2021 at 6:25 PM.  Valeria Covarrubias called with concerns about elevated blood pressures for Sanna-multiple blood pressures higher than 160/90 at home. She is not sure if client is taking her medications at the current time consistently. Anand Wisdom is asymptomatic. Advised her family to track her blood pressures and to take all her blood pressure medicines on a consistent basis. Call office on Monday 12/27 with BP readings.

## 2022-01-09 DIAGNOSIS — E11.40 TYPE 2 DIABETES MELLITUS WITH DIABETIC NEUROPATHY, WITHOUT LONG-TERM CURRENT USE OF INSULIN (HCC): ICD-10-CM

## 2022-01-10 LAB — STATUS: NORMAL

## 2022-01-10 RX ORDER — GABAPENTIN 600 MG/1
600 TABLET ORAL 3 TIMES DAILY
Qty: 90 TABLET | Refills: 2 | Status: SHIPPED | OUTPATIENT
Start: 2022-01-10 | End: 2022-01-31

## 2022-01-10 NOTE — TELEPHONE ENCOUNTER
Last visit: 12/13/2021  Last Med refill: 12/03/2021  Does patient have enough medication for 72 hours: No:     Next Visit Date:  Future Appointments   Date Time Provider Pradeep Catherine   1/13/2022  9:20 AM YOLANDA Yoo - CNP [facility]   1/14/2022  9:45 AM Amrita Walters DPM [facility]   4/13/2022 11:00 AM Connie Harrison  [address] [facility] Maintenance   Topic Date Due    Shingles Vaccine (1 of 2) Never done    COVID-19 Vaccine (3 - Booster for Moderna series) 12/16/2021    DTaP/Tdap/Td vaccine (1 - Tdap) 10/18/2026 (Originally 5/28/1959)    Lipid screen  10/07/2022    Potassium monitoring  10/07/2022    Creatinine monitoring  10/07/2022    Depression Screen  12/13/2022    Annual Wellness Visit (AWV)  12/14/2022    DEXA (modify frequency per FRAX score)  Completed    Flu vaccine  Completed    Pneumococcal 65+ years Vaccine  Completed    Hepatitis C screen  Completed    Hepatitis A vaccine  Aged Out    Hib vaccine  Aged Out    Meningococcal (ACWY) vaccine  Aged Out       Hemoglobin A1C (%)   Date Value   10/07/2021 6.7 (H)   09/30/2020 5.7   02/25/2020 6.6             ( goal A1C is < 7)   Microalb/Crt.  Ratio (mcg/mg creat)   Date Value   10/06/2020 CANNOT BE CALCULATED     LDL Cholesterol (mg/dL)   Date Value   10/07/2021 48   09/30/2020 48       (goal LDL is <100)   AST (U/L)   Date Value   10/07/2021 16     ALT (U/L)   Date Value   10/07/2021 9     BUN (mg/dL)   Date Value   10/07/2021 16     BP Readings from Last 3 Encounters:   12/13/21 138/82   11/24/21 122/80   10/07/21 110/70          (goal 120/80)    All Future Testing planned in [facility]  Lab Frequency Next Occurrence   Drugs of Abuse, Urine Once 02/03/2021               Patient Active Problem List:     Osteoarthritis     Essential hypertension     Type 2 diabetes mellitus with diabetic neuropathy, without long-term current use of insulin (HCC)     Pure hypercholesterolemia     Constipation     Rectal pain     Diverticulosis of colon     Tubular adenoma of colon     History of colon polyps     Rectal pressure     Failed neck syndrome     Cervical stenosis of spine     Cervical radicular pain     Lumbar stenosis with neurogenic claudication     JANN on CPAP     Primary osteoarthritis involving multiple joints     Hx of cervical spine surgery     Chronic use of opiate drugs therapeutic purposes     Myelomalacia (HCC)     Lumbar facet joint syndrome     Gastroesophageal reflux disease     Sleeping difficulty

## 2022-01-12 DIAGNOSIS — G47.33 OBSTRUCTIVE SLEEP APNEA: Primary | ICD-10-CM

## 2022-01-14 ENCOUNTER — OFFICE VISIT (OUTPATIENT)
Dept: PODIATRY | Age: 82
End: 2022-01-14
Payer: MEDICARE

## 2022-01-14 VITALS — HEIGHT: 62 IN | BODY MASS INDEX: 28.89 KG/M2 | WEIGHT: 157 LBS

## 2022-01-14 DIAGNOSIS — M77.51 BURSITIS OF RIGHT ANKLE: ICD-10-CM

## 2022-01-14 DIAGNOSIS — M15.9 PRIMARY OSTEOARTHRITIS INVOLVING MULTIPLE JOINTS: ICD-10-CM

## 2022-01-14 DIAGNOSIS — E11.40 TYPE 2 DIABETES MELLITUS WITH DIABETIC NEUROPATHY, WITHOUT LONG-TERM CURRENT USE OF INSULIN (HCC): Primary | ICD-10-CM

## 2022-01-14 DIAGNOSIS — R60.0 EDEMA OF LOWER EXTREMITY: ICD-10-CM

## 2022-01-14 DIAGNOSIS — B35.1 DERMATOPHYTOSIS OF NAIL: ICD-10-CM

## 2022-01-14 DIAGNOSIS — I73.9 PERIPHERAL VASCULAR DISORDER (HCC): ICD-10-CM

## 2022-01-14 PROCEDURE — G8427 DOCREV CUR MEDS BY ELIG CLIN: HCPCS | Performed by: PODIATRIST

## 2022-01-14 PROCEDURE — G8484 FLU IMMUNIZE NO ADMIN: HCPCS | Performed by: PODIATRIST

## 2022-01-14 PROCEDURE — 1123F ACP DISCUSS/DSCN MKR DOCD: CPT | Performed by: PODIATRIST

## 2022-01-14 PROCEDURE — 99204 OFFICE O/P NEW MOD 45 MIN: CPT | Performed by: PODIATRIST

## 2022-01-14 PROCEDURE — 1090F PRES/ABSN URINE INCON ASSESS: CPT | Performed by: PODIATRIST

## 2022-01-14 PROCEDURE — 11721 DEBRIDE NAIL 6 OR MORE: CPT | Performed by: PODIATRIST

## 2022-01-14 PROCEDURE — G8417 CALC BMI ABV UP PARAM F/U: HCPCS | Performed by: PODIATRIST

## 2022-01-14 PROCEDURE — 20605 DRAIN/INJ JOINT/BURSA W/O US: CPT | Performed by: PODIATRIST

## 2022-01-14 PROCEDURE — G8399 PT W/DXA RESULTS DOCUMENT: HCPCS | Performed by: PODIATRIST

## 2022-01-14 PROCEDURE — 4040F PNEUMOC VAC/ADMIN/RCVD: CPT | Performed by: PODIATRIST

## 2022-01-14 PROCEDURE — 1036F TOBACCO NON-USER: CPT | Performed by: PODIATRIST

## 2022-01-14 NOTE — PATIENT INSTRUCTIONS
Schedule a Vaccine  When you qualify to receive the vaccine, call the [facility]) COVID-19 Vaccination Hotline to schedule your appointment or to get additional information about the [facility]) locations which are offering the COVID-19 vaccine. To be 94% effective, it's important that you receive two doses of one of the COVID-19 vaccines. -If you are receiving the [place] Peter vaccine, your second shot will be scheduled as close to 21 days after the first shot as possible. -If you are receiving the Moderna vaccine, your second shot will be scheduled as close to 28 days after the first shot as possible. [facility]) COVID-19 Vaccination Hotline: [phone]    Links to [facility]) website and [facility] website:    [email or web address]    [email or web address]

## 2022-01-14 NOTE — PROGRESS NOTES
[address] [facility] PODIATRY [facility]  [address] [address] [address]  Dept: [phone]  Dept Fax: [phone]    NEW DIABETIC PROGRESS NOTE  Date of patient's visit: 1/14/2022  Patient's Name:  Radha Shirley YOB: 1940            Patient Care Team:  Howard Robbins MD as PCP - General (Internal Medicine)  Howard Robbins MD as PCP - [facility]  Abdelrahman Guardado MD as Consulting Physician (Colon and Rectal Surgery)  Marleny Barajas MD as Consulting Physician (Pain Management)  Giuliano Hicks DPM as Physician (Podiatry)          Chief Complaint   Patient presents with    New Patient    Diabetes     [facility] [place]     Peripheral Neuropathy       Subjective:   Radha Shirley comes to clinic for New Patient, Diabetes [facility] & [place] ), and Peripheral Neuropathy    she is a diabetic and states Patient having difficulty walking in today with painful ankle. Ankle apprears to be swollen and tender to the touch. Pain in her foot just started this morning being very painful. .  Pt currently has complaint of thickened, elongated nails that they cannot manage by themselves. Pt's primary care physician is ELAINE Lorenz MD last seen 12/13/21. Pt's last blood sugar was 121- today . Pt also has complaint of increased painful swollen right ankle. She denies any trauma. Relates that is difficulty to walk. Lab Results   Component Value Date    LABA1C 6.7 (H) 10/07/2021      Complains of numbness in the feet bilat.   Past Medical History:   Diagnosis Date    Arthritis     Cataract     Cataracts, bilateral     Diverticulosis of colon 2015    Gastroesophageal reflux disease 4/13/2021    Headache(784.0)     History of colon polyps 2015    Insomnia     Neck pain     Osteoarthrosis, unspecified whether generalized or localized, unspecified site 10/5/2012    Pure hypercholesterolemia 10/5/2012    Shoulder blade pain     right side    Stroke [facility])     Tubular adenoma of colon 2015    Type II or unspecified type diabetes mellitus without mention of complication, not stated as uncontrolled 10/5/2012    Unspecified essential hypertension 10/5/2012    Unspecified sleep apnea        No Known Allergies  Current Outpatient Medications on File Prior to Visit   Medication Sig Dispense Refill    zolpidem (AMBIEN) 10 MG tablet Take 0.5 tablets by mouth nightly as needed for Sleep for up to 60 days. 15 tablet 1    gabapentin (NEURONTIN) 600 MG tablet Take 1 tablet by mouth 3 times daily for 90 days. 90 tablet 2    HYDROcodone-acetaminophen (NORCO) 5-325 MG per tablet Take 1 tablet by mouth 2 times daily as needed for Pain for up to 30 days. 60 tablet 0    Alcohol Swabs 70 % PADS 1 each by Does not apply route daily 100 each 3    Lancets MISC 1 each by Does not apply route daily 100 each 3    blood glucose monitor strips Check blood sugars daily as directed.  50 strip 6    fluticasone (FLONASE) 50 MCG/ACT nasal spray instill 1 spray into each nostril once daily 16 g 2    tiZANidine (ZANAFLEX) 2 MG tablet Take 2 tablets by mouth at bedtime and once during the day if needed 90 tablet 1    metFORMIN (GLUCOPHAGE) 1000 MG tablet Pt states she takes 1 a day      carvedilol (COREG) 3.125 MG tablet take 1 tablet by mouth twice a day 180 tablet 1    allopurinol (ZYLOPRIM) 100 MG tablet take 1 tablet by mouth once daily 90 tablet 1    pantoprazole (PROTONIX) 40 MG tablet take 1 tablet by mouth every morning BEFORE BREAKFAST 90 tablet 1    cloNIDine (CATAPRES) 0.1 MG tablet Take 1 tablet by mouth 2 times daily 180 tablet 1    loratadine (CLARITIN) 10 MG tablet take 1 tablet by mouth once daily 90 tablet 1    amitriptyline (ELAVIL) 25 MG tablet Take 1 tablet by mouth nightly 90 tablet 1    atorvastatin (LIPITOR) 40 MG tablet Take 1 tablet by mouth daily 90 tablet 1    polyethylene glycol (GLYCOLAX) 17 GM/SCOOP powder take 17GM (DISSOLVED IN WATER) by mouth once daily 510 g 1    blood glucose monitor kit and supplies use check blood sugar as directed 1 kit 0    Lift Chair MISC by Does not apply route 1 each 0    aspirin 325 MG tablet Take 325 mg by mouth daily       No current facility-administered medications on file prior to visit. Review of Systems    Review of Systems:   History obtained from chart review and the patient  General ROS: negative for - chills, fatigue, fever, night sweats or weight gain  Constitutional: Negative for chills, diaphoresis, fatigue, fever and unexpected weight change. Musculoskeletal: Positive for arthralgias, gait problem and joint swelling. Neurological ROS: negative for - behavioral changes, confusion, headaches or seizures. Negative for weakness and numbness. Dermatological ROS: negative for - mole changes, rash  Cardiovascular: Negative for leg swelling. Gastrointestinal: Negative for constipation, diarrhea, nausea and vomiting. Objective:  Dermatologic Exam:  Skin lesion/ulceration Absent . Skin No rashes or nodules noted. .   Skin is thin, with flaky sloughing skin as well as decreased hair growth to the lower leg  Small red hemosiderin deposits seen dorsal foot   Musculoskeletal:     1st MPJ ROM decreased, Bilateral. +POP right AJ along lateral ankle ligaments.   Muscle strength 5/5, Bilateral.  Pain present upon palpation of toenails 1-5, Bilateral. decreased medial longitudinal arch, Bilateral.  Ankle ROM decreased,Bilateral.    Dorsally contracted digits present digits 2, Bilateral.     Vascular: DP pulses 1/4 bilateral.  PT pulses 0/4 bilateral.   CFT <5 seconds, Bilateral.  Hair growth absent to the level of the digits, Bilateral.  Edema present, Bilateral.  Varicosities absent, Bilateral. Erythema absent, Bilateral    Neurological: Sensation diminshed to light touch to level of digits, Bilateral.  Protective sensation intact 6/10 sites via 5.07/10g [place] Monofilament, Bilateral.  negative Tinel's, Bilateral.  negative Valleix sign, Bilateral.      Integument: Warm, dry, supple, Bilateral.  Open lesion absent, Bilateral.  Interdigital maceration absent to web spaces 4, Bilateral.  Nails 1-5 left and 1-5 right thickened > 3.0 mm, dystrophic and crumbly, discolored with subungual debris. Fissures absent, Bilateral.   General: AAO x 3 in NAD. Derm  Toenail Description  Sites of Onychomycosis Involvement (Check affected area)  [x] [x] [x] [x] [x] [x] [x] [x] [x] [x]  5 4 3 2 1 1 2 3 4 5                          Right                                        Left    Thickness  [x] [x] [x] [x] [x] [x] [x] [x] [x] [x]  5 4 3 2 1 1 2 3 4 5                         Right                                        Left    Dystrophic Changes   [x] [x] [x] [x] [x] [x] [x] [x] [x] [x]  5 4 3 2 1 1 2 3 4 5                         Right                                        Left    Color   [x] [x] [x] [x] [x] [x] [x] [x] [x] [x]  5 4 3 2 1 1 2 3 4 5                          Right                                        Left    Incurvation/Ingrowin   [] [] [] [] [] [] [] [] [] []  5 4 3 2 1 1 2 3 4 5                         Right                                        Left    Inflammation/Pain   [x] [x] [x] [x] [x] [x] [x] [x] [x] [x]  5 4 3 2 1 1 2 3 4 5                         Right                                        Left        Visual inspection:  Deformity: hammertoe deformity regis feet  amputation: absent  Skin lesions: absent  Edema: right- 2+ pitting edema, left- 2+ pitting edema    Sensory exam:  Monofilament sensation: abnormal - 6/10 via SW 5.07/10g monofilament to the plantar foot bilateral feet    Pulses: abnormal - 1/4 dorsalis pedis pulse and 0/4 Posterior tibial pulse,   Pinprick: Impaired  Proprioception: Impaired  Vibration (128 Hz): Impaired       DM with PVD       [x]Yes    []No      Assessment:  80 y.o. female with:   Diagnosis Orders   1.  Type 2 diabetes mellitus with diabetic neuropathy, without long-term current use of insulin ([facility] [address]  [address] - [place] DEBRIDEMENT OF NAILS, 6 OR MORE    HM DIABETES FOOT EXAM   2. Dermatophytosis of nail  [address] - [place] DEBRIDEMENT OF NAILS, 6 OR MORE    HM DIABETES FOOT EXAM   3. Edema of lower extremity  557[address] - [place] DEBRIDEMENT OF NAILS, 6 OR MORE    HM DIABETES FOOT EXAM    Compression Stockings MISC   4. Peripheral vascular disorder (HCC)  [address] - [place] DEBRIDEMENT OF NAILS, 6 OR MORE    HM DIABETES FOOT EXAM   5. Primary osteoarthritis involving multiple joints  [address] - [place] DRAIN/INJECT INTERMEDIATE JOINT/BURSA    betamethasone acetate-betamethasone sodium phosphate (CELESTONE SOLUSPAN) 6 (3-3) MG/ML injection   6. Bursitis of right ankle  70660 - [place] DRAIN/INJECT INTERMEDIATE JOINT/BURSA    betamethasone acetate-betamethasone sodium phosphate (CELESTONE SOLUSPAN) 6 (3-3) MG/ML injection           Q7   []Yes    []No                Q8   [x]Yes    []No                     Q9   []Yes    []No    Plan:   Pt was evaluated and examined. Patient was given personalized discharge instructions. A steroid injection was performed at right AJ using 1% plain Lidocaine and 6 mg of Celestone. This was well tolerated. To address new complaint of increased swelling, recommend patient to wear compression stockings. Dispensed tubi  for regis LE and instructed pt to wear at all times when walking. Pt may take NSAIDs as needed. Nails 1-10 were debrided sharply in length and thickness with a nipper and , without incident. Pt will follow up in 9 weeks or sooner if any problems arise. Diagnosis was discussed with the pt and all of their questions were answered in detail. Proper foot hygiene and care was discussed with the pt. Informed patient on proper diabetic foot care and importance of tight glycemic control. Patient to check feet daily and contact the office with any questions/problems/concerns.    Other comorbidity noted and will be managed by PCP.  1/14/2022    Electronically signed by Ramandeep Watkins DPM on 1/14/2022 at 10:21 AM  1/14/2022

## 2022-01-20 RX ORDER — BETAMETHASONE SODIUM PHOSPHATE AND BETAMETHASONE ACETATE 3; 3 MG/ML; MG/ML
9 INJECTION, SUSPENSION INTRA-ARTICULAR; INTRALESIONAL; INTRAMUSCULAR; SOFT TISSUE ONCE
Qty: 1.5 ML | Refills: 0
Start: 2022-01-20 | End: 2022-01-27

## 2022-01-27 RX ORDER — BETAMETHASONE SODIUM PHOSPHATE AND BETAMETHASONE ACETATE 3; 3 MG/ML; MG/ML
6 INJECTION, SUSPENSION INTRA-ARTICULAR; INTRALESIONAL; INTRAMUSCULAR; SOFT TISSUE ONCE
Status: COMPLETED | OUTPATIENT
Start: 2022-01-27 | End: 2022-01-27

## 2022-01-27 RX ADMIN — BETAMETHASONE SODIUM PHOSPHATE AND BETAMETHASONE ACETATE 6 MG: 3; 3 INJECTION, SUSPENSION INTRA-ARTICULAR; INTRALESIONAL; INTRAMUSCULAR; SOFT TISSUE at 09:26

## 2022-02-17 ENCOUNTER — OFFICE VISIT (OUTPATIENT)
Dept: PAIN MANAGEMENT | Age: 82
End: 2022-02-17
Payer: MEDICARE

## 2022-02-17 VITALS — BODY MASS INDEX: 29.44 KG/M2 | WEIGHT: 160 LBS | HEIGHT: 62 IN

## 2022-02-17 DIAGNOSIS — Z79.891 CHRONIC USE OF OPIATE DRUG FOR THERAPEUTIC PURPOSE: ICD-10-CM

## 2022-02-17 DIAGNOSIS — M96.1 FAILED NECK SYNDROME: Primary | ICD-10-CM

## 2022-02-17 DIAGNOSIS — M47.816 LUMBAR FACET JOINT SYNDROME: ICD-10-CM

## 2022-02-17 DIAGNOSIS — M48.062 LUMBAR STENOSIS WITH NEUROGENIC CLAUDICATION: ICD-10-CM

## 2022-02-17 DIAGNOSIS — M54.12 CERVICAL RADICULAR PAIN: ICD-10-CM

## 2022-02-17 PROCEDURE — G8484 FLU IMMUNIZE NO ADMIN: HCPCS | Performed by: NURSE PRACTITIONER

## 2022-02-17 PROCEDURE — 1123F ACP DISCUSS/DSCN MKR DOCD: CPT | Performed by: NURSE PRACTITIONER

## 2022-02-17 PROCEDURE — G8399 PT W/DXA RESULTS DOCUMENT: HCPCS | Performed by: NURSE PRACTITIONER

## 2022-02-17 PROCEDURE — G8417 CALC BMI ABV UP PARAM F/U: HCPCS | Performed by: NURSE PRACTITIONER

## 2022-02-17 PROCEDURE — 4040F PNEUMOC VAC/ADMIN/RCVD: CPT | Performed by: NURSE PRACTITIONER

## 2022-02-17 PROCEDURE — 1090F PRES/ABSN URINE INCON ASSESS: CPT | Performed by: NURSE PRACTITIONER

## 2022-02-17 PROCEDURE — G8427 DOCREV CUR MEDS BY ELIG CLIN: HCPCS | Performed by: NURSE PRACTITIONER

## 2022-02-17 PROCEDURE — 99213 OFFICE O/P EST LOW 20 MIN: CPT | Performed by: NURSE PRACTITIONER

## 2022-02-17 PROCEDURE — 1036F TOBACCO NON-USER: CPT | Performed by: NURSE PRACTITIONER

## 2022-02-17 RX ORDER — HYDROCODONE BITARTRATE AND ACETAMINOPHEN 5; 325 MG/1; MG/1
1 TABLET ORAL EVERY 6 HOURS PRN
Qty: 120 TABLET | Refills: 0 | Status: SHIPPED | OUTPATIENT
Start: 2022-02-19 | End: 2022-05-18 | Stop reason: SDUPTHER

## 2022-02-17 ASSESSMENT — ENCOUNTER SYMPTOMS
CONSTIPATION: 0
BACK PAIN: 1
EYES NEGATIVE: 1
SHORTNESS OF BREATH: 0
COUGH: 0

## 2022-02-17 NOTE — PROGRESS NOTES
Patient is here today to review medication contract. Chief Complaint   Patient presents with    Neck Pain         PMH    history of chronic neck and chronic lower back pain  She is diagnosed with cervical spinal stenosis and lumbar spinal stenosis      neck pain midline into upper trapezius. Reports constant bilat. finger/hand numbness, denies HA     Her back pain is minimal at this time but at times located in lower back pain in the lumbar area with radiation down both legs. aggravated with standing and walking and does notice bilat ankel and foot swelling     Patient is on chronic low-dose opioid therapy with Norco 5 mg twice a day along with gabapentin  Finds the medication helpful allowing her to do daily life activities and become independent  She has tried conservative measures with therapy in past  Not a candidate for NSAIDs because of renal insufficiency and advanced age        Procedures  4/29/21  BILATERAL L5 EPIDURAL STEROID INJECTION reports 70% relief with improved function for 2 weeks      3/22/21  CERVICAL EPIDURAL STEROID INJECTION C7-T1 more than 50% relief     Today reports worse pain is in right side of neck declined injections     HPI:     Neck Pain   This is a chronic problem. The current episode started more than 1 year ago. The problem occurs intermittently. The problem has been unchanged. The pain is associated with nothing. The pain is present in the midline. The quality of the pain is described as stabbing. The pain is at a severity of 4/10. The pain is mild. Nothing aggravates the symptoms. The pain is worse during the day. Associated symptoms include headaches, numbness, tingling and weakness. Pertinent negatives include no chest pain, fever or leg pain. She has tried oral narcotics (PT) for the symptoms. The treatment provided mild relief. Patient denies any new neurological symptoms. No bowel or bladder incontinence, no weakness, and no falling.     Pill count: appropriate / pt states she did not bring her medication with for a count, states that she has 8 left - lst filled 12/27/21   Pt instructed to bring pills to appt in order to document compliance, verbalized understanding    Opioid Contract:2/3/21  Last Urine Dug screen dated: 2/17/21    Morphine equivalent: 10    Controlled Substance Monitoring:    Acute and Chronic Pain Monitoring:   RX Monitoring 2/17/2022   Attestation -   Periodic Controlled Substance Monitoring Possible medication side effects, risk of tolerance/dependence & alternative treatments discussed. ;No signs of potential drug abuse or diversion identified. ;Assessed functional status. ;Obtaining appropriate analgesic effect of treatment. Chronic Pain > 50 MEDD -   Chronic Pain > 80 MEDD -         Periodic Controlled Substance Monitoring: Possible medication side effects, risk of tolerance/dependence & alternative treatments discussed. ,No signs of potential drug abuse or diversion identified. ,Assessed functional status. ,Obtaining appropriate analgesic effect of treatment.  Chadwick Medina, APRN - CNP)      Past Medical History:   Diagnosis Date    Arthritis     Cataract     Cataracts, bilateral     Cervical spondylosis with myelopathy     Depression     Diverticulosis of colon 2015    Dysuria     Gastroesophageal reflux disease 4/13/2021    Gout     Headache(784.0)     History of colon polyps 2015    Insomnia     Lumbar stenosis with neurogenic claudication     Neck pain     Osteoarthrosis, unspecified whether generalized or localized, unspecified site 10/5/2012    Pure hypercholesterolemia 10/5/2012    Shoulder blade pain     right  side    Sinus problem     Stroke ([address]     Tubular adenoma of colon 2015    Type II or unspecified type diabetes mellitus without mention of complication, not stated as uncontrolled 10/5/2012    Under care of team 01/31/2022    PCP: [facility]. Arlette Randall, [facility], last visit 12/2021    Unspecified essential hypertension 10/5/2012    Unspecified sleep apnea     Wears dentures        Past Surgical History:   Procedure Laterality Date     SECTION      x2    COLONOSCOPY  06    Dr Fidel Ernandez  5 12 15    extensive diverticulosis, tubular adenoma    EPIDURAL STEROID INJECTION N/A 2017    EPIDURAL STEROID INJECTION cervical performed by Stuart Harris MD at [address] [facility] [address] N/A 2017    EPIDURAL STEROID INJECTION L5S1 performed by Stuart Harris MD at [address] [facility] [address] Bilateral 2019    EPIDURAL STEROID INJECTION BILATERAL S1 performed by Stuart Harris MD at [address] Left     Cataracts   [address] [facility]  10/1/06    NECK SURGERY  2011 &     NERVE BLOCK Bilateral 2020    Lumbar Facet L2-L5    NERVE SURGERY Bilateral 2019    BILATERAL LUMBAR FACET STEROID INJECTION L2-L5 [facility] [address] [facility] [facility]  2017    L5-S1 steroid injection    OTHER SURGICAL HISTORY  2017    VANITA L5-S1    OTHER SURGICAL HISTORY  10/01/2018    cervical steroid injection    OTHER SURGICAL HISTORY Bilateral 2019    EPIDURAL STEROID INJECTION BILATERAL S1 (Bilateral )    OTHER SURGICAL HISTORY  2021    lumbar VANITA    PAIN MANAGEMENT PROCEDURE Bilateral 2020    LUMBAR FACET L2-L5 performed by Stuart Harris MD at [facility]  N/A 3/22/2021    CERVICAL EPIDURAL STEROID INJECTION C7-T1 performed by Stuart Harris MD at [facility]  Bilateral 2021    BILATERAL L5 EPIDURAL STEROID INJECTION performed by Stuart Harris MD at [address] AA&/STRD TFRML EPI LUMBAR/SACRAL 1 LEVEL Bilateral 2018    BILATERAL L4 VANITA performed by Stuart Harris MD at [address] DX/THER SBST INTRLMNR CRV/THRC W/IMG GDN N/A 10/1/2018    EPIDURAL STEROID INJECTION CERVICAL C7-T1 performed by Maureen Oneil MD at [address] [place] [address]       No Known Allergies      Current Outpatient Medications:     [START ON 2/19/2022] HYDROcodone-acetaminophen (NORCO) 5-325 MG per tablet, Take 1 tablet by mouth every 6 hours as needed for Pain for up to 30 days. , Disp: 120 tablet, Rfl: 0    gabapentin (NEURONTIN) 300 MG capsule, Take 300 mg by mouth 2 times daily. , Disp: , Rfl:     Compression Stockings MISC, 20-30 mm/hg, Disp: 2 each, Rfl: 0    zolpidem (AMBIEN) 10 MG tablet, Take 0.5 tablets by mouth nightly as needed for Sleep for up to 60 days. , Disp: 15 tablet, Rfl: 1    Alcohol Swabs 70 % PADS, 1 each by Does not apply route daily, Disp: 100 each, Rfl: 3    Lancets MISC, 1 each by Does not apply route daily, Disp: 100 each, Rfl: 3    blood glucose monitor strips, Check blood sugars daily as directed., Disp: 50 strip, Rfl: 6    fluticasone (FLONASE) 50 MCG/ACT nasal spray, instill 1 spray into each nostril once daily (Patient taking differently: 1 spray by Nasal route as needed instill 1 spray into each nostril once daily), Disp: 16 g, Rfl: 2    tiZANidine (ZANAFLEX) 2 MG tablet, Take 2 tablets by mouth at bedtime and once during the day if needed, Disp: 90 tablet, Rfl: 1    metFORMIN (GLUCOPHAGE) 1000 MG tablet, Take 1,000 mg by mouth daily Pt states she takes 1 a day, Disp: , Rfl:     carvedilol (COREG) 3.125 MG tablet, take 1 tablet by mouth twice a day, Disp: 180 tablet, Rfl: 1    allopurinol (ZYLOPRIM) 100 MG tablet, take 1 tablet by mouth once daily, Disp: 90 tablet, Rfl: 1    pantoprazole (PROTONIX) 40 MG tablet, take 1 tablet by mouth every morning BEFORE BREAKFAST, Disp: 90 tablet, Rfl: 1    cloNIDine (CATAPRES) 0.1 MG tablet, Take 1 tablet by mouth 2 times daily, Disp: 180 tablet, Rfl: 1    loratadine (CLARITIN) 10 MG tablet, take 1 tablet by mouth once daily, Disp: 90 tablet, Rfl: 1    amitriptyline (ELAVIL) 25 MG tablet, Take 1 tablet by mouth nightly, Disp: 90 tablet, Rfl: 1    atorvastatin (LIPITOR) 40 MG tablet, Take 1 tablet by mouth daily, Disp: 90 tablet, Rfl: 1    polyethylene glycol (GLYCOLAX) 17 GM/SCOOP powder, take 17GM (DISSOLVED IN WATER) by mouth once daily, Disp: 510 g, Rfl: 1    blood glucose monitor kit and supplies, use check blood sugar as directed, Disp: 1 kit, Rfl: 0    Lift Chair MISC, by Does not apply route, Disp: 1 each, Rfl: 0    aspirin 325 MG tablet, Take 325 mg by mouth daily, Disp: , Rfl:     Family History   Problem Relation Age of Onset    Breast Cancer Sister     Cancer Sister         liver    Diabetes Daughter        Social History     Socioeconomic History    Marital status: Single     Spouse name: Not on file    Number of children: Not on file    Years of education: Not on file    Highest education level: Not on file   Occupational History    Not on file   Tobacco Use    Smoking status: Former Smoker     Packs/day: 0.50     Years: 15.00     Pack years: 7.50     Types: Cigarettes     Quit date: 10/5/2001     Years since quittin.3    Smokeless tobacco: Never Used   Vaping Use    Vaping Use: Never used   Substance and Sexual Activity    Alcohol use: Yes     Alcohol/week: 0.0 standard drinks     Comment: on occasion    Drug use: No    Sexual activity: Not Currently   Other Topics Concern    Not on file   Social History Narrative    Not on file     Social Determinants of Health     Financial Resource Strain: Low Risk     Difficulty of Paying Living Expenses: Not very hard   Food Insecurity: No Food Insecurity    Worried About Running Out of Food in the Last Year: Never true    Mai of Food in the Last Year: Never true   Transportation Needs: Unmet Transportation Needs    Lack of Transportation (Medical):  Yes    Lack of Transportation (Non-Medical): Yes   Physical Activity:     Days of Exercise per Week: Not on file    Minutes of Exercise per Session: Not on file   Stress:     Feeling of Stress : Not on file   Social Connections:     Frequency of Communication with Friends and Family: Not on file    Frequency of Social Gatherings with Friends and Family: Not on file    Attends [employer] Services: Not on file    Active Member of Clubs or Organizations: Not on file    Attends Club or Organization Meetings: Not on file    Marital Status: Not on file   Intimate Partner Violence:     Fear of Current or Ex-Partner: Not on file    Emotionally Abused: Not on file    Physically Abused: Not on file    Sexually Abused: Not on file   Housing Stability:     Unable to Pay for Housing in the Last Year: Not on file    Number of [place] in the Last Year: Not on file    Unstable Housing in the Last Year: Not on file       Review of Systems:  Review of Systems   Constitutional: Negative for chills and fever. HENT: Negative. Eyes: Negative. Cardiovascular: Negative for chest pain. Respiratory: Negative for cough and shortness of breath. Musculoskeletal: Positive for back pain, muscle weakness, myalgias and neck pain. Negative for falls. Gastrointestinal: Negative for constipation. Neurological: Positive for headaches, numbness, tingling and weakness. Physical Exam:  Ht 5' 2\" (1.575 m)   Wt 160 lb (72.6 kg)   BMI 29.26 kg/m²     Physical Exam  Cardiovascular:      Rate and Rhythm: Normal rate. Pulmonary:      Effort: Pulmonary effort is normal.   Musculoskeletal:      Cervical back: Decreased range of motion. Comments: In w/c for visit    Skin:     General: Skin is warm and dry. Neurological:      Mental Status: She is alert and oriented to person, place, and time.            Assessment:  Problem List Items Addressed This Visit     Failed neck syndrome - Primary (Chronic)    Relevant Medications    HYDROcodone-acetaminophen (NORCO) 5-325 MG per tablet (Start on 2/19/2022)    Cervical radicular pain (Chronic)    Relevant Medications    HYDROcodone-acetaminophen (NORCO) 5-325 MG per tablet (Start on 2/19/2022) Lumbar stenosis with neurogenic claudication (Chronic)    Relevant Medications    HYDROcodone-acetaminophen (NORCO) 5-325 MG per tablet (Start on 2/19/2022)    Lumbar facet joint syndrome (Chronic)    Relevant Medications    HYDROcodone-acetaminophen (NORCO) 5-325 MG per tablet (Start on 2/19/2022)    Chronic use of opiate drugs therapeutic purposes             Treatment Plan:  Patient relates current medications are helping the pain. Patient reports taking pain medications as prescribed, denies obtaining medications from different sources and denies use of illegal drugs. Patient denies side effects from medications like nausea, vomiting, constipation or drowsiness. Patient reports current activities of daily living are possible due to medications and would like to continue them. As always, we encourage daily stretching and strengthening exercises, and recommend minimizing use of pain medications unless patient cannot get through daily activities due to pain. Contract requirements met. Continue opioid therapy.  Script written for norco   SABIHA next OV  Follow up appointment made for 4 weeks

## 2022-03-07 ENCOUNTER — TELEPHONE (OUTPATIENT)
Dept: FAMILY MEDICINE CLINIC | Age: 82
End: 2022-03-07

## 2022-03-07 NOTE — TELEPHONE ENCOUNTER
Pts daughter stated they went to do the sleep study and was told the procedure was not approved thru insurance and was told they could pay 300 and something dollars to do it, pt left     They was also told by the insurance company the doctors office would have ask for an extension approval is only til the 9th and no appts are available til after that     Leelee Ley [phone] (daughter)

## 2022-03-07 NOTE — TELEPHONE ENCOUNTER
Please clarify if the procedure was approved through insurance or not.   She may have an unmet deductible and so she may need to pay part of the cost

## 2022-03-08 NOTE — TELEPHONE ENCOUNTER
So they do that or do we? And how do we find out about the deductible v/s out of pocket? Is it less expensive to do a home sleep study?

## 2022-03-09 ENCOUNTER — ANESTHESIA EVENT (OUTPATIENT)
Dept: OPERATING ROOM | Age: 82
End: 2022-03-09
Payer: MEDICARE

## 2022-03-09 ENCOUNTER — APPOINTMENT (OUTPATIENT)
Dept: GENERAL RADIOLOGY | Age: 82
End: 2022-03-09
Attending: UROLOGY
Payer: MEDICARE

## 2022-03-09 ENCOUNTER — HOSPITAL ENCOUNTER (OUTPATIENT)
Age: 82
Setting detail: OUTPATIENT SURGERY
Discharge: HOME | End: 2022-03-09
Attending: UROLOGY | Admitting: UROLOGY
Payer: MEDICARE

## 2022-03-09 ENCOUNTER — ANESTHESIA (OUTPATIENT)
Dept: OPERATING ROOM | Age: 82
End: 2022-03-09
Payer: MEDICARE

## 2022-03-09 VITALS
SYSTOLIC BLOOD PRESSURE: 183 MMHG | HEIGHT: 62 IN | HEART RATE: 72 BPM | WEIGHT: 160 LBS | RESPIRATION RATE: 16 BRPM | BODY MASS INDEX: 29.44 KG/M2 | DIASTOLIC BLOOD PRESSURE: 97 MMHG | OXYGEN SATURATION: 97 % | TEMPERATURE: 96.8 F

## 2022-03-09 VITALS — OXYGEN SATURATION: 99 % | SYSTOLIC BLOOD PRESSURE: 99 MMHG | DIASTOLIC BLOOD PRESSURE: 51 MMHG

## 2022-03-09 LAB
GFR NON-AFRICAN AMERICAN: >60 ML/MIN
GFR SERPL CREATININE-BSD FRML MDRD: >60 ML/MIN
GFR SERPL CREATININE-BSD FRML MDRD: NORMAL ML/MIN/{1.73_M2}
GLUCOSE BLD-MCNC: 117 MG/DL (ref 65–105)
GLUCOSE BLD-MCNC: 119 MG/DL (ref 74–100)
POC BUN: 13 MG/DL (ref 8–26)
POC CREATININE: 0.65 MG/DL (ref 0.51–1.19)

## 2022-03-09 PROCEDURE — 6360000004 HC RX CONTRAST MEDICATION: Performed by: UROLOGY

## 2022-03-09 PROCEDURE — 3600000002 HC SURGERY LEVEL 2 BASE: Performed by: UROLOGY

## 2022-03-09 PROCEDURE — 82565 ASSAY OF CREATININE: CPT

## 2022-03-09 PROCEDURE — 2500000003 HC RX 250 WO HCPCS: Performed by: NURSE ANESTHETIST, CERTIFIED REGISTERED

## 2022-03-09 PROCEDURE — C1769 GUIDE WIRE: HCPCS | Performed by: UROLOGY

## 2022-03-09 PROCEDURE — 7100000010 HC PHASE II RECOVERY - FIRST 15 MIN: Performed by: UROLOGY

## 2022-03-09 PROCEDURE — 3600000012 HC SURGERY LEVEL 2 ADDTL 15MIN: Performed by: UROLOGY

## 2022-03-09 PROCEDURE — 3700000001 HC ADD 15 MINUTES (ANESTHESIA): Performed by: UROLOGY

## 2022-03-09 PROCEDURE — 3700000000 HC ANESTHESIA ATTENDED CARE: Performed by: UROLOGY

## 2022-03-09 PROCEDURE — C1758 CATHETER, URETERAL: HCPCS | Performed by: UROLOGY

## 2022-03-09 PROCEDURE — 82947 ASSAY GLUCOSE BLOOD QUANT: CPT

## 2022-03-09 PROCEDURE — 3209999900 FLUORO FOR SURGICAL PROCEDURES

## 2022-03-09 PROCEDURE — 93005 ELECTROCARDIOGRAM TRACING: CPT | Performed by: ANESTHESIOLOGY

## 2022-03-09 PROCEDURE — 6360000002 HC RX W HCPCS: Performed by: PHYSICIAN ASSISTANT

## 2022-03-09 PROCEDURE — 2709999900 HC NON-CHARGEABLE SUPPLY: Performed by: UROLOGY

## 2022-03-09 PROCEDURE — 6360000002 HC RX W HCPCS: Performed by: NURSE ANESTHETIST, CERTIFIED REGISTERED

## 2022-03-09 PROCEDURE — 2580000003 HC RX 258: Performed by: UROLOGY

## 2022-03-09 PROCEDURE — 7100000011 HC PHASE II RECOVERY - ADDTL 15 MIN: Performed by: UROLOGY

## 2022-03-09 PROCEDURE — 84520 ASSAY OF UREA NITROGEN: CPT

## 2022-03-09 RX ORDER — MAGNESIUM HYDROXIDE 1200 MG/15ML
LIQUID ORAL PRN
Status: DISCONTINUED | OUTPATIENT
Start: 2022-03-09 | End: 2022-03-09 | Stop reason: ALTCHOICE

## 2022-03-09 RX ORDER — SODIUM CHLORIDE 0.9 % (FLUSH) 0.9 %
5-40 SYRINGE (ML) INJECTION EVERY 12 HOURS SCHEDULED
Status: DISCONTINUED | OUTPATIENT
Start: 2022-03-09 | End: 2022-03-09 | Stop reason: HOSPADM

## 2022-03-09 RX ORDER — LIDOCAINE HYDROCHLORIDE 10 MG/ML
INJECTION, SOLUTION EPIDURAL; INFILTRATION; INTRACAUDAL; PERINEURAL PRN
Status: DISCONTINUED | OUTPATIENT
Start: 2022-03-09 | End: 2022-03-09 | Stop reason: SDUPTHER

## 2022-03-09 RX ORDER — CIPROFLOXACIN 500 MG/1
500 TABLET, FILM COATED ORAL 2 TIMES DAILY
Qty: 6 TABLET | Refills: 0 | Status: SHIPPED | OUTPATIENT
Start: 2022-03-09 | End: 2022-03-12

## 2022-03-09 RX ORDER — FENTANYL CITRATE 50 UG/ML
50 INJECTION, SOLUTION INTRAMUSCULAR; INTRAVENOUS EVERY 5 MIN PRN
Status: DISCONTINUED | OUTPATIENT
Start: 2022-03-09 | End: 2022-03-09 | Stop reason: HOSPADM

## 2022-03-09 RX ORDER — MAGNESIUM HYDROXIDE 1200 MG/15ML
LIQUID ORAL CONTINUOUS PRN
Status: COMPLETED | OUTPATIENT
Start: 2022-03-09 | End: 2022-03-09

## 2022-03-09 RX ORDER — SODIUM CHLORIDE 0.9 % (FLUSH) 0.9 %
5-40 SYRINGE (ML) INJECTION PRN
Status: DISCONTINUED | OUTPATIENT
Start: 2022-03-09 | End: 2022-03-09 | Stop reason: HOSPADM

## 2022-03-09 RX ORDER — MEPERIDINE HYDROCHLORIDE 50 MG/ML
12.5 INJECTION INTRAMUSCULAR; INTRAVENOUS; SUBCUTANEOUS EVERY 5 MIN PRN
Status: DISCONTINUED | OUTPATIENT
Start: 2022-03-09 | End: 2022-03-09 | Stop reason: HOSPADM

## 2022-03-09 RX ORDER — SODIUM CHLORIDE 9 MG/ML
25 INJECTION, SOLUTION INTRAVENOUS PRN
Status: DISCONTINUED | OUTPATIENT
Start: 2022-03-09 | End: 2022-03-09 | Stop reason: HOSPADM

## 2022-03-09 RX ORDER — ESTRADIOL 0.1 MG/G
1 CREAM VAGINAL DAILY
Qty: 42.5 G | Refills: 1 | Status: SHIPPED | OUTPATIENT
Start: 2022-03-09

## 2022-03-09 RX ORDER — FENTANYL CITRATE 50 UG/ML
25 INJECTION, SOLUTION INTRAMUSCULAR; INTRAVENOUS EVERY 5 MIN PRN
Status: DISCONTINUED | OUTPATIENT
Start: 2022-03-09 | End: 2022-03-09 | Stop reason: HOSPADM

## 2022-03-09 RX ORDER — PROPOFOL 10 MG/ML
INJECTION, EMULSION INTRAVENOUS PRN
Status: DISCONTINUED | OUTPATIENT
Start: 2022-03-09 | End: 2022-03-09 | Stop reason: SDUPTHER

## 2022-03-09 RX ORDER — ONDANSETRON 2 MG/ML
4 INJECTION INTRAMUSCULAR; INTRAVENOUS
Status: DISCONTINUED | OUTPATIENT
Start: 2022-03-09 | End: 2022-03-09 | Stop reason: HOSPADM

## 2022-03-09 RX ORDER — SODIUM CHLORIDE, SODIUM LACTATE, POTASSIUM CHLORIDE, CALCIUM CHLORIDE 600; 310; 30; 20 MG/100ML; MG/100ML; MG/100ML; MG/100ML
INJECTION, SOLUTION INTRAVENOUS CONTINUOUS
Status: DISCONTINUED | OUTPATIENT
Start: 2022-03-09 | End: 2022-03-09 | Stop reason: HOSPADM

## 2022-03-09 RX ADMIN — PROPOFOL 50 MG: 10 INJECTION, EMULSION INTRAVENOUS at 13:36

## 2022-03-09 RX ADMIN — PROPOFOL 250 MG: 10 INJECTION, EMULSION INTRAVENOUS at 13:40

## 2022-03-09 RX ADMIN — CEFAZOLIN SODIUM 2000 MG: 10 INJECTION, POWDER, FOR SOLUTION INTRAVENOUS at 13:46

## 2022-03-09 RX ADMIN — SODIUM CHLORIDE, POTASSIUM CHLORIDE, SODIUM LACTATE AND CALCIUM CHLORIDE: 600; 310; 30; 20 INJECTION, SOLUTION INTRAVENOUS at 12:13

## 2022-03-09 RX ADMIN — PROPOFOL 50 MG: 10 INJECTION, EMULSION INTRAVENOUS at 13:39

## 2022-03-09 RX ADMIN — LIDOCAINE HYDROCHLORIDE 50 MG: 10 INJECTION, SOLUTION EPIDURAL; INFILTRATION; INTRACAUDAL; PERINEURAL at 13:36

## 2022-03-09 ASSESSMENT — PULMONARY FUNCTION TESTS
PIF_VALUE: 1
PIF_VALUE: 0
PIF_VALUE: 1
PIF_VALUE: 0
PIF_VALUE: 0
PIF_VALUE: 1
PIF_VALUE: 0
PIF_VALUE: 1
PIF_VALUE: 0
PIF_VALUE: 0
PIF_VALUE: 1
PIF_VALUE: 0
PIF_VALUE: 1
PIF_VALUE: 1
PIF_VALUE: 2
PIF_VALUE: 1

## 2022-03-09 ASSESSMENT — PAIN SCALES - GENERAL
PAINLEVEL_OUTOF10: 0

## 2022-03-09 ASSESSMENT — PAIN - FUNCTIONAL ASSESSMENT: PAIN_FUNCTIONAL_ASSESSMENT: 0-10

## 2022-03-09 NOTE — ANESTHESIA PRE PROCEDURE
Department of Anesthesiology  Preprocedure Note       Name:  Sony Tellez   Age:  80 y.o.  :  1940                                          MRN:  [id number]         Date:  3/9/2022      Surgeon: Clare Montgomery):  Saadia Mccray MD    Procedure: Procedure(s):  CYSTOSCOPY, RETROGRADE PYELOGRAM    Medications prior to admission:   Prior to Admission medications    Medication Sig Start Date End Date Taking? Authorizing Provider   HYDROcodone-acetaminophen (NORCO) 5-325 MG per tablet Take 1 tablet by mouth every 6 hours as needed for Pain for up to 30 days. 2/19/22 3/21/22 Yes YOLANDA Langston - CNP   gabapentin (NEURONTIN) 300 MG capsule Take 300 mg by mouth 2 times daily. Yes Historical Provider, MD   zolpidem (AMBIEN) 10 MG tablet Take 0.5 tablets by mouth nightly as needed for Sleep for up to 60 days.  1/10/22 3/11/22 Yes Connie Albert MD   fluticasone [facility]) 50 MCG/ACT nasal spray instill 1 spray into each nostril once daily  Patient taking differently: 1 spray by Nasal route as needed instill 1 spray into each nostril once daily 21  Yes Connie Albert MD   tiZANidine (ZANAFLEX) 2 MG tablet Take 2 tablets by mouth at bedtime and once during the day if needed 21  Yes Connie Albert MD   metFORMIN (GLUCOPHAGE) 1000 MG tablet Take 1,000 mg by mouth daily Pt states she takes 1 a day 10/19/21  Yes Historical Provider, MD   carvedilol (COREG) 3.125 MG tablet take 1 tablet by mouth twice a day 10/7/21  Yes Connie Albert MD   allopurinol (ZYLOPRIM) 100 MG tablet take 1 tablet by mouth once daily 10/7/21  Yes Connie Albert MD   pantoprazole (PROTONIX) 40 MG tablet take 1 tablet by mouth every morning BEFORE BREAKFAST 10/7/21  Yes Connie Albert MD   cloNIDine (CATAPRES) 0.1 MG tablet Take 1 tablet by mouth 2 times daily 10/7/21  Yes Connie Albert MD   loratadine (CLARITIN) 10 MG tablet take 1 tablet by mouth once daily 10/7/21  Yes Shiela Garcia MD   amitriptyline (ELAVIL) 25 MG tablet Take 1 tablet by mouth nightly 10/7/21  Yes Connie Harrison MD   atorvastatin (LIPITOR) 40 MG tablet Take 1 tablet by mouth daily 10/7/21  Yes Connie Harrison MD   polyethylene glycol [facility]) 17 GM/SCOOP powder take 17GM (DISSOLVED IN WATER) by mouth once daily 10/7/21  Yes Connie Harrison MD   aspirin 325 MG tablet Take 325 mg by mouth daily   Yes Historical Provider, MD   Compression Stockings MISC 20-30 mm/hg 1/14/22   Amrita Blush, DPSTAS   Alcohol Swabs 70 % PADS 1 each by Does not apply route daily 12/13/21   Connie Harrison MD   Lancets MISC 1 each by Does not apply route daily 12/13/21   Connie Harrison MD   blood glucose monitor strips Check blood sugars daily as directed.  12/13/21   Connie Harrison MD   blood glucose monitor kit and supplies use check blood sugar as directed 4/13/21   Connie Harrison MD   Lift Chair [address] [facility] by Does not apply route 12/2/20   Connie Harrison MD       Current medications:    Current Facility-Administered Medications   Medication Dose Route Frequency Provider Last Rate Last Admin    ceFAZolin (ANCEF) 2000 mg in dextrose 5 % 50 mL IVPB  2,000 mg IntraVENous On Call to [address] [facility],[address], [facility]        lactated ringers infusion   IntraVENous Continuous Evelio Castaneda MD           Allergies:  No Known Allergies    Problem List:    Patient Active Problem List   Diagnosis Code    Osteoarthritis M19.90    Essential hypertension I10    Type 2 diabetes mellitus with diabetic neuropathy, without long-term current use of insulin ([facility] [address] E11.40    Pure hypercholesterolemia E78.00    Constipation K59.00    Rectal pain K62.89    Diverticulosis of colon K57.30    Tubular adenoma of colon D12.6    History of colon polyps Z86.010    Rectal pressure R19.8    Failed neck syndrome M96.1    Cervical stenosis of spine M48.02    Cervical radicular pain M54.12    Lumbar stenosis with neurogenic claudication M48.062    JANN on CPAP G47.33, Z99.89    Primary osteoarthritis involving multiple joints M89.49    Hx of cervical spine surgery Z98.890    Chronic use of opiate drugs therapeutic purposes Z79.891    Myelomalacia (HCC) G95.89    Lumbar facet joint syndrome M47.816    Gastroesophageal reflux disease K21.9    Sleeping difficulty G47.9       Past Medical History:        Diagnosis Date    Arthritis     Cataract     Cataracts, bilateral     Cervical spondylosis with myelopathy     Depression     Diverticulosis of colon     Dysuria     Dysuria     Gastroesophageal reflux disease 2021    Gout     Headache(784.0)     History of colon polyps 2015    Insomnia     Lumbar stenosis with neurogenic claudication     Neck pain     JANN (obstructive sleep apnea)     Osteoarthrosis, unspecified whether generalized or localized, unspecified site 10/5/2012    Pelvic pain     Pure hypercholesterolemia 10/5/2012    Shoulder blade pain     right  side    Sinus problem     Stroke (HCC)     Tinnitus     Tubular adenoma of colon     Type II or unspecified type diabetes mellitus without mention of complication, not stated as uncontrolled 10/5/2012    Dr. Arlette Randall last visit 2022 [address] Under care of team 2022    PCP: Dr. Arlette Randall, [facility], last visit 2022    Unspecified essential hypertension 10/5/2012    Dr. Mel Busby Unspecified sleep apnea     Wears dentures        Past Surgical History:        Procedure Laterality Date     SECTION      x2    COLONOSCOPY  06    Dr Miller McCarr  5 12 15    extensive diverticulosis, tubular adenoma    EPIDURAL STEROID INJECTION N/A 2017    EPIDURAL STEROID INJECTION cervical performed by Marleny Barajas MD at [address] N/A 2017    EPIDURAL STEROID INJECTION L5S1 performed by Marleny Barajas MD at [address] Bilateral 2019    EPIDURAL STEROID INJECTION BILATERAL S1 performed by Marleny Barajas MD at [address] Left     Cataracts    HYSTERECTOMY      MAMMO IMPLANT DIGITAL DIAG [address]  10/1/06    NECK SURGERY  2011 &     NERVE BLOCK Bilateral 2020    Lumbar Facet L2-L5    NERVE SURGERY Bilateral 2019    BILATERAL LUMBAR FACET STEROID INJECTION L2-L5 performed by Marleny Barajas MD at [facility]  2017    L5-S1 steroid injection    OTHER SURGICAL HISTORY  2017    VANITA L5-S1    OTHER SURGICAL HISTORY  10/01/2018    cervical steroid injection    OTHER SURGICAL HISTORY Bilateral 2019    EPIDURAL STEROID INJECTION BILATERAL S1 (Bilateral )    OTHER SURGICAL HISTORY  2021    lumbar VANITA    PAIN MANAGEMENT PROCEDURE Bilateral 2020    LUMBAR FACET L2-L5 performed by Marleny Barajas MD at [place] [address] N/A 3/22/2021    CERVICAL EPIDURAL STEROID INJECTION C7-T1 performed by Marleny Barajas MD at [place] [address] Bilateral 2021    BILATERAL L5 EPIDURAL STEROID INJECTION performed by Marleny Barajas MD at [address] AA&/STRD TFRML EPI LUMBAR/SACRAL 1 LEVEL Bilateral 2018    BILATERAL L4 VANITA performed by Marleny Barajas MD at [address] DX/THER SBST INTRLMNR CRV/THRC W/IMG GDN N/A 10/1/2018    EPIDURAL STEROID INJECTION CERVICAL C7-T1 performed by Marleny Barajas MD at [facility] History:    Social History     Tobacco Use    Smoking status: Former Smoker     Packs/day: 0.50     Years: 15.00     Pack years: 7.50     Types: Cigarettes     Quit date: 10/5/2001     Years since quittin.4    Smokeless tobacco: Never Used   Substance Use Topics    Alcohol use:  Yes     Alcohol/week: 0.0 standard drinks     Comment: once every few months                                Counseling given: Not Answered      Vital Signs (Current):   Vitals:    22 1022   Weight: 160 lb (72.6 kg)   Height: 5' 2\" (1.575 m) BP Readings from Last 3 Encounters:   12/13/21 138/82   11/24/21 122/80   10/07/21 110/70       NPO Status: Time of last liquid consumption: 2335                        Time of last solid consumption: 2335                        Date of last liquid consumption: 03/08/22                        Date of last solid food consumption: 03/08/22    BMI:   Wt Readings from Last 3 Encounters:   03/07/22 160 lb (72.6 kg)   02/17/22 160 lb (72.6 kg)   01/14/22 157 lb (71.2 kg)     Body mass index is 29.26 kg/m². CBC:   Lab Results   Component Value Date    WBC 6.5 10/07/2021    RBC 4.50 10/07/2021    RBC 4.72 09/26/2011    HGB 11.1 10/07/2021    HCT 36.8 10/07/2021    MCV 81.8 10/07/2021    RDW 16.4 10/07/2021     10/07/2021     09/26/2011       CMP:   Lab Results   Component Value Date     10/07/2021    K 4.1 10/07/2021     10/07/2021    CO2 21 10/07/2021    BUN 16 10/07/2021    CREATININE 1.03 10/07/2021    GFRAA >60 10/07/2021    LABGLOM 52 10/07/2021    GLUCOSE 137 10/07/2021    GLUCOSE 111 01/19/2012    PROT 7.1 10/07/2021    CALCIUM 9.4 10/07/2021    BILITOT 0.60 10/07/2021    ALKPHOS 84 10/07/2021    AST 16 10/07/2021    ALT 9 10/07/2021       POC Tests: No results for input(s): POCGLU, POCNA, POCK, POCCL, POCBUN, POCHEMO, POCHCT in the last 72 hours.     Coags: No results found for: PROTIME, INR, APTT    HCG (If Applicable): No results found for: PREGTESTUR, PREGSERUM, HCG, HCGQUANT     ABGs: No results found for: PHART, PO2ART, BBO7VWA, QEW9VXI, BEART, L2UCEFEK     Type & Screen (If Applicable):  No results found for: LABABO, LABRH    Drug/Infectious Status (If Applicable):  Lab Results   Component Value Date    HEPCAB NONREACTIVE 10/07/2021       COVID-19 Screening (If Applicable):   Lab Results   Component Value Date    COVID19 Not Detected 06/11/2020           Anesthesia Evaluation    Airway: Mallampati: III     Neck ROM: full   Dental:    (+) edentulous      Pulmonary: breath sounds clear to auscultation  (+) sleep apnea:                             Cardiovascular:    (+) hypertension:, hyperlipidemia        Rhythm: regular  Rate: normal                    Neuro/Psych:   (+) CVA:, headaches:, psychiatric history:            GI/Hepatic/Renal:   (+) GERD:,           Endo/Other:    (+) Diabetes, . Abdominal:         (-) obese       Vascular: Other Findings:           Anesthesia Plan      MAC     ASA 3       Induction: intravenous. Anesthetic plan and risks discussed with patient. Plan discussed with CRNA.                   Bakari Suárez MD   3/9/2022

## 2022-03-09 NOTE — OP NOTE
FACILITY:  [address] [place] [address]  Ashish Feliz  1940  [id number]    DATE: 03/09/22  SURGEON:  Dr. Margot Elliott MD    ASSISTANT: Dr. Huy Gregorio MD PGY3  PREOPERATIVE DIAGNOSIS:  Dysuria, microhematuria  POSTOPERATIVE DIAGNOSIS:  Urethral caruncle, Cystocele   PROCEDURES PERFORMED:  1. Cystourethroscopy. 2. Bilateral retrograde pyelogram.  ANESTHESIA:  MAC    COMPLICATIONS:  None.   DRAINS:  None. SPECIMEN:  None   ESTIMATED BLOOD LOSS:  Less than 5 mL.      INDICATIONS FOR THE PROCEDURE:  Ashish Feliz is a 80 y.o. female presents with a history of dysuria and microhematuria. The patient follows up today for cystourethroscopy with bilateral retrograde pyelogram to rule out any abnormality. The risks and benefits of the procedure, as well as possible alternatives and complications were discussed and he consented.          DETAILS OF THE PROCEDURE:  The patient was correctly identified in the preoperative holding area. The patient was brought back to the operating room and placed in the dorsal lithotomy  position. Anesthesia was administered; antibiotics administered by Anesthesia. EPC cuffs were on and functional. The patient was then prepped and draped in the usual sterile fashion. Once an appropriate time out had been performed, with all parties consenting, a 25 Prydeinig cystoscope with a 30-degree lens was placed through the urethra into the bladder. We did note a urethral caruncle in that made insertion of the cystoscope difficult and a cystocele as well. The right ureteral orifice was cannulated with a 5 Prydeinig ureteral catheter. Contrast was injected and the right ureter and renal pelvis were identified, with  no abnormalities or filling defects. The catheter was removed and placed in the left ureteral orifice. The procedure was repeated once again with no abnormalities or filling defects present.  A thorough and complete cystoscopy was performed with no abnormalities involving the bladder or mucosa. The bladder was drained and the cystoscope was removed. The patient tolerated the procedure well and was sent to the PACU for postoperative monitoring.      Plan:  The patient was discharged home in stable condition with instructions to follow up in clinic. She will be prescribed estrace for her urethral caruncle.      Angela Gold MD   Urology Resident PGY3

## 2022-03-09 NOTE — PROGRESS NOTES
Daughter back to see mother and asked if Dr Malathi Root spoke with her, she said yes everything was fine. Dr Sergei Newman perfect served by daughter request to explain need for Estrogen cream. Dr Sergei Newman to bedside to explain findings.

## 2022-03-09 NOTE — H&P
Pre-op History and Physical  James Simpson PA-C    Patient:  Demario Granados  MRN: [id number]  YOB: 1940    HISTORY OF PRESENT ILLNESS:     The patient is a 80 y.o. female who presents with history of recurrent Dysuria, referred by her PCP. Had 2-5 RBC on her UA from 2021. No gross hematuria. Here for cystoscopy, retrograde pyelogram bilateral .    Patient's old records, notes and chart reviewed and summarized above. James Simpson PA-C independently reviewed the images and verified the radiology reports from:    No results found.       Past Medical History:    Past Medical History:   Diagnosis Date    Arthritis     Cataract     Cataracts, bilateral     Cervical spondylosis with myelopathy     Depression     Diverticulosis of colon     Dysuria     Dysuria     Gastroesophageal reflux disease 2021    Gout     Headache(784.0)     History of colon polyps     Insomnia     Lumbar stenosis with neurogenic claudication     Neck pain     JANN (obstructive sleep apnea)     Osteoarthrosis, unspecified whether generalized or localized, unspecified site 10/5/2012    Pelvic pain     Pure hypercholesterolemia 10/5/2012    Shoulder blade pain     right  side    Sinus problem     Stroke (HCC)     Tinnitus     Tubular adenoma of colon     Type II or unspecified type diabetes mellitus without mention of complication, not stated as uncontrolled 10/5/2012    Dr. Carmita Cruz last visit 2022 [address] Under care of team 2022    PCP: Dr. Carmita Cruz, [facility], last visit 2022    Unspecified essential hypertension 10/5/2012    Dr. Kasie De La O Unspecified sleep apnea     Wears dentures        Past Surgical History:    Past Surgical History:   Procedure Laterality Date     SECTION      x2    COLONOSCOPY  06    Dr Josey Fowler  5 12 15    extensive diverticulosis, tubular adenoma    EPIDURAL STEROID INJECTION N/A 8/25/2017    EPIDURAL STEROID INJECTION cervical performed by Amrita Tabares MD at [address] N/A 11/20/2017    EPIDURAL STEROID INJECTION L5S1 performed by Amrita Tabares MD at [address] Bilateral 5/20/2019    EPIDURAL STEROID INJECTION BILATERAL S1 performed by Amrita Tabares MD at [facility] Left     Cataracts   [address]  10/1/06    NECK SURGERY  5/2011 & 2001    NERVE BLOCK Bilateral 02/17/2020    Lumbar Facet L2-L5    NERVE SURGERY Bilateral 9/5/2019    BILATERAL LUMBAR FACET STEROID INJECTION L2-L5 performed by Amrita Tabares MD at [facility] 12.  08/25/2017    L5-S1 steroid injection    OTHER SURGICAL HISTORY  11/20/2017    VANITA L5-S1    OTHER SURGICAL HISTORY  10/01/2018    cervical steroid injection    OTHER SURGICAL HISTORY Bilateral 05/20/2019    EPIDURAL STEROID INJECTION BILATERAL S1 (Bilateral )    OTHER SURGICAL HISTORY  04/29/2021    lumbar VANITA    PAIN MANAGEMENT PROCEDURE Bilateral 2/17/2020    LUMBAR FACET L2-L5 performed by Amrita Tabares MD at [address] [place] [address] N/A 3/22/2021    CERVICAL EPIDURAL STEROID INJECTION C7-T1 performed by Amrita Tabares MD at [address] [place] [address] Bilateral 4/29/2021    BILATERAL L5 EPIDURAL STEROID INJECTION performed by Amrita Tabares MD at [address] AA&/STRD TFRML EPI LUMBAR/SACRAL 1 LEVEL Bilateral 11/19/2018    BILATERAL L4 VANITA performed by Amrita Tabares MD at [address] DX/THER SBST INTRLMNR CRV/THRC W/IMG GDN N/A 10/1/2018    EPIDURAL STEROID INJECTION CERVICAL C7-T1 performed by Amrita Tabares MD at [address] [place] [address]       Medications Prior to Admission:    Prior to Admission medications    Medication Sig Start Date End Date Taking?  Authorizing Provider   HYDROcodone-acetaminophen (NORCO) 5-325 MG per tablet Take 1 tablet by mouth every 6 hours as needed for Pain for up to 30 days. 2/19/22 3/21/22 Yes Forrest Arshad, APRN - CNP   gabapentin (NEURONTIN) 300 MG capsule Take 300 mg by mouth 2 times daily. Yes Historical Provider, MD   Compression Stockings MISC 20-30 mm/hg 1/14/22  Yes Rico Whittington DPM   zolpidem (AMBIEN) 10 MG tablet Take 0.5 tablets by mouth nightly as needed for Sleep for up to 60 days. 1/10/22 3/11/22 Yes Connie Godinez MD   Alcohol Swabs 70 % PADS 1 each by Does not apply route daily 12/13/21  Yes Idelia Kussmaul, MD   Lancets MISC 1 each by Does not apply route daily 12/13/21  Yes Idelia Kussmaul, MD   blood glucose monitor strips Check blood sugars daily as directed.  12/13/21  Yes Connie Godinez MD   fluticasone [facility]) 50 MCG/ACT nasal spray instill 1 spray into each nostril once daily  Patient taking differently: 1 spray by Nasal route as needed instill 1 spray into each nostril once daily 12/13/21  Yes Connie Godinez MD   tiZANidine (ZANAFLEX) 2 MG tablet Take 2 tablets by mouth at bedtime and once during the day if needed 12/13/21  Yes Connie Godinez MD   metFORMIN (GLUCOPHAGE) 1000 MG tablet Take 1,000 mg by mouth daily Pt states she takes 1 a day 10/19/21  Yes Historical Provider, MD   carvedilol (COREG) 3.125 MG tablet take 1 tablet by mouth twice a day 10/7/21  Yes Connie Godinez MD   allopurinol (ZYLOPRIM) 100 MG tablet take 1 tablet by mouth once daily 10/7/21  Yes Connie Godinez MD   pantoprazole (PROTONIX) 40 MG tablet take 1 tablet by mouth every morning BEFORE BREAKFAST 10/7/21  Yes Connie Godinez MD   cloNIDine (CATAPRES) 0.1 MG tablet Take 1 tablet by mouth 2 times daily 10/7/21  Yes Connie Godinez MD   loratadine (CLARITIN) 10 MG tablet take 1 tablet by mouth once daily 10/7/21  Yes Connie Godinez MD   amitriptyline (ELAVIL) 25 MG tablet Take 1 tablet by mouth nightly 10/7/21  Yes Connie Godinez MD   atorvastatin (LIPITOR) 40 MG tablet Take 1 tablet by mouth daily 10/7/21  Yes Idelia Kussmaul, MD   polyethylene glycol [facility]) 17 GM/SCOOP powder take 17GM (DISSOLVED IN WATER) by mouth once daily 10/7/21  Yes Kasandra Leon MD   blood glucose monitor kit and supplies use check blood sugar as directed 21  Yes Kasandra Leon MD   Lift Chair [facility] by Does not apply route 20  Yes Kasandra Leon MD   aspirin 325 MG tablet Take 325 mg by mouth daily   Yes Historical Provider, MD       Allergies:  Patient has no known allergies. Social History:    Social History     Socioeconomic History    Marital status: Single     Spouse name: Not on file    Number of children: Not on file    Years of education: Not on file    Highest education level: Not on file   Occupational History    Not on file   Tobacco Use    Smoking status: Former Smoker     Packs/day: 0.50     Years: 15.00     Pack years: 7.50     Types: Cigarettes     Quit date: 10/5/2001     Years since quittin.4    Smokeless tobacco: Never Used   Vaping Use    Vaping Use: Never used   Substance and Sexual Activity    Alcohol use: Yes     Alcohol/week: 0.0 standard drinks     Comment: once every few months    Drug use: No    Sexual activity: Not Currently   Other Topics Concern    Not on file   Social History Narrative    Not on file     Social Determinants of Health     Financial Resource Strain: Low Risk     Difficulty of Paying Living Expenses: Not very hard   Food Insecurity: No Food Insecurity    Worried About Running Out of Food in the Last Year: Never true    Mai of Food in the Last Year: Never true   Transportation Needs: Unmet Transportation Needs    Lack of Transportation (Medical):  Yes    Lack of Transportation (Non-Medical): Yes   Physical Activity:     Days of Exercise per Week: Not on file    Minutes of Exercise per Session: Not on file   Stress:     Feeling of Stress : Not on file   Social Connections:     Frequency of Communication with Friends and Family: Not on file    Frequency of Social Gatherings with Friends and Family: Not on file    Attends Christian Services: Not on file    Active Member of Clubs or Organizations: Not on file    Attends Club or Organization Meetings: Not on file    Marital Status: Not on file   Intimate Partner Violence:     Fear of Current or Ex-Partner: Not on file    Emotionally Abused: Not on file    Physically Abused: Not on file    Sexually Abused: Not on file   Housing Stability:     Unable to Pay for Housing in the Last Year: Not on file    Number of [place] in the Last Year: Not on file    Unstable Housing in the Last Year: Not on file       Family History:    Family History   Problem Relation Age of Onset    Breast Cancer Sister     Cancer Sister         liver    Diabetes Daughter        REVIEW OF SYSTEMS:  Constitutional: negative  Eyes: negative  Respiratory: negative  Cardiovascular: negative  Gastrointestinal: negative  Genitourinary: no acute issues  Musculoskeletal: negative  Skin: negative   Neurological: negative  Hematological/Lymphatic: negative  Psychological: negative    PHYSICAL EXAM:    No data found. Constitutional: Patient in NAD  Neuro: Alert and oriented to person, place, and time  Psych: Mood and affect normal  Skin: Clean, dry, intact   Lungs: Respiratory effort normal, CTA  Cardiovascular:  Normal peripheral pulses; no murmur. Normal rhythm  Abdomen: Soft, non-tender, non-distended, no hepatosplenomegaly or hernia, CVA tenderness none  Bladder: Non-tender and non-disdended   : Non-tender, skin intact, no lesions       LABS:   No results for input(s): WBC, HGB, HCT, MCV, PLT in the last 72 hours. No results for input(s): NA, K, CL, CO2, PHOS, BUN, CREATININE, CA in the last 72 hours. No results found for: PSA      Urinalysis: No results for input(s): COLORU, PHUR, LABCAST, WBCUA, RBCUA, MUCUS, TRICHOMONAS, YEAST, BACTERIA, CLARITYU, SPECGRAV, LEUKOCYTESUR, UROBILINOGEN, Elfreda [place] in the last 72 hours.     Invalid input(s): NITRATE, GLUCOSEUKETONESUAMORPHOUS     -----------------------------------------------------------------    ASSESSMENT AND PLAN:    Impression:    Recurrent Dysuria  Hematuria    Patient Active Problem List   Diagnosis    Osteoarthritis    Essential hypertension    Type 2 diabetes mellitus with diabetic neuropathy, without long-term current use of insulin ([address]    Pure hypercholesterolemia    Constipation    Rectal pain    Diverticulosis of colon    Tubular adenoma of colon    History of colon polyps    Rectal pressure    Failed neck syndrome    Cervical stenosis of spine    Cervical radicular pain    Lumbar stenosis with neurogenic claudication    JANN on CPAP    Primary osteoarthritis involving multiple joints    Hx of cervical spine surgery    Chronic use of opiate drugs therapeutic purposes    Myelomalacia ([address]    Lumbar facet joint syndrome    Gastroesophageal reflux disease    Sleeping difficulty       Plan: Cystoscopy with bilateral retrograde pyelogram in OR today. Consent obtained    The patient was counseled at length about the risks of fuentes Covid-19 during their perioperative period and any recovery window from their procedure. The patient was made aware that fuentes Covid-19  may worsen their prognosis for recovering from their procedure  and lend to a higher morbidity and/or mortality risk. All material risks, benefits, and reasonable alternatives including postponing the procedure were discussed. The patient does wish to proceed with the procedure at this time.         Cheikh Butler PA-C  11:08 AM 3/9/2022

## 2022-03-10 LAB
EKG ATRIAL RATE: 72 BPM
EKG P AXIS: 54 DEGREES
EKG P-R INTERVAL: 142 MS
EKG Q-T INTERVAL: 388 MS
EKG QRS DURATION: 102 MS
EKG QTC CALCULATION (BAZETT): 424 MS
EKG R AXIS: -2 DEGREES
EKG T AXIS: 46 DEGREES
EKG VENTRICULAR RATE: 72 BPM

## 2022-03-10 NOTE — ANESTHESIA POSTPROCEDURE EVALUATION
Department of Anesthesiology  Postprocedure Note    Patient: Renae Danielle  MRN: [id number]  YOB: 1940  Date of evaluation: 3/10/2022  Time:  8:48 AM     Procedure Summary     Date: 03/09/22 Room / Location: [address]    Anesthesia Start: 3766 Anesthesia Stop: 6965    Procedure: CYSTOSCOPY, RETROGRADE PYELOGRAM (Bilateral ) Diagnosis: (HEMATURIA)    Surgeons: Caro Estrada MD Responsible Provider: Orlando Buck MD    Anesthesia Type: MAC ASA Status: 3          Anesthesia Type: MAC    Radha Phase I:      Radha Phase II: Radha Score: 10    Last vitals: Reviewed and per EMR flowsheets.        Anesthesia Post Evaluation    Patient location during evaluation: PACU  Patient participation: complete - patient participated  Level of consciousness: awake and alert  Airway patency: patent  Nausea & Vomiting: no nausea and no vomiting  Complications: no  Cardiovascular status: blood pressure returned to baseline  Respiratory status: acceptable  Hydration status: euvolemic

## 2022-03-14 NOTE — TELEPHONE ENCOUNTER
Called sleep study, they will e-mail the auth department and get back to us about who/what needs to be done for patient

## 2022-03-14 NOTE — TELEPHONE ENCOUNTER
Evens Mcknight from sleep study could not get ahold of anybody in the [place] department, she will try first thing tomorrow morning and get back with us

## 2022-03-15 NOTE — TELEPHONE ENCOUNTER
Will Winston from sleep study called back stated the auth department said alls they need to do is schedule patient, there is nothing for us to do on our end.

## 2022-03-17 ENCOUNTER — OFFICE VISIT (OUTPATIENT)
Dept: PAIN MANAGEMENT | Age: 82
End: 2022-03-17
Payer: MEDICARE

## 2022-03-17 VITALS
DIASTOLIC BLOOD PRESSURE: 90 MMHG | HEART RATE: 86 BPM | BODY MASS INDEX: 27.97 KG/M2 | HEIGHT: 62 IN | WEIGHT: 152 LBS | OXYGEN SATURATION: 96 % | SYSTOLIC BLOOD PRESSURE: 175 MMHG

## 2022-03-17 DIAGNOSIS — M96.1 FAILED NECK SYNDROME: ICD-10-CM

## 2022-03-17 DIAGNOSIS — M48.062 LUMBAR STENOSIS WITH NEUROGENIC CLAUDICATION: ICD-10-CM

## 2022-03-17 DIAGNOSIS — M54.12 CERVICAL RADICULAR PAIN: ICD-10-CM

## 2022-03-17 DIAGNOSIS — Z79.891 CHRONIC USE OF OPIATE FOR THERAPEUTIC PURPOSE: Primary | ICD-10-CM

## 2022-03-17 DIAGNOSIS — M47.816 LUMBAR FACET JOINT SYNDROME: ICD-10-CM

## 2022-03-17 PROCEDURE — 1123F ACP DISCUSS/DSCN MKR DOCD: CPT | Performed by: NURSE PRACTITIONER

## 2022-03-17 PROCEDURE — 4040F PNEUMOC VAC/ADMIN/RCVD: CPT | Performed by: NURSE PRACTITIONER

## 2022-03-17 PROCEDURE — G8427 DOCREV CUR MEDS BY ELIG CLIN: HCPCS | Performed by: NURSE PRACTITIONER

## 2022-03-17 PROCEDURE — G8399 PT W/DXA RESULTS DOCUMENT: HCPCS | Performed by: NURSE PRACTITIONER

## 2022-03-17 PROCEDURE — 1090F PRES/ABSN URINE INCON ASSESS: CPT | Performed by: NURSE PRACTITIONER

## 2022-03-17 PROCEDURE — 99213 OFFICE O/P EST LOW 20 MIN: CPT | Performed by: NURSE PRACTITIONER

## 2022-03-17 PROCEDURE — G8484 FLU IMMUNIZE NO ADMIN: HCPCS | Performed by: NURSE PRACTITIONER

## 2022-03-17 PROCEDURE — G8417 CALC BMI ABV UP PARAM F/U: HCPCS | Performed by: NURSE PRACTITIONER

## 2022-03-17 PROCEDURE — 1036F TOBACCO NON-USER: CPT | Performed by: NURSE PRACTITIONER

## 2022-03-17 RX ORDER — HYDROCODONE BITARTRATE AND ACETAMINOPHEN 5; 325 MG/1; MG/1
1 TABLET ORAL EVERY 6 HOURS PRN
Qty: 120 TABLET | Refills: 0 | Status: CANCELLED | OUTPATIENT
Start: 2022-03-17 | End: 2022-04-16

## 2022-03-17 ASSESSMENT — ENCOUNTER SYMPTOMS
COUGH: 1
CONSTIPATION: 0
SHORTNESS OF BREATH: 0
BACK PAIN: 1
WHEEZING: 0
SORE THROAT: 0

## 2022-03-17 NOTE — PROGRESS NOTES
Chief Complaint   Patient presents with    Medication Refill    Back Pain    Neck Pain       [facility]     history of chronic neck and chronic lower back pain  She is diagnosed with cervical spinal stenosis and lumbar spinal stenosis     Neck pain is located midline into upper trapezius. Reports constant bilat. finger/hand numbness, denies HA. Worse with ROM      Her back pain is minimal at this time but at times located in lower back pain in the lumbar area with radiation down both legs. aggravated with standing and walking and does notice bilat ankle and foot swelling at times     Patient is on chronic low-dose opioid therapy with Norco 5 mg twice a day along with gabapentin. Does take norco sparingly  Finds the medication helpful allowing her to do daily life activities and be independent  She has tried conservative measures with therapy and injections in past  Not a candidate for NSAIDs because of renal insufficiency and advanced age     Procedures  4/29/21  BILATERAL L5 EPIDURAL STEROID INJECTION reports 70% relief with improved function for 2 weeks      3/22/21  CERVICAL EPIDURAL STEROID INJECTION C7-T1 more than 50% relief     Feels pain is well controlled and declined injections     HPI:   Back Pain  This is a chronic problem. The current episode started more than 1 year ago. The problem occurs intermittently. The problem is unchanged. The pain is present in the lumbar spine. The quality of the pain is described as aching. The pain radiates to the left foot and right foot. The pain is at a severity of 5/10. The pain is moderate. The pain is worse during the day. The symptoms are aggravated by standing and position. Associated symptoms include headaches, leg pain, numbness and paresthesias. Pertinent negatives include no chest pain or fever. Risk factors include menopause, obesity, poor posture, sedentary lifestyle and lack of exercise. She has tried analgesics for the symptoms.  The treatment provided moderate relief. Neck Pain   This is a chronic problem. The current episode started more than 1 year ago. The problem occurs intermittently. The problem has been unchanged. The pain is present in the midline and occipital region. The quality of the pain is described as aching. The pain is at a severity of 5/10. The pain is moderate. Nothing aggravates the symptoms. Associated symptoms include headaches, leg pain and numbness. Pertinent negatives include no chest pain or fever. She has tried oral narcotics for the symptoms. The treatment provided moderate relief. Medication Refill: unsure     Pain score Today:  5  Adverse effects (Constipation / Nausea / Sedation / sexual Dysfunction / others) : no  Mood: good  Sleep pattern and quality: poor  Activity level: poor    Pill count Today:107 left from #120 filled 2/19  Last dose taken  03/16/2022 PM  OARRS report reviewed today: yes  Morphine equivalent: 20  ER/Hospitalizations/PCP visit related to pain since last visit:no   Any legal problems e.g. DUI etc.:No  Satisfied with current management: Yes    Opioid Contract:2/3/21  Last Urine Dug screen dated: 2/17/21    Lab Results   Component Value Date    LABA1C 6.7 (H) 10/07/2021     Lab Results   Component Value Date     10/07/2021       Past Medical History, Past Surgical History, Social History, Allergies and Medications reviewed and updated in EPIC as indicated    Family History reviewed and is noncontributory. Controlled Substance Monitoring:    Acute and Chronic Pain Monitoring:   RX Monitoring 3/17/2022   Attestation -   Periodic Controlled Substance Monitoring Possible medication side effects, risk of tolerance/dependence & alternative treatments discussed. ;No signs of potential drug abuse or diversion identified. ;Assessed functional status. ;Obtaining appropriate analgesic effect of treatment. ;Random urine drug screen sent today.    Chronic Pain > 50 MEDD -   Chronic Pain > 80 MEDD - Periodic Controlled Substance Monitoring: Possible medication side effects, risk of tolerance/dependence & alternative treatments discussed. ,No signs of potential drug abuse or diversion identified. ,Assessed functional status. ,Obtaining appropriate analgesic effect of treatment. ,Random urine drug screen sent today.  Nato Sotomayor, APRN - CNP)      Past Medical History:   Diagnosis Date    Arthritis     Cataract     Cataracts, bilateral     Cervical spondylosis with myelopathy     Depression     Diverticulosis of colon     Dysuria     Dysuria     Gastroesophageal reflux disease 2021    Gout     Headache(784.0)     Hematuria     History of colon polyps 2015    Insomnia     Lumbar stenosis with neurogenic claudication     Neck pain     JANN (obstructive sleep apnea)     Osteoarthrosis, unspecified whether generalized or localized, unspecified site 10/5/2012    Pelvic pain     Pure hypercholesterolemia 10/5/2012    Shoulder blade pain     right  side    Sinus problem     Stroke (HCC)     Tinnitus     Tubular adenoma of colon     Type II or unspecified type diabetes mellitus without mention of complication, not stated as uncontrolled 10/5/2012    Dr. Mercy Crocker last visit 2022 [address] Under care of team 2022    PCP: Dr. Mercy Crocker, [facility], last visit 2022    Unspecified essential hypertension 10/5/2012    Dr. Nishant Fleming Unspecified sleep apnea     Wears dentures        Past Surgical History:   Procedure Laterality Date     SECTION      x2    COLONOSCOPY  06    Dr Mary Diaz  5 12 15    extensive diverticulosis, tubular adenoma    CYSTOSCOPY  2022    Bilateral Retrograde Pyelogram     CYSTOSCOPY Bilateral 3/9/2022    CYSTOSCOPY, RETROGRADE PYELOGRAM performed by Samina Saladña MD at [address] N/A 2017    EPIDURAL STEROID INJECTION cervical performed by Paresh Medina MD at [facility]    EPIDURAL STEROID INJECTION N/A 11/20/2017    EPIDURAL STEROID INJECTION L5S1 performed by Isatu Davis MD at [address] Bilateral 5/20/2019    EPIDURAL STEROID INJECTION BILATERAL S1 performed by Isatu Davis MD at [facility] Left     Cataracts   [address] [facility] [address]  10/1/06    NECK SURGERY  5/2011 & 2001    NERVE BLOCK Bilateral 02/17/2020    Lumbar Facet L2-L5    NERVE SURGERY Bilateral 9/5/2019    BILATERAL LUMBAR FACET STEROID INJECTION L2-L5 performed by Isatu Davis MD at [place] [address]  08/25/2017    L5-S1 steroid injection    OTHER SURGICAL HISTORY  11/20/2017    VANITA L5-S1    OTHER SURGICAL HISTORY  10/01/2018    cervical steroid injection    OTHER SURGICAL HISTORY Bilateral 05/20/2019    EPIDURAL STEROID INJECTION BILATERAL S1 (Bilateral )    OTHER SURGICAL HISTORY  04/29/2021    lumbar VANITA    PAIN MANAGEMENT PROCEDURE Bilateral 2/17/2020    LUMBAR FACET L2-L5 performed by Isatu Davis MD at [address] [place] [address] N/A 3/22/2021    CERVICAL EPIDURAL STEROID INJECTION C7-T1 performed by Isatu Davis MD at [address] [place] [address] Bilateral 4/29/2021    BILATERAL L5 EPIDURAL STEROID INJECTION performed by Isatu Davis MD at [address] AA&/STRD TFRML EPI LUMBAR/SACRAL 1 LEVEL Bilateral 11/19/2018    BILATERAL L4 VANITA performed by Isatu Davis MD at [address] DX/THER SBST INTRLMNR CRV/THRC W/IMG GDN N/A 10/1/2018    EPIDURAL STEROID INJECTION CERVICAL C7-T1 performed by Isatu Davis MD at [address] [place] [address]       No Known Allergies      Current Outpatient Medications:     estradiol (ESTRACE VAGINAL) 0.1 MG/GM vaginal cream, Place 1 g vaginally daily Apply daily for one week, then twice a week thereafter, apply peasize amount in the urethra, Disp: 42.5 g, Rfl: 1    HYDROcodone-acetaminophen (NORCO) 5-325 MG per tablet, Take 1 tablet by mouth every 6 hours as needed for Pain for up to 30 days. , Disp: 120 tablet, Rfl: 0    gabapentin (NEURONTIN) 300 MG capsule, Take 300 mg by mouth 2 times daily. , Disp: , Rfl:     Compression Stockings MISC, 20-30 mm/hg, Disp: 2 each, Rfl: 0    Alcohol Swabs 70 % PADS, 1 each by Does not apply route daily, Disp: 100 each, Rfl: 3    Lancets MISC, 1 each by Does not apply route daily, Disp: 100 each, Rfl: 3    blood glucose monitor strips, Check blood sugars daily as directed., Disp: 50 strip, Rfl: 6    fluticasone (FLONASE) 50 MCG/ACT nasal spray, instill 1 spray into each nostril once daily (Patient taking differently: 1 spray by Nasal route as needed instill 1 spray into each nostril once daily), Disp: 16 g, Rfl: 2    tiZANidine (ZANAFLEX) 2 MG tablet, Take 2 tablets by mouth at bedtime and once during the day if needed, Disp: 90 tablet, Rfl: 1    metFORMIN (GLUCOPHAGE) 1000 MG tablet, Take 1,000 mg by mouth daily Pt states she takes 1 a day, Disp: , Rfl:     carvedilol (COREG) 3.125 MG tablet, take 1 tablet by mouth twice a day, Disp: 180 tablet, Rfl: 1    allopurinol (ZYLOPRIM) 100 MG tablet, take 1 tablet by mouth once daily, Disp: 90 tablet, Rfl: 1    pantoprazole (PROTONIX) 40 MG tablet, take 1 tablet by mouth every morning BEFORE BREAKFAST, Disp: 90 tablet, Rfl: 1    cloNIDine (CATAPRES) 0.1 MG tablet, Take 1 tablet by mouth 2 times daily, Disp: 180 tablet, Rfl: 1    loratadine (CLARITIN) 10 MG tablet, take 1 tablet by mouth once daily, Disp: 90 tablet, Rfl: 1    amitriptyline (ELAVIL) 25 MG tablet, Take 1 tablet by mouth nightly, Disp: 90 tablet, Rfl: 1    atorvastatin (LIPITOR) 40 MG tablet, Take 1 tablet by mouth daily, Disp: 90 tablet, Rfl: 1    polyethylene glycol (GLYCOLAX) 17 GM/SCOOP powder, take 17GM (DISSOLVED IN WATER) by mouth once daily, Disp: 510 g, Rfl: 1    blood glucose monitor kit and supplies, use check blood sugar as directed, Disp: 1 kit, Rfl: 0    Lift Chair MISC, by Does not apply route, Disp: 1 each, Rfl: 0    aspirin 325 MG tablet, Take 325 mg by mouth daily, Disp: , Rfl:     Family History   Problem Relation Age of Onset    Breast Cancer Sister     Cancer Sister         liver    Diabetes Daughter        Social History     Socioeconomic History    Marital status: Single     Spouse name: Not on file    Number of children: Not on file    Years of education: Not on file    Highest education level: Not on file   Occupational History    Not on file   Tobacco Use    Smoking status: Former Smoker     Packs/day: 0.50     Years: 15.00     Pack years: 7.50     Types: Cigarettes     Quit date: 10/5/2001     Years since quittin.4    Smokeless tobacco: Never Used   Vaping Use    Vaping Use: Never used   Substance and Sexual Activity    Alcohol use: Yes     Alcohol/week: 0.0 standard drinks     Comment: once every few months    Drug use: No    Sexual activity: Not Currently   Other Topics Concern    Not on file   Social History Narrative    Not on file     Social Determinants of Health     Financial Resource Strain: Low Risk     Difficulty of Paying Living Expenses: Not very hard   Food Insecurity: No Food Insecurity    Worried About Running Out of Food in the Last Year: Never true    Mai of Food in the Last Year: Never true   Transportation Needs: Unmet Transportation Needs    Lack of Transportation (Medical):  Yes    Lack of Transportation (Non-Medical): Yes   Physical Activity:     Days of Exercise per Week: Not on file    Minutes of Exercise per Session: Not on file   Stress:     Feeling of Stress : Not on file   Social Connections:     Frequency of Communication with Friends and Family: Not on file    Frequency of Social Gatherings with Friends and Family: Not on file    Attends [employer] Services: Not on file    Active Member of Clubs or Organizations: Not on file    Attends Club or Organization Meetings: Not on file    Marital Status: Not on file   Intimate Partner Violence:     Fear of Current or Ex-Partner: Not on file    Emotionally Abused: Not on file    Physically Abused: Not on file    Sexually Abused: Not on file   Housing Stability:     Unable to Pay for Housing in the Last Year: Not on file    Number of Places Lived in the Last Year: Not on file    Unstable Housing in the Last Year: Not on file       Review of Systems:  Review of Systems   Constitutional: Positive for decreased appetite. Negative for chills, fever and night sweats. HENT: Negative for congestion and sore throat. Cardiovascular: Negative for chest pain. Respiratory: Positive for cough. Negative for shortness of breath and wheezing. Musculoskeletal: Positive for back pain, muscle weakness, myalgias, neck pain and stiffness. Negative for falls and joint pain. Gastrointestinal: Negative for constipation. Neurological: Positive for headaches, light-headedness, numbness and paresthesias. Negative for dizziness. Physical Exam:  BP (!) 175/90   Pulse 86   Ht 5' 2\" (1.575 m)   Wt 152 lb (68.9 kg)   SpO2 96%   BMI 27.80 kg/m²     Physical Exam  Cardiovascular:      Rate and Rhythm: Normal rate. Pulmonary:      Effort: Pulmonary effort is normal.   Musculoskeletal:      Cervical back: Decreased range of motion. Lumbar back: Decreased range of motion. Comments: In w/c for visit   Skin:     General: Skin is warm and dry. Neurological:      Mental Status: She is alert and oriented to person, place, and time. Assessment:  Problem List Items Addressed This Visit     Failed neck syndrome (Chronic)    Cervical radicular pain (Chronic)    Lumbar stenosis with neurogenic claudication (Chronic)    Lumbar facet joint syndrome (Chronic)      Other Visit Diagnoses     Chronic use of opiate for therapeutic purpose    -  Primary    Relevant Orders    DRUG SCREEN, PAIN             Treatment Plan:  Patient relates current medications are helping the pain.  Patient reports taking pain medications as prescribed, denies obtaining medications from different sources and denies use of illegal drugs. Patient denies side effects from medications like nausea, vomiting, constipation or drowsiness. Patient reports current activities of daily living are possible due to medications and would like to continue them. As always, we encourage daily stretching and strengthening exercises, and recommend minimizing use of pain medications unless patient cannot get through daily activities due to pain. Contract requirements met. Continue opioid therapy. Script not needed  SABIHA obtained today for monitoring purposes.  Last dose of medication was last night - taking sparingly  Follow up appointment made for 8 weeks

## 2022-03-19 DIAGNOSIS — G47.9 SLEEPING DIFFICULTY: ICD-10-CM

## 2022-03-21 NOTE — TELEPHONE ENCOUNTER
Last visit: 12/13/21  Last Med refill: 1/10/22  Does patient have enough medication for 72 hours: No:     Next Visit Date:  Future Appointments   Date Time Provider Pradeep Alexander   3/25/2022  9:00 AM Seymour Patel DPM [facility]   4/13/2022 11:00 AM Connie Alfaro MD [facility] FP [facility]   5/18/2022 10:00 AM Kayleigh Ou, APRN - CNP [facility] [address] Maintenance   Topic Date Due    Shingles Vaccine (1 of 2) Never done    COVID-19 Vaccine (3 - Booster for Moderna series) 11/16/2021    DTaP/Tdap/Td vaccine (1 - Tdap) 10/18/2026 (Originally 5/28/1959)    Lipid screen  10/07/2022    Potassium monitoring  10/07/2022    Depression Screen  12/13/2022    Annual Wellness Visit (AWV)  12/14/2022    Creatinine monitoring  03/09/2023    DEXA (modify frequency per FRAX score)  Completed    Flu vaccine  Completed    Pneumococcal 65+ years Vaccine  Completed    Hepatitis C screen  Completed    Hepatitis A vaccine  Aged Out    Hib vaccine  Aged Out    Meningococcal (ACWY) vaccine  Aged Out       Hemoglobin A1C (%)   Date Value   10/07/2021 6.7 (H)   09/30/2020 5.7   02/25/2020 6.6             ( goal A1C is < 7)   Microalb/Crt.  Ratio (mcg/mg creat)   Date Value   10/06/2020 CANNOT BE CALCULATED     LDL Cholesterol (mg/dL)   Date Value   10/07/2021 48   09/30/2020 48       (goal LDL is <100)   AST (U/L)   Date Value   10/07/2021 16     ALT (U/L)   Date Value   10/07/2021 9     BUN (mg/dL)   Date Value   10/07/2021 16     BP Readings from Last 3 Encounters:   03/17/22 (!) 175/90   03/09/22 (!) 183/97   03/09/22 (!) 99/51          (goal 120/80)    All Future Testing planned in [facility]  Lab Frequency Next Occurrence   Sleep Study with PAP Titration Once 01/19/2022   DRUG SCREEN, PAIN Once 03/17/2022               Patient Active Problem List:     Osteoarthritis     Essential hypertension     Type 2 diabetes mellitus with diabetic neuropathy, without long-term current use of insulin ([facility] Pure hypercholesterolemia     Constipation     Rectal pain     Diverticulosis of colon     Tubular adenoma of colon     History of colon polyps     Rectal pressure     Failed neck syndrome     Cervical stenosis of spine     Cervical radicular pain     Lumbar stenosis with neurogenic claudication     JANN on CPAP     Primary osteoarthritis involving multiple joints     Hx of cervical spine surgery     Chronic use of opiate drugs therapeutic purposes     Myelomalacia (HCC)     Lumbar facet joint syndrome     Gastroesophageal reflux disease     Sleeping difficulty

## 2022-03-22 RX ORDER — ZOLPIDEM TARTRATE 10 MG/1
TABLET ORAL
Qty: 15 TABLET | Refills: 1 | Status: SHIPPED | OUTPATIENT
Start: 2022-03-22 | End: 2022-05-31

## 2022-04-04 DIAGNOSIS — I10 ESSENTIAL HYPERTENSION: ICD-10-CM

## 2022-04-04 RX ORDER — CLONIDINE HYDROCHLORIDE 0.1 MG/1
TABLET ORAL
Qty: 180 TABLET | Refills: 1 | Status: SHIPPED | OUTPATIENT
Start: 2022-04-04 | End: 2022-05-09

## 2022-04-04 NOTE — TELEPHONE ENCOUNTER
Last visit: 12/03/2021  Last Med refill: 10/07/2021  Does patient have enough medication for 72 hours: Yes    Next Visit Date:  Future Appointments   Date Time Provider Pradeep Alexander   4/5/2022  7:20 PM [facility]   4/13/2022 11:00 AM Connie Ragsdale MD [facility] FP [facility]   4/25/2022 11:30 AM Gayathri Polanco DPM [facility]   5/18/2022 10:00 AM YOLANDA Xiao - CNP [facility] [address] Maintenance   Topic Date Due    Shingles Vaccine (1 of 2) Never done    COVID-19 Vaccine (3 - Booster for Moderna series) 11/16/2021    DTaP/Tdap/Td vaccine (1 - Tdap) 10/18/2026 (Originally 5/28/1959)    Lipid screen  10/07/2022    Potassium monitoring  10/07/2022    Depression Screen  12/13/2022    Annual Wellness Visit (AWV)  12/14/2022    Creatinine monitoring  03/09/2023    DEXA (modify frequency per FRAX score)  Completed    Flu vaccine  Completed    Pneumococcal 65+ years Vaccine  Completed    Hepatitis C screen  Completed    Hepatitis A vaccine  Aged Out    Hib vaccine  Aged Out    Meningococcal (ACWY) vaccine  Aged Out       Hemoglobin A1C (%)   Date Value   10/07/2021 6.7 (H)   09/30/2020 5.7   02/25/2020 6.6             ( goal A1C is < 7)   Microalb/Crt.  Ratio (mcg/mg creat)   Date Value   10/06/2020 CANNOT BE CALCULATED     LDL Cholesterol (mg/dL)   Date Value   10/07/2021 48   09/30/2020 48       (goal LDL is <100)   AST (U/L)   Date Value   10/07/2021 16     ALT (U/L)   Date Value   10/07/2021 9     BUN (mg/dL)   Date Value   10/07/2021 16     BP Readings from Last 3 Encounters:   03/17/22 (!) 175/90   03/09/22 (!) 183/97   03/09/22 (!) 99/51          (goal 120/80)    All Future Testing planned in [facility]  Lab Frequency Next Occurrence   Sleep Study with PAP Titration Once 01/19/2022   DRUG SCREEN, PAIN Once 03/17/2022               Patient Active Problem List:     Osteoarthritis     Essential hypertension     Type 2 diabetes mellitus with diabetic done neuropathy, without long-term current use of insulin (HCC)     Pure hypercholesterolemia     Constipation     Rectal pain     Diverticulosis of colon     Tubular adenoma of colon     History of colon polyps     Rectal pressure     Failed neck syndrome     Cervical stenosis of spine     Cervical radicular pain     Lumbar stenosis with neurogenic claudication     JANN on CPAP     Primary osteoarthritis involving multiple joints     Hx of cervical spine surgery     Chronic use of opiate drugs therapeutic purposes     Myelomalacia (HCC)     Lumbar facet joint syndrome     Gastroesophageal reflux disease     Sleeping difficulty

## 2022-04-25 ENCOUNTER — OFFICE VISIT (OUTPATIENT)
Dept: PODIATRY | Age: 82
End: 2022-04-25
Payer: MEDICARE

## 2022-04-25 VITALS — BODY MASS INDEX: 27.97 KG/M2 | RESPIRATION RATE: 16 BRPM | WEIGHT: 152 LBS | HEIGHT: 62 IN

## 2022-04-25 DIAGNOSIS — E11.40 TYPE 2 DIABETES MELLITUS WITH DIABETIC NEUROPATHY, WITHOUT LONG-TERM CURRENT USE OF INSULIN (HCC): Primary | ICD-10-CM

## 2022-04-25 DIAGNOSIS — I73.9 PERIPHERAL VASCULAR DISORDER (HCC): ICD-10-CM

## 2022-04-25 DIAGNOSIS — B35.1 DERMATOPHYTOSIS OF NAIL: ICD-10-CM

## 2022-04-25 DIAGNOSIS — R60.0 EDEMA OF LOWER EXTREMITY: ICD-10-CM

## 2022-04-25 PROCEDURE — 4040F PNEUMOC VAC/ADMIN/RCVD: CPT | Performed by: PODIATRIST

## 2022-04-25 PROCEDURE — 11721 DEBRIDE NAIL 6 OR MORE: CPT | Performed by: PODIATRIST

## 2022-04-25 PROCEDURE — 1036F TOBACCO NON-USER: CPT | Performed by: PODIATRIST

## 2022-04-25 PROCEDURE — 1123F ACP DISCUSS/DSCN MKR DOCD: CPT | Performed by: PODIATRIST

## 2022-04-25 PROCEDURE — 1090F PRES/ABSN URINE INCON ASSESS: CPT | Performed by: PODIATRIST

## 2022-04-25 PROCEDURE — G8427 DOCREV CUR MEDS BY ELIG CLIN: HCPCS | Performed by: PODIATRIST

## 2022-04-25 PROCEDURE — G8399 PT W/DXA RESULTS DOCUMENT: HCPCS | Performed by: PODIATRIST

## 2022-04-25 PROCEDURE — G8417 CALC BMI ABV UP PARAM F/U: HCPCS | Performed by: PODIATRIST

## 2022-04-25 RX ORDER — GABAPENTIN 600 MG/1
TABLET ORAL
COMMUNITY
Start: 2022-04-13 | End: 2022-06-01

## 2022-04-25 NOTE — PROGRESS NOTES
[address] [facility] PODIATRY [facility]  [address] [address] [place] [address]  Dept: [phone]  Dept Fax: [phone]    DIABETIC PROGRESS NOTE  Date of patient's visit: 4/25/2022  Patient's Name:  Michaela Bridges YOB: 1940            Patient Care Team:  Toni Rodriguez MD as PCP - General (Internal Medicine)  Toni Rodriguez MD as PCP - [facility] Empaneled Provider  Zee Tamze MD as Consulting Physician (Colon and Rectal Surgery)  Jessa Fontanez MD as Consulting Physician (Pain Management)  Mae Diaz DPM as Physician (Podiatry)          Chief Complaint   Patient presents with    Diabetes    Peripheral Neuropathy    Nail Problem       Subjective:   Michaela Bridges comes to clinic for Diabetes, Peripheral Neuropathy, and Nail Problem    she is a diabetic and states that she is doing well. Pt currently has complaint of thickened, elongated nails that they cannot manage by themselves. Pt's primary care physician is ELAINE [address] [place] [address] MD Edith last seen 12/13/2021   Pt's last blood sugar was unknown. Pt has a new complaint of increased swelling to regis LE. Pt has tried changing shoes but it has not helped the pain       Lab Results   Component Value Date    LABA1C 6.7 (H) 10/07/2021      Complains of numbness in the feet bilat.   Past Medical History:   Diagnosis Date    Arthritis     Cataract     Cataracts, bilateral     Cervical spondylosis with myelopathy     Depression     Diverticulosis of colon 2015    Dysuria     Dysuria     Gastroesophageal reflux disease 4/13/2021    Gout     Headache(784.0)     Hematuria     History of colon polyps 2015    Insomnia     Lumbar stenosis with neurogenic claudication     Neck pain     JANN (obstructive sleep apnea)     Osteoarthrosis, unspecified whether generalized or localized, unspecified site 10/5/2012    Pelvic pain     Pure hypercholesterolemia 10/5/2012    Shoulder blade pain right  side    Sinus problem     Stroke ([facility] [address]     Tinnitus     Tubular adenoma of colon 2015    Type II or unspecified type diabetes mellitus without mention of complication, not stated as uncontrolled 10/5/2012    Dr. He Ruby last visit 1/2022 [address] Under care of team 01/31/2022    PCP: Dr. He Ruby, [facility], last visit 1/2022    Unspecified essential hypertension 10/5/2012    Dr. Seble Arteaga sleep apnea     Wears dentures        No Known Allergies  Current Outpatient Medications on File Prior to Visit   Medication Sig Dispense Refill    gabapentin (NEURONTIN) 600 MG tablet take 1 tablet by mouth three times a day      cloNIDine (CATAPRES) 0.1 MG tablet take 1 tablet by mouth twice a day 180 tablet 1    zolpidem (AMBIEN) 10 MG tablet take 1/2 tablet by mouth nightly if needed for sleep 15 tablet 1    estradiol (ESTRACE VAGINAL) 0.1 MG/GM vaginal cream Place 1 g vaginally daily Apply daily for one week, then twice a week thereafter, apply peasize amount in the urethra 42.5 g 1    Compression Stockings MISC 20-30 mm/hg 2 each 0    Alcohol Swabs 70 % PADS 1 each by Does not apply route daily 100 each 3    Lancets MISC 1 each by Does not apply route daily 100 each 3    blood glucose monitor strips Check blood sugars daily as directed.  50 strip 6    fluticasone (FLONASE) 50 MCG/ACT nasal spray instill 1 spray into each nostril once daily (Patient taking differently: 1 spray by Nasal route as needed instill 1 spray into each nostril once daily) 16 g 2    tiZANidine (ZANAFLEX) 2 MG tablet Take 2 tablets by mouth at bedtime and once during the day if needed 90 tablet 1    metFORMIN (GLUCOPHAGE) 1000 MG tablet Take 1,000 mg by mouth daily Pt states she takes 1 a day      carvedilol (COREG) 3.125 MG tablet take 1 tablet by mouth twice a day 180 tablet 1    allopurinol (ZYLOPRIM) 100 MG tablet take 1 tablet by mouth once daily 90 tablet 1    pantoprazole (PROTONIX) 40 MG tablet take 1 tablet by mouth every morning BEFORE BREAKFAST 90 tablet 1    loratadine (CLARITIN) 10 MG tablet take 1 tablet by mouth once daily 90 tablet 1    amitriptyline (ELAVIL) 25 MG tablet Take 1 tablet by mouth nightly 90 tablet 1    atorvastatin (LIPITOR) 40 MG tablet Take 1 tablet by mouth daily 90 tablet 1    polyethylene glycol (GLYCOLAX) 17 GM/SCOOP powder take 17GM (DISSOLVED IN WATER) by mouth once daily 510 g 1    blood glucose monitor kit and supplies use check blood sugar as directed 1 kit 0    Lift Chair MISC by Does not apply route 1 each 0    aspirin 325 MG tablet Take 325 mg by mouth daily       No current facility-administered medications on file prior to visit. Review of Systems    Review of Systems:   History obtained from chart review and the patient  General ROS: negative for - chills, fatigue, fever, night sweats or weight gain  Constitutional: Negative for chills, diaphoresis, fatigue, fever and unexpected weight change. Musculoskeletal: Positive for arthralgias, gait problem and joint swelling. Neurological ROS: negative for - behavioral changes, confusion, headaches or seizures. Negative for weakness and numbness. Dermatological ROS: negative for - mole changes, rash  Cardiovascular: Negative for leg swelling. Gastrointestinal: Negative for constipation, diarrhea, nausea and vomiting. Objective:  Dermatologic Exam:  Skin lesion/ulceration Absent . Skin No rashes or nodules noted. .   Skin is thin, with flaky sloughing skin as well as decreased hair growth to the lower leg  Small red hemosiderin deposits seen dorsal foot   Musculoskeletal:     1st MPJ ROM decreased, Bilateral.  Muscle strength 5/5, Bilateral.  Pain present upon palpation of toenails 1-5, Bilateral. decreased medial longitudinal arch, Bilateral.  Ankle ROM decreased,Bilateral.    Dorsally contracted digits present digits 2, Bilateral.     Vascular: DP pulses 1/4 bilateral.  PT pulses 0/4 bilateral.   CFT <5 seconds, Bilateral.  Hair growth absent to the level of the digits, Bilateral.  Edema present, Bilateral.  Varicosities absent, Bilateral. Erythema absent, Bilateral    Neurological: Sensation diminshed to light touch to level of digits, Bilateral.  Protective sensation intact 6/10 sites via 5.07/10g Cleves-Socorro Monofilament, Bilateral.  negative Tinel's, Bilateral.  negative Valleix sign, Bilateral.      Integument: Warm, dry, supple, Bilateral.  Open lesion absent, Bilateral.  Interdigital maceration absent to web spaces 4, Bilateral.  Nails 1-5 left and 1-5 right thickened > 3.0 mm, dystrophic and crumbly, discolored with subungual debris. Fissures absent, Bilateral.   General: AAO x 3 in NAD.     Derm  Toenail Description  Sites of Onychomycosis Involvement (Check affected area)  [x] [x] [x] [x] [x] [x] [x] [x] [x] [x]  5 4 3 2 1 1 2 3 4 5                          Right                                        Left    Thickness  [x] [x] [x] [x] [x] [x] [x] [x] [x] [x]  5 4 3 2 1 1 2 3 4 5                         Right                                        Left    Dystrophic Changes   [x] [x] [x] [x] [x] [x] [x] [x] [x] [x]  5 4 3 2 1 1 2 3 4 5                         Right                                        Left    Color   [x] [x] [x] [x] [x] [x] [x] [x] [x] [x]  5 4 3 2 1 1 2 3 4 5                          Right                                        Left    Incurvation/Ingrowin   [] [] [] [] [] [] [] [] [] []  5 4 3 2 1 1 2 3 4 5                         Right                                        Left    Inflammation/Pain   [x] [x] [x] [x] [x] [x] [x] [x] [x] [x]  5 4 3 2 1 1 2 3 4 5                         Right                                        Left        Visual inspection:  Deformity: hammertoe deformity regis feet  amputation: absent  Skin lesions: absent  Edema: right- 2+ pitting edema, left- 2+ pitting edema    Sensory exam:  Monofilament sensation: abnormal - 6/10 via SW 5.07/10g monofilament to the plantar foot bilateral feet    Pulses: abnormal - 1/4 dorsalis pedis pulse and 0/4 Posterior tibial pulse,   Pinprick: Impaired  Proprioception: Impaired  Vibration (128 Hz): Impaired       DM with PVD       [x]Yes    []No      Assessment:  80 y.o. female with:   Diagnosis Orders   1. Type 2 diabetes mellitus with diabetic neuropathy, without long-term current use of insulin (HCC)  [facility] DIABETES FOOT EXAM    42984 - MD DEBRIDEMENT OF NAILS, 6 OR MORE   2. Dermatophytosis of nail  [facility] DIABETES FOOT EXAM    08340 - MD DEBRIDEMENT OF NAILS, 6 OR MORE   3. Edema of lower extremity  [facility] DIABETES FOOT EXAM    93659 - MD DEBRIDEMENT OF NAILS, 6 OR MORE   4. Peripheral vascular disorder (HCC)  HM DIABETES FOOT EXAM    18203 - MD DEBRIDEMENT OF NAILS, 6 OR MORE           Q7   []Yes    []No                Q8   [x]Yes    []No                     Q9   []Yes    []No    Plan:   Pt was evaluated and examined. Patient was given personalized discharge instructions. To address new complaint of increased swelling, recommend patient to wear compression stockings. Dispensed tubi  for regis LE and instructed pt to wear at all times when walking. Pt may take NSAIDs as needed. Nails 1-10 were debrided sharply in length and thickness with a nipper and , without incident. Pt will follow up in 9 weeks or sooner if any problems arise. Diagnosis was discussed with the pt and all of their questions were answered in detail. Proper foot hygiene and care was discussed with the pt. Informed patient on proper diabetic foot care and importance of tight glycemic control. Patient to check feet daily and contact the office with any questions/problems/concerns.    Other comorbidity noted and will be managed by PCP.  4/25/2022    Electronically signed by Raymondo Galeazzi, DPM on 4/25/2022 at 11:00 AM  4/25/2022

## 2022-04-26 DIAGNOSIS — M54.12 CERVICAL RADICULAR PAIN: Chronic | ICD-10-CM

## 2022-04-26 RX ORDER — AMITRIPTYLINE HYDROCHLORIDE 25 MG/1
25 TABLET, FILM COATED ORAL NIGHTLY
Qty: 90 TABLET | Refills: 1 | Status: SHIPPED | OUTPATIENT
Start: 2022-04-26 | End: 2022-10-14

## 2022-04-26 NOTE — TELEPHONE ENCOUNTER
Last visit: 12/13/21  Last Med refill: 10/7/21  Does patient have enough medication for 72 hours: No:     Next Visit Date:  Future Appointments   Date Time Provider Pradeep Alexander   5/9/2022  1:15 PM Connie Ayala, [facility]   5/18/2022 10:00 AM YOLANDA Dacosta - CNP [facility]   6/27/2022 10:30 AM APARNA [facility] [address] Maintenance   Topic Date Due    Shingles Vaccine (1 of 2) Never done    DTaP/Tdap/Td vaccine (1 - Tdap) 10/18/2026 (Originally 5/28/1959)    Lipids  10/07/2022    Potassium  10/07/2022    Depression Screen  12/13/2022    Annual Wellness Visit (AWV)  12/14/2022    Creatinine  03/09/2023    DEXA (modify frequency per FRAX score)  Completed    Flu vaccine  Completed    Pneumococcal 65+ years Vaccine  Completed    COVID-19 Vaccine  Completed    Hepatitis C screen  Completed    Hepatitis A vaccine  Aged Out    Hib vaccine  Aged Out    Meningococcal (ACWY) vaccine  Aged Out       Hemoglobin A1C (%)   Date Value   10/07/2021 6.7 (H)   09/30/2020 5.7   02/25/2020 6.6             ( goal A1C is < 7)   Microalb/Crt.  Ratio (mcg/mg creat)   Date Value   10/06/2020 CANNOT BE CALCULATED     LDL Cholesterol (mg/dL)   Date Value   10/07/2021 48   09/30/2020 48       (goal LDL is <100)   AST (U/L)   Date Value   10/07/2021 16     ALT (U/L)   Date Value   10/07/2021 9     BUN (mg/dL)   Date Value   10/07/2021 16     BP Readings from Last 3 Encounters:   03/17/22 (!) 175/90   03/09/22 (!) 183/97   03/09/22 (!) 99/51          (goal 120/80)    All Future Testing planned in [facility]  Lab Frequency Next Occurrence   DRUG SCREEN, PAIN Once 03/17/2022               Patient Active Problem List:     Osteoarthritis     Essential hypertension     Type 2 diabetes mellitus with diabetic neuropathy, without long-term current use of insulin (HCC)     Pure hypercholesterolemia     Constipation     Rectal pain     Diverticulosis of colon     Tubular adenoma of

## 2022-05-02 DIAGNOSIS — I10 ESSENTIAL HYPERTENSION: ICD-10-CM

## 2022-05-03 DIAGNOSIS — M54.12 CERVICAL RADICULAR PAIN: Chronic | ICD-10-CM

## 2022-05-03 RX ORDER — AMITRIPTYLINE HYDROCHLORIDE 25 MG/1
25 TABLET, FILM COATED ORAL NIGHTLY
Qty: 90 TABLET | Refills: 1 | OUTPATIENT
Start: 2022-05-03

## 2022-05-03 RX ORDER — CARVEDILOL 3.12 MG/1
TABLET ORAL
Qty: 180 TABLET | Refills: 1 | Status: SHIPPED | OUTPATIENT
Start: 2022-05-03

## 2022-05-03 NOTE — TELEPHONE ENCOUNTER
Last visit: 12/13/21  Last Med refill: 10/7/21  Does patient have enough medication for 72 hours: No:     Next Visit Date:  Future Appointments   Date Time Provider Pradeep Alexander   5/9/2022  1:15 PM Connie Carmona, [address] [facility]   5/18/2022 10:00 AM Deepthi Juliet, APRN - CNP [facility]   6/27/2022 10:30 AM Tigist Aguilera DPM [facility] [address] Maintenance   Topic Date Due    Shingles vaccine (1 of 2) Never done    DTaP/Tdap/Td vaccine (1 - Tdap) 10/18/2026 (Originally 5/28/1959)    Lipids  10/07/2022    Potassium  10/07/2022    Depression Screen  12/13/2022    Annual Wellness Visit (AWV)  12/14/2022    Creatinine  03/09/2023    DEXA (modify frequency per FRAX score)  Completed    Flu vaccine  Completed    Pneumococcal 65+ years Vaccine  Completed    COVID-19 Vaccine  Completed    Hepatitis C screen  Completed    Hepatitis A vaccine  Aged Out    Hib vaccine  Aged Out    Meningococcal (ACWY) vaccine  Aged Out       Hemoglobin A1C (%)   Date Value   10/07/2021 6.7 (H)   09/30/2020 5.7   02/25/2020 6.6             ( goal A1C is < 7)   Microalb/Crt.  Ratio (mcg/mg creat)   Date Value   10/06/2020 CANNOT BE CALCULATED     LDL Cholesterol (mg/dL)   Date Value   10/07/2021 48   09/30/2020 48       (goal LDL is <100)   AST (U/L)   Date Value   10/07/2021 16     ALT (U/L)   Date Value   10/07/2021 9     BUN (mg/dL)   Date Value   10/07/2021 16     BP Readings from Last 3 Encounters:   03/17/22 (!) 175/90   03/09/22 (!) 183/97   03/09/22 (!) 99/51          (goal 120/80)    All Future Testing planned in [facility]  Lab Frequency Next Occurrence   DRUG SCREEN, PAIN Once 03/17/2022               Patient Active Problem List:     Osteoarthritis     Essential hypertension     Type 2 diabetes mellitus with diabetic neuropathy, without long-term current use of insulin (HCC)     Pure hypercholesterolemia     Constipation     Rectal pain     Diverticulosis of colon     Tubular adenoma of colon     History of colon polyps     Rectal pressure     Failed neck syndrome     Cervical stenosis of spine     Cervical radicular pain     Lumbar stenosis with neurogenic claudication     JANN on CPAP     Primary osteoarthritis involving multiple joints     Hx of cervical spine surgery     Chronic use of opiate drugs therapeutic purposes     Myelomalacia (HCC)     Lumbar facet joint syndrome     Gastroesophageal reflux disease     Sleeping difficulty

## 2022-05-09 ENCOUNTER — OFFICE VISIT (OUTPATIENT)
Dept: FAMILY MEDICINE CLINIC | Age: 82
End: 2022-05-09
Payer: MEDICARE

## 2022-05-09 VITALS
OXYGEN SATURATION: 97 % | DIASTOLIC BLOOD PRESSURE: 64 MMHG | TEMPERATURE: 98.4 F | SYSTOLIC BLOOD PRESSURE: 106 MMHG | HEART RATE: 69 BPM

## 2022-05-09 DIAGNOSIS — E78.00 PURE HYPERCHOLESTEROLEMIA: ICD-10-CM

## 2022-05-09 DIAGNOSIS — M15.9 PRIMARY OSTEOARTHRITIS INVOLVING MULTIPLE JOINTS: ICD-10-CM

## 2022-05-09 DIAGNOSIS — R51.9 LEFT TEMPORAL HEADACHE: Primary | ICD-10-CM

## 2022-05-09 DIAGNOSIS — Z99.89 OSA ON CPAP: ICD-10-CM

## 2022-05-09 DIAGNOSIS — J30.89 CHRONIC NON-SEASONAL ALLERGIC RHINITIS: ICD-10-CM

## 2022-05-09 DIAGNOSIS — M54.12 CERVICAL RADICULAR PAIN: Chronic | ICD-10-CM

## 2022-05-09 DIAGNOSIS — R60.9 PERIPHERAL EDEMA: ICD-10-CM

## 2022-05-09 DIAGNOSIS — M1A.09X0 CHRONIC GOUT OF MULTIPLE SITES, UNSPECIFIED CAUSE: ICD-10-CM

## 2022-05-09 DIAGNOSIS — I10 ESSENTIAL HYPERTENSION: ICD-10-CM

## 2022-05-09 DIAGNOSIS — K21.9 GASTROESOPHAGEAL REFLUX DISEASE, UNSPECIFIED WHETHER ESOPHAGITIS PRESENT: ICD-10-CM

## 2022-05-09 DIAGNOSIS — G47.33 OSA ON CPAP: ICD-10-CM

## 2022-05-09 DIAGNOSIS — G95.89 MYELOMALACIA (HCC): ICD-10-CM

## 2022-05-09 DIAGNOSIS — K59.03 DRUG INDUCED CONSTIPATION: ICD-10-CM

## 2022-05-09 DIAGNOSIS — E11.40 TYPE 2 DIABETES MELLITUS WITH DIABETIC NEUROPATHY, WITHOUT LONG-TERM CURRENT USE OF INSULIN (HCC): ICD-10-CM

## 2022-05-09 DIAGNOSIS — G47.9 SLEEPING DIFFICULTY: ICD-10-CM

## 2022-05-09 LAB — HBA1C MFR BLD: 6.6 %

## 2022-05-09 PROCEDURE — 1123F ACP DISCUSS/DSCN MKR DOCD: CPT | Performed by: INTERNAL MEDICINE

## 2022-05-09 PROCEDURE — G8399 PT W/DXA RESULTS DOCUMENT: HCPCS | Performed by: INTERNAL MEDICINE

## 2022-05-09 PROCEDURE — 4040F PNEUMOC VAC/ADMIN/RCVD: CPT | Performed by: INTERNAL MEDICINE

## 2022-05-09 PROCEDURE — 1036F TOBACCO NON-USER: CPT | Performed by: INTERNAL MEDICINE

## 2022-05-09 PROCEDURE — G8427 DOCREV CUR MEDS BY ELIG CLIN: HCPCS | Performed by: INTERNAL MEDICINE

## 2022-05-09 PROCEDURE — 1090F PRES/ABSN URINE INCON ASSESS: CPT | Performed by: INTERNAL MEDICINE

## 2022-05-09 PROCEDURE — G8417 CALC BMI ABV UP PARAM F/U: HCPCS | Performed by: INTERNAL MEDICINE

## 2022-05-09 PROCEDURE — 3044F HG A1C LEVEL LT 7.0%: CPT | Performed by: INTERNAL MEDICINE

## 2022-05-09 PROCEDURE — 83036 HEMOGLOBIN GLYCOSYLATED A1C: CPT | Performed by: INTERNAL MEDICINE

## 2022-05-09 PROCEDURE — 99214 OFFICE O/P EST MOD 30 MIN: CPT | Performed by: INTERNAL MEDICINE

## 2022-05-09 RX ORDER — ALLOPURINOL 100 MG/1
TABLET ORAL
Qty: 90 TABLET | Refills: 1 | Status: SHIPPED | OUTPATIENT
Start: 2022-05-09

## 2022-05-09 RX ORDER — POLYETHYLENE GLYCOL 3350 17 G/17G
POWDER, FOR SOLUTION ORAL
Qty: 510 G | Refills: 1 | Status: SHIPPED | OUTPATIENT
Start: 2022-05-09

## 2022-05-09 RX ORDER — PANTOPRAZOLE SODIUM 40 MG/1
TABLET, DELAYED RELEASE ORAL
Qty: 90 TABLET | Refills: 1 | Status: SHIPPED | OUTPATIENT
Start: 2022-05-09

## 2022-05-09 RX ORDER — GLUCOSAMINE HCL/CHONDROITIN SU 500-400 MG
CAPSULE ORAL
Qty: 50 STRIP | Refills: 6 | Status: SHIPPED | OUTPATIENT
Start: 2022-05-09

## 2022-05-09 RX ORDER — ATORVASTATIN CALCIUM 40 MG/1
40 TABLET, FILM COATED ORAL DAILY
Qty: 90 TABLET | Refills: 1 | Status: SHIPPED | OUTPATIENT
Start: 2022-05-09

## 2022-05-09 RX ORDER — LANCETS 30 GAUGE
1 EACH MISCELLANEOUS DAILY
Qty: 100 EACH | Refills: 3 | Status: SHIPPED | OUTPATIENT
Start: 2022-05-09

## 2022-05-09 RX ORDER — FLUTICASONE PROPIONATE 50 MCG
SPRAY, SUSPENSION (ML) NASAL
Qty: 16 G | Refills: 2 | Status: SHIPPED | OUTPATIENT
Start: 2022-05-09

## 2022-05-09 RX ORDER — GLUCOSAMINE HCL/CHONDROITIN SU 500-400 MG
1 CAPSULE ORAL DAILY
Qty: 100 EACH | Refills: 3 | Status: SHIPPED | OUTPATIENT
Start: 2022-05-09

## 2022-05-09 RX ORDER — TIZANIDINE 2 MG/1
TABLET ORAL
Qty: 90 TABLET | Refills: 1 | Status: SHIPPED | OUTPATIENT
Start: 2022-05-09 | End: 2022-09-29

## 2022-05-09 RX ORDER — FUROSEMIDE 20 MG/1
20 TABLET ORAL DAILY
Qty: 3 TABLET | Refills: 0 | Status: SHIPPED | OUTPATIENT
Start: 2022-05-09 | End: 2022-05-18

## 2022-05-09 RX ORDER — LORATADINE 10 MG/1
TABLET ORAL
Qty: 90 TABLET | Refills: 1 | Status: SHIPPED | OUTPATIENT
Start: 2022-05-09

## 2022-05-09 RX ORDER — CLONIDINE HYDROCHLORIDE 0.1 MG/1
0.1 TABLET ORAL DAILY
Qty: 180 TABLET | Refills: 1
Start: 2022-05-09 | End: 2022-09-30

## 2022-05-09 SDOH — ECONOMIC STABILITY: FOOD INSECURITY: WITHIN THE PAST 12 MONTHS, YOU WORRIED THAT YOUR FOOD WOULD RUN OUT BEFORE YOU GOT MONEY TO BUY MORE.: NEVER TRUE

## 2022-05-09 SDOH — ECONOMIC STABILITY: FOOD INSECURITY: WITHIN THE PAST 12 MONTHS, THE FOOD YOU BOUGHT JUST DIDN'T LAST AND YOU DIDN'T HAVE MONEY TO GET MORE.: NEVER TRUE

## 2022-05-09 ASSESSMENT — ENCOUNTER SYMPTOMS
ABDOMINAL PAIN: 0
SINUS PRESSURE: 0
BLURRED VISION: 0
NAUSEA: 0
SWOLLEN GLANDS: 0
EYE PAIN: 0
EYE WATERING: 0
SORE THROAT: 0
VOMITING: 0
PHOTOPHOBIA: 0
BACK PAIN: 0
SCALP TENDERNESS: 0
VISUAL CHANGE: 0
FACIAL SWEATING: 0
RHINORRHEA: 0
EYE REDNESS: 0
COUGH: 0

## 2022-05-09 ASSESSMENT — SOCIAL DETERMINANTS OF HEALTH (SDOH): HOW HARD IS IT FOR YOU TO PAY FOR THE VERY BASICS LIKE FOOD, HOUSING, MEDICAL CARE, AND HEATING?: NOT VERY HARD

## 2022-05-09 ASSESSMENT — VISUAL ACUITY: OU: 1

## 2022-05-09 NOTE — PROGRESS NOTES
Pt states the LT side of her head hurts. Pt is hearing some grinding when turning her neck and head.  She would like a CT scan done

## 2022-05-09 NOTE — PROGRESS NOTES
Subjective:       Patient ID:     Damian Koroma is a 80 y.o. female who presents for   Chief Complaint   Patient presents with    Diabetes       HPI:  Nursing note reviewed and discussed with patient. Diabetes - doing well with current regimen. Denies hypoglycemia, hyperglycemia, fatigue, polyuria, polydipsia, paresthesias, vision changes, dizziness. Eye exam was done. Not following with podiatry. Patient is taking ACE inhibitor/ARB. Complications of diabetes include HLD. Hypertension-tolerating current regimen without chest pain, palpitations, dizziness, peripheral edema, dyspnea on exertion, orthopnea, paroxysmal nocturnal dyspnea. Hyperlipidemia-tolerating current regimen without myalgias, dyspepsia, jaundice. Mostly compliant with diet recommendations for low carb diet, not very compliant with exercise recommendations. Cardiovascular risk factors: advanced age (older than 54 for men, 72 for women), diabetes mellitus, dyslipidemia, hypertension and sedentary lifestyle  Continues to have dizzy spells - intermittent. Last week had gotten out of bed and went to kitchen, kitchen started spinning around her. Lasted a couple of minutes, had to hold on to the table. Headache   This is a recurrent problem. The current episode started more than 1 year ago. The problem occurs intermittently. The problem has been waxing and waning. The pain is located in the left unilateral region. The pain does not radiate. The quality of the pain is described as aching. The pain is moderate.  Pertinent negatives include no abdominal pain, abnormal behavior, anorexia, back pain, blurred vision, coughing, dizziness, drainage, ear pain, eye pain, eye redness, eye watering, facial sweating, fever, hearing loss, insomnia, loss of balance, muscle aches, nausea, neck pain, numbness, phonophobia, photophobia, rhinorrhea, scalp tenderness, seizures, sinus pressure, sore throat, swollen glands, tingling, tinnitus, visual change, vomiting, weakness or weight loss. Nothing aggravates the symptoms. She has tried acetaminophen for the symptoms. The treatment provided moderate relief. Patient's medications, allergies, past medical, surgical, social and family histories were reviewed and updated as appropriate.     Past Medical History:   Diagnosis Date    Arthritis     Cataract     Cataracts, bilateral     Cervical spondylosis with myelopathy     Depression     Diverticulosis of colon     Dysuria     Dysuria     Gastroesophageal reflux disease 2021    Gout     Headache(784.0)     Hematuria     History of colon polyps 2015    Insomnia     Lumbar stenosis with neurogenic claudication     Neck pain     JANN (obstructive sleep apnea)     Osteoarthrosis, unspecified whether generalized or localized, unspecified site 10/5/2012    Pelvic pain     Pure hypercholesterolemia 10/5/2012    Shoulder blade pain     right  side    Sinus problem     Stroke (HCC)     Tinnitus     Tubular adenoma of colon     Type II or unspecified type diabetes mellitus without mention of complication, not stated as uncontrolled 10/5/2012    Dr. Corey Zambrano last visit 2022 [address] Under care of team 2022    PCP: Dr. Corey Zambrano, [facility], last visit 2022    Unspecified essential hypertension 10/5/2012    Dr. Arti Whiteside Unspecified sleep apnea     Wears dentures      Past Surgical History:   Procedure Laterality Date     SECTION      x2    COLONOSCOPY  06    Dr Frank Maurice  5 12 15    extensive diverticulosis, tubular adenoma    CYSTOSCOPY  2022    Bilateral Retrograde Pyelogram     CYSTOSCOPY Bilateral 3/9/2022    CYSTOSCOPY, RETROGRADE PYELOGRAM performed by Tessie Lepe MD at [address] N/A 2017    EPIDURAL STEROID INJECTION cervical performed by Nathanael Wilson MD at [address] N/A 2017    EPIDURAL STEROID INJECTION L5S1 performed by Amos Busby MD at [address] [facility] Bilateral 2019    EPIDURAL STEROID INJECTION BILATERAL S1 performed by Amos Busby MD at [facility] Left     Cataracts   [address] [facility]  10/1/06    NECK SURGERY  2011 &     NERVE BLOCK Bilateral 2020    Lumbar Facet L2-L5    NERVE SURGERY Bilateral 2019    BILATERAL LUMBAR FACET STEROID INJECTION L2-L5 performed by Amos Busby MD at [address] [place] [address]  2017    L5-S1 steroid injection    OTHER SURGICAL HISTORY  2017    VANITA L5-S1    OTHER SURGICAL HISTORY  10/01/2018    cervical steroid injection    OTHER SURGICAL HISTORY Bilateral 2019    EPIDURAL STEROID INJECTION BILATERAL S1 (Bilateral )    OTHER SURGICAL HISTORY  2021    lumbar VANITA    PAIN MANAGEMENT PROCEDURE Bilateral 2020    LUMBAR FACET L2-L5 performed by Amos Busby MD at [address] [place] [address] N/A 3/22/2021    CERVICAL EPIDURAL STEROID INJECTION C7-T1 performed by Amos Busby MD at [address] [place] [address] Bilateral 2021    BILATERAL L5 EPIDURAL STEROID INJECTION performed by Amos Busby MD at [address] AA&/STRD TFRML EPI LUMBAR/SACRAL 1 LEVEL Bilateral 2018    BILATERAL L4 VANITA performed by Amos Busby MD at [address] DX/THER SBST INTRLMNR CRV/THRC W/IMG GDN N/A 10/1/2018    EPIDURAL STEROID INJECTION CERVICAL C7-T1 performed by Amos Busby MD at [facility] History     Tobacco Use    Smoking status: Former Smoker     Packs/day: 0.50     Years: 15.00     Pack years: 7.50     Types: Cigarettes     Quit date: 10/5/2001     Years since quittin.6    Smokeless tobacco: Never Used   Substance Use Topics    Alcohol use:  Yes     Alcohol/week: 0.0 standard drinks     Comment: once every few months      Patient Active Problem List   Diagnosis    Osteoarthritis    Essential hypertension    Type 2 diabetes mellitus with diabetic neuropathy, without long-term current use of insulin (HCC)    Pure hypercholesterolemia    Constipation    Rectal pain    Diverticulosis of colon    Tubular adenoma of colon    History of colon polyps    Rectal pressure    Failed neck syndrome    Cervical stenosis of spine    Cervical radicular pain    Lumbar stenosis with neurogenic claudication    JANN on CPAP    Primary osteoarthritis involving multiple joints    Hx of cervical spine surgery    Chronic use of opiate drugs therapeutic purposes    Myelomalacia (HCC)    Lumbar facet joint syndrome    Gastroesophageal reflux disease    Sleeping difficulty         Prior to Visit Medications    Medication Sig Taking? Authorizing Provider   carvedilol (COREG) 3.125 MG tablet take 1 tablet by mouth twice a day Yes Connie Harmon MD   amitriptyline (ELAVIL) 25 MG tablet take 1 tablet by mouth nightly Yes Connie Harmon MD   gabapentin (NEURONTIN) 600 MG tablet take 1 tablet by mouth three times a day Yes Historical Provider, MD   cloNIDine (CATAPRES) 0.1 MG tablet take 1 tablet by mouth twice a day Yes YOLANDA Singletary - NP   zolpidem (AMBIEN) 10 MG tablet take 1/2 tablet by mouth nightly if needed for sleep Yes Connie Harmon MD   estradiol (ESTRACE VAGINAL) 0.1 MG/GM vaginal cream Place 1 g vaginally daily Apply daily for one week, then twice a week thereafter, apply peasize amount in the urethra Yes Mayra Velazco MD   Compression Stockings MISC 20-30 mm/hg Yes Brayden Barron DPM   Alcohol Swabs 70 % PADS 1 each by Does not apply route daily Yes Vianca Davila MD   Lancets MISC 1 each by Does not apply route daily Yes Vianca Davila MD   blood glucose monitor strips Check blood sugars daily as directed.  Yes Vianca Davila MD   fluticasone (FLONASE) 50 MCG/ACT nasal spray instill 1 spray into each nostril once daily  Patient taking differently: 1 spray by Nasal route as needed instill 1 spray into each nostril once daily Yes Marlene Paulino MD   tiZANidine (ZANAFLEX) 2 MG tablet Take 2 tablets by mouth at bedtime and once during the day if needed Yes Connie Ragsdale MD   metFORMIN (GLUCOPHAGE) 1000 MG tablet Take 1,000 mg by mouth daily Pt states she takes 1 a day Yes Historical Provider, MD   allopurinol (ZYLOPRIM) 100 MG tablet take 1 tablet by mouth once daily Yes Connie Ragsdale MD   pantoprazole (PROTONIX) 40 MG tablet take 1 tablet by mouth every morning BEFORE BREAKFAST Yes Connie Ragsdale MD   loratadine (CLARITIN) 10 MG tablet take 1 tablet by mouth once daily Yes Marlene Paulino MD   atorvastatin (LIPITOR) 40 MG tablet Take 1 tablet by mouth daily Yes Marlene Paulino MD   polyethylene glycol (GLYCOLAX) 17 GM/SCOOP powder take 17GM (DISSOLVED IN WATER) by mouth once daily Yes Marlene Paulino MD   blood glucose monitor kit and supplies use check blood sugar as directed Yes Connie Ragsdale MD   Lift Chair MISC by Does not apply route Yes Marlene Paulino MD   aspirin 325 MG tablet Take 325 mg by mouth daily Yes Historical Provider, MD     Review of Systems  Review of Systems   Constitutional: Negative for fever and weight loss. HENT: Negative for ear pain, hearing loss, rhinorrhea, sinus pressure, sore throat and tinnitus. Eyes: Negative for blurred vision, photophobia, pain and redness. Respiratory: Negative for cough. Gastrointestinal: Negative for abdominal pain, anorexia, nausea and vomiting. Musculoskeletal: Negative for back pain and neck pain. Neurological: Positive for headaches. Negative for dizziness, tingling, seizures, weakness, numbness and loss of balance. Psychiatric/Behavioral: The patient does not have insomnia. All other systems reviewed and are negative.          Objective:       Physical Exam:  /64   Pulse 69   Temp 98.4 °F (36.9 °C) (Temporal)   SpO2 97%   Wt Readings from Last 3 Encounters:   22 152 lb (68.9 kg)   22 152 lb (68.9 kg)   22 160 lb (72.6 kg)         Physical Exam  Vitals and nursing note reviewed. Constitutional:       General: She is not in acute distress. Appearance: She is well-developed. She is not ill-appearing. Eyes:      General: Lids are normal. Vision grossly intact. Cardiovascular:      Rate and Rhythm: Normal rate and regular rhythm. Heart sounds: Normal heart sounds, S1 normal and S2 normal. No murmur heard. No friction rub. No gallop. Pulmonary:      Effort: Pulmonary effort is normal. No respiratory distress. Breath sounds: Normal breath sounds. No wheezing. Abdominal:      General: Bowel sounds are normal.      Palpations: Abdomen is soft. There is no mass. Tenderness: There is no abdominal tenderness. There is no guarding. Musculoskeletal:         General: Normal range of motion. Right lower le+ Pitting Edema present. Left lower le+ Pitting Edema present. Skin:     General: Skin is warm and dry. Capillary Refill: Capillary refill takes less than 2 seconds. Neurological:      General: No focal deficit present. Mental Status: She is alert and oriented to person, place, and time. Data Review  No visits with results within 2 Month(s) from this visit.    Latest known visit with results is:   Admission on 2022, Discharged on 2022   Component Date Value    Ventricular Rate 2022 72     Atrial Rate 2022 72     P-R Interval 2022 142     QRS Duration 2022 102     Q-T Interval 2022 388     QTc Calculation (Bazett) 2022 424     P Axis 2022 54     R Axis 2022 -2     T Axis 2022 46     POC Creatinine 2022 0.65     GFR Comment 2022 >60     GFR Non- 2022 >60     GFR Comment 2022          POC BUN 2022 13     POC Glucose 2022 119*    POC Glucose 2022 117*     Lab Results   Component Value Date    CHOL 126 09/30/2020    TRIG 93 09/30/2020    HDL 60 10/07/2021     Lab Results   Component Value Date    LABA1C 6.7 (H) 10/07/2021     Lab Results   Component Value Date     10/07/2021     A1c 6.6% today        Assessment/Plan:      1. Drug induced constipation  - polyethylene glycol (GLYCOLAX) 17 GM/SCOOP powder; take 17GM (DISSOLVED IN WATER) by mouth once daily  Dispense: 510 g; Refill: 1    2. Pure hypercholesterolemia  - atorvastatin (LIPITOR) 40 MG tablet; Take 1 tablet by mouth daily  Dispense: 90 tablet; Refill: 1    3. Chronic non-seasonal allergic rhinitis    - loratadine (CLARITIN) 10 MG tablet; take 1 tablet by mouth once daily  Dispense: 90 tablet; Refill: 1  - fluticasone (FLONASE) 50 MCG/ACT nasal spray; instill 1 spray into each nostril once daily  Dispense: 16 g; Refill: 2    4. Gastroesophageal reflux disease, unspecified whether esophagitis present  - pantoprazole (PROTONIX) 40 MG tablet; take 1 tablet by mouth every morning BEFORE BREAKFAST  Dispense: 90 tablet; Refill: 1    5. Chronic gout of multiple sites, unspecified cause  - allopurinol (ZYLOPRIM) 100 MG tablet; take 1 tablet by mouth once daily  Dispense: 90 tablet; Refill: 1    6. Cervical radicular pain  - tiZANidine (ZANAFLEX) 2 MG tablet; Take 2 tablets by mouth at bedtime and once during the day if needed  Dispense: 90 tablet; Refill: 1    7. Type 2 diabetes mellitus with diabetic neuropathy, without long-term current use of insulin (HCC)  - metFORMIN (GLUCOPHAGE) 1000 MG tablet; Take 1 tablet by mouth daily Pt states she takes 1 a day  Dispense: 180 tablet; Refill: 1  - blood glucose monitor strips; Check blood sugars daily as directed. Dispense: 50 strip; Refill: 6  - Lancets MISC; 1 each by Does not apply route daily  Dispense: 100 each; Refill: 3  - Alcohol Swabs 70 % PADS; 1 each by Does not apply route daily  Dispense: 100 each;  Refill: 3  - POCT glycosylated hemoglobin (Hb A1C)    8. Essential hypertension  - cloNIDine (CATAPRES) 0.1 MG tablet; Take 1 tablet by mouth daily  Dispense: 180 tablet; Refill: 1    9. Myelomalacia (HCC)  Stable   F/u pain management     10. Left temporal headache  - CT HEAD WO CONTRAST; Future    11. Sleeping difficulty  Continue ambien     12. Primary osteoarthritis involving multiple joints  Continue medications per pain management     13. JANN on CPAP  Continue CPAP    14. Peripheral edema  - furosemide (LASIX) 20 MG tablet; Take 1 tablet by mouth daily for 3 days  Dispense: 3 tablet;  Refill: 0           Health Maintenance Due   Topic Date Due    Shingles vaccine (1 of 2) Never done       Electronically signed by Juanita Lemus MD on 5/9/2022 at 1:38 PM

## 2022-05-09 NOTE — PATIENT INSTRUCTIONS
Decrease the clonidine 0.1mg to once daily - take it when you wake up for the day. Check your blood pressure at home and send me your readings or call with the readings in about ten days. Keep track of your dizziness episodes as well and let me know when they occur. Continue carvedilol.    Use furosemide 20mg daily for three days to reduce the swelling in your legs

## 2022-05-13 DIAGNOSIS — R60.9 PERIPHERAL EDEMA: ICD-10-CM

## 2022-05-13 RX ORDER — FUROSEMIDE 20 MG/1
TABLET ORAL
Qty: 3 TABLET | Refills: 0 | OUTPATIENT
Start: 2022-05-13

## 2022-05-18 ENCOUNTER — OFFICE VISIT (OUTPATIENT)
Dept: PAIN MANAGEMENT | Age: 82
End: 2022-05-18
Payer: MEDICARE

## 2022-05-18 VITALS
DIASTOLIC BLOOD PRESSURE: 94 MMHG | HEIGHT: 62 IN | BODY MASS INDEX: 27.97 KG/M2 | SYSTOLIC BLOOD PRESSURE: 170 MMHG | HEART RATE: 82 BPM | OXYGEN SATURATION: 93 % | WEIGHT: 152 LBS

## 2022-05-18 DIAGNOSIS — M54.12 CERVICAL RADICULAR PAIN: ICD-10-CM

## 2022-05-18 DIAGNOSIS — Z79.891 CHRONIC USE OF OPIATE FOR THERAPEUTIC PURPOSE: ICD-10-CM

## 2022-05-18 DIAGNOSIS — M96.1 FAILED NECK SYNDROME: ICD-10-CM

## 2022-05-18 DIAGNOSIS — M47.816 LUMBAR FACET JOINT SYNDROME: Primary | ICD-10-CM

## 2022-05-18 DIAGNOSIS — M48.062 LUMBAR STENOSIS WITH NEUROGENIC CLAUDICATION: ICD-10-CM

## 2022-05-18 PROCEDURE — 1123F ACP DISCUSS/DSCN MKR DOCD: CPT | Performed by: NURSE PRACTITIONER

## 2022-05-18 PROCEDURE — G8399 PT W/DXA RESULTS DOCUMENT: HCPCS | Performed by: NURSE PRACTITIONER

## 2022-05-18 PROCEDURE — G8427 DOCREV CUR MEDS BY ELIG CLIN: HCPCS | Performed by: NURSE PRACTITIONER

## 2022-05-18 PROCEDURE — G8417 CALC BMI ABV UP PARAM F/U: HCPCS | Performed by: NURSE PRACTITIONER

## 2022-05-18 PROCEDURE — 1090F PRES/ABSN URINE INCON ASSESS: CPT | Performed by: NURSE PRACTITIONER

## 2022-05-18 PROCEDURE — 99213 OFFICE O/P EST LOW 20 MIN: CPT | Performed by: NURSE PRACTITIONER

## 2022-05-18 PROCEDURE — 4040F PNEUMOC VAC/ADMIN/RCVD: CPT | Performed by: NURSE PRACTITIONER

## 2022-05-18 PROCEDURE — 1036F TOBACCO NON-USER: CPT | Performed by: NURSE PRACTITIONER

## 2022-05-18 RX ORDER — HYDROCODONE BITARTRATE AND ACETAMINOPHEN 5; 325 MG/1; MG/1
1 TABLET ORAL EVERY 6 HOURS PRN
Qty: 120 TABLET | Refills: 0 | Status: SHIPPED | OUTPATIENT
Start: 2022-05-22 | End: 2022-08-22 | Stop reason: SDUPTHER

## 2022-05-18 ASSESSMENT — ENCOUNTER SYMPTOMS
DIARRHEA: 0
BACK PAIN: 1
CONSTIPATION: 0
WHEEZING: 0
VOMITING: 0
NAUSEA: 0
COUGH: 0
SHORTNESS OF BREATH: 0
SORE THROAT: 0

## 2022-05-18 NOTE — PROGRESS NOTES
Chief Complaint   Patient presents with    Neck Pain    Back Pain    Medication Refill     Norco       [facility]     history of chronic neck and chronic lower back pain  She is diagnosed with cervical spinal stenosis and lumbar spinal stenosis     Neck pain is located midline into upper trapezius. Reports constant bilat. finger/hand numbness, denies HA. Worse with ROM      Her back pain is minimal at this time but at times located in lower back pain in the lumbar area with radiation down both legs. aggravated with standing and walking and does notice bilat ankle and foot swelling at times     Patient is on chronic low-dose opioid therapy with Norco 5 mg twice a day along with gabapentin. Does take norco sparingly  Finds the medication helpful allowing her to do daily life activities and be independent  She has tried conservative measures with therapy and injections in past  Not a candidate for NSAIDs because of renal insufficiency and advanced age     Procedures  4/29/21  BILATERAL L5 EPIDURAL STEROID INJECTION reports 70% relief with improved function for 2 weeks      3/22/21  CERVICAL EPIDURAL STEROID INJECTION C7-T1 more than 50% relief     Feels pain is well controlled and declined injections       HPI:   Neck Pain   This is a chronic problem. The current episode started more than 1 year ago. The problem occurs intermittently. The problem has been unchanged. The pain is associated with nothing. The pain is present in the left side, midline and right side. The quality of the pain is described as aching. The pain is at a severity of 5/10. The pain is mild. Nothing aggravates the symptoms. Associated symptoms include leg pain and numbness. Pertinent negatives include no chest pain or fever. She has tried oral narcotics for the symptoms. The treatment provided moderate relief. Back Pain  This is a chronic problem. The current episode started more than 1 year ago. The problem occurs intermittently.  The problem is unchanged. The pain is present in the lumbar spine. The quality of the pain is described as aching. The pain radiates to the left foot and right foot. The pain is at a severity of 5/10. The pain is moderate. The pain is worse during the day. The symptoms are aggravated by position and standing (walking). Associated symptoms include leg pain, numbness and paresthesias. Pertinent negatives include no chest pain or fever. Risk factors include menopause, obesity, poor posture and lack of exercise. She has tried analgesics for the symptoms. The treatment provided moderate relief. Medication Refill: Norco    Pain score Today:  5  Adverse effects (Constipation / Nausea / Sedation / sexual Dysfunction / others) : no  Mood: good  Sleep pattern and quality: poor  Activity level: poor    Pill count Today:17 from 2/22 fill   Last dose taken 05/18/2022 PM  OARRS report reviewed today: yes  ER/Hospitalizations/PCP visit related to pain since last visit:no   Any legal problems e.g. DUI etc.:No  Satisfied with current management: Yes    Opioid Contract:02/03/2021  Last Urine Dug screen dated:3/22/22    Lab Results   Component Value Date    LABA1C 6.6 05/09/2022     Lab Results   Component Value Date     10/07/2021       Past Medical History, Past Surgical History, Social History, Allergies and Medications reviewed and updated in EPIC as indicated    Family History reviewed and is noncontributory. Controlled Substance Monitoring:    Acute and Chronic Pain Monitoring:   RX Monitoring 5/18/2022   Attestation -   Periodic Controlled Substance Monitoring Possible medication side effects, risk of tolerance/dependence & alternative treatments discussed. ;No signs of potential drug abuse or diversion identified.;Obtaining appropriate analgesic effect of treatment. ;Assessed functional status.    Chronic Pain > 50 MEDD -   Chronic Pain > 80 MEDD -         Periodic Controlled Substance Monitoring: Possible medication side effects, risk of tolerance/dependence & alternative treatments discussed. ,No signs of potential drug abuse or diversion identified. ,Obtaining appropriate analgesic effect of treatment. ,Assessed functional status.  Bradley Craig, APRN - CNP)      Past Medical History:   Diagnosis Date    Arthritis     Cataract     Cataracts, bilateral     Cervical spondylosis with myelopathy     Depression     Diverticulosis of colon     Dysuria     Dysuria     Gastroesophageal reflux disease 2021    Gout     Headache(784.0)     Hematuria     History of colon polyps     Insomnia     Lumbar stenosis with neurogenic claudication     Neck pain     JANN (obstructive sleep apnea)     Osteoarthrosis, unspecified whether generalized or localized, unspecified site 10/5/2012    Pelvic pain     Pure hypercholesterolemia 10/5/2012    Shoulder blade pain     right  side    Sinus problem     Stroke (HCC)     Tinnitus     Tubular adenoma of colon     Type II or unspecified type diabetes mellitus without mention of complication, not stated as uncontrolled 10/5/2012    Dr. Migue Lockett last visit 2022 [address] Under care of team 2022    PCP: Dr. Migue Lockett, [facility], last visit 2022    Unspecified essential hypertension 10/5/2012    Dr. Marjorie Martino Unspecified sleep apnea     Wears dentures        Past Surgical History:   Procedure Laterality Date     SECTION      x2    COLONOSCOPY  06    Dr Thony Gautam  5 12 15    extensive diverticulosis, tubular adenoma    CYSTOSCOPY  2022    Bilateral Retrograde Pyelogram     CYSTOSCOPY Bilateral 3/9/2022    CYSTOSCOPY, RETROGRADE PYELOGRAM performed by Brandon Lenz MD at [address] N/A 2017    EPIDURAL STEROID INJECTION cervical performed by Jessica Johnson MD at [address] N/A 2017    EPIDURAL STEROID INJECTION L5S1 performed by Roland Miller Adolfo Angeles MD at [address] Bilateral 5/20/2019    EPIDURAL STEROID INJECTION BILATERAL S1 performed by Aramis Matamoros MD at [address] Left     Cataracts   [address] [facility] [address]  10/1/06    NECK SURGERY  5/2011 & 2001    NERVE BLOCK Bilateral 02/17/2020    Lumbar Facet L2-L5    NERVE SURGERY Bilateral 9/5/2019    BILATERAL LUMBAR FACET STEROID INJECTION L2-L5 performed by Aramis Matamoros MD at [place] [address]  08/25/2017    L5-S1 steroid injection    OTHER SURGICAL HISTORY  11/20/2017    VANITA L5-S1    OTHER SURGICAL HISTORY  10/01/2018    cervical steroid injection    OTHER SURGICAL HISTORY Bilateral 05/20/2019    EPIDURAL STEROID INJECTION BILATERAL S1 (Bilateral )    OTHER SURGICAL HISTORY  04/29/2021    lumbar VANITA    PAIN MANAGEMENT PROCEDURE Bilateral 2/17/2020    LUMBAR FACET L2-L5 performed by Aramis Matamoros MD at [address] [place] [address] N/A 3/22/2021    CERVICAL EPIDURAL STEROID INJECTION C7-T1 performed by Aramis Matamoros MD at [address] [place] [address] Bilateral 4/29/2021    BILATERAL L5 EPIDURAL STEROID INJECTION performed by Aramis Matamoros MD at [address] AA&/STRD TFRML EPI LUMBAR/SACRAL 1 LEVEL Bilateral 11/19/2018    BILATERAL L4 VANITA performed by Aramis Matamoros MD at [address] DX/THER SBST INTRLMNR CRV/THRC W/IMG GDN N/A 10/1/2018    EPIDURAL STEROID INJECTION CERVICAL C7-T1 performed by Aramis Matamoros MD at [address] [place] [address]       No Known Allergies      Current Outpatient Medications:     [START ON 5/22/2022] HYDROcodone-acetaminophen (NORCO) 5-325 MG per tablet, Take 1 tablet by mouth every 6 hours as needed for Pain for up to 30 days. , Disp: 120 tablet, Rfl: 0    polyethylene glycol (GLYCOLAX) 17 GM/SCOOP powder, take 17GM (DISSOLVED IN WATER) by mouth once daily, Disp: 510 g, Rfl: 1    atorvastatin (LIPITOR) 40 MG tablet, Take 1 tablet by mouth daily, Disp: 90 tablet, Rfl: 1    loratadine (CLARITIN) 10 MG tablet, take 1 tablet by mouth once daily, Disp: 90 tablet, Rfl: 1    pantoprazole (PROTONIX) 40 MG tablet, take 1 tablet by mouth every morning BEFORE BREAKFAST, Disp: 90 tablet, Rfl: 1    allopurinol (ZYLOPRIM) 100 MG tablet, take 1 tablet by mouth once daily, Disp: 90 tablet, Rfl: 1    metFORMIN (GLUCOPHAGE) 1000 MG tablet, Take 1 tablet by mouth daily Pt states she takes 1 a day, Disp: 180 tablet, Rfl: 1    tiZANidine (ZANAFLEX) 2 MG tablet, Take 2 tablets by mouth at bedtime and once during the day if needed, Disp: 90 tablet, Rfl: 1    fluticasone (FLONASE) 50 MCG/ACT nasal spray, instill 1 spray into each nostril once daily, Disp: 16 g, Rfl: 2    blood glucose monitor strips, Check blood sugars daily as directed., Disp: 50 strip, Rfl: 6    Lancets MISC, 1 each by Does not apply route daily, Disp: 100 each, Rfl: 3    Alcohol Swabs 70 % PADS, 1 each by Does not apply route daily, Disp: 100 each, Rfl: 3    cloNIDine (CATAPRES) 0.1 MG tablet, Take 1 tablet by mouth daily, Disp: 180 tablet, Rfl: 1    furosemide (LASIX) 20 MG tablet, Take 1 tablet by mouth daily for 3 days, Disp: 3 tablet, Rfl: 0    carvedilol (COREG) 3.125 MG tablet, take 1 tablet by mouth twice a day, Disp: 180 tablet, Rfl: 1    amitriptyline (ELAVIL) 25 MG tablet, take 1 tablet by mouth nightly, Disp: 90 tablet, Rfl: 1    gabapentin (NEURONTIN) 600 MG tablet, take 1 tablet by mouth three times a day, Disp: , Rfl:     zolpidem (AMBIEN) 10 MG tablet, take 1/2 tablet by mouth nightly if needed for sleep, Disp: 15 tablet, Rfl: 1    estradiol (ESTRACE VAGINAL) 0.1 MG/GM vaginal cream, Place 1 g vaginally daily Apply daily for one week, then twice a week thereafter, apply peasize amount in the urethra, Disp: 42.5 g, Rfl: 1    Compression Stockings MISC, 20-30 mm/hg, Disp: 2 each, Rfl: 0    blood glucose monitor kit and supplies, use check blood sugar as directed, Disp: 1 kit, Rfl: 0    Lift Chair MISC, by Does not apply route, Disp: 1 each, Rfl: 0    aspirin 325 MG tablet, Take 325 mg by mouth daily, Disp: , Rfl:     Family History   Problem Relation Age of Onset    Breast Cancer Sister     Cancer Sister         liver    Diabetes Daughter        Social History     Socioeconomic History    Marital status: Single     Spouse name: Not on file    Number of children: Not on file    Years of education: Not on file    Highest education level: Not on file   Occupational History    Not on file   Tobacco Use    Smoking status: Former Smoker     Packs/day: 0.50     Years: 15.00     Pack years: 7.50     Types: Cigarettes     Quit date: 10/5/2001     Years since quittin.6    Smokeless tobacco: Never Used   Vaping Use    Vaping Use: Never used   Substance and Sexual Activity    Alcohol use: Yes     Alcohol/week: 0.0 standard drinks     Comment: once every few months    Drug use: No    Sexual activity: Not Currently   Other Topics Concern    Not on file   Social History Narrative    Not on file     Social Determinants of Health     Financial Resource Strain: Low Risk     Difficulty of Paying Living Expenses: Not very hard   Food Insecurity: No Food Insecurity    Worried About Running Out of Food in the Last Year: Never true    Mai of Food in the Last Year: Never true   Transportation Needs:     Lack of Transportation (Medical): Not on file    Lack of Transportation (Non-Medical):  Not on file   Physical Activity:     Days of Exercise per Week: Not on file    Minutes of Exercise per Session: Not on file   Stress:     Feeling of Stress : Not on file   Social Connections:     Frequency of Communication with Friends and Family: Not on file    Frequency of Social Gatherings with Friends and Family: Not on file    Attends Zoroastrian Services: Not on file    Active Member of Clubs or Organizations: Not on file    Attends Club or Organization Meetings: Not on file    Marital Status: Not on file   Intimate Partner Violence:     Fear of Current or Ex-Partner: Not on file    Emotionally Abused: Not on file    Physically Abused: Not on file    Sexually Abused: Not on file   Housing Stability:     Unable to Pay for Housing in the Last Year: Not on file    Number of [place] in the Last Year: Not on file    Unstable Housing in the Last Year: Not on file       Review of Systems:  Review of Systems   Constitutional: Negative for chills and fever. HENT: Negative for congestion and sore throat. Cardiovascular: Negative for chest pain. Respiratory: Negative for cough, shortness of breath and wheezing. Musculoskeletal: Positive for back pain and neck pain. Gastrointestinal: Negative for constipation, diarrhea, nausea and vomiting. Neurological: Positive for numbness and paresthesias. Physical Exam:  BP (!) 170/94   Pulse 82   Ht 5' 2\" (1.575 m)   Wt 152 lb (68.9 kg)   SpO2 93%   BMI 27.80 kg/m²     Physical Exam  Cardiovascular:      Rate and Rhythm: Normal rate. Pulmonary:      Effort: Pulmonary effort is normal.   Musculoskeletal:         General: Normal range of motion. Skin:     General: Skin is warm and dry. Neurological:      Mental Status: She is alert and oriented to person, place, and time.              Assessment:  Problem List Items Addressed This Visit     Failed neck syndrome (Chronic)    Relevant Medications    HYDROcodone-acetaminophen (NORCO) 5-325 MG per tablet (Start on 5/22/2022)    Cervical radicular pain (Chronic)    Relevant Medications    HYDROcodone-acetaminophen (NORCO) 5-325 MG per tablet (Start on 5/22/2022)    Lumbar stenosis with neurogenic claudication (Chronic)    Relevant Medications    HYDROcodone-acetaminophen (NORCO) 5-325 MG per tablet (Start on 5/22/2022)    Lumbar facet joint syndrome - Primary (Chronic)    Relevant Medications    HYDROcodone-acetaminophen (NORCO) 5-325 MG per tablet (Start on 5/22/2022)      Other Visit Diagnoses Chronic use of opiate for therapeutic purpose                 Treatment Plan:  Patient relates current medications are helping the pain. Patient reports taking pain medications as prescribed, denies obtaining medications from different sources and denies use of illegal drugs. Patient denies side effects from medications like nausea, vomiting, constipation or drowsiness. Patient reports current activities of daily living are possible due to medications and would like to continue them. As always, we encourage daily stretching and strengthening exercises, and recommend minimizing use of pain medications unless patient cannot get through daily activities due to pain. Contract requirements met. Continue opioid therapy. Script written for norco  Follow up appointment made for 8 weeks    I have reviewed the chief complaint and history of present illness (including ROS and PFSH) and vital documentation by my staff and I agree with their documentation and have added where applicable.

## 2022-05-31 DIAGNOSIS — G47.9 SLEEPING DIFFICULTY: ICD-10-CM

## 2022-05-31 RX ORDER — ZOLPIDEM TARTRATE 10 MG/1
TABLET ORAL
Qty: 15 TABLET | Refills: 1 | Status: SHIPPED | OUTPATIENT
Start: 2022-05-31 | End: 2022-06-14

## 2022-05-31 NOTE — TELEPHONE ENCOUNTER
Last visit: 05/09/2022  Last Med refill: 03/22/2022  Does patient have enough medication for 72 hours: No:     Next Visit Date:  Future Appointments   Date Time Provider Pradeep Alexander   6/9/2022  7:30 PM STAZ SLEEP RM 5 [facility] [place]   6/11/2022  9:00 AM STA CT SCAN RM 1 STAZ CT SCAN [facility]iolog   6/27/2022 10:30 AM Pat Crawford DPM [facility]   7/15/2022 10:00 AM YOLANDA Santa - CNP [facility]   9/12/2022 10:15 AM Connie Kirkland  [address] [facility]   Topic Date Due    Shingles vaccine (1 of 2) Never done    DTaP/Tdap/Td vaccine (1 - Tdap) 10/18/2026 (Originally 5/28/1959)    Lipids  10/07/2022    Depression Screen  12/13/2022    Annual Wellness Visit (AWV)  12/14/2022    DEXA (modify frequency per FRAX score)  Completed    Flu vaccine  Completed    Pneumococcal 65+ years Vaccine  Completed    COVID-19 Vaccine  Completed    Hepatitis A vaccine  Aged Out    Hib vaccine  Aged Out    Meningococcal (ACWY) vaccine  Aged Out       Hemoglobin A1C (%)   Date Value   05/09/2022 6.6   10/07/2021 6.7 (H)   09/30/2020 5.7             ( goal A1C is < 7)   Microalb/Crt.  Ratio (mcg/mg creat)   Date Value   10/06/2020 CANNOT BE CALCULATED     LDL Cholesterol (mg/dL)   Date Value   10/07/2021 48   09/30/2020 48       (goal LDL is <100)   AST (U/L)   Date Value   10/07/2021 16     ALT (U/L)   Date Value   10/07/2021 9     BUN (mg/dL)   Date Value   10/07/2021 16     BP Readings from Last 3 Encounters:   05/18/22 (!) 170/94   05/09/22 106/64   03/17/22 (!) 175/90          (goal 120/80)    All Future Testing planned in [facility]  Lab Frequency Next Occurrence   DRUG SCREEN, PAIN Once 03/17/2022   CT HEAD WO CONTRAST Once 05/09/2022               Patient Active Problem List:     Osteoarthritis     Essential hypertension     Type 2 diabetes mellitus with diabetic neuropathy, without long-term current use of insulin (HCC)     Pure hypercholesterolemia Constipation     Rectal pain     Diverticulosis of colon     Tubular adenoma of colon     History of colon polyps     Rectal pressure     Failed neck syndrome     Cervical stenosis of spine     Cervical radicular pain     Lumbar stenosis with neurogenic claudication     JANN on CPAP     Primary osteoarthritis involving multiple joints     Hx of cervical spine surgery     Chronic use of opiate drugs therapeutic purposes     Myelomalacia (HCC)     Lumbar facet joint syndrome     Gastroesophageal reflux disease     Sleeping difficulty

## 2022-06-01 RX ORDER — GABAPENTIN 600 MG/1
TABLET ORAL
Qty: 90 TABLET | Refills: 2 | Status: SHIPPED | OUTPATIENT
Start: 2022-06-01 | End: 2022-10-14

## 2022-06-01 NOTE — TELEPHONE ENCOUNTER
Last visit: 05/09/2022  Last Med refill: 04/13/2022-by historical provider  Does patient have enough medication for 72 hours: No:     Next Visit Date:  Future Appointments   Date Time Provider Pradeep Alexander   6/9/2022  7:30 PM STAZ SLEEP RM 5 [facility] [place] [facility]   6/11/2022  9:00 AM STA CT SCAN RM 1 STAZ CT SCAN STA Radiolog   6/27/2022 10:30 AM Ramandeep Chinchilla DPM [facility]   7/15/2022 10:00 AM YOLANDA Nichols - CNP [facility]   9/12/2022 10:15 AM Connie Figueroa  [address] Maintenance   Topic Date Due    Shingles vaccine (1 of 2) Never done    DTaP/Tdap/Td vaccine (1 - Tdap) 10/18/2026 (Originally 5/28/1959)    Lipids  10/07/2022    Depression Screen  12/13/2022    Annual Wellness Visit (AWV)  12/14/2022    DEXA (modify frequency per FRAX score)  Completed    Flu vaccine  Completed    Pneumococcal 65+ years Vaccine  Completed    COVID-19 Vaccine  Completed    Hepatitis A vaccine  Aged Out    Hib vaccine  Aged Out    Meningococcal (ACWY) vaccine  Aged Out       Hemoglobin A1C (%)   Date Value   05/09/2022 6.6   10/07/2021 6.7 (H)   09/30/2020 5.7             ( goal A1C is < 7)   Microalb/Crt.  Ratio (mcg/mg creat)   Date Value   10/06/2020 CANNOT BE CALCULATED     LDL Cholesterol (mg/dL)   Date Value   10/07/2021 48   09/30/2020 48       (goal LDL is <100)   AST (U/L)   Date Value   10/07/2021 16     ALT (U/L)   Date Value   10/07/2021 9     BUN (mg/dL)   Date Value   10/07/2021 16     BP Readings from Last 3 Encounters:   05/18/22 (!) 170/94   05/09/22 106/64   03/17/22 (!) 175/90          (goal 120/80)    All Future Testing planned in [facility]  Lab Frequency Next Occurrence   DRUG SCREEN, PAIN Once 03/17/2022   CT HEAD WO CONTRAST Once 05/09/2022               Patient Active Problem List:     Osteoarthritis     Essential hypertension     Type 2 diabetes mellitus with diabetic neuropathy, without long-term current use of insulin ([facility] [address]     Pure hypercholesterolemia     Constipation     Rectal pain     Diverticulosis of colon     Tubular adenoma of colon     History of colon polyps     Rectal pressure     Failed neck syndrome     Cervical stenosis of spine     Cervical radicular pain     Lumbar stenosis with neurogenic claudication     JANN on CPAP     Primary osteoarthritis involving multiple joints     Hx of cervical spine surgery     Chronic use of opiate drugs therapeutic purposes     Myelomalacia (HCC)     Lumbar facet joint syndrome     Gastroesophageal reflux disease     Sleeping difficulty

## 2022-06-09 ENCOUNTER — HOSPITAL ENCOUNTER (OUTPATIENT)
Dept: SLEEP CENTER | Age: 82
Discharge: HOME | End: 2022-06-11
Payer: MEDICARE

## 2022-06-09 PROCEDURE — 95811 POLYSOM 6/>YRS CPAP 4/> PARM: CPT

## 2022-06-13 ENCOUNTER — HOSPITAL ENCOUNTER (OUTPATIENT)
Dept: CT IMAGING | Age: 82
Discharge: HOME | End: 2022-06-15
Payer: MEDICARE

## 2022-06-13 DIAGNOSIS — R51.9 LEFT TEMPORAL HEADACHE: ICD-10-CM

## 2022-06-13 PROCEDURE — 70450 CT HEAD/BRAIN W/O DYE: CPT

## 2022-06-14 DIAGNOSIS — G47.9 SLEEPING DIFFICULTY: ICD-10-CM

## 2022-06-14 LAB — STATUS: NORMAL

## 2022-06-14 RX ORDER — ZOLPIDEM TARTRATE 10 MG/1
TABLET ORAL
Qty: 15 TABLET | Refills: 1 | Status: SHIPPED | OUTPATIENT
Start: 2022-06-14 | End: 2022-07-13 | Stop reason: SDUPTHER

## 2022-06-14 NOTE — TELEPHONE ENCOUNTER
Last visit: 05/18/22  Last Med refill: 04/26/22  Does patient have enough medication for 72 hours: Yes    Next Visit Date:  Future Appointments   Date Time Provider Pradeep Alexander   6/27/2022 10:30 AM Holland Nugent DPM [facility]   7/15/2022 10:00 AM YOLANDA Hale - CNP [facility]   9/12/2022 10:15 AM Connie Wong  [address] [facility]   Topic Date Due    Shingles vaccine (1 of 2) Never done    DTaP/Tdap/Td vaccine (1 - Tdap) 10/18/2026 (Originally 5/28/1959)    Lipids  10/07/2022    Depression Screen  12/13/2022    Annual Wellness Visit (AWV)  12/14/2022    DEXA (modify frequency per FRAX score)  Completed    Flu vaccine  Completed    Pneumococcal 65+ years Vaccine  Completed    COVID-19 Vaccine  Completed    Hepatitis A vaccine  Aged Out    Hib vaccine  Aged Out    Meningococcal (ACWY) vaccine  Aged Out       Hemoglobin A1C (%)   Date Value   05/09/2022 6.6   10/07/2021 6.7 (H)   09/30/2020 5.7             ( goal A1C is < 7)   Microalb/Crt.  Ratio (mcg/mg creat)   Date Value   10/06/2020 CANNOT BE CALCULATED     LDL Cholesterol (mg/dL)   Date Value   10/07/2021 48   09/30/2020 48       (goal LDL is <100)   AST (U/L)   Date Value   10/07/2021 16     ALT (U/L)   Date Value   10/07/2021 9     BUN (mg/dL)   Date Value   10/07/2021 16     BP Readings from Last 3 Encounters:   05/18/22 (!) 170/94   05/09/22 106/64   03/17/22 (!) 175/90          (goal 120/80)    All Future Testing planned in [facility]  Lab Frequency Next Occurrence   DRUG SCREEN, PAIN Once 03/17/2022               Patient Active Problem List:     Osteoarthritis     Essential hypertension     Type 2 diabetes mellitus with diabetic neuropathy, without long-term current use of insulin (HCC)     Pure hypercholesterolemia     Constipation     Rectal pain     Diverticulosis of colon     Tubular adenoma of colon     History of colon polyps     Rectal pressure     Failed neck syndrome     Cervical stenosis of spine     Cervical radicular pain     Lumbar stenosis with neurogenic claudication     JANN on CPAP     Primary osteoarthritis involving multiple joints     Hx of cervical spine surgery     Chronic use of opiate drugs therapeutic purposes     Myelomalacia (HCC)     Lumbar facet joint syndrome     Gastroesophageal reflux disease     Sleeping difficulty

## 2022-07-11 ENCOUNTER — TELEPHONE (OUTPATIENT)
Dept: FAMILY MEDICINE CLINIC | Age: 82
End: 2022-07-11

## 2022-07-11 DIAGNOSIS — G47.9 SLEEPING DIFFICULTY: Primary | ICD-10-CM

## 2022-07-11 NOTE — TELEPHONE ENCOUNTER
Patient called with concern for her Ambien. She stated she will be running out, and she spoke to her pharmacy and insurance will not pay for it until 7/19/22. Requested a fill list from Pharmacy-scanned into chart. Patient stated she is the only one who handles her medication. Asked patient if there may have been days were she forgot she took it and took another. She stated \"its possible\" patient knows she is only supposed to take 1/2 of a pill. She stated she is even willing to pay out of pocket because she is afraid that she wont get any sleep. She has 1 pill left, which will give her 2 days. Patient informed pharmacy will probably need a verbal from our office and Dr. Zachary Thomas is out of office until 7/13/22.

## 2022-07-13 RX ORDER — ZOLPIDEM TARTRATE 5 MG/1
5 TABLET ORAL NIGHTLY PRN
Qty: 30 TABLET | Refills: 1 | Status: SHIPPED | OUTPATIENT
Start: 2022-07-13 | End: 2022-10-05

## 2022-07-13 NOTE — TELEPHONE ENCOUNTER
Ok to give one time refill. Would it be easier for her to take the 5mg pills of the Ambien rather than splitting the 10mg pills as I have offered her before? Less chance of making errors.

## 2022-07-22 ENCOUNTER — TELEPHONE (OUTPATIENT)
Dept: ADMINISTRATIVE | Age: 82
End: 2022-07-22

## 2022-07-22 ENCOUNTER — TELEMEDICINE (OUTPATIENT)
Dept: PAIN MANAGEMENT | Age: 82
End: 2022-07-22
Payer: MEDICARE

## 2022-07-22 DIAGNOSIS — M47.816 LUMBAR FACET JOINT SYNDROME: Primary | ICD-10-CM

## 2022-07-22 DIAGNOSIS — M54.12 CERVICAL RADICULAR PAIN: ICD-10-CM

## 2022-07-22 DIAGNOSIS — M96.1 FAILED NECK SYNDROME: ICD-10-CM

## 2022-07-22 DIAGNOSIS — Z79.891 CHRONIC USE OF OPIATE FOR THERAPEUTIC PURPOSE: ICD-10-CM

## 2022-07-22 DIAGNOSIS — M48.062 LUMBAR STENOSIS WITH NEUROGENIC CLAUDICATION: ICD-10-CM

## 2022-07-22 PROCEDURE — 99213 OFFICE O/P EST LOW 20 MIN: CPT | Performed by: NURSE PRACTITIONER

## 2022-07-22 PROCEDURE — 1123F ACP DISCUSS/DSCN MKR DOCD: CPT | Performed by: NURSE PRACTITIONER

## 2022-07-22 RX ORDER — HYDROCODONE BITARTRATE AND ACETAMINOPHEN 5; 325 MG/1; MG/1
1 TABLET ORAL EVERY 6 HOURS PRN
Qty: 120 TABLET | Refills: 0 | Status: CANCELLED | OUTPATIENT
Start: 2022-07-22 | End: 2022-08-21

## 2022-07-22 ASSESSMENT — ENCOUNTER SYMPTOMS
SHORTNESS OF BREATH: 1
NAUSEA: 0
DIARRHEA: 0
BACK PAIN: 1
COUGH: 1
WHEEZING: 0
CONSTIPATION: 0

## 2022-07-22 NOTE — TELEPHONE ENCOUNTER
Patient states she is not feeling well, and would like to have virtual, unable to reach office for approval, please call [phone]

## 2022-07-22 NOTE — PROGRESS NOTES
Chief Complaint   Patient presents with    Back Pain    Medication Refill     Norco         VV today d/t not feeling well \"dizzy and shaking\"  plans to check blood sugar and call PCP if needed    PMH     history of chronic neck and chronic lower back pain  She is diagnosed with cervical spinal stenosis and lumbar spinal stenosis     Neck pain is located midline into upper trapezius. Reports constant bilat. finger/hand numbness, denies HA. Worse with ROM      Her back pain is minimal at this time but at times located in lower back pain in the lumbar area with radiation down both legs. aggravated with standing and walking and does notice bilat ankle and foot swelling at times     Patient is on chronic low-dose opioid therapy with Norco 5 mg twice a day along with gabapentin. Does take norco sparingly  Finds the medication helpful allowing her to do daily life activities and be independent  She has tried conservative measures with therapy and injections in past  Not a candidate for NSAIDs because of renal insufficiency and advanced age     Procedures  4/29/21  BILATERAL L5 EPIDURAL STEROID INJECTION reports 70% relief with improved function for 2 weeks     3/22/21  CERVICAL EPIDURAL STEROID INJECTION C7-T1 more than 50% relief     Feels pain is well controlled and declined injections     HPI:   Back Pain  This is a chronic problem. The current episode started more than 1 year ago. The problem is unchanged. The pain is present in the lumbar spine. The quality of the pain is described as aching. The pain does not radiate. The pain is at a severity of 5/10. The symptoms are aggravated by position, sitting and standing. Associated symptoms include numbness (fingers) and paresthesias. Pertinent negatives include no chest pain, fever or headaches. Risk factors include menopause, obesity, poor posture, sedentary lifestyle and lack of exercise. She has tried analgesics for the symptoms.  The treatment provided mild relief. Neck Pain   This is a chronic problem. The current episode started more than 1 year ago. The problem occurs constantly. The problem has been unchanged. The pain is associated with nothing. The pain is present in the midline, right side and left side. The quality of the pain is described as aching. The pain is Same all the time. Associated symptoms include numbness (fingers). Pertinent negatives include no chest pain, fever or headaches. She has tried muscle relaxants and oral narcotics for the symptoms. The treatment provided mild relief. Medication Refill:     Pain score Today:  5  Adverse effects (Constipation / Nausea / Sedation / sexual Dysfunction / others) : no  Mood: good  Sleep pattern and quality: fair  Activity level: poor    Pill count Today: Norco # 25 per pt from May refill  Last dose taken  07/21/2022  OARRS report reviewed today: yes  ER/Hospitalizations/PCP visit related to pain since last visit:no   Any legal problems e.g. DUI etc.:No  Satisfied with current management: Yes    Opioid Contract:02/03/2021  Last Urine Dug screen dated:3/22/22      Lab Results   Component Value Date    LABA1C 6.6 05/09/2022     Lab Results   Component Value Date     10/07/2021       Past Medical History, Past Surgical History, Social History, Allergies and Medications reviewed and updated in EPIC as indicated    Family History reviewed and is noncontributory. Controlled Substance Monitoring:    Acute and Chronic Pain Monitoring:   RX Monitoring 7/22/2022   Attestation -   Periodic Controlled Substance Monitoring Possible medication side effects, risk of tolerance/dependence & alternative treatments discussed. ;No signs of potential drug abuse or diversion identified. ;Assessed functional status. ;Obtaining appropriate analgesic effect of treatment.    Chronic Pain > 50 MEDD -   Chronic Pain > 80 MEDD -        Periodic Controlled Substance Monitoring: Possible medication side effects, risk of Bilateral 5/20/2019    EPIDURAL STEROID INJECTION BILATERAL S1 performed by Nicolas Cerrato MD at [address]  10/1/06    NECK SURGERY  5/2011 & 2001    NERVE BLOCK Bilateral 02/17/2020    Lumbar Facet L2-L5    NERVE SURGERY Bilateral 9/5/2019    BILATERAL LUMBAR FACET STEROID INJECTION L2-L5 performed by Nicolas Cerrato MD at [address]  08/25/2017    L5-S1 steroid injection    OTHER SURGICAL HISTORY  11/20/2017    VANITA L5-S1    OTHER SURGICAL HISTORY  10/01/2018    cervical steroid injection    OTHER SURGICAL HISTORY Bilateral 05/20/2019    EPIDURAL STEROID INJECTION BILATERAL S1 (Bilateral )    OTHER SURGICAL HISTORY  04/29/2021    lumbar VANITA    PAIN MANAGEMENT PROCEDURE Bilateral 2/17/2020    LUMBAR FACET L2-L5 performed by Nicolas Cerrato MD at [facility] N/A 3/22/2021    CERVICAL EPIDURAL STEROID INJECTION C7-T1 performed by Nicolas Cerrato MD at [facility] Bilateral 4/29/2021    BILATERAL L5 EPIDURAL STEROID INJECTION performed by Nicolas Cerrato MD at [facility] AA&/STRD TFRML EPI LUMBAR/SACRAL 1 LEVEL Bilateral 11/19/2018    BILATERAL L4 VANITA performed by Nicolas Cerrato MD at [facility] DX/THER SBST INTRLMNR CRV/THRC W/IMG GDN N/A 10/1/2018    EPIDURAL STEROID INJECTION CERVICAL C7-T1 performed by Nicolas Cerrato MD at [address] [place] [address]       No Known Allergies      Current Outpatient Medications:     zolpidem (AMBIEN) 5 MG tablet, Take 1 tablet by mouth nightly as needed for Sleep for up to 60 days. , Disp: 30 tablet, Rfl: 1    gabapentin (NEURONTIN) 600 MG tablet, take 1 tablet by mouth three times a day, Disp: 90 tablet, Rfl: 2    polyethylene glycol (GLYCOLAX) 17 GM/SCOOP powder, take 17GM (DISSOLVED IN WATER) by mouth once daily, Disp: 510 g, Rfl: 1    atorvastatin (LIPITOR) 40 MG tablet, Take 1 tablet by mouth daily, Disp: 90 tablet, Rfl: 1 loratadine (CLARITIN) 10 MG tablet, take 1 tablet by mouth once daily, Disp: 90 tablet, Rfl: 1    pantoprazole (PROTONIX) 40 MG tablet, take 1 tablet by mouth every morning BEFORE BREAKFAST, Disp: 90 tablet, Rfl: 1    allopurinol (ZYLOPRIM) 100 MG tablet, take 1 tablet by mouth once daily, Disp: 90 tablet, Rfl: 1    metFORMIN (GLUCOPHAGE) 1000 MG tablet, Take 1 tablet by mouth daily Pt states she takes 1 a day, Disp: 180 tablet, Rfl: 1    tiZANidine (ZANAFLEX) 2 MG tablet, Take 2 tablets by mouth at bedtime and once during the day if needed, Disp: 90 tablet, Rfl: 1    fluticasone (FLONASE) 50 MCG/ACT nasal spray, instill 1 spray into each nostril once daily, Disp: 16 g, Rfl: 2    blood glucose monitor strips, Check blood sugars daily as directed., Disp: 50 strip, Rfl: 6    Lancets MISC, 1 each by Does not apply route daily, Disp: 100 each, Rfl: 3    Alcohol Swabs 70 % PADS, 1 each by Does not apply route daily, Disp: 100 each, Rfl: 3    cloNIDine (CATAPRES) 0.1 MG tablet, Take 1 tablet by mouth daily, Disp: 180 tablet, Rfl: 1    carvedilol (COREG) 3.125 MG tablet, take 1 tablet by mouth twice a day, Disp: 180 tablet, Rfl: 1    amitriptyline (ELAVIL) 25 MG tablet, take 1 tablet by mouth nightly, Disp: 90 tablet, Rfl: 1    estradiol (ESTRACE VAGINAL) 0.1 MG/GM vaginal cream, Place 1 g vaginally daily Apply daily for one week, then twice a week thereafter, apply peasize amount in the urethra, Disp: 42.5 g, Rfl: 1    Compression Stockings MISC, 20-30 mm/hg, Disp: 2 each, Rfl: 0    blood glucose monitor kit and supplies, use check blood sugar as directed, Disp: 1 kit, Rfl: 0    Lift Chair MISC, by Does not apply route, Disp: 1 each, Rfl: 0    aspirin 325 MG tablet, Take 325 mg by mouth daily, Disp: , Rfl:     furosemide (LASIX) 20 MG tablet, Take 1 tablet by mouth daily for 3 days, Disp: 3 tablet, Rfl: 0    Family History   Problem Relation Age of Onset    Breast Cancer Sister     Cancer Sister         liver    Diabetes Daughter        Social History     Socioeconomic History    Marital status: Single     Spouse name: Not on file    Number of children: Not on file    Years of education: Not on file    Highest education level: Not on file   Occupational History    Not on file   Tobacco Use    Smoking status: Former     Packs/day: 0.50     Years: 15.00     Pack years: 7.50     Types: Cigarettes     Quit date: 10/5/2001     Years since quittin.8    Smokeless tobacco: Never   Vaping Use    Vaping Use: Never used   Substance and Sexual Activity    Alcohol use: Yes     Alcohol/week: 0.0 standard drinks     Comment: once every few months    Drug use: No    Sexual activity: Not Currently   Other Topics Concern    Not on file   Social History Narrative    Not on file     Social Determinants of Health     Financial Resource Strain: Low Risk     Difficulty of Paying Living Expenses: Not very hard   Food Insecurity: No Food Insecurity    Worried About Running Out of Food in the Last Year: Never true    Ran Out of Food in the Last Year: Never true   Transportation Needs: Not on file   Physical Activity: Not on file   Stress: Not on file   Social Connections: Not on file   Intimate Partner Violence: Not on file   Housing Stability: Not on file       Review of Systems:  Review of Systems   Constitutional: Negative for chills, fever and malaise/fatigue. Cardiovascular:  Negative for chest pain. Respiratory:  Positive for cough and shortness of breath. Negative for wheezing. Sick    Musculoskeletal:  Positive for back pain, falls and neck pain. Negative for muscle cramps, muscle weakness and stiffness. Gastrointestinal:  Negative for constipation, diarrhea, melena and nausea. Neurological:  Positive for dizziness, numbness (fingers) and paresthesias. Negative for headaches and tremors. Physical Exam:  There were no vitals taken for this visit.     Physical Exam      Assessment:  Problem List Items Addressed This Visit Failed neck syndrome (Chronic)    Cervical radicular pain (Chronic)    Lumbar stenosis with neurogenic claudication (Chronic)    Lumbar facet joint syndrome - Primary (Chronic)     Other Visit Diagnoses       Chronic use of opiate for therapeutic purpose                   Treatment Plan:  Patient relates current medications are helping the pain. Patient reports taking pain medications as prescribed, denies obtaining medications from different sources and denies use of illegal drugs. Patient denies side effects from medications like nausea, vomiting, constipation or drowsiness. Patient reports current activities of daily living are possible due to medications and would like to continue them. As always, we encourage daily stretching and strengthening exercises, and recommend minimizing use of pain medications unless patient cannot get through daily activities due to pain. Contract requirements met. Continue opioid therapy. Script not needed  Follow up appointment made for 8 weeks    I have reviewed the chief complaint and history of present illness (including ROS and PFSH) and vital documentation by my staff and I agree with their documentation and have added where applicable. Pt was evaluated through a synchronous (real-time) audio encounter. The patient (or guardian if applicable) is aware that this is a billable service. Verbal consent to proceed has been obtained within the past 12 months. The visit was conducted pursuant to the emergency declaration under the [facility], [address] [place] [address] [facility] authority and the [employer] and [employer] General Act. Patient identification was verified, and a caregiver was present when appropriate. The patient was located in a state where the provider was credentialed to provide care.     Total time spent for this encounter: Not billed by time

## 2022-08-09 ENCOUNTER — TELEPHONE (OUTPATIENT)
Dept: FAMILY MEDICINE CLINIC | Age: 82
End: 2022-08-09

## 2022-08-09 DIAGNOSIS — M48.02 CERVICAL STENOSIS OF SPINE: ICD-10-CM

## 2022-08-09 DIAGNOSIS — M15.9 PRIMARY OSTEOARTHRITIS INVOLVING MULTIPLE JOINTS: Primary | ICD-10-CM

## 2022-08-09 NOTE — TELEPHONE ENCOUNTER
Daughter called in regards to parking placard. Current one is about to .  Asking for renewal of placard

## 2022-08-19 ENCOUNTER — TELEPHONE (OUTPATIENT)
Dept: PAIN MANAGEMENT | Age: 82
End: 2022-08-19

## 2022-08-19 NOTE — TELEPHONE ENCOUNTER
Pt called needs refill on Norco 5/325 mg Take 1 tablet by mouth every 6 hours as needed for Pain for up to 30 days.  Appt is on 09/12

## 2022-08-22 DIAGNOSIS — M48.062 LUMBAR STENOSIS WITH NEUROGENIC CLAUDICATION: ICD-10-CM

## 2022-08-22 DIAGNOSIS — M54.12 CERVICAL RADICULAR PAIN: ICD-10-CM

## 2022-08-22 DIAGNOSIS — M47.816 LUMBAR FACET JOINT SYNDROME: ICD-10-CM

## 2022-08-22 DIAGNOSIS — M96.1 FAILED NECK SYNDROME: ICD-10-CM

## 2022-08-22 RX ORDER — HYDROCODONE BITARTRATE AND ACETAMINOPHEN 5; 325 MG/1; MG/1
1 TABLET ORAL 2 TIMES DAILY PRN
Qty: 60 TABLET | Refills: 0 | Status: SHIPPED | OUTPATIENT
Start: 2022-08-22 | End: 2022-09-12 | Stop reason: SDUPTHER

## 2022-08-23 ENCOUNTER — HOSPITAL ENCOUNTER (OUTPATIENT)
Dept: SLEEP CENTER | Age: 82
Discharge: HOME | End: 2022-08-25
Payer: MEDICARE

## 2022-08-23 VITALS — HEIGHT: 62 IN | WEIGHT: 152 LBS | BODY MASS INDEX: 27.97 KG/M2

## 2022-08-23 PROCEDURE — 95811 POLYSOM 6/>YRS CPAP 4/> PARM: CPT

## 2022-08-31 ENCOUNTER — OFFICE VISIT (OUTPATIENT)
Dept: PODIATRY | Age: 82
End: 2022-08-31
Payer: MEDICARE

## 2022-08-31 VITALS — BODY MASS INDEX: 27.97 KG/M2 | WEIGHT: 152 LBS | HEIGHT: 62 IN

## 2022-08-31 DIAGNOSIS — E11.40 TYPE 2 DIABETES MELLITUS WITH DIABETIC NEUROPATHY, WITHOUT LONG-TERM CURRENT USE OF INSULIN (HCC): Primary | ICD-10-CM

## 2022-08-31 DIAGNOSIS — M79.605 PAIN IN BOTH LOWER EXTREMITIES: ICD-10-CM

## 2022-08-31 DIAGNOSIS — M79.604 PAIN IN BOTH LOWER EXTREMITIES: ICD-10-CM

## 2022-08-31 DIAGNOSIS — I73.9 PERIPHERAL VASCULAR DISORDER (HCC): ICD-10-CM

## 2022-08-31 DIAGNOSIS — B35.1 DERMATOPHYTOSIS OF NAIL: ICD-10-CM

## 2022-08-31 PROCEDURE — 11721 DEBRIDE NAIL 6 OR MORE: CPT | Performed by: PODIATRIST

## 2022-08-31 PROCEDURE — 99999 PR OFFICE/OUTPT VISIT,PROCEDURE ONLY: CPT | Performed by: PODIATRIST

## 2022-08-31 NOTE — PROGRESS NOTES
[address] [facility] PODIATRY [facility]  [id number] [place] [id number]  Dept: [phone]  Dept Fax: [phone]    DIABETIC PROGRESS NOTE  Date of patient's visit: 8/31/2022  Patient's Name:  Conrado Hernández YOB: 1940            Patient Care Team:  David Strange MD as PCP - General (Internal Medicine)  David Strange MD as PCP - [facility]  Rico Nieves MD as Consulting Physician (Colon and Rectal Surgery)  Craig Raman MD as Consulting Physician (Pain Management)  Latonya Vanessa DPM as Physician (Podiatry)          Chief Complaint   Patient presents with    Diabetes     Diabetic foot care    Peripheral Neuropathy     Bilateral feet       Subjective:   Conrado Hernández comes to clinic for Diabetes (Diabetic foot care) and Peripheral Neuropathy (Bilateral feet)    she is a diabetic and states that she is doing well. Pt currently has complaint of thickened, elongated nails that they cannot manage by themselves. Pt's primary care physician is ELAINE [address] [place] [address] MD Edith last seen 05/09/2022   Pt's last blood sugar was unknown. Lab Results   Component Value Date    LABA1C 6.6 05/09/2022      Complains of numbness in the feet bilat.   Past Medical History:   Diagnosis Date    Arthritis     Cataract     Cataracts, bilateral     Cervical spondylosis with myelopathy     Depression     Diverticulosis of colon 2015    Dysuria     Dysuria     Gastroesophageal reflux disease 4/13/2021    Gout     Headache(784.0)     Hematuria     History of colon polyps 2015    Insomnia     Lumbar stenosis with neurogenic claudication     Neck pain     JANN (obstructive sleep apnea)     Osteoarthrosis, unspecified whether generalized or localized, unspecified site 10/5/2012    Pelvic pain     Pure hypercholesterolemia 10/5/2012    Shoulder blade pain     right  side    Sinus problem     Stroke (HCC)     Tinnitus     Tubular adenoma of colon 2015 Type II or unspecified type diabetes mellitus without mention of complication, not stated as uncontrolled 10/5/2012    Dr. Ana Hernandez last visit 1/2022 [facility].    Under care of team 01/31/2022    PCP: Dr. Ana Hernandez, [facility] last visit 1/2022    Unspecified essential hypertension 10/5/2012    Dr. Ana Hernandez    Unspecified sleep apnea     Wears dentures        No Known Allergies  Current Outpatient Medications on File Prior to Visit   Medication Sig Dispense Refill    HYDROcodone-acetaminophen (NORCO) 5-325 MG per tablet Take 1 tablet by mouth 2 times daily as needed for Pain for up to 30 days. 60 tablet 0    Handicap Placard MISC by Does not apply route Exp: 8/9/2027 1 each 0    zolpidem (AMBIEN) 5 MG tablet Take 1 tablet by mouth nightly as needed for Sleep for up to 60 days. 30 tablet 1    polyethylene glycol (GLYCOLAX) 17 GM/SCOOP powder take 17GM (DISSOLVED IN WATER) by mouth once daily 510 g 1    atorvastatin (LIPITOR) 40 MG tablet Take 1 tablet by mouth daily 90 tablet 1    loratadine (CLARITIN) 10 MG tablet take 1 tablet by mouth once daily 90 tablet 1    pantoprazole (PROTONIX) 40 MG tablet take 1 tablet by mouth every morning BEFORE BREAKFAST 90 tablet 1    allopurinol (ZYLOPRIM) 100 MG tablet take 1 tablet by mouth once daily 90 tablet 1    metFORMIN (GLUCOPHAGE) 1000 MG tablet Take 1 tablet by mouth daily Pt states she takes 1 a day 180 tablet 1    tiZANidine (ZANAFLEX) 2 MG tablet Take 2 tablets by mouth at bedtime and once during the day if needed 90 tablet 1    fluticasone (FLONASE) 50 MCG/ACT nasal spray instill 1 spray into each nostril once daily 16 g 2    blood glucose monitor strips Check blood sugars daily as directed.  50 strip 6    Lancets MISC 1 each by Does not apply route daily 100 each 3    Alcohol Swabs 70 % PADS 1 each by Does not apply route daily 100 each 3    cloNIDine (CATAPRES) 0.1 MG tablet Take 1 tablet by mouth daily 180 tablet 1    carvedilol (COREG) 3.125 MG tablet take 1 tablet by mouth twice a day 180 tablet 1    amitriptyline (ELAVIL) 25 MG tablet take 1 tablet by mouth nightly 90 tablet 1    estradiol (ESTRACE VAGINAL) 0.1 MG/GM vaginal cream Place 1 g vaginally daily Apply daily for one week, then twice a week thereafter, apply peasize amount in the urethra 42.5 g 1    Compression Stockings MISC 20-30 mm/hg 2 each 0    blood glucose monitor kit and supplies use check blood sugar as directed 1 kit 0    Lift Chair MISC by Does not apply route 1 each 0    aspirin 325 MG tablet Take 325 mg by mouth daily      gabapentin (NEURONTIN) 600 MG tablet take 1 tablet by mouth three times a day 90 tablet 2    furosemide (LASIX) 20 MG tablet Take 1 tablet by mouth daily for 3 days 3 tablet 0     No current facility-administered medications on file prior to visit. Review of Systems    Review of Systems:   History obtained from chart review and the patient  General ROS: negative for - chills, fatigue, fever, night sweats or weight gain  Constitutional: Negative for chills, diaphoresis, fatigue, fever and unexpected weight change. Musculoskeletal: Positive for arthralgias, gait problem and joint swelling. Neurological ROS: negative for - behavioral changes, confusion, headaches or seizures. Negative for weakness and numbness. Dermatological ROS: negative for - mole changes, rash  Cardiovascular: Negative for leg swelling. Gastrointestinal: Negative for constipation, diarrhea, nausea and vomiting. Objective:  Dermatologic Exam:  Skin lesion/ulceration Absent . Skin No rashes or nodules noted. .   Skin is thin, with flaky sloughing skin as well as decreased hair growth to the lower leg  Small red hemosiderin deposits seen dorsal foot   Musculoskeletal:     1st MPJ ROM decreased, Bilateral.  Muscle strength 5/5, Bilateral.  Pain present upon palpation of toenails 1-5, Bilateral. decreased medial longitudinal arch, Bilateral.  Ankle ROM decreased,Bilateral. Dorsally contracted digits present digits 2, Bilateral.     Vascular: DP pulses 1/4 bilateral.  PT pulses 0/4 bilateral.   CFT <5 seconds, Bilateral.  Hair growth absent to the level of the digits, Bilateral.  Edema present, Bilateral.  Varicosities absent, Bilateral. Erythema absent, Bilateral    Neurological: Sensation diminshed to light touch to level of digits, Bilateral.  Protective sensation intact 6/10 sites via 5.07/10g [place] Monofilament, Bilateral.  negative Tinel's, Bilateral.  negative Valleix sign, Bilateral.      Integument: Warm, dry, supple, Bilateral.  Open lesion absent, Bilateral.  Interdigital maceration absent to web spaces 4, Bilateral.  Nails 1-5 left and 1-5 right thickened > 3.0 mm, dystrophic and crumbly, discolored with subungual debris. Fissures absent, Bilateral.   General: AAO x 3 in NAD.     Derm  Toenail Description  Sites of Onychomycosis Involvement (Check affected area)  [x] [x] [x] [x] [x] [x] [x] [x] [x] [x]  5 4 3 2 1 1 2 3 4 5                          Right                                        Left    Thickness  [x] [x] [x] [x] [x] [x] [x] [x] [x] [x]  5 4 3 2 1 1 2 3 4 5                         Right                                        Left    Dystrophic Changes   [x] [x] [x] [x] [x] [x] [x] [x] [x] [x]  5 4 3 2 1 1 2 3 4 5                         Right                                        Left    Color   [x] [x] [x] [x] [x] [x] [x] [x] [x] [x]  5 4 3 2 1 1 2 3 4 5                          Right                                        Left    Incurvation/Ingrowin   [] [] [] [] [] [] [] [] [] []  5 4 3 2 1 1 2 3 4 5                         Right                                        Left    Inflammation/Pain   [x] [x] [x] [x] [x] [x] [x] [x] [x] [x]  5 4 3 2 1 1 2 3 4 5                         Right                                        Left        Visual inspection:  Deformity: hammertoe deformity regis feet  amputation: absent  Skin lesions: absent  Edema: right- 2+ pitting edema, left- 2+ pitting edema    Sensory exam:  Monofilament sensation: abnormal - 6/10 via SW 5.07/10g monofilament to the plantar foot bilateral feet    Pulses: abnormal - 1/4 dorsalis pedis pulse and 0/4 Posterior tibial pulse,   Pinprick: Impaired  Proprioception: Impaired  Vibration (128 Hz): Impaired       DM with PVD       [x]Yes    []No      Assessment:  [de-identified] y.o. female with:   Diagnosis Orders   1. Type 2 diabetes mellitus with diabetic neuropathy, without long-term current use of insulin (HCC)   DIABETES FOOT EXAM    65758 - [place] DEBRIDEMENT OF NAILS, 6 OR MORE      2. Dermatophytosis of nail   DIABETES FOOT EXAM    69492 - [place] DEBRIDEMENT OF NAILS, 6 OR MORE      3. Peripheral vascular disorder (HCC)   DIABETES FOOT EXAM    24578 - [place] DEBRIDEMENT OF NAILS, 6 OR MORE      4. Pain in both lower extremities   DIABETES FOOT EXAM    12991 - [place] DEBRIDEMENT OF NAILS, 6 OR MORE            Q7   []Yes    []No                Q8   [x]Yes    []No                     Q9   []Yes    []No    Plan:   Pt was evaluated and examined. Patient was given personalized discharge instructions. Nails 1-10 were debrided sharply in length and thickness with a nipper and , without incident. Pt will follow up in 9 weeks or sooner if any problems arise. Diagnosis was discussed with the pt and all of their questions were answered in detail. Proper foot hygiene and care was discussed with the pt. Informed patient on proper diabetic foot care and importance of tight glycemic control. Patient to check feet daily and contact the office with any questions/problems/concerns.    Other comorbidity noted and will be managed by PCP.  8/31/2022    Electronically signed by Belgica Up DPM on 8/31/2022 at 9:28 AM  8/31/2022

## 2022-09-05 LAB — STATUS: NORMAL

## 2022-09-12 ENCOUNTER — OFFICE VISIT (OUTPATIENT)
Dept: PAIN MANAGEMENT | Age: 82
End: 2022-09-12
Payer: MEDICARE

## 2022-09-12 VITALS
SYSTOLIC BLOOD PRESSURE: 162 MMHG | HEIGHT: 62 IN | DIASTOLIC BLOOD PRESSURE: 105 MMHG | WEIGHT: 152 LBS | HEART RATE: 54 BPM | BODY MASS INDEX: 27.97 KG/M2 | OXYGEN SATURATION: 98 %

## 2022-09-12 DIAGNOSIS — Z79.891 CHRONIC USE OF OPIATE FOR THERAPEUTIC PURPOSE: ICD-10-CM

## 2022-09-12 DIAGNOSIS — M47.816 LUMBAR FACET JOINT SYNDROME: ICD-10-CM

## 2022-09-12 DIAGNOSIS — M54.12 CERVICAL RADICULAR PAIN: Primary | ICD-10-CM

## 2022-09-12 DIAGNOSIS — M96.1 FAILED NECK SYNDROME: ICD-10-CM

## 2022-09-12 DIAGNOSIS — M48.062 LUMBAR STENOSIS WITH NEUROGENIC CLAUDICATION: ICD-10-CM

## 2022-09-12 PROCEDURE — 1036F TOBACCO NON-USER: CPT | Performed by: NURSE PRACTITIONER

## 2022-09-12 PROCEDURE — 99213 OFFICE O/P EST LOW 20 MIN: CPT | Performed by: NURSE PRACTITIONER

## 2022-09-12 PROCEDURE — G8417 CALC BMI ABV UP PARAM F/U: HCPCS | Performed by: NURSE PRACTITIONER

## 2022-09-12 PROCEDURE — 1123F ACP DISCUSS/DSCN MKR DOCD: CPT | Performed by: NURSE PRACTITIONER

## 2022-09-12 PROCEDURE — G8427 DOCREV CUR MEDS BY ELIG CLIN: HCPCS | Performed by: NURSE PRACTITIONER

## 2022-09-12 PROCEDURE — 1090F PRES/ABSN URINE INCON ASSESS: CPT | Performed by: NURSE PRACTITIONER

## 2022-09-12 PROCEDURE — G8399 PT W/DXA RESULTS DOCUMENT: HCPCS | Performed by: NURSE PRACTITIONER

## 2022-09-12 RX ORDER — HYDROCODONE BITARTRATE AND ACETAMINOPHEN 5; 325 MG/1; MG/1
1 TABLET ORAL 2 TIMES DAILY PRN
Qty: 60 TABLET | Refills: 0 | Status: SHIPPED | OUTPATIENT
Start: 2022-09-26 | End: 2022-10-26

## 2022-09-12 ASSESSMENT — ENCOUNTER SYMPTOMS
COUGH: 0
SHORTNESS OF BREATH: 0
WHEEZING: 0
CONSTIPATION: 0
VOMITING: 0
DIARRHEA: 0
BACK PAIN: 1
NAUSEA: 0
SORE THROAT: 0

## 2022-09-12 NOTE — PROGRESS NOTES
Chief Complaint   Patient presents with    Back Pain    Medication Refill     Norco       [facility]     history of chronic neck and chronic lower back pain  She is diagnosed with cervical spinal stenosis and lumbar spinal stenosis     Neck pain is located midline into right upper trapezius. Reports constant bilat. finger/hand numbness, denies HA. Worse with ROM      Her back pain is minimal at this time but at times located in lower back pain in the lumbar area with radiation down both legs. aggravated with standing and walking and does notice bilat ankle and foot swelling at times     Patient is on chronic low-dose opioid therapy with Norco 5 mg twice a day along with gabapentin. Does take norco sparingly  Finds the medication helpful allowing her to do daily life activities and be independent  She has tried conservative measures with therapy and injections in past  Not a candidate for NSAIDs because of renal insufficiency and advanced age     Procedures  4/29/21  BILATERAL L5 EPIDURAL STEROID INJECTION reports 70% relief with improved function for 2 weeks     3/22/21  CERVICAL EPIDURAL STEROID INJECTION C7-T1 more than 50% relief     Feels pain is well controlled and declined injections    HPI:   Back Pain  This is a chronic problem. The current episode started more than 1 year ago. The problem occurs constantly. The problem has been gradually worsening since onset. Pertinent negatives include no fever. Neck Pain   This is a chronic problem. The current episode started more than 1 year ago. The problem occurs 2 to 4 times per day. The problem has been unchanged. The pain is associated with nothing. The pain is present in the midline and right side. The quality of the pain is described as aching. The pain is at a severity of 4/10. The pain is mild. Nothing aggravates the symptoms. Pertinent negatives include no fever. She has tried ice, NSAIDs and oral narcotics for the symptoms.  The treatment provided mild relief. Medication Refill: Norco    Pain score Today:  4  Adverse effects (Constipation / Nausea / Sedation / sexual Dysfunction / others) : no  Mood: good  Sleep pattern and quality: poor  Activity level: poor    Pill count Today:32.5 Norco due today prescription adjusted appropriately   Last dose taken  09/11/2022 PM  OARRS report reviewed today: yes  Morphine equivalent: 10  ER/Hospitalizations/PCP visit related to pain since last visit:no   Any legal problems e.g. DUI etc.:No  Satisfied with current management: Yes    Opioid Contract:02/03/2021  Last Urine Dug screen dated:03/22/2022    Lab Results   Component Value Date    LABA1C 6.6 05/09/2022     Lab Results   Component Value Date     10/07/2021       Past Medical History, Past Surgical History, Social History, Allergies and Medications reviewed and updated in EPIC as indicated    Family History reviewed and is noncontributory. Controlled Substance Monitoring:    Acute and Chronic Pain Monitoring:   RX Monitoring 9/12/2022   Attestation -   Periodic Controlled Substance Monitoring Possible medication side effects, risk of tolerance/dependence & alternative treatments discussed. ;No signs of potential drug abuse or diversion identified. ;Assessed functional status. ;Obtaining appropriate analgesic effect of treatment. Chronic Pain > 50 MEDD -   Chronic Pain > 80 MEDD -              Periodic Controlled Substance Monitoring: Possible medication side effects, risk of tolerance/dependence & alternative treatments discussed., No signs of potential drug abuse or diversion identified. , Assessed functional status., Obtaining appropriate analgesic effect of treatment.  Gretchen Memo, APRN - CNP)      Past Medical History:   Diagnosis Date    Arthritis     Cataract     Cataracts, bilateral     Cervical spondylosis with myelopathy     Depression     Diverticulosis of colon 2015    Dysuria     Dysuria     Gastroesophageal reflux disease 4/13/2021    Gout Headache(784.0)     Hematuria     History of colon polyps     Insomnia     Lumbar stenosis with neurogenic claudication     Neck pain     JANN (obstructive sleep apnea)     Osteoarthrosis, unspecified whether generalized or localized, unspecified site 10/5/2012    Pelvic pain     Pure hypercholesterolemia 10/5/2012    Shoulder blade pain     right  side    Sinus problem     Stroke [facility])     Tinnitus     Tubular adenoma of colon 2015    Type II or unspecified type diabetes mellitus without mention of complication, not stated as uncontrolled 10/5/2012    Dr. Denny Musa last visit 2022 [facility].    Under care of team 2022    PCP: Dr. Denny Musa, [facility], last visit 2022    Unspecified essential hypertension 10/5/2012    Dr. Denny Musa    Unspecified sleep apnea     Wears dentures        Past Surgical History:   Procedure Laterality Date     SECTION      x2    COLONOSCOPY  06    Dr Ivy Aparicio  5 12 15    extensive diverticulosis, tubular adenoma    CYSTOSCOPY  2022    Bilateral Retrograde Pyelogram     CYSTOSCOPY Bilateral 3/9/2022    CYSTOSCOPY, RETROGRADE PYELOGRAM performed by Alissa Munoz MD at [address] 2017    EPIDURAL STEROID INJECTION cervical performed by Sandoval Rueda MD at [address]  2017    EPIDURAL STEROID INJECTION L5S1 performed by Sandoval Rueda MD at [address] Bilateral 2019    EPIDURAL STEROID INJECTION BILATERAL S1 performed by Sand[facility] Rueda MD [address] [place] [address] Left     Cataracts    HYSTERECTOMY (CERVIX STATUS UNKNOWN)  [address]  10/1/06    NECK SURGERY  2011 &     NERVE BLOCK Bilateral 2020    Lumbar Facet L2-L5    NERVE SURGERY Bilateral 2019    BILATERAL LUMBAR FACET STEROID INJECTION L2-L5 performed by Sandoval Rueda MD at [place] [address]  2017    L5-S1 steroid injection    OTHER SURGICAL HISTORY  11/20/2017    VANITA L5-S1    OTHER SURGICAL HISTORY  10/01/2018    cervical steroid injection    OTHER SURGICAL HISTORY Bilateral 05/20/2019    EPIDURAL STEROID INJECTION BILATERAL S1 (Bilateral )    OTHER SURGICAL HISTORY  04/29/2021    lumbar VANITA    PAIN MANAGEMENT PROCEDURE Bilateral 2/17/2020    LUMBAR FACET L2-L5 performed by Angela Olivares MD at [facility] N/A 3/22/2021    CERVICAL EPIDURAL STEROID INJECTION C7-T1 performed by Angela Olivares MD at [facility] Bilateral 4/29/2021    BILATERAL L5 EPIDURAL STEROID INJECTION performed by Angela Olivares MD at [facility] AA&/STRD TFRML EPI LUMBAR/SACRAL 1 LEVEL Bilateral 11/19/2018    BILATERAL L4 VANITA performed by Angela Olivares MD at [facility] DX/THER SBST INTRLMNR CRV/THRC W/IMG GDN N/A 10/1/2018    EPIDURAL STEROID INJECTION CERVICAL C7-T1 performed by Angela Olivares MD at [address] [place] [address]       No Known Allergies      Current Outpatient Medications:     [START ON 9/26/2022] HYDROcodone-acetaminophen (NORCO) 5-325 MG per tablet, Take 1 tablet by mouth 2 times daily as needed for Pain for up to 30 days. , Disp: 60 tablet, Rfl: 0    Handicap Placard MISC, by Does not apply route Exp: 8/9/2027, Disp: 1 each, Rfl: 0    gabapentin (NEURONTIN) 600 MG tablet, take 1 tablet by mouth three times a day, Disp: 90 tablet, Rfl: 2    polyethylene glycol (GLYCOLAX) 17 GM/SCOOP powder, take 17GM (DISSOLVED IN WATER) by mouth once daily, Disp: 510 g, Rfl: 1    atorvastatin (LIPITOR) 40 MG tablet, Take 1 tablet by mouth daily, Disp: 90 tablet, Rfl: 1    loratadine (CLARITIN) 10 MG tablet, take 1 tablet by mouth once daily, Disp: 90 tablet, Rfl: 1    pantoprazole (PROTONIX) 40 MG tablet, take 1 tablet by mouth every morning BEFORE BREAKFAST, Disp: 90 tablet, Rfl: 1    allopurinol (ZYLOPRIM) 100 MG tablet, take 1 tablet by mouth once daily, Disp: 90 tablet, Rfl: 1    metFORMIN (GLUCOPHAGE) 1000 MG tablet, Take 1 tablet by mouth daily Pt states she takes 1 a day, Disp: 180 tablet, Rfl: 1    tiZANidine (ZANAFLEX) 2 MG tablet, Take 2 tablets by mouth at bedtime and once during the day if needed, Disp: 90 tablet, Rfl: 1    fluticasone (FLONASE) 50 MCG/ACT nasal spray, instill 1 spray into each nostril once daily, Disp: 16 g, Rfl: 2    blood glucose monitor strips, Check blood sugars daily as directed., Disp: 50 strip, Rfl: 6    Lancets MISC, 1 each by Does not apply route daily, Disp: 100 each, Rfl: 3    Alcohol Swabs 70 % PADS, 1 each by Does not apply route daily, Disp: 100 each, Rfl: 3    cloNIDine (CATAPRES) 0.1 MG tablet, Take 1 tablet by mouth daily, Disp: 180 tablet, Rfl: 1    furosemide (LASIX) 20 MG tablet, Take 1 tablet by mouth daily for 3 days, Disp: 3 tablet, Rfl: 0    carvedilol (COREG) 3.125 MG tablet, take 1 tablet by mouth twice a day, Disp: 180 tablet, Rfl: 1    amitriptyline (ELAVIL) 25 MG tablet, take 1 tablet by mouth nightly, Disp: 90 tablet, Rfl: 1    estradiol (ESTRACE VAGINAL) 0.1 MG/GM vaginal cream, Place 1 g vaginally daily Apply daily for one week, then twice a week thereafter, apply peasize amount in the urethra, Disp: 42.5 g, Rfl: 1    Compression Stockings MISC, 20-30 mm/hg, Disp: 2 each, Rfl: 0    blood glucose monitor kit and supplies, use check blood sugar as directed, Disp: 1 kit, Rfl: 0    Lift Chair MISC, by Does not apply route, Disp: 1 each, Rfl: 0    aspirin 325 MG tablet, Take 325 mg by mouth daily, Disp: , Rfl:     Family History   Problem Relation Age of Onset    Breast Cancer Sister     Cancer Sister         liver    Diabetes Daughter        Social History     Socioeconomic History    Marital status: Single     Spouse name: Not on file    Number of children: Not on file    Years of education: Not on file    Highest education level: Not on file   Occupational History    Not on file   Tobacco Use    Smoking status: Former     Packs/day: 0.50 Years: 15.00     Pack years: 7.50     Types: Cigarettes     Quit date: 10/5/2001     Years since quittin.9    Smokeless tobacco: Never   Vaping Use    Vaping Use: Never used   Substance and Sexual Activity    Alcohol use: Yes     Alcohol/week: 0.0 standard drinks     Comment: once every few months    Drug use: No    Sexual activity: Not Currently   Other Topics Concern    Not on file   Social History Narrative    Not on file     Social Determinants of Health     Financial Resource Strain: Low Risk     Difficulty of Paying Living Expenses: Not very hard   Food Insecurity: No Food Insecurity    Worried About Running Out of Food in the Last Year: Never true    Ran Out of Food in the Last Year: Never true   Transportation Needs: Not on file   Physical Activity: Not on file   Stress: Not on file   Social Connections: Not on file   Intimate Partner Violence: Not on file   Housing Stability: Not on file       Review of Systems:  Review of Systems   Constitutional: Negative for chills and fever. HENT:  Negative for congestion and sore throat. Respiratory:  Negative for cough, shortness of breath and wheezing. Musculoskeletal:  Positive for back pain and neck pain. Gastrointestinal:  Negative for constipation, diarrhea, nausea and vomiting. Physical Exam:  BP (!) 162/105   Pulse 54   Ht 5' 2\" (1.575 m)   Wt 152 lb (68.9 kg)   SpO2 98%   BMI 27.80 kg/m²     Physical Exam  Cardiovascular:      Rate and Rhythm: Normal rate. Pulmonary:      Effort: Pulmonary effort is normal.   Musculoskeletal:      Cervical back: Decreased range of motion. Skin:     General: Skin is warm and dry. Neurological:      Mental Status: She is alert and oriented to person, place, and time.      Assessment:  Problem List Items Addressed This Visit       Failed neck syndrome (Chronic)    Relevant Medications    HYDROcodone-acetaminophen (NORCO) 5-325 MG per tablet (Start on 2022)    Cervical radicular pain - Primary (Chronic)    Relevant Medications    HYDROcodone-acetaminophen (NORCO) 5-325 MG per tablet (Start on 9/26/2022)    Lumbar stenosis with neurogenic claudication (Chronic)    Relevant Medications    HYDROcodone-acetaminophen (NORCO) 5-325 MG per tablet (Start on 9/26/2022)    Lumbar facet joint syndrome (Chronic)    Relevant Medications    HYDROcodone-acetaminophen (NORCO) 5-325 MG per tablet (Start on 9/26/2022)     Other Visit Diagnoses       Chronic use of opiate for therapeutic purpose                   Treatment Plan:  Patient relates current medications are helping the pain. Patient reports taking pain medications as prescribed, denies obtaining medications from different sources and denies use of illegal drugs. Patient denies side effects from medications like nausea, vomiting, constipation or drowsiness. Patient reports current activities of daily living are possible due to medications and would like to continue them. As always, we encourage daily stretching and strengthening exercises, and recommend minimizing use of pain medications unless patient cannot get through daily activities due to pain. Contract requirements met. Continue opioid therapy. Script written for norco  Follow up appointment made for 8 weeks    I have reviewed the chief complaint and history of present illness (including ROS and PFSH) and vital documentation by my staff and I agree with their documentation and have added where applicable.

## 2022-09-29 DIAGNOSIS — M54.12 CERVICAL RADICULAR PAIN: Chronic | ICD-10-CM

## 2022-09-29 RX ORDER — TIZANIDINE 2 MG/1
TABLET ORAL
Qty: 90 TABLET | Refills: 1 | Status: SHIPPED | OUTPATIENT
Start: 2022-09-29

## 2022-09-29 NOTE — TELEPHONE ENCOUNTER
colon     History of colon polyps     Rectal pressure     Failed neck syndrome     Cervical stenosis of spine     Cervical radicular pain     Lumbar stenosis with neurogenic claudication     JANN on CPAP     Primary osteoarthritis involving multiple joints     Hx of cervical spine surgery     Chronic use of opiate drugs therapeutic purposes     Myelomalacia (HCC)     Lumbar facet joint syndrome     Gastroesophageal reflux disease     Sleeping difficulty

## 2022-09-30 DIAGNOSIS — I10 ESSENTIAL HYPERTENSION: ICD-10-CM

## 2022-09-30 RX ORDER — CLONIDINE HYDROCHLORIDE 0.1 MG/1
TABLET ORAL
Qty: 180 TABLET | Refills: 1 | Status: SHIPPED | OUTPATIENT
Start: 2022-09-30

## 2022-09-30 NOTE — TELEPHONE ENCOUNTER
Last visit: 05/09/2022  Last Med refill: 07/05/2022  Does patient have enough medication for 72 hours: No:     Next Visit Date:  Future Appointments   Date Time Provider Pradeep Alexander   11/2/2022  1:15 PM Connie Wallace MD [facility]   11/11/2022 11:00 AM Xochilt Phlegm, APRN - CNP [facility]   11/16/2022 10:30 AM Rob Wong DPM [facility] [address] [facility]   12/15/2022  2:00 PM Connie Wallace  [address] [facility]   Topic Date Due    Shingles vaccine (1 of 2) Never done    COVID-19 Vaccine (4 - Booster for Moderna series) 05/05/2022    Flu vaccine (1) 08/01/2022    Lipids  10/07/2022    DTaP/Tdap/Td vaccine (1 - Tdap) 10/18/2026 (Originally 5/28/1959)    Depression Screen  12/13/2022    Annual Wellness Visit (AWV)  12/14/2022    DEXA (modify frequency per FRAX score)  Completed    Pneumococcal 65+ years Vaccine  Completed    Hepatitis A vaccine  Aged Out    Hib vaccine  Aged Out    Meningococcal (ACWY) vaccine  Aged Out       Hemoglobin A1C (%)   Date Value   05/09/2022 6.6   10/07/2021 6.7 (H)   09/30/2020 5.7             ( goal A1C is < 7)   Microalb/Crt.  Ratio (mcg/mg creat)   Date Value   10/06/2020 CANNOT BE CALCULATED     LDL Cholesterol (mg/dL)   Date Value   10/07/2021 48   09/30/2020 48       (goal LDL is <100)   AST (U/L)   Date Value   10/07/2021 16     ALT (U/L)   Date Value   10/07/2021 9     BUN (mg/dL)   Date Value   10/07/2021 16     BP Readings from Last 3 Encounters:   09/12/22 (!) 162/105   05/18/22 (!) 170/94   05/09/22 106/64          (goal 120/80)    All Future Testing planned in [facility]  Lab Frequency Next Occurrence   DRUG SCREEN, PAIN Once 03/17/2022               Patient Active Problem List:     Osteoarthritis     Essential hypertension     Type 2 diabetes mellitus with diabetic neuropathy, without long-term current use of insulin (HCC)     Pure hypercholesterolemia     Constipation     Rectal pain     Diverticulosis of colon     Tubular adenoma

## 2022-10-05 DIAGNOSIS — G47.9 SLEEPING DIFFICULTY: ICD-10-CM

## 2022-10-05 RX ORDER — ZOLPIDEM TARTRATE 5 MG/1
TABLET ORAL
Qty: 30 TABLET | Refills: 1 | Status: SHIPPED | OUTPATIENT
Start: 2022-10-05 | End: 2022-12-04

## 2022-10-05 NOTE — TELEPHONE ENCOUNTER
Last visit: 5/9/22  Last Med refill: 7/13/22  Does patient have enough medication for 72 hours: No:     Next Visit Date:  Future Appointments   Date Time Provider Pradeep Alexander   11/2/2022  1:15 PM Connie Wray MD [facility]   11/11/2022 11:00 AM Fang [facility] - [facility]   11/16/2022 10:30 AM Mary Feliz DPM [facility] [facility]   12/15/2022  2:00 PM Connie Wray  [address] [facility]   Topic Date Due    Shingles vaccine (1 of 2) Never done    COVID-19 Vaccine (4 - Booster for Moderna series) 05/05/2022    Flu vaccine (1) 08/01/2022    Lipids  10/07/2022    DTaP/Tdap/Td vaccine (1 - Tdap) 10/18/2026 (Originally 5/28/1959)    Depression Screen  12/13/2022    Annual Wellness Visit (AWV)  12/14/2022    DEXA (modify frequency per FRAX score)  Completed    Pneumococcal 65+ years Vaccine  Completed    Hepatitis A vaccine  Aged Out    Hib vaccine  Aged Out    Meningococcal (ACWY) vaccine  Aged Out       Hemoglobin A1C (%)   Date Value   05/09/2022 6.6   10/07/2021 6.7 (H)   09/30/2020 5.7             ( goal A1C is < 7)   Microalb/Crt.  Ratio (mcg/mg creat)   Date Value   10/06/2020 CANNOT BE CALCULATED     LDL Cholesterol (mg/dL)   Date Value   10/07/2021 48   09/30/2020 48       (goal LDL is <100)   AST (U/L)   Date Value   10/07/2021 16     ALT (U/L)   Date Value   10/07/2021 9     BUN (mg/dL)   Date Value   10/07/2021 16     BP Readings from Last 3 Encounters:   09/12/22 (!) 162/105   05/18/22 (!) 170/94   05/09/22 106/64          (goal 120/80)    All Future Testing planned in [facility]  Lab Frequency Next Occurrence   DRUG SCREEN, PAIN Once 03/17/2022               Patient Active Problem List:     Osteoarthritis     Essential hypertension     Type 2 diabetes mellitus with diabetic neuropathy, without long-term current use of insulin (HCC)     Pure hypercholesterolemia     Constipation     Rectal pain     Diverticulosis of colon     Tubular adenoma of colon     History of colon polyps     Rectal pressure     Failed neck syndrome     Cervical stenosis of spine     Cervical radicular pain     Lumbar stenosis with neurogenic claudication     JANN on CPAP     Primary osteoarthritis involving multiple joints     Hx of cervical spine surgery     Chronic use of opiate drugs therapeutic purposes     Myelomalacia (HCC)     Lumbar facet joint syndrome     Gastroesophageal reflux disease     Sleeping difficulty

## 2022-10-13 DIAGNOSIS — M54.12 CERVICAL RADICULAR PAIN: Chronic | ICD-10-CM

## 2022-10-14 RX ORDER — GABAPENTIN 600 MG/1
TABLET ORAL
Qty: 90 TABLET | Refills: 2 | Status: SHIPPED | OUTPATIENT
Start: 2022-10-14 | End: 2023-01-12

## 2022-10-14 RX ORDER — AMITRIPTYLINE HYDROCHLORIDE 25 MG/1
TABLET, FILM COATED ORAL
Qty: 90 TABLET | Refills: 1 | Status: SHIPPED | OUTPATIENT
Start: 2022-10-14

## 2022-10-14 NOTE — TELEPHONE ENCOUNTER
Last visit: 05/09/2022  Last Med refill: 06/01/2022 04/26/2022  Does patient have enough medication for 72 hours: Yes    Next Visit Date:  Future Appointments   Date Time Provider Pradeep Alexander   11/2/2022  1:15 PM Connie Bird MD [facility]   11/11/2022 11:00 AM [facility]   11/16/2022 10:30 AM Marilynn Breen DPM [facility]   12/15/2022  2:00 PM Connie Bird  [address] [facility]   Topic Date Due    Shingles vaccine (1 of 2) Never done    COVID-19 Vaccine (4 - Booster for Moderna series) 05/05/2022    Flu vaccine (1) 08/01/2022    Lipids  10/07/2022    DTaP/Tdap/Td vaccine (1 - Tdap) 10/18/2026 (Originally 5/28/1959)    Depression Screen  12/13/2022    Annual Wellness Visit (AWV)  12/14/2022    DEXA (modify frequency per FRAX score)  Completed    Pneumococcal 65+ years Vaccine  Completed    Hepatitis A vaccine  Aged Out    Hib vaccine  Aged Out    Meningococcal (ACWY) vaccine  Aged Out       Hemoglobin A1C (%)   Date Value   05/09/2022 6.6   10/07/2021 6.7 (H)   09/30/2020 5.7             ( goal A1C is < 7)   Microalb/Crt.  Ratio (mcg/mg creat)   Date Value   10/06/2020 CANNOT BE CALCULATED     LDL Cholesterol (mg/dL)   Date Value   10/07/2021 48   09/30/2020 48       (goal LDL is <100)   AST (U/L)   Date Value   10/07/2021 16     ALT (U/L)   Date Value   10/07/2021 9     BUN (mg/dL)   Date Value   10/07/2021 16     BP Readings from Last 3 Encounters:   09/12/22 (!) 162/105   05/18/22 (!) 170/94   05/09/22 106/64          (goal 120/80)    All Future Testing planned in [facility]  Lab Frequency Next Occurrence   DRUG SCREEN, PAIN Once 03/17/2022               Patient Active Problem List:     Osteoarthritis     Essential hypertension     Type 2 diabetes mellitus with diabetic neuropathy, without long-term current use of insulin (HCC)     Pure hypercholesterolemia     Constipation     Rectal pain     Diverticulosis of colon Tubular adenoma of colon     History of colon polyps     Rectal pressure     Failed neck syndrome     Cervical stenosis of spine     Cervical radicular pain     Lumbar stenosis with neurogenic claudication     JANN on CPAP     Primary osteoarthritis involving multiple joints     Hx of cervical spine surgery     Chronic use of opiate drugs therapeutic purposes     Myelomalacia (HCC)     Lumbar facet joint syndrome     Gastroesophageal reflux disease     Sleeping difficulty

## 2022-11-11 ENCOUNTER — OFFICE VISIT (OUTPATIENT)
Dept: PAIN MANAGEMENT | Age: 82
End: 2022-11-11
Payer: MEDICARE

## 2022-11-11 VITALS
DIASTOLIC BLOOD PRESSURE: 86 MMHG | HEART RATE: 83 BPM | WEIGHT: 152 LBS | HEIGHT: 62 IN | OXYGEN SATURATION: 97 % | BODY MASS INDEX: 27.97 KG/M2 | SYSTOLIC BLOOD PRESSURE: 148 MMHG

## 2022-11-11 DIAGNOSIS — M96.1 FAILED NECK SYNDROME: ICD-10-CM

## 2022-11-11 DIAGNOSIS — M48.062 LUMBAR STENOSIS WITH NEUROGENIC CLAUDICATION: ICD-10-CM

## 2022-11-11 DIAGNOSIS — M47.816 LUMBAR FACET JOINT SYNDROME: ICD-10-CM

## 2022-11-11 DIAGNOSIS — M54.12 CERVICAL RADICULAR PAIN: ICD-10-CM

## 2022-11-11 PROCEDURE — G8484 FLU IMMUNIZE NO ADMIN: HCPCS | Performed by: NURSE PRACTITIONER

## 2022-11-11 PROCEDURE — 1123F ACP DISCUSS/DSCN MKR DOCD: CPT | Performed by: NURSE PRACTITIONER

## 2022-11-11 PROCEDURE — 3074F SYST BP LT 130 MM HG: CPT | Performed by: NURSE PRACTITIONER

## 2022-11-11 PROCEDURE — 1036F TOBACCO NON-USER: CPT | Performed by: NURSE PRACTITIONER

## 2022-11-11 PROCEDURE — G8399 PT W/DXA RESULTS DOCUMENT: HCPCS | Performed by: NURSE PRACTITIONER

## 2022-11-11 PROCEDURE — G8417 CALC BMI ABV UP PARAM F/U: HCPCS | Performed by: NURSE PRACTITIONER

## 2022-11-11 PROCEDURE — G8427 DOCREV CUR MEDS BY ELIG CLIN: HCPCS | Performed by: NURSE PRACTITIONER

## 2022-11-11 PROCEDURE — 1090F PRES/ABSN URINE INCON ASSESS: CPT | Performed by: NURSE PRACTITIONER

## 2022-11-11 PROCEDURE — 99213 OFFICE O/P EST LOW 20 MIN: CPT | Performed by: NURSE PRACTITIONER

## 2022-11-11 PROCEDURE — 3078F DIAST BP <80 MM HG: CPT | Performed by: NURSE PRACTITIONER

## 2022-11-11 RX ORDER — HYDROCODONE BITARTRATE AND ACETAMINOPHEN 5; 325 MG/1; MG/1
1 TABLET ORAL 2 TIMES DAILY PRN
Qty: 60 TABLET | Refills: 0 | Status: SHIPPED | OUTPATIENT
Start: 2022-11-11 | End: 2022-12-11

## 2022-11-11 ASSESSMENT — ENCOUNTER SYMPTOMS
NAUSEA: 0
DIARRHEA: 0
VOMITING: 0
CONSTIPATION: 0
BACK PAIN: 1

## 2022-11-11 NOTE — PROGRESS NOTES
Chief Complaint   Patient presents with    Back Pain    Neck Pain     NORCO          [facility]     history of chronic neck and chronic lower back pain  She is diagnosed with cervical spinal stenosis and lumbar spinal stenosis     Neck pain is located midline into right upper trapezius. Reports constant bilat. finger/hand numbness, denies HA. Worse with ROM      Her back pain is minimal at this time but at times located in lower back pain in the lumbar area with radiation down both legs. aggravated with standing and walking and does notice bilat ankle and foot swelling at times     Patient is on chronic low-dose opioid therapy with Norco 5 mg twice a day along with gabapentin. Does take norco sparingly  Finds the medication helpful allowing her to do daily life activities and be independent  She has tried conservative measures with therapy and injections in past  Not a candidate for NSAIDs because of renal insufficiency and advanced age     Procedures  4/29/21  BILATERAL L5 EPIDURAL STEROID INJECTION reports 70% relief with improved function for 2 weeks     3/22/21  CERVICAL EPIDURAL STEROID INJECTION C7-T1 more than 50% relief     Feels pain is well controlled and declined injections    HPI:   Back Pain  This is a chronic problem. The problem has been waxing and waning since onset. The pain is present in the lumbar spine. The pain is at a severity of 5/10. The pain is moderate. The pain is Worse during the day. The symptoms are aggravated by bending and position. Associated symptoms include headaches and numbness. Pertinent negatives include no fever. Risk factors include obesity, poor posture and lack of exercise. She has tried analgesics for the symptoms. The treatment provided mild relief. Neck Pain   This is a chronic problem. The problem occurs constantly. Associated with: weather. The pain is at a severity of 5/10. The pain is moderate. Associated symptoms include headaches and numbness.  Pertinent negatives include no fever. Medication Refill: Norco     Pain score Today:  5  Adverse effects (Constipation / Nausea / Sedation / sexual Dysfunction / others) : no  Mood: good  Sleep pattern and quality: poor  Activity level: poor    Pill count Today: Norcowas due 10/26  Last dose taken  11/11/2022  OARRS report reviewed today: yes  ER/Hospitalizations/PCP visit related to pain since last visit:no   Any legal problems e.g. DUI etc.:No  Satisfied with current management: Yes    Opioid Contract:New Contract 11/11/2022   Last Urine Dug screen dated: 03/22/2022    Hemoglobin A1C   Date Value Ref Range Status   05/09/2022 6.6 % Final       Past Medical History, Past Surgical History, Social History, Allergies and Medications reviewed and updated in EPIC as indicated    Family History reviewed and is noncontributory. Patient denies any new neurological symptoms. No bowel or bladder incontinence, no weakness, and no falling. Morphine equivalent: 10    Controlled Substance Monitoring:    Acute and Chronic Pain Monitoring:   RX Monitoring 11/11/2022   Attestation -   Periodic Controlled Substance Monitoring Possible medication side effects, risk of tolerance/dependence & alternative treatments discussed. ;No signs of potential drug abuse or diversion identified. ;Assessed functional status. ;Obtaining appropriate analgesic effect of treatment. Chronic Pain > 50 MEDD -   Chronic Pain > 80 MEDD -          Periodic Controlled Substance Monitoring: Possible medication side effects, risk of tolerance/dependence & alternative treatments discussed., No signs of potential drug abuse or diversion identified. , Assessed functional status., Obtaining appropriate analgesic effect of treatment.  Darcy Wiseman, APRN - CNP)      Past Medical History:   Diagnosis Date    Arthritis     Cataract     Cataracts, bilateral     Cervical spondylosis with myelopathy     Depression     Diverticulosis of colon 2015    Dysuria Dysuria     Gastroesophageal reflux disease 2021    Gout     Headache(784.0)     Hematuria     History of colon polyps     Insomnia     Lumbar stenosis with neurogenic claudication     Neck pain     JANN (obstructive sleep apnea)     Osteoarthrosis, unspecified whether generalized or localized, unspecified site 10/5/2012    Pelvic pain     Pure hypercholesterolemia 10/5/2012    Shoulder blade pain     right  side    Sinus problem     Stroke [facility])     Tinnitus     Tubular adenoma of colon 2015    Type II or unspecified type diabetes mellitus without mention of complication, not stated as uncontrolled 10/5/2012    Dr. Paty Galeano last visit 2022 [facility].    Under care of team 2022    PCP: Dr. Paty Galeano, [facility], last visit 2022    Unspecified essential hypertension 10/5/2012    Dr. Paty Galeano    Unspecified sleep apnea     Wears dentures        Past Surgical History:   Procedure Laterality Date     SECTION      x2    COLONOSCOPY  06    Dr Bella Martinez  5 12 15    extensive diverticulosis, tubular adenoma    CYSTOSCOPY  2022    Bilateral Retrograde Pyelogram     CYSTOSCOPY Bilateral 3/9/2022    CYSTOSCOPY, RETROGRADE [facility]    EPIDURAL STEROID INJECTION cervical [facility]    EPIDURAL STEROID INJECTION L5S1 performed by [facility]des MD [email or web address] B[facility]    EPIDURAL STEROID INJECTION BILATERAL S1 performed by Jules Valdes MD at [address] [facility] [place] [address] Left     Cataracts    HYSTERECTOMY (CERVIX STATUS UNKNOWN)  6 Aleisha Santana  10/1/06    NECK SURGERY  2011 &     NERVE BLOCK Bilateral 2020    Lumbar Facet L2-L5    NERVE SURGERY Bilateral 2019    BILATERAL LUMBAR FACET STEROID INJECTION L2-L5 performed by Jules Valdes MD at [address]  08/25/2017    L5-S1 steroid injection    OTHER SURGICAL HISTORY  11/20/2017    VANITA L5-S1    OTHER SURGICAL HISTORY  10/01/2018    cervical steroid injection    OTHER SURGICAL HISTORY Bilateral 05/20/2019    EPIDURAL STEROID INJECTION BILATERAL S1 (Bilateral )    OTHER SURGICAL HISTORY  04/29/2021    lumbar VANITA    PAIN MANAGEMENT PROCEDURE Bilateral 2/17/2020    LUMBAR FACET L2-L5 performed by Maricarmen Dodd MD at [address] N/A 3/22/2021    CERVICAL EPIDURAL STEROID INJECTION C7-T1 performed by Maricarmen Dodd MD at [address] Bilateral 4/29/2021    BILATERAL L5 EPIDURAL STEROID INJECTION performed by Maricarmen Dodd MD at [facility] AA&/STRD TFRML EPI LUMBAR/SACRAL 1 LEVEL Bilateral 11/19/2018    BILATERAL L4 VANITA performed by Maricarmen Dodd MD at [facility] DX/THER SBST INTRLMNR CRV/THRC W/IMG GDN N/A 10/1/2018    EPIDURAL STEROID INJECTION CERVICAL C7-T1 performed by Maricarmen Dodd MD at [address] [place] [address]       No Known Allergies      Current Outpatient Medications:     HYDROcodone-acetaminophen (NORCO) 5-325 MG per tablet, Take 1 tablet by mouth 2 times daily as needed for Pain for up to 30 days. , Disp: 60 tablet, Rfl: 0    amitriptyline (ELAVIL) 25 MG tablet, take 1 tablet by mouth at bedtime, Disp: 90 tablet, Rfl: 1    gabapentin (NEURONTIN) 600 MG tablet, take 1 tablet by mouth three times a day, Disp: 90 tablet, Rfl: 2    zolpidem (AMBIEN) 5 MG tablet, take 1 tablet by mouth at bedtime if needed for sleep, Disp: 30 tablet, Rfl: 1    cloNIDine (CATAPRES) 0.1 MG tablet, take 1 tablet by mouth twice a day, Disp: 180 tablet, Rfl: 1    tiZANidine (ZANAFLEX) 2 MG tablet, TAKE 2 TABLETS BY MOUTH AT BEDTIME AND ONCE DURING THE DAY AS NEEDED, Disp: 90 tablet, Rfl: 1    Handicap Placard MISC, by Does not apply route Exp: 8/9/2027, Disp: 1 each, Rfl: 0    polyethylene glycol (GLYCOLAX) 17 GM/SCOOP powder, take 17GM (DISSOLVED IN WATER) by mouth once daily, Disp: 510 g, Rfl: 1    atorvastatin (LIPITOR) 40 MG tablet, Take 1 tablet by mouth daily, Disp: 90 tablet, Rfl: 1    loratadine (CLARITIN) 10 MG tablet, take 1 tablet by mouth once daily, Disp: 90 tablet, Rfl: 1    pantoprazole (PROTONIX) 40 MG tablet, take 1 tablet by mouth every morning BEFORE BREAKFAST, Disp: 90 tablet, Rfl: 1    allopurinol (ZYLOPRIM) 100 MG tablet, take 1 tablet by mouth once daily, Disp: 90 tablet, Rfl: 1    metFORMIN (GLUCOPHAGE) 1000 MG tablet, Take 1 tablet by mouth daily Pt states she takes 1 a day, Disp: 180 tablet, Rfl: 1    fluticasone (FLONASE) 50 MCG/ACT nasal spray, instill 1 spray into each nostril once daily, Disp: 16 g, Rfl: 2    blood glucose monitor strips, Check blood sugars daily as directed., Disp: 50 strip, Rfl: 6    Lancets MISC, 1 each by Does not apply route daily, Disp: 100 each, Rfl: 3    Alcohol Swabs 70 % PADS, 1 each by Does not apply route daily, Disp: 100 each, Rfl: 3    carvedilol (COREG) 3.125 MG tablet, take 1 tablet by mouth twice a day, Disp: 180 tablet, Rfl: 1    estradiol (ESTRACE VAGINAL) 0.1 MG/GM vaginal cream, Place 1 g vaginally daily Apply daily for one week, then twice a week thereafter, apply peasize amount in the urethra, Disp: 42.5 g, Rfl: 1    Compression Stockings MISC, 20-30 mm/hg, Disp: 2 each, Rfl: 0    blood glucose monitor kit and supplies, use check blood sugar as directed, Disp: 1 kit, Rfl: 0    Lift Chair MISC, by Does not apply route, Disp: 1 each, Rfl: 0    aspirin 325 MG tablet, Take 325 mg by mouth daily, Disp: , Rfl:     furosemide (LASIX) 20 MG tablet, Take 1 tablet by mouth daily for 3 days, Disp: 3 tablet, Rfl: 0    Family History   Problem Relation Age of Onset    Breast Cancer Sister     Cancer Sister         liver    Diabetes Daughter        Social History     Socioeconomic History    Marital status: Single     Spouse name: Not on file    Number of children: Not on file    Years of education: Not time.         Assessment:  Problem List Items Addressed This Visit       Failed neck syndrome (Chronic)    Relevant Medications    HYDROcodone-acetaminophen (NORCO) 5-325 MG per tablet    Cervical radicular pain (Chronic)    Relevant Medications    HYDROcodone-acetaminophen (NORCO) 5-325 MG per tablet    Lumbar stenosis with neurogenic claudication (Chronic)    Relevant Medications    HYDROcodone-acetaminophen (NORCO) 5-325 MG per tablet    Lumbar facet joint syndrome (Chronic)    Relevant Medications    HYDROcodone-acetaminophen (NORCO) 5-325 MG per tablet          Treatment Plan:  Patient relates current medications are helping the pain. Patient reports taking pain medications as prescribed, denies obtaining medications from different sources and denies use of illegal drugs. Patient denies side effects from medications like nausea, vomiting, constipation or drowsiness. Patient reports current activities of daily living are possible due to medications and would like to continue them. As always, we encourage daily stretching and strengthening exercises, and recommend minimizing use of pain medications unless patient cannot get through daily activities due to pain. Contract requirements met. Continue opioid therapy. Script written for norco  Follow up appointment made for 8 weeks    I have reviewed the chief complaint and history of present illness (including ROS and PFSH) and vital documentation by my staff and I agree with their documentation and have added where applicable.

## 2022-12-08 DIAGNOSIS — I10 ESSENTIAL HYPERTENSION: ICD-10-CM

## 2022-12-09 NOTE — TELEPHONE ENCOUNTER
Last visit: 05/09/2022  Last Med refill: 09/08/2022  Does patient have enough medication for 72 hours: No:     Next Visit Date:  Future Appointments   Date Time Provider Pradeep Alexander   12/15/2022  2:00 PM Connie Ruby MD [email or web address]   12/21/2022 10:00 AM [facility]   1/6/2023 10:00 AM YOLANDA Arnett - CNP [facility] [address] Maintenance   Topic Date Due    Shingles vaccine (1 of 2) Never done    COVID-19 Vaccine (4 - Booster for Moderna series) 03/02/2022    Flu vaccine (1) 08/01/2022    Lipids  10/07/2022    Depression Screen  12/13/2022    Annual Wellness Visit (AWV)  12/14/2022    DTaP/Tdap/Td vaccine (1 - Tdap) 10/18/2026 (Originally 5/28/1959)    DEXA (modify frequency per FRAX score)  Completed    Pneumococcal 65+ years Vaccine  Completed    Hepatitis A vaccine  Aged Out    Hib vaccine  Aged Out    Meningococcal (ACWY) vaccine  Aged Out       Hemoglobin A1C (%)   Date Value   05/09/2022 6.6   10/07/2021 6.7 (H)   09/30/2020 5.7             ( goal A1C is < 7)   Microalb/Crt.  Ratio (mcg/mg creat)   Date Value   10/06/2020 CANNOT BE CALCULATED     LDL Cholesterol (mg/dL)   Date Value   10/07/2021 48   09/30/2020 48       (goal LDL is <100)   AST (U/L)   Date Value   10/07/2021 16     ALT (U/L)   Date Value   10/07/2021 9     BUN (mg/dL)   Date Value   10/07/2021 16     BP Readings from Last 3 Encounters:   11/11/22 (!) 148/86   09/12/22 (!) 162/105   05/18/22 (!) 170/94          (goal 120/80)    All Future Testing planned in [facility]  Lab Frequency Next Occurrence   DRUG SCREEN, PAIN Once 03/17/2022               Patient Active Problem List:     Osteoarthritis     Essential hypertension     Type 2 diabetes mellitus with diabetic neuropathy, without long-term current use of insulin (HCC)     Pure hypercholesterolemia     Constipation     Rectal pain     Diverticulosis of colon     Tubular adenoma of colon     History of colon polyps     Rectal pressure Failed neck syndrome     Cervical stenosis of spine     Cervical radicular pain     Lumbar stenosis with neurogenic claudication     JANN on CPAP     Primary osteoarthritis involving multiple joints     Hx of cervical spine surgery     Chronic use of opiate drugs therapeutic purposes     Myelomalacia (HCC)     Lumbar facet joint syndrome     Gastroesophageal reflux disease     Sleeping difficulty

## 2022-12-12 RX ORDER — CARVEDILOL 3.12 MG/1
TABLET ORAL
Qty: 180 TABLET | Refills: 1 | Status: SHIPPED | OUTPATIENT
Start: 2022-12-12

## 2022-12-15 ENCOUNTER — OFFICE VISIT (OUTPATIENT)
Dept: FAMILY MEDICINE CLINIC | Age: 82
End: 2022-12-15
Payer: MEDICARE

## 2022-12-15 VITALS
SYSTOLIC BLOOD PRESSURE: 164 MMHG | TEMPERATURE: 98.1 F | HEART RATE: 85 BPM | DIASTOLIC BLOOD PRESSURE: 98 MMHG | OXYGEN SATURATION: 96 %

## 2022-12-15 DIAGNOSIS — T40.2X5A THERAPEUTIC OPIOID-INDUCED CONSTIPATION (OIC): ICD-10-CM

## 2022-12-15 DIAGNOSIS — I10 ESSENTIAL HYPERTENSION: ICD-10-CM

## 2022-12-15 DIAGNOSIS — E78.00 PURE HYPERCHOLESTEROLEMIA: ICD-10-CM

## 2022-12-15 DIAGNOSIS — K21.9 GASTROESOPHAGEAL REFLUX DISEASE, UNSPECIFIED WHETHER ESOPHAGITIS PRESENT: ICD-10-CM

## 2022-12-15 DIAGNOSIS — M48.02 CERVICAL STENOSIS OF SPINE: ICD-10-CM

## 2022-12-15 DIAGNOSIS — J30.89 CHRONIC NON-SEASONAL ALLERGIC RHINITIS: ICD-10-CM

## 2022-12-15 DIAGNOSIS — M1A.09X0 CHRONIC GOUT OF MULTIPLE SITES, UNSPECIFIED CAUSE: ICD-10-CM

## 2022-12-15 DIAGNOSIS — G47.9 SLEEPING DIFFICULTY: ICD-10-CM

## 2022-12-15 DIAGNOSIS — Z23 NEED FOR IMMUNIZATION AGAINST INFLUENZA: ICD-10-CM

## 2022-12-15 DIAGNOSIS — K59.03 DRUG INDUCED CONSTIPATION: ICD-10-CM

## 2022-12-15 DIAGNOSIS — D64.9 ANEMIA, UNSPECIFIED TYPE: ICD-10-CM

## 2022-12-15 DIAGNOSIS — Z00.00 MEDICARE ANNUAL WELLNESS VISIT, SUBSEQUENT: Primary | ICD-10-CM

## 2022-12-15 DIAGNOSIS — K59.03 THERAPEUTIC OPIOID-INDUCED CONSTIPATION (OIC): ICD-10-CM

## 2022-12-15 DIAGNOSIS — E11.40 TYPE 2 DIABETES MELLITUS WITH DIABETIC NEUROPATHY, WITHOUT LONG-TERM CURRENT USE OF INSULIN (HCC): ICD-10-CM

## 2022-12-15 DIAGNOSIS — E07.9 DISORDER OF THYROID, UNSPECIFIED: ICD-10-CM

## 2022-12-15 LAB — HBA1C MFR BLD: 6.3 %

## 2022-12-15 PROCEDURE — 3044F HG A1C LEVEL LT 7.0%: CPT | Performed by: INTERNAL MEDICINE

## 2022-12-15 PROCEDURE — 1123F ACP DISCUSS/DSCN MKR DOCD: CPT | Performed by: INTERNAL MEDICINE

## 2022-12-15 PROCEDURE — G0439 PPPS, SUBSEQ VISIT: HCPCS | Performed by: INTERNAL MEDICINE

## 2022-12-15 PROCEDURE — 3078F DIAST BP <80 MM HG: CPT | Performed by: INTERNAL MEDICINE

## 2022-12-15 PROCEDURE — 3074F SYST BP LT 130 MM HG: CPT | Performed by: INTERNAL MEDICINE

## 2022-12-15 PROCEDURE — G8484 FLU IMMUNIZE NO ADMIN: HCPCS | Performed by: INTERNAL MEDICINE

## 2022-12-15 PROCEDURE — 83036 HEMOGLOBIN GLYCOSYLATED A1C: CPT | Performed by: INTERNAL MEDICINE

## 2022-12-15 PROCEDURE — G0008 ADMIN INFLUENZA VIRUS VAC: HCPCS | Performed by: INTERNAL MEDICINE

## 2022-12-15 PROCEDURE — 90694 VACC AIIV4 NO PRSRV 0.5ML IM: CPT | Performed by: INTERNAL MEDICINE

## 2022-12-15 RX ORDER — LORATADINE 10 MG/1
TABLET ORAL
Qty: 90 TABLET | Refills: 1 | Status: SHIPPED | OUTPATIENT
Start: 2022-12-15

## 2022-12-15 RX ORDER — FLUTICASONE PROPIONATE 50 MCG
SPRAY, SUSPENSION (ML) NASAL
Qty: 16 G | Refills: 2 | Status: SHIPPED | OUTPATIENT
Start: 2022-12-15

## 2022-12-15 RX ORDER — POLYETHYLENE GLYCOL 3350 17 G/17G
POWDER, FOR SOLUTION ORAL
Qty: 510 G | Refills: 1 | Status: SHIPPED | OUTPATIENT
Start: 2022-12-15

## 2022-12-15 RX ORDER — PANTOPRAZOLE SODIUM 40 MG/1
TABLET, DELAYED RELEASE ORAL
Qty: 90 TABLET | Refills: 1 | Status: SHIPPED | OUTPATIENT
Start: 2022-12-15

## 2022-12-15 RX ORDER — ALLOPURINOL 100 MG/1
TABLET ORAL
Qty: 90 TABLET | Refills: 1 | Status: SHIPPED | OUTPATIENT
Start: 2022-12-15

## 2022-12-15 RX ORDER — ATORVASTATIN CALCIUM 40 MG/1
40 TABLET, FILM COATED ORAL DAILY
Qty: 90 TABLET | Refills: 1 | Status: SHIPPED | OUTPATIENT
Start: 2022-12-15

## 2022-12-15 ASSESSMENT — LIFESTYLE VARIABLES
HOW MANY STANDARD DRINKS CONTAINING ALCOHOL DO YOU HAVE ON A TYPICAL DAY: PATIENT DOES NOT DRINK
HOW OFTEN DO YOU HAVE A DRINK CONTAINING ALCOHOL: NEVER

## 2022-12-15 ASSESSMENT — PATIENT HEALTH QUESTIONNAIRE - PHQ9
SUM OF ALL RESPONSES TO PHQ QUESTIONS 1-9: 0
SUM OF ALL RESPONSES TO PHQ QUESTIONS 1-9: 0
2. FEELING DOWN, DEPRESSED OR HOPELESS: 0
1. LITTLE INTEREST OR PLEASURE IN DOING THINGS: 0
SUM OF ALL RESPONSES TO PHQ QUESTIONS 1-9: 0
SUM OF ALL RESPONSES TO PHQ QUESTIONS 1-9: 0
SUM OF ALL RESPONSES TO PHQ9 QUESTIONS 1 & 2: 0

## 2022-12-15 NOTE — PATIENT INSTRUCTIONS
Go back up on the clonidine to 0.1 mg twice daily. Check your blood pressure once daily at home a couple hours after you take your medications, starting Monday 12/19/22 and call with your readings in one week. We can do a nurse visit in four weeks to recheck your blood pressure, bring your BP cuff to that visit. Get a consistent sleep routine - consistent bedtime, consistent time to take your Ambien. You need to be getting about 6 hours of continuous sleep for your body to get good quality rest.   Schedule an appointment to get your eye exam done   Start Movantik 12.5mg daily to help with the constipation due to your pain meds. Continue your daily MiraLAX as well. Let me know if we need to increase the dose of the Movantik in two weeks. When you come in for your nurse visit, get your fasting labs done including a urine sample. Preventing Falls: Care Instructions  Overview     Getting around your home safely can be a challenge if you have injuries or health problems that make it easy for you to fall. Loose rugs and furniture in walkways are among the dangers for many older people who have problems walking or who have poor eyesight. People who have conditions such as arthritis, osteoporosis, or dementia also have to be careful not to fall. You can make your home safer with a few simple measures. Follow-up care is a key part of your treatment and safety. Be sure to make and go to all appointments, and call your doctor if you are having problems. It's also a good idea to know your test results and keep a list of the medicines you take. How can you care for yourself at home? Taking care of yourself  Exercise regularly to improve your strength, muscle tone, and balance. Walk if you can. Swimming may be a good choice if you cannot walk easily. Have your vision and hearing checked each year or any time you notice a change.  If you have trouble seeing and hearing, you might not be able to avoid objects and could lose your balance. Know the side effects of the medicines you take. Ask your doctor or pharmacist whether the medicines you take can affect your balance. Sleeping pills or sedatives can affect your balance. Limit the amount of alcohol you drink. Alcohol can impair your balance and other senses. Ask your doctor whether calluses or corns on your feet need to be removed. If you wear loose-fitting shoes because of calluses or corns, you can lose your balance and fall. Talk to your doctor if you have numbness in your feet. You may get dizzy if you do not drink enough water. To prevent dehydration, drink plenty of fluids. Choose water and other clear liquids. If you have kidney, heart, or liver disease and have to limit fluids, talk with your doctor before you increase the amount of fluids you drink. Preventing falls at home  Remove raised doorway thresholds, throw rugs, and clutter. Repair loose carpet or raised areas in the floor. Move furniture and electrical cords to keep them out of walking paths. Use nonskid floor wax, and wipe up spills right away, especially on ceramic tile floors. If you use a walker or cane, put rubber tips on it. If you use crutches, clean the bottoms of them regularly with an abrasive pad, such as steel wool. Keep your house well lit, especially stairways, porches, and outside walkways. Use night-lights in areas such as hallways and bathrooms. Add extra light switches or use remote switches (such as switches that go on or off when you clap your hands) to make it easier to turn lights on if you have to get up during the night. Install sturdy handrails on stairways. Move items in your cabinets so that the things you use a lot are on the lower shelves (about waist level). Keep a cordless phone and a flashlight with new batteries by your bed. If possible, put a phone in each of the main rooms of your house, or carry a cell phone in case you fall and cannot reach a phone.  Or, you can wear a device around your neck or wrist. You push a button that sends a signal for help. Wear low-heeled shoes that fit well and give your feet good support. Use footwear with nonskid soles. Check the heels and soles of your shoes for wear. Repair or replace worn heels or soles. Do not wear socks without shoes on smooth floors, such as wood. Walk on the grass when the sidewalks are slippery. If you live in an area that gets snow and ice in the winter, sprinkle salt on slippery steps and sidewalks. Or ask a family member or friend to do this for you. Preventing falls in the bath  Install grab bars and nonskid mats inside and outside your shower or tub and near the toilet and sinks. Use shower chairs and bath benches. Use a hand-held shower head that will allow you to sit while showering. Get into a tub or shower by putting the weaker leg in first. Get out of a tub or shower with your strong side first.  Repair loose toilet seats and consider installing a raised toilet seat to make getting on and off the toilet easier. Keep your bathroom door unlocked while you are in the shower. Where can you learn more? Go to http://www.rodriguez.com/ and enter G117 to learn more about \"Preventing Falls: Care Instructions. \"  Current as of: May 4, 2022               Content Version: 13.5  © 2006-2022 Healthwise, [facility]. Care instructions adapted under license by [facility] ([facility]). If you have questions about a medical condition or this instruction, always ask your healthcare professional. [place] [address] any warranty or liability for your use of this information. Learning About Emotional Support  When do you need emotional support? You might find getting support from others helpful when you have a long-term health problem. Often people feel alone, confused, or scared when coping with an illness. But you aren't alone.  Other people are going through the same thing you are and know how you feel. Talking with others about your feelings can help you feel better. Your family and friends can give you support. So can your doctor, a support group, or a Sabianist. If you have a support network, you will not feel as alone. You will learn new ways to deal with your situation, and you may try harder to overcome it. Where you can get support  Family and friends: They can help you cope by giving you comfort and encouragement. Counseling: Professional counseling can help you cope with situations that interfere with your life and cause stress. Counseling can help you understand and deal with your illness. Your doctor: Find a doctor you trust and feel comfortable with. Be open and honest about your fears and concerns. Your doctor can help you get the right medical treatments, including counseling. Spiritual or Samaritan groups: They can provide comfort and may be able to help you find counseling or other social support services. Social groups: They can help you meet new people and get involved in activities you enjoy. Community support groups: In a support group, you can talk to others who have dealt with the same problems or illness as you. You can encourage one another and learn ways to cope with tough emotions. How to find a support group  Ask your doctor, counselor, or other health professional for suggestions. Contact your local Sabianist, Buddhism, Hindu, or other Samaritan group. Ask your family and friends. Ask people who have the same health concerns. Go online. Forums and blogs let you read messages from others and leave your own messages. You can exchange stories, vent your frustrations, and ask and answer questions. Contact a city, state, or national group that provides support for your health concerns. Your library or community center may have a list of these groups. Or you can look for information online. Look for a support group that works for you.  Ask yourself if you prefer structure and would like a , or if you would like a less formal group. Do you prefer face-to-face meetings? Or do you feel more secure in online chat rooms or forums? Supportive relationships  A supportive relationship includes emotional support such as love, trust, and understanding, as well as advice and concrete help, such as help managing your time. Reach out to others  Family and friends can help you. Ask them to:  Listen to you and give you encouragement. This can keep you from feeling hopeless or alone. Help with small daily tasks or with bigger problems. A helping hand can keep you from feeling overwhelmed. Help you manage a health problem. For example, ask them to go to doctor visits with you. Your loved ones can offer support by being involved in your medical care. Respect your relationships  A good relationship is also a two-way street. You count on help from others, but they also count on you. Know your friends' limits. You don't have to see or call your friends every day. If you are going through a rough patch, ask friends if you can contact them outside of the usual boundaries. Don't always complain or talk about yourself. Know when it's time to stop talking and listen or just enjoy your friend's company. Know that good friends can be a bad influence. For example, if a friend encourages you to drink when you know it will harm you, you may want to end the friendship. Where can you learn more? Go to http://www.woods.com/ and enter G092 to learn more about \"Learning About Emotional Support. \"  Current as of: February 9, 2022               Content Version: 13.5  © 0956-5439 Healthwise, [employer]. Care instructions adapted under license by [facility] ([facility]). If you have questions about a medical condition or this instruction, always ask your healthcare professional. [address] any warranty or liability for your use of this information. Hearing Loss: Care Instructions  Overview     Hearing loss is a sudden or slow decrease in how well you hear. It can range from mild to severe. Permanent hearing loss can occur with aging. It also can happen when you are exposed long-term to loud noise. Examples include listening to loud music, riding motorcycles, or being around other loud machines. Hearing loss can affect your work and home life. It can make you feel lonely or depressed. You may feel that you have lost your independence. But hearing aids and other devices can help you hear better and feel connected to others. Follow-up care is a key part of your treatment and safety. Be sure to make and go to all appointments, and call your doctor if you are having problems. It's also a good idea to know your test results and keep a list of the medicines you take. How can you care for yourself at home? Avoid loud noises whenever possible. This helps keep your hearing from getting worse. Always wear hearing protection around loud noises. Wear a hearing aid as directed. See a professional who can help you pick a hearing aid that fits you. Have hearing tests as your doctor suggests. They can show whether your hearing has changed. Your hearing aid may need to be adjusted. Use other devices as needed. These may include:  Telephone amplifiers and hearing aids that can connect to a television, stereo, radio, or microphone. Devices that use lights or vibrations. These alert you to the doorbell, a ringing telephone, or a baby monitor. Television closed-captioning. This shows the words at the bottom of the screen. Most new TVs can do this. TTY (text telephone). This lets you type messages back and forth on the telephone instead of talking or listening. These devices are also called TDD. When messages are typed on the keyboard, they are sent over the phone line to a receiving TTY. The message is shown on a monitor.   Use text messaging, social media, and email if it is hard for you to communicate by telephone. Try to learn a listening technique called speechreading. It is not lipreading. You pay attention to people's gestures, expressions, posture, and tone of voice. These clues can help you understand what a person is saying. Face the person you are talking to, and have them face you. Make sure the lighting is good. You need to see the other person's face clearly. Think about counseling if you need help to adjust to your hearing loss. When should you call for help? Watch closely for changes in your health, and be sure to contact your doctor if:    You think your hearing is getting worse.     You have new symptoms, such as dizziness or nausea. Where can you learn more? Go to http://www.rodriguez.com/ and enter R798 to learn more about \"Hearing Loss: Care Instructions. \"  Current as of: May 4, 2022               Content Version: 13.5  © 2006-2022 [employer]. Care instructions adapted under license by [facility] ([facility]). If you have questions about a medical condition or this instruction, always ask your healthcare professional. [place] [address] any warranty or liability for your use of this information. Learning About Vision Tests  What are vision tests? The four most common vision tests are visual acuity tests, refraction, visual field tests, and color vision tests. Visual acuity (sharpness) tests  These tests are used: To see if you need glasses or contact lenses. To monitor an eye problem. To check an eye injury. Visual acuity tests are done as part of routine exams. You may also have this test when you get your 's license or apply for some types of jobs. Visual field tests  These tests are used: To check for vision loss in any area of your range of vision. To screen for certain eye diseases.   To look for nerve damage after a stroke, head injury, or other problem that could reduce blood flow to the brain. Refraction and color tests  A refraction test is done to find the right prescription for glasses and contact lenses. A color vision test is done to check for color blindness. Color vision is often tested as part of a routine exam. You may also have this test when you apply for a job where recognizing different colors is important, such as , electronics, or the [employer]. How are vision tests done? Visual acuity test   You cover one eye at a time. You read aloud from a wall chart across the room. You read aloud from a small card that you hold in your hand. Refraction   You look into a special device. The device puts lenses of different strengths in front of each eye to see how strong your glasses or contact lenses need to be. Visual field tests   Your doctor may have you look through special machines. Or your doctor may simply have you stare straight ahead while they move a finger into and out of your field of vision. Color vision test   You look at pieces of printed test patterns in various colors. You say what number or symbol you see. Your doctor may have you trace the number or symbol using a pointer. How do these tests feel? There is very little chance of having a problem from this test. If dilating drops are used for a vision test, they may make the eyes sting and cause a medicine taste in the mouth. Follow-up care is a key part of your treatment and safety. Be sure to make and go to all appointments, and call your doctor if you are having problems. It's also a good idea to know your test results and keep a list of the medicines you take. Where can you learn more? Go to http://www.rodriguez.com/ and enter G551 to learn more about \"Learning About Vision Tests. \"  Current as of: October 12, 2022               Content Version: 13.5  © 7355-1247 Healthwise, Incorporated. Care instructions adapted under license by [facility] ([facility]).  If you have questions about a medical condition or this instruction, always ask your healthcare professional. [address] any warranty or liability for your use of this information. Learning About Activities of Daily Living  What are activities of daily living? Activities of daily living (ADLs) are the basic self-care tasks you do every day. As you age, and if you have health problems, you may find that it's harder to do these things for yourself. That's when you may need some help. Your doctor uses ADLs to measure how much help you need. Knowing what you can and can't do for yourself is an important first step to getting help. And when you have the help you need, you can stay as independent as possible. Your doctor will want to know if you are able to do tasks such as: Take a bath or shower without help. Go to the bathroom by yourself. Dress and undress without help. Shave, comb your hair, and brush teeth on your own. Get in and out of bed or a chair without help. Feed yourself without help. If you are having trouble doing basic self-care tasks, talk with your doctor. You may want to bring a caregiver or family member who can help the doctor understand your needs and abilities. How will a doctor assess your ADLs? Asking about ADLs is part of a routine health checkup your doctor will likely do as you age. Your health check might be done in a doctor's office, in your home, or at a hospital. The goal is to find out if you are having any problems that could make your health problems worse or that make it unsafe for you to be on your own. To measure your ADLs, your doctor will ask how hard it is for you to do routine tasks. He or she may also want to know if you have changed the way you do a task because of a health problem. He or she may watch how you:  Walk back and forth. Keep your balance while you stand or walk. Move from sitting to standing or from a bed to a chair.   Button or unbutton a shirt or sweater. Remove and put on your shoes. It's normal to feel a little worried or anxious if you find you can't do all the things you used to be able to do. Talking with your doctor about ADLs isn't a test that you either pass or fail. It's just a way to get more information about your health and safety. Follow-up care is a key part of your treatment and safety. Be sure to make and go to all appointments, and call your doctor if you are having problems. It's also a good idea to know your test results and keep a list of the medicines you take. Current as of: October 6, 2021               Content Version: 13.5  © 2006-2022 [employer]. Care instructions adapted under license by [facility] ([facility]). If you have questions about a medical condition or this instruction, always ask your healthcare professional. [place] [address] any warranty or liability for your use of this information. Advance Directives: Care Instructions  Overview  An advance directive is a legal way to state your wishes at the end of your life. It tells your family and your doctor what to do if you can't say what you want. There are two main types of advance directives. You can change them any time your wishes change. Living will. This form tells your family and your doctor your wishes about life support and other treatment. The form is also called a declaration. Medical power of . This form lets you name a person to make treatment decisions for you when you can't speak for yourself. This person is called a health care agent (health care proxy, health care surrogate). The form is also called a durable power of  for health care. If you do not have an advance directive, decisions about your medical care may be made by a family member, or by a doctor or a  who doesn't know you. It may help to think of an advance directive as a gift to the people who care for you.  If you have one, they won't have to make tough decisions by themselves. For more information, including forms for your state, see the [address] website (www.caringinfo.org/planning/advance-directives/). Follow-up care is a key part of your treatment and safety. Be sure to make and go to all appointments, and call your doctor if you are having problems. It's also a good idea to know your test results and keep a list of the medicines you take. What should you include in an advance directive? Many states have a unique advance directive form. (It may ask you to address specific issues.) Or you might use a universal form that's approved by many states. If your form doesn't tell you what to address, it may be hard to know what to include in your advance directive. Use the questions below to help you get started. Who do you want to make decisions about your medical care if you are not able to? What life-support measures do you want if you have a serious illness that gets worse over time or can't be cured? What are you most afraid of that might happen? (Maybe you're afraid of having pain, losing your independence, or being kept alive by machines.)  Where would you prefer to die? (Your home? A hospital? A nursing home?)  Do you want to donate your organs when you die? Do you want certain Moravian practices performed before you die? When should you call for help? Be sure to contact your doctor if you have any questions. Where can you learn more? Go to http://www.rodriguez.com/ and enter R264 to learn more about \"Advance Directives: Care Instructions. \"  Current as of: June 16, 2022               Content Version: 13.5  © 2402-7830 Healthwise, Incorporated. Care instructions adapted under license by [facility] ([facility]).  If you have questions about a medical condition or this instruction, always ask your healthcare professional. [address] any warranty or liability for your use of this information. Personalized Preventive Plan for Paula Skinner - 12/15/2022  Medicare offers a range of preventive health benefits. Some of the tests and screenings are paid in full while other may be subject to a deductible, co-insurance, and/or copay. Some of these benefits include a comprehensive review of your medical history including lifestyle, illnesses that may run in your family, and various assessments and screenings as appropriate. After reviewing your medical record and screening and assessments performed today your provider may have ordered immunizations, labs, imaging, and/or referrals for you. A list of these orders (if applicable) as well as your Preventive Care list are included within your After Visit Summary for your review. Other Preventive Recommendations:    A preventive eye exam performed by an eye specialist is recommended every 1-2 years to screen for glaucoma; cataracts, macular degeneration, and other eye disorders. A preventive dental visit is recommended every 6 months. Try to get at least 150 minutes of exercise per week or 10,000 steps per day on a pedometer . Order or download the FREE \"Exercise & Physical Activity: Your Everyday Guide\" from [employer]. Call 4-706.222.4368 or search [employer] online. You need 7208-1264 mg of calcium and 7462-5849 IU of vitamin D per day. It is possible to meet your calcium requirement with diet alone, but a vitamin D supplement is usually necessary to meet this goal.  When exposed to the sun, use a sunscreen that protects against both UVA and UVB radiation with an SPF of 30 or greater. Reapply every 2 to 3 hours or after sweating, drying off with a towel, or swimming. Always wear a seat belt when traveling in a car. Always wear a helmet when riding a bicycle or motorcycle.

## 2022-12-15 NOTE — PROGRESS NOTES
Medicare Annual Wellness Visit    Talitha Carrel is here for Medicare AWV    Assessment & Plan   Medicare annual wellness visit, subsequent  Need for immunization against influenza  -     Influenza, FLUAD, (age 72 y+), IM, Preservative Free, 0.5 mL  Type 2 diabetes mellitus with diabetic neuropathy, without long-term current use of insulin (HCC)  -     POCT glycosylated hemoglobin (Hb A1C)  -     Microalbumin / Creatinine Urine Ratio; Future  -     metFORMIN (GLUCOPHAGE) 1000 MG tablet; Take 1 tablet by mouth daily Pt states she takes 1 a day, Disp-180 tablet, R-1Normal  Essential hypertension  -     Comprehensive Metabolic Panel; Future  Cervical stenosis of spine  Pure hypercholesterolemia  -     Lipid, Fasting; Future  -     Comprehensive Metabolic Panel; Future  -     atorvastatin (LIPITOR) 40 MG tablet; Take 1 tablet by mouth daily, Disp-90 tablet, R-1Normal  Gastroesophageal reflux disease, unspecified whether esophagitis present  -     pantoprazole (PROTONIX) 40 MG tablet; take 1 tablet by mouth every morning BEFORE BREAKFAST, Disp-90 tablet, R-1Normal  Disorder of thyroid, unspecified  -     TSH With Reflex Ft4; Future  Anemia, unspecified type  -     CBC with Auto Differential; Future  -     Iron and TIBC; Future  -     Ferritin; Future  -     Vitamin B12 & Folate; Future  Therapeutic opioid-induced constipation (OIC)  -     naloxegol (MOVANTIK) 12.5 MG TABS tablet;  Take 1 tablet by mouth every morning (before breakfast), Disp-30 tablet, R-3Normal  Drug induced constipation  -     polyethylene glycol (GLYCOLAX) 17 GM/SCOOP powder; take 17GM (DISSOLVED IN WATER) by mouth once daily, Disp-510 g, R-1Normal  Chronic non-seasonal allergic rhinitis  -     loratadine (CLARITIN) 10 MG tablet; take 1 tablet by mouth once daily, Disp-90 tablet, R-1Normal  -     fluticasone (FLONASE) 50 MCG/ACT nasal spray; instill 1 spray into each nostril once daily, Disp-16 g, R-2Normal  Chronic gout of multiple sites, unspecified cause  -     allopurinol (ZYLOPRIM) 100 MG tablet; take 1 tablet by mouth once daily, Disp-90 tablet, R-1Normal  Sleeping difficulty  -     zolpidem (AMBIEN) 5 MG tablet; Take 1 tablet by mouth nightly as needed for Sleep for up to 60 days. , Disp-30 tablet, R-1Normal    Recommendations for Preventive Services Due: see orders and patient instructions/AVS.  Recommended screening schedule for the next 5-10 years is provided to the patient in written form: see Patient Instructions/AVS.     No follow-ups on file. Subjective   The following acute and/or chronic problems were also addressed today:  Diabetes - doing well with current regimen - . Denies hypoglycemia, hyperglycemia, fatigue, polyuria, polydipsia, paresthesias, vision changes, dizziness. Eye exam was done. Not following with podiatry. Patient is taking ACE inhibitor/ARB. Complications of diabetes include HLD. A1c today 6.3%. Hypertension-tolerating current regimen without chest pain, palpitations, dizziness, peripheral edema, dyspnea on exertion, orthopnea, paroxysmal nocturnal dyspnea. Hyperlipidemia-tolerating current regimen without myalgias, dyspepsia, jaundice. Not sleeping well - staying up all night, sometimes takes long naps in the day, up talking with family all night long sometimes doesn't make it to bed till noon. She takes her [place] but after she lays down she cannot sleep. Mostly compliant with diet recommendations for low carb diet, not very compliant with exercise recommendations. Cardiovascular risk factors: advanced age (older than 54 for men, 72 for women), diabetes mellitus, dyslipidemia, hypertension, and sedentary lifestyle      Patient's complete Health Risk Assessment and screening values have been reviewed and are found in Flowsheets. The following problems were reviewed today and where indicated follow up appointments were made and/or referrals ordered.     Positive Risk Factor Screenings with Interventions:    Fall Risk:  Do you feel unsteady or are you worried about falling? : (!) yes  2 or more falls in past year?: (!) yes  Fall with injury in past year?: no     Interventions:    Patient declines any further evaluation or treatment             Opioid Risk: (Low risk score <55) Opioid risk score: 4    Patient is low risk for opioid use disorder or overdose. Last PDMP [place] as Reviewed:  Review User Review Instant Review Result   Victory Byrne 11/11/2022 11:27 AM     Reviewed PDMP [1]     Last Controlled Substance Monitoring Documentation      [address] Office Visit from 11/11/2022 in [facility] [place]   Periodic Controlled Substance Monitoring Possible medication side effects, risk of tolerance/dependence & alternative treatments discussed., No signs of potential drug abuse or diversion identified. , Assessed functional status., Obtaining appropriate analgesic effect of treatment. filed at 11/11/2022 1128                   Vision Screen:  Do you have difficulty driving, watching TV, or doing any of your daily activities because of your eyesight?: (!) Yes  Have you had an eye exam within the past year?: (!) No  No results found. Interventions:   Patient encouraged to make appointment with their eye specialist  See AVS for additional education material  See A/P for any pertinent orders     ADL's:   Patient reports needing help with:  Select all that apply: (!) Transportation, Laundry    Interventions:  Patient declined any further interventions or treatment    Advanced Directives:  Do you have a Living Will?: (!) No    Intervention:  has NO advanced directive - information provided    Advance Care Planning   Advanced Care Planning: Discussed the patients choices for care and treatment in case of a health event that adversely affects decision-making abilities. Also discussed the patients long-term treatment options. Reviewed with the patient the appropriate state-specific advance directive documents.  Reviewed the process of designating a competent adult as an Agent (or -in-fact) that could take make health care decisions for the patient if incompetent. Patient was asked to complete the declaration forms, if they have not already, either acknowledge the forms by a public notary or an eligible witness and provide a signed copy to the practice office. Time spent (minutes): 15        CV Risk Counseling:  Patient was asked about her current diet and exercise habits, and personalized advice was provided regarding recommended lifestyle changes. Patient's individual cardiovascular disease risk factors, including advanced age (> 54 for men, > 65 for women), were discussed, as well as the likely benefits of lifestyle changes. Based upon patient's motivation to change her behavior, the following plan was agreed upon to work toward lowering cardiovascular disease risk: low saturated fat, low cholesterol diet. Aspirin use for primary prevention of cardiovascular disease for men 45-79 and women 55-79: Indicated- continue daily aspirin. Educational materials for lifestyle changes were provided. Patient will follow-up in 4 month(s) with PCP. Provider spent 10 minutes counseling patient. Objective   Vitals:    12/15/22 1413   BP: (!) 164/98   Site: Left Upper Arm   Position: Sitting   Cuff Size: Large Adult   Pulse: 85   Temp: 98.1 °F (36.7 °C)   SpO2: 96%      There is no height or weight on file to calculate BMI.       General Appearance: alert and oriented to person, place and time, well developed and well- nourished, in no acute distress  Skin: warm and dry, no rash or erythema  Head: normocephalic and atraumatic  Eyes: pupils equal, round, and reactive to light, extraocular eye movements intact, conjunctivae normal  ENT: tympanic membrane, external ear and ear canal normal bilaterally, nose without deformity, nasal mucosa and turbinates normal without polyps  Neck: supple and non-tender without mass, no thyromegaly or thyroid nodules, no cervical lymphadenopathy  Pulmonary/Chest: clear to auscultation bilaterally- no wheezes, rales or rhonchi, normal air movement, no respiratory distress  Cardiovascular: normal rate, regular rhythm, normal S1 and S2, no murmurs, rubs, clicks, or gallops, distal pulses intact, no carotid bruits  Abdomen: soft, non-tender, non-distended, normal bowel sounds, no masses or organomegaly  Extremities: no cyanosis, clubbing or edema  Musculoskeletal: normal range of motion, no joint swelling, deformity or tenderness  Neurologic: reflexes normal and symmetric, no cranial nerve deficit, gait, coordination and speech normal       Allergies   Allergen Reactions    Seasonal Itching     Runny nose, watery eyes      Prior to Visit Medications    Medication Sig Taking?  Authorizing Provider   carvedilol (COREG) 3.125 MG tablet take 1 tablet by mouth twice a day Yes Connie Lundy MD   amitriptyline (ELAVIL) 25 MG tablet take 1 tablet by mouth at bedtime Yes Connie Lundy MD   gabapentin (NEURONTIN) 600 MG tablet take 1 tablet by mouth three times a day Yes Connie Lundy MD   cloNIDine (CATAPRES) 0.1 MG tablet take 1 tablet by mouth twice a day Yes Connie Lundy MD   tiZANidine (ZANAFLEX) 2 MG tablet TAKE 2 TABLETS BY MOUTH AT BEDTIME AND ONCE DURING THE DAY AS NEEDED Yes Connie Lundy MD   Handicap [place] MISC by Does not apply route Exp: 8/9/2027 Yes Connie Lundy MD   polyethylene glycol (GLYCOLAX) 17 GM/SCOOP powder take 17GM (DISSOLVED IN WATER) by mouth once daily Yes Connie Lundy MD   atorvastatin (LIPITOR) 40 MG tablet Take 1 tablet by mouth daily Yes Connie Lundy MD   loratadine (CLARITIN) 10 MG tablet take 1 tablet by mouth once daily Yes Connie Lundy MD   pantoprazole (PROTONIX) 40 MG tablet take 1 tablet by mouth every morning BEFORE BREAKFAST Yes Florentino Grace MD   allopurinol (ZYLOPRIM) 100 MG tablet take 1 tablet by mouth once daily Yes Connie Paris Gunn MD   metFORMIN (GLUCOPHAGE) 1000 MG tablet Take 1 tablet by mouth daily Pt states she takes 1 a day Yes Connie Gunn MD   fluticasone (FLONASE) 50 MCG/ACT nasal spray instill 1 spray into each nostril once daily Yes Viviana Bernal MD   blood glucose monitor strips Check blood sugars daily as directed.  Yes Viviana Bernal MD   Lancets MISC 1 each by Does not apply route daily Yes Viviana Bernal MD   Alcohol Swabs 70 % PADS 1 each by Does not apply route daily Yes Connie Gunn MD   estradiol (ESTRACE VAGINAL) 0.1 MG/GM vaginal cream Place 1 g vaginally daily Apply daily for one week, then twice a week thereafter, apply peasize amount in the urethra Yes Bianca Richardson MD   Compression Stockings MISC 20-30 mm/hg Yes Vielka Potter DPM   blood glucose monitor kit and supplies use check blood sugar as directed Yes Connie Gunn MD   Lift Chair MISC by Does not apply route Yes Viviana Bernal MD   aspirin 325 MG tablet Take 325 mg by mouth daily Yes Historical Provider, MD   furosemide (LASIX) 20 MG tablet Take 1 tablet by mouth daily for 3 days  Connie Gunn MD       [facility] (Including outside providers/suppliers regularly involved in providing care):   Patient Care Team:  Viviana Bernal MD as PCP - General (Internal Medicine)  Viviana Bernal MD as PCP - [facility]  Cruz Solis MD as Consulting Physician (Colon and Rectal Surgery)  Karine Ramirez MD as Consulting Physician (Pain Management)  Vielka Potter DPM as Physician (Podiatry)     Reviewed and updated this visit:  Tobacco  Allergies  Meds  Med Hx  Surg Hx  Soc Hx  Fam Hx

## 2022-12-15 NOTE — PROGRESS NOTES
Pt is here today for a AWV     pT has no complaints at this time     T came in in a wheelchair today, no weight was taken

## 2022-12-19 ENCOUNTER — TELEPHONE (OUTPATIENT)
Dept: PAIN MANAGEMENT | Age: 82
End: 2022-12-19

## 2022-12-19 DIAGNOSIS — M96.1 FAILED NECK SYNDROME: ICD-10-CM

## 2022-12-19 DIAGNOSIS — M48.062 LUMBAR STENOSIS WITH NEUROGENIC CLAUDICATION: ICD-10-CM

## 2022-12-19 DIAGNOSIS — M54.12 CERVICAL RADICULAR PAIN: ICD-10-CM

## 2022-12-19 DIAGNOSIS — M47.816 LUMBAR FACET JOINT SYNDROME: ICD-10-CM

## 2022-12-19 RX ORDER — HYDROCODONE BITARTRATE AND ACETAMINOPHEN 5; 325 MG/1; MG/1
1 TABLET ORAL 2 TIMES DAILY PRN
Qty: 60 TABLET | Refills: 0 | Status: SHIPPED | OUTPATIENT
Start: 2022-12-19 | End: 2023-01-18

## 2022-12-20 ENCOUNTER — TELEPHONE (OUTPATIENT)
Dept: PAIN MANAGEMENT | Age: 82
End: 2022-12-20

## 2022-12-21 ENCOUNTER — OFFICE VISIT (OUTPATIENT)
Dept: PODIATRY | Age: 82
End: 2022-12-21
Payer: MEDICARE

## 2022-12-21 VITALS — WEIGHT: 152 LBS | HEIGHT: 62 IN | BODY MASS INDEX: 27.97 KG/M2

## 2022-12-21 DIAGNOSIS — I73.9 PERIPHERAL VASCULAR DISORDER (HCC): ICD-10-CM

## 2022-12-21 DIAGNOSIS — E11.40 TYPE 2 DIABETES MELLITUS WITH DIABETIC NEUROPATHY, WITHOUT LONG-TERM CURRENT USE OF INSULIN (HCC): Primary | ICD-10-CM

## 2022-12-21 DIAGNOSIS — R60.0 EDEMA OF LOWER EXTREMITY: ICD-10-CM

## 2022-12-21 DIAGNOSIS — M79.604 PAIN IN BOTH LOWER EXTREMITIES: ICD-10-CM

## 2022-12-21 DIAGNOSIS — B35.1 DERMATOPHYTOSIS OF NAIL: ICD-10-CM

## 2022-12-21 DIAGNOSIS — M79.605 PAIN IN BOTH LOWER EXTREMITIES: ICD-10-CM

## 2022-12-21 PROCEDURE — 1036F TOBACCO NON-USER: CPT | Performed by: PODIATRIST

## 2022-12-21 PROCEDURE — 1123F ACP DISCUSS/DSCN MKR DOCD: CPT | Performed by: PODIATRIST

## 2022-12-21 PROCEDURE — 11721 DEBRIDE NAIL 6 OR MORE: CPT | Performed by: PODIATRIST

## 2022-12-21 PROCEDURE — G8484 FLU IMMUNIZE NO ADMIN: HCPCS | Performed by: PODIATRIST

## 2022-12-21 PROCEDURE — G8427 DOCREV CUR MEDS BY ELIG CLIN: HCPCS | Performed by: PODIATRIST

## 2022-12-21 PROCEDURE — 99213 OFFICE O/P EST LOW 20 MIN: CPT | Performed by: PODIATRIST

## 2022-12-21 PROCEDURE — 1090F PRES/ABSN URINE INCON ASSESS: CPT | Performed by: PODIATRIST

## 2022-12-21 PROCEDURE — G8417 CALC BMI ABV UP PARAM F/U: HCPCS | Performed by: PODIATRIST

## 2022-12-21 PROCEDURE — G8399 PT W/DXA RESULTS DOCUMENT: HCPCS | Performed by: PODIATRIST

## 2022-12-21 NOTE — PROGRESS NOTES
[address] PODIATRY [facility]  [address] [place] [address]  Dept: [phone]  Dept Fax: [phone]    DIABETIC PROGRESS NOTE  Date of patient's visit: 12/21/2022  Patient's Name:  Lianne Arevalo YOB: 1940            Patient Care Team:  Day Gonzalez MD as PCP - General (Internal Medicine)  Day Gonzalez MD as PCP - [facility] Empaneled Provider  Beryle Harvard, MD as Consulting Physician (Colon and Rectal Surgery)  Melissa Segal MD as Consulting Physician (Pain Management)  Cricket Diaz DPM as Physician (Podiatry)          Chief Complaint   Patient presents with    Diabetes     Diabetic foot care    Peripheral Neuropathy     Bilateral feet       Subjective:   Lianne Arevalo comes to clinic for Diabetes (Diabetic foot care) and Peripheral Neuropathy (Bilateral feet)    she is a diabetic and states that she is doing well. Pt currently has complaint of thickened, elongated nails that they cannot manage by themselves. Pt's primary care physician is ELAINE [address] [place] [address] MD Edith last seen 12/15/2022   Pt's last blood sugar was 101 yesterday. Pt has a new complaint of increased swelling to regis LE. Pt has tried changing shoes but it has not helped the pain       Lab Results   Component Value Date    LABA1C 6.3 12/15/2022      Complains of numbness in the feet bilat.   Past Medical History:   Diagnosis Date    Arthritis     Cataract     Cataracts, bilateral     Cervical spondylosis with myelopathy     Depression     Diverticulosis of colon 2015    Dysuria     Dysuria     Gastroesophageal reflux disease 4/13/2021    Gout     Headache(784.0)     Hematuria     History of colon polyps 2015    Insomnia     Lumbar stenosis with neurogenic claudication     Neck pain     JANN (obstructive sleep apnea)     Osteoarthrosis, unspecified whether generalized or localized, unspecified site 10/5/2012    Pelvic pain     Pure hypercholesterolemia 10/5/2012    Shoulder blade pain     right  side    Sinus problem     Stroke [facility])     Tinnitus     Tubular adenoma of colon 2015    Type II or unspecified type diabetes mellitus without mention of complication, not stated as uncontrolled 10/5/2012    Dr. Zachary Thomas last visit 1/2022 [facility].    Under care of team 01/31/2022    PCP: Dr. Zachary Thomas, [facility], last visit 1/2022    Unspecified essential hypertension 10/5/2012    Dr. Zachary Thomas    Unspecified sleep apnea     Wears dentures        Allergies   Allergen Reactions    Seasonal Itching     Runny nose, watery eyes      Current Outpatient Medications on File Prior to Visit   Medication Sig Dispense Refill    HYDROcodone-acetaminophen (NORCO) 5-325 MG per tablet Take 1 tablet by mouth 2 times daily as needed for Pain for up to 30 days.  60 tablet 0    naloxegol (MOVANTIK) 12.5 MG TABS tablet Take 1 tablet by mouth every morning (before breakfast) 30 tablet 3    polyethylene glycol (GLYCOLAX) 17 GM/SCOOP powder take 17GM (DISSOLVED IN WATER) by mouth once daily 510 g 1    atorvastatin (LIPITOR) 40 MG tablet Take 1 tablet by mouth daily 90 tablet 1    loratadine (CLARITIN) 10 MG tablet take 1 tablet by mouth once daily 90 tablet 1    pantoprazole (PROTONIX) 40 MG tablet take 1 tablet by mouth every morning BEFORE BREAKFAST 90 tablet 1    allopurinol (ZYLOPRIM) 100 MG tablet take 1 tablet by mouth once daily 90 tablet 1    metFORMIN (GLUCOPHAGE) 1000 MG tablet Take 1 tablet by mouth daily Pt states she takes 1 a day 180 tablet 1    fluticasone (FLONASE) 50 MCG/ACT nasal spray instill 1 spray into each nostril once daily 16 g 2    carvedilol (COREG) 3.125 MG tablet take 1 tablet by mouth twice a day 180 tablet 1    amitriptyline (ELAVIL) 25 MG tablet take 1 tablet by mouth at bedtime 90 tablet 1    gabapentin (NEURONTIN) 600 MG tablet take 1 tablet by mouth three times a day 90 tablet 2    cloNIDine (CATAPRES) 0.1 MG tablet take 1 tablet by mouth twice a day 180 tablet 1    tiZANidine (ZANAFLEX) 2 MG tablet TAKE 2 TABLETS BY MOUTH AT BEDTIME AND ONCE DURING THE DAY AS NEEDED 90 tablet 1    Handicap Placard MISC by Does not apply route Exp: 8/9/2027 1 each 0    blood glucose monitor strips Check blood sugars daily as directed. 50 strip 6    Lancets MISC 1 each by Does not apply route daily 100 each 3    Alcohol Swabs 70 % PADS 1 each by Does not apply route daily 100 each 3    estradiol (ESTRACE VAGINAL) 0.1 MG/GM vaginal cream Place 1 g vaginally daily Apply daily for one week, then twice a week thereafter, apply peasize amount in the urethra 42.5 g 1    Compression Stockings MISC 20-30 mm/hg 2 each 0    blood glucose monitor kit and supplies use check blood sugar as directed 1 kit 0    Lift Chair MISC by Does not apply route 1 each 0    aspirin 325 MG tablet Take 325 mg by mouth daily      furosemide (LASIX) 20 MG tablet Take 1 tablet by mouth daily for 3 days 3 tablet 0     No current facility-administered medications on file prior to visit.       Review of Systems    Review of Systems:   History obtained from chart review and the patient  General ROS: negative for - chills, fatigue, fever, night sweats or weight gain  Constitutional: Negative for chills, diaphoresis, fatigue, fever and unexpected weight change.  Musculoskeletal: Positive for arthralgias, gait problem and joint swelling.  Neurological ROS: negative for - behavioral changes, confusion, headaches or seizures. Negative for weakness and numbness.   Dermatological ROS: negative for - mole changes, rash  Cardiovascular: Negative for leg swelling.   Gastrointestinal: Negative for constipation, diarrhea, nausea and vomiting.        Objective:  Dermatologic Exam:  Skin lesion/ulceration Absent .   Skin No rashes or nodules noted..   Skin is thin, with flaky sloughing skin as well as decreased hair growth to the lower leg  Small red hemosiderin deposits seen dorsal foot   Musculoskeletal:     1st MPJ ROM  decreased, Bilateral.  Muscle strength 5/5, Bilateral.  Pain present upon palpation of toenails 1-5, Bilateral. decreased medial longitudinal arch, Bilateral.  Ankle ROM decreased,Bilateral.    Dorsally contracted digits present digits 2, Bilateral.     Vascular: DP pulses 1/4 bilateral.  PT pulses 0/4 bilateral.   CFT <5 seconds, Bilateral.  Hair growth absent to the level of the digits, Bilateral.  Edema present, Bilateral.  Varicosities absent, Bilateral. Erythema absent, Bilateral    Neurological: Sensation diminshed to light touch to level of digits, Bilateral.  Protective sensation intact 6/10 sites via 5.07/10g [place] Monofilament, Bilateral.  negative Tinel's, Bilateral.  negative Valleix sign, Bilateral.      Integument: Warm, dry, supple, Bilateral.  Open lesion absent, Bilateral.  Interdigital maceration absent to web spaces 4, Bilateral.  Nails 1-5 left and 1-5 right thickened > 3.0 mm, dystrophic and crumbly, discolored with subungual debris. Fissures absent, Bilateral.   General: AAO x 3 in NAD.     Derm  Toenail Description  Sites of Onychomycosis Involvement (Check affected area)  [x] [x] [x] [x] [x] [x] [x] [x] [x] [x]  5 4 3 2 1 1 2 3 4 5                          Right                                        Left    Thickness  [x] [x] [x] [x] [x] [x] [x] [x] [x] [x]  5 4 3 2 1 1 2 3 4 5                         Right                                        Left    Dystrophic Changes   [x] [x] [x] [x] [x] [x] [x] [x] [x] [x]  5 4 3 2 1 1 2 3 4 5                         Right                                        Left    Color   [x] [x] [x] [x] [x] [x] [x] [x] [x] [x]  5 4 3 2 1 1 2 3 4 5                          Right                                        Left    Incurvation/Ingrowin   [] [] [] [] [] [] [] [] [] []  5 4 3 2 1 1 2 3 4 5                         Right                                        Left    Inflammation/Pain [x] [x] [x] [x] [x] [x] [x] [x] [x] [x]  5 4 3 2 1 1 2 3 4 5                         Right                                        Left        Visual inspection:  Deformity: hammertoe deformity regis feet  amputation: absent  Skin lesions: absent  Edema: right- 2+ pitting edema, left- 2+ pitting edema    Sensory exam:  Monofilament sensation: abnormal - 6/10 via SW 5.07/10g monofilament to the plantar foot bilateral feet    Pulses: abnormal - 1/4 dorsalis pedis pulse and 0/4 Posterior tibial pulse,   Pinprick: Impaired  Proprioception: Impaired  Vibration (128 Hz): Impaired       DM with PVD       [x]Yes    []No      Assessment:  [de-identified] y.o. female with:   Diagnosis Orders   1. Type 2 diabetes mellitus with diabetic neuropathy, without long-term current use of insulin (HCC)   DIABETES FOOT EXAM    22968 - VT DEBRIDEMENT OF NAILS, 6 OR MORE      2. Dermatophytosis of nail   DIABETES FOOT EXAM    97400 - VT DEBRIDEMENT OF NAILS, 6 OR MORE      3. Peripheral vascular disorder (HCC)   DIABETES FOOT EXAM    71452 - VT DEBRIDEMENT OF NAILS, 6 OR MORE      4. Pain in both lower extremities   DIABETES FOOT EXAM    20437 - VT DEBRIDEMENT OF NAILS, 6 OR MORE      5. Edema of lower extremity                Q7   []Yes    []No                Q8   [x]Yes    []No                     Q9   []Yes    []No    Plan:   Pt was evaluated and examined. Patient was given personalized discharge instructions. To address new complaint of increased swelling, recommend patient to wear compression stockings. Dispensed tubi  for regis LE and instructed pt to wear at all times when walking. Pt may take NSAIDs as needed. Nails 1-10 were debrided sharply in length and thickness with a nipper and , without incident. Pt will follow up in 9 weeks or sooner if any problems arise. Diagnosis was discussed with the pt and all of their questions were answered in detail. Proper foot hygiene and care was discussed with the pt.  Informed patient on proper diabetic foot care and importance of tight glycemic control. Patient to check feet daily and contact the office with any questions/problems/concerns.    Other comorbidity noted and will be managed by PCP.  12/21/2022    Electronically signed by Sandy Hernández DPM on 12/21/2022 at 10:40 AM  12/21/2022

## 2022-12-27 RX ORDER — ZOLPIDEM TARTRATE 5 MG/1
5 TABLET ORAL NIGHTLY PRN
Qty: 30 TABLET | Refills: 1 | Status: SHIPPED | OUTPATIENT
Start: 2022-12-27 | End: 2023-02-25

## 2023-01-05 DIAGNOSIS — G47.9 SLEEPING DIFFICULTY: ICD-10-CM

## 2023-01-06 ENCOUNTER — OFFICE VISIT (OUTPATIENT)
Dept: PAIN MANAGEMENT | Age: 83
End: 2023-01-06
Payer: MEDICARE

## 2023-01-06 VITALS — HEART RATE: 77 BPM | BODY MASS INDEX: 27.97 KG/M2 | WEIGHT: 152 LBS | HEIGHT: 62 IN | OXYGEN SATURATION: 93 %

## 2023-01-06 DIAGNOSIS — M47.816 LUMBAR FACET JOINT SYNDROME: ICD-10-CM

## 2023-01-06 DIAGNOSIS — M54.12 CERVICAL RADICULAR PAIN: ICD-10-CM

## 2023-01-06 DIAGNOSIS — M48.062 LUMBAR STENOSIS WITH NEUROGENIC CLAUDICATION: ICD-10-CM

## 2023-01-06 DIAGNOSIS — M96.1 FAILED NECK SYNDROME: ICD-10-CM

## 2023-01-06 PROCEDURE — G8399 PT W/DXA RESULTS DOCUMENT: HCPCS | Performed by: NURSE PRACTITIONER

## 2023-01-06 PROCEDURE — G8484 FLU IMMUNIZE NO ADMIN: HCPCS | Performed by: NURSE PRACTITIONER

## 2023-01-06 PROCEDURE — 1123F ACP DISCUSS/DSCN MKR DOCD: CPT | Performed by: NURSE PRACTITIONER

## 2023-01-06 PROCEDURE — 99213 OFFICE O/P EST LOW 20 MIN: CPT | Performed by: NURSE PRACTITIONER

## 2023-01-06 PROCEDURE — G8417 CALC BMI ABV UP PARAM F/U: HCPCS | Performed by: NURSE PRACTITIONER

## 2023-01-06 PROCEDURE — 1036F TOBACCO NON-USER: CPT | Performed by: NURSE PRACTITIONER

## 2023-01-06 PROCEDURE — 1090F PRES/ABSN URINE INCON ASSESS: CPT | Performed by: NURSE PRACTITIONER

## 2023-01-06 PROCEDURE — G8427 DOCREV CUR MEDS BY ELIG CLIN: HCPCS | Performed by: NURSE PRACTITIONER

## 2023-01-06 RX ORDER — HYDROCODONE BITARTRATE AND ACETAMINOPHEN 5; 325 MG/1; MG/1
1 TABLET ORAL 2 TIMES DAILY PRN
Qty: 60 TABLET | Refills: 0 | Status: CANCELLED | OUTPATIENT
Start: 2023-01-06 | End: 2023-02-05

## 2023-01-06 ASSESSMENT — ENCOUNTER SYMPTOMS
BACK PAIN: 1
RESPIRATORY NEGATIVE: 1
GASTROINTESTINAL NEGATIVE: 1

## 2023-01-06 NOTE — PROGRESS NOTES
Chief Complaint   Patient presents with    Back Pain    Neck Pain     Norco       [facility]     history of chronic neck and chronic lower back pain  She is diagnosed with cervical spinal stenosis and lumbar spinal stenosis     Neck pain is located midline into left upper trapezius. Reports constant bilat. finger/hand numbness, denies HA. Worse with ROM. Will think about repeating TASIA, may benfit from MBB also       Her back pain is minimal at this time but at times located in lower back pain in the lumbar area with radiation down both legs. aggravated with standing and walking and does notice bilat ankle and foot swelling at times     Patient is on chronic low-dose opioid therapy with Norco 5 mg twice a day along with gabapentin. Does take norco sparingly  Finds the medication helpful allowing her to do daily life activities and be independent  She has tried conservative measures with therapy and injections in past  Not a candidate for NSAIDs because of renal insufficiency and advanced age     Procedures  4/29/21  BILATERAL L5 EPIDURAL STEROID INJECTION reports 70% relief with improved function for 2 weeks     3/22/21  CERVICAL EPIDURAL STEROID INJECTION C7-T1 more than 50% relief     HPI  Back Pain  This is a chronic problem. The current episode started more than 1 year ago. The problem occurs intermittently. The problem is unchanged. The pain is present in the lumbar spine. The quality of the pain is described as aching. The pain radiates to the right thigh and left thigh. The pain is at a severity of 6/10. The pain is moderate. The pain is Worse during the day. The symptoms are aggravated by position and standing. Associated symptoms include headaches, numbness and paresthesias. Risk factors include menopause, obesity, poor posture, sedentary lifestyle and lack of exercise. She has tried analgesics for the symptoms. The treatment provided mild relief. Neck Pain   This is a chronic problem.  The current episode started more than 1 year ago. The problem occurs constantly. The problem has been gradually worsening. The pain is associated with nothing. The pain is present in the midline, right side and occipital region (left trap.). The quality of the pain is described as aching. The pain is at a severity of 6/10. The pain is moderate. Nothing aggravates the symptoms. Associated symptoms include headaches and numbness. She has tried bed rest and oral narcotics for the symptoms. The treatment provided mild relief. Medication Refill: Norco    Pain score Today:  6  Adverse effects (Constipation / Nausea / Sedation / sexual Dysfunction / others) : no  Mood: good  Sleep pattern and quality: poor  Activity level: fair    Pill count Today: 49 1/18 no refill needed   Last dose taken  01/06/2023 AM  OARRS report reviewed today: yes  ER/Hospitalizations/PCP visit related to pain since last visit:no   Any legal problems e.g. DUI etc.:No  Satisfied with current management: No    Opioid Contract:11/11/2022  Last Urine Dug screen dated:03/22/2022    Hemoglobin A1C   Date Value Ref Range Status   12/15/2022 6.3 % Final       Past Medical History, Past Surgical History, Social History, Allergies and Medications reviewed and updated in EPIC as indicated    Family History reviewed and is noncontributory. Patient denies any new neurological symptoms. No bowel or bladder incontinence, no weakness, and no falling. Pill count: appropriate    Morphine equivalent: 10    Controlled Substance Monitoring:    Acute and Chronic Pain Monitoring:   RX Monitoring 1/6/2023   Attestation -   Periodic Controlled Substance Monitoring Possible medication side effects, risk of tolerance/dependence & alternative treatments discussed. ;No signs of potential drug abuse or diversion identified. ;Assessed functional status. ;Obtaining appropriate analgesic effect of treatment.    Chronic Pain > 50 MEDD -   Chronic Pain > 80 MEDD -          Periodic Controlled Substance Monitoring: Possible medication side effects, risk of tolerance/dependence & alternative treatments discussed., No signs of potential drug abuse or diversion identified. , Assessed functional status., Obtaining appropriate analgesic effect of treatment.  Ronna Adjutant, APRN - CNP)      Past Medical History:   Diagnosis Date    Arthritis     Cataract     Cataracts, bilateral     Cervical spondylosis with myelopathy     Depression     Diverticulosis of colon     Dysuria     Dysuria     Gastroesophageal reflux disease 2021    Gout     Headache(784.0)     Hematuria     History of colon polyps     Insomnia     Lumbar stenosis with neurogenic claudication     Neck pain     JANN (obstructive sleep apnea)     Osteoarthrosis, unspecified whether generalized or localized, unspecified site 10/5/2012    Pelvic pain     Pure hypercholesterolemia 10/5/2012    Shoulder blade pain     right  side    Sinus problem     Stroke [facility])     Tinnitus     Tubular adenoma of colon     Type II or unspecified type diabetes mellitus without mention of complication, not stated as uncontrolled 10/5/2012    Dr. Eula Garcia last visit 2022 [facility].    Under care of team 2022    PCP: Dr. Eula Garcia, [facility], last visit 2022    Unspecified essential hypertension 10/5/2012    Dr. Eula Garcia    Unspecified sleep apnea     Wears dentures        Past Surgical History:   Procedure Laterality Date     SECTION      x2    COLONOSCOPY  06    Dr Colin Levels  5 12 15    extensive diverticulosis, tubular adenoma    CYSTOSCOPY  2022    Bilateral Retrograde Pyelogram     CYSTOSCOPY Bilateral 3/9/2022    CYSTOSCOPY, RETROGRADE PYELOGRAM performed by Herson Cruz MD at [address] 2017    EPIDURAL STEROID INJECTION cervical performed by Ayse Gillis MD at [address] 2017    EPIDURAL STEROID INJECTION L5S1 performed by Ernestina Boo MD at [facility] Bilateral 5/20/2019    EPIDURAL STEROID INJECTION BILATERAL S1 performed by Ernestina Boo MD at [address] Left     Cataracts    HYSTERECTOMY (CERVIX STATUS UNKNOWN)  1961    MAMMO IMPLANT DIGITAL DIAG BI  10/1/06    NECK SURGERY  5/2011 & 2001    NERVE BLOCK Bilateral 02/17/2020    Lumbar Facet L2-L5    NERVE SURGERY Bilateral 9/5/2019    BILATERAL LUMBAR FACET STEROID INJECTION L2-L5 performed by Ernestina Boo MD at [address] [place] [address]  08/25/2017    L5-S1 steroid injection    OTHER SURGICAL HISTORY  11/20/2017    VANITA L5-S1    OTHER SURGICAL HISTORY  10/01/2018    cervical steroid injection    OTHER SURGICAL HISTORY Bilateral 05/20/2019    EPIDURAL STEROID INJECTION BILATERAL S1 (Bilateral )    OTHER SURGICAL HISTORY  04/29/2021    lumbar VANITA    PAIN MANAGEMENT PROCEDURE Bilateral 2/17/2020    LUMBAR FACET L2-L5 performed by Ernestina Boo MD at [facility] N/A 3/22/2021    CERVICAL EPIDURAL STEROID INJECTION C7-T1 performed by Ernestina Boo MD at [facility] Bilateral 4/29/2021    BILATERAL L5 EPIDURAL STEROID INJECTION performed by Ernestina Boo MD at [facility] [address] AA&/STRD TFRML EPI LUMBAR/SACRAL 1 LEVEL Bilateral 11/19/2018    BILATERAL L4 VANITA performed by Ernestina Boo MD at [facility] [address] DX/THER SBST INTRLMNR CRV/THRC W/IMG GDN N/A 10/1/2018    EPIDURAL STEROID INJECTION CERVICAL C7-T1 performed by Ernestina Boo MD at [address]   Allergen Reactions    Seasonal Itching     Runny nose, watery eyes          Current Outpatient Medications:     zolpidem (AMBIEN) 5 MG tablet, Take 1 tablet by mouth nightly as needed for Sleep for up to 60 days. , Disp: 30 tablet, Rfl: 1    HYDROcodone-acetaminophen (NORCO) 5-325 MG per tablet, Take 1 tablet by mouth 2 times daily as needed for Pain for up to 30 days. , Disp: 60 tablet, Rfl: 0    naloxegol (MOVANTIK) 12.5 MG TABS tablet, Take 1 tablet by mouth every morning (before breakfast), Disp: 30 tablet, Rfl: 3    polyethylene glycol (GLYCOLAX) 17 GM/SCOOP powder, take 17GM (DISSOLVED IN WATER) by mouth once daily, Disp: 510 g, Rfl: 1    atorvastatin (LIPITOR) 40 MG tablet, Take 1 tablet by mouth daily, Disp: 90 tablet, Rfl: 1    loratadine (CLARITIN) 10 MG tablet, take 1 tablet by mouth once daily, Disp: 90 tablet, Rfl: 1    pantoprazole (PROTONIX) 40 MG tablet, take 1 tablet by mouth every morning BEFORE BREAKFAST, Disp: 90 tablet, Rfl: 1    allopurinol (ZYLOPRIM) 100 MG tablet, take 1 tablet by mouth once daily, Disp: 90 tablet, Rfl: 1    metFORMIN (GLUCOPHAGE) 1000 MG tablet, Take 1 tablet by mouth daily Pt states she takes 1 a day, Disp: 180 tablet, Rfl: 1    fluticasone (FLONASE) 50 MCG/ACT nasal spray, instill 1 spray into each nostril once daily, Disp: 16 g, Rfl: 2    carvedilol (COREG) 3.125 MG tablet, take 1 tablet by mouth twice a day, Disp: 180 tablet, Rfl: 1    amitriptyline (ELAVIL) 25 MG tablet, take 1 tablet by mouth at bedtime, Disp: 90 tablet, Rfl: 1    gabapentin (NEURONTIN) 600 MG tablet, take 1 tablet by mouth three times a day, Disp: 90 tablet, Rfl: 2    cloNIDine (CATAPRES) 0.1 MG tablet, take 1 tablet by mouth twice a day, Disp: 180 tablet, Rfl: 1    tiZANidine (ZANAFLEX) 2 MG tablet, TAKE 2 TABLETS BY MOUTH AT BEDTIME AND ONCE DURING THE DAY AS NEEDED, Disp: 90 tablet, Rfl: 1    Handicap Placard MISC, by Does not apply route Exp: 8/9/2027, Disp: 1 each, Rfl: 0    blood glucose monitor strips, Check blood sugars daily as directed., Disp: 50 strip, Rfl: 6    Lancets MISC, 1 each by Does not apply route daily, Disp: 100 each, Rfl: 3    Alcohol Swabs 70 % PADS, 1 each by Does not apply route daily, Disp: 100 each, Rfl: 3    furosemide (LASIX) 20 MG tablet, Take 1 tablet by mouth daily for 3 days, Disp: 3 tablet, Rfl: 0    estradiol (ESTRACE VAGINAL) 0.1 MG/GM vaginal cream, Place 1 g vaginally daily Apply daily for one week, then twice a week thereafter, apply peasize amount in the urethra, Disp: 42.5 g, Rfl: 1    Compression Stockings MISC, 20-30 mm/hg, Disp: 2 each, Rfl: 0    blood glucose monitor kit and supplies, use check blood sugar as directed, Disp: 1 kit, Rfl: 0    Lift Chair MISC, by Does not apply route, Disp: 1 each, Rfl: 0    aspirin 325 MG tablet, Take 325 mg by mouth daily, Disp: , Rfl:     Family History   Problem Relation Age of Onset    Breast Cancer Sister     Cancer Sister         liver    Diabetes Daughter        Social History     Socioeconomic History    Marital status: Single     Spouse name: Not on file    Number of children: Not on file    Years of education: Not on file    Highest education level: Not on file   Occupational History    Not on file   Tobacco Use    Smoking status: Former     Packs/day: 0.50     Years: 15.00     Pack years: 7.50     Types: Cigarettes     Quit date: 10/5/2001     Years since quittin.2    Smokeless tobacco: Never   Vaping Use    Vaping Use: Never used   Substance and Sexual Activity    Alcohol use: Yes     Alcohol/week: 0.0 standard drinks     Comment: once every few months    Drug use: No    Sexual activity: Not Currently   Other Topics Concern    Not on file   Social History Narrative    Not on file     Social Determinants of Health     Financial Resource Strain: Low Risk     Difficulty of Paying Living Expenses: Not very hard   Food Insecurity: No Food Insecurity    Worried About Running Out of Food in the Last Year: Never true    Ran Out of Food in the Last Year: Never true   Transportation Needs: Not on file   Physical Activity: Insufficiently Active    Days of Exercise per Week: 5 days    Minutes of Exercise per Session: 20 min   Stress: Not on file   Social Connections: Not on file   Intimate Partner Violence: Not on file   Housing Stability: Not on file       Review of Systems:  Review of Systems   Constitutional: Negative. HENT: Negative. Respiratory: Negative. Musculoskeletal:  Positive for back pain and neck pain. Gastrointestinal: Negative. Neurological:  Positive for headaches, numbness and paresthesias. Physical Exam:  Pulse 77   Ht 5' 2\" (1.575 m)   Wt 152 lb (68.9 kg)   SpO2 93%   BMI 27.80 kg/m²     Physical Exam  Cardiovascular:      Rate and Rhythm: Normal rate. Pulmonary:      Effort: Pulmonary effort is normal.   Musculoskeletal:      Cervical back: Decreased range of motion. Back:    Skin:     General: Skin is warm and dry. Neurological:      Mental Status: She is alert and oriented to person, place, and time. Assessment:  Problem List Items Addressed This Visit       Failed neck syndrome (Chronic)    Cervical radicular pain (Chronic)    Lumbar stenosis with neurogenic claudication (Chronic)    Lumbar facet joint syndrome (Chronic)          Treatment Plan:  Patient relates current medications are helping the pain. Patient reports taking pain medications as prescribed, denies obtaining medications from different sources and denies use of illegal drugs. Medication risk and benefits have been discussed. Patient denies side effects from medications like nausea, vomiting, constipation or drowsiness. Patient reports current activities of daily living are possible due to medications and would like to continue them. As always, we encourage daily stretching and strengthening exercises, and recommend minimizing use of pain medications unless patient cannot get through daily activities due to pain. Due to the high risk nature of this patient's pain medication close monitoring is required. Continue current medication management, pt has been stable and compliant.   Script not needed was due 1/18 with 25 days left will call for next refill and make appt at that time based on refill date  consider VV if sever weather   Will call office for TASIA appt it neck pain worsens       I have reviewed the chief complaint and history of present illness (including ROS and PFSH) and vital documentation by my staff and I agree with their documentation and have added where applicable.

## 2023-01-06 NOTE — TELEPHONE ENCOUNTER
Last visit: 12/15/2022  Last Med refill: 11/23/2022  Does patient have enough medication for 72 hours: No:     Next Visit Date:  Future Appointments   Date Time Provider Department Center   1/6/2023 10:00 AM YOLANDA Lizarraga - CNP [facility]   1/16/2023 10:45 AM SCHEDULE, [facility]   2/22/2023 10:15 AM Miryam Ruiz DPM [facility]   12/18/2023  1:45 PM Connie Hale MD [facility]       Health Maintenance   Topic Date Due    Diabetic retinal exam  08/21/2020    Lipids  10/07/2022    Shingles vaccine (1 of 2) 12/15/2023 (Originally 5/28/1990)    COVID-19 Vaccine (4 - Booster for Moderna series) 12/15/2023 (Originally 3/2/2022)    DTaP/Tdap/Td vaccine (1 - Tdap) 10/18/2026 (Originally 5/28/1959)    A1C test (Diabetic or Prediabetic)  06/15/2023    Depression Screen  12/15/2023    Annual Wellness Visit (AWV)  12/16/2023    Diabetic foot exam  01/02/2024    DEXA (modify frequency per FRAX score)  Completed    Flu vaccine  Completed    Pneumococcal 65+ years Vaccine  Completed    Hepatitis A vaccine  Aged Out    Hib vaccine  Aged Out    Meningococcal (ACWY) vaccine  Aged Out       Hemoglobin A1C (%)   Date Value   12/15/2022 6.3   05/09/2022 6.6   10/07/2021 6.7 (H)             ( goal A1C is < 7)   Microalb/Crt. Ratio (mcg/mg creat)   Date Value   10/06/2020 CANNOT BE CALCULATED     LDL Cholesterol (mg/dL)   Date Value   10/07/2021 48   09/30/2020 48       (goal LDL is <100)   AST (U/L)   Date Value   10/07/2021 16     ALT (U/L)   Date Value   10/07/2021 9     BUN (mg/dL)   Date Value   10/07/2021 16     BP Readings from Last 3 Encounters:   12/15/22 (!) 164/98   11/11/22 (!) 148/86   09/12/22 (!) 162/105          (goal 120/80)    All Future Testing planned in [facility]  Lab Frequency Next Occurrence   DRUG SCREEN, PAIN Once 03/17/2022   Lipid, Fasting Once 01/15/2023   Comprehensive Metabolic Panel Once 01/15/2023   Microalbumin / Creatinine Urine Ratio Once  01/15/2023   CBC with Auto Differential Once 01/15/2023   Iron and TIBC Once 01/15/2023   Ferritin Once 01/15/2023   Vitamin B12 & Folate Once 01/15/2023   TSH With Reflex Ft4 Once 01/15/2023               Patient Active Problem List:     Osteoarthritis     Essential hypertension     Type 2 diabetes mellitus with diabetic neuropathy, without long-term current use of insulin (HCC)     Pure hypercholesterolemia     Constipation     Rectal pain     Diverticulosis of colon     Tubular adenoma of colon     History of colon polyps     Rectal pressure     Failed neck syndrome     Cervical stenosis of spine     Cervical radicular pain     Lumbar stenosis with neurogenic claudication     JANN on CPAP     Primary osteoarthritis involving multiple joints     Hx of cervical spine surgery     Chronic use of opiate drugs therapeutic purposes     Myelomalacia (HCC)     Lumbar facet joint syndrome     Gastroesophageal reflux disease     Sleeping difficulty

## 2023-01-10 RX ORDER — ZOLPIDEM TARTRATE 5 MG/1
TABLET ORAL
Qty: 30 TABLET | OUTPATIENT
Start: 2023-01-10

## 2023-01-29 DIAGNOSIS — M54.12 CERVICAL RADICULAR PAIN: Chronic | ICD-10-CM

## 2023-01-30 RX ORDER — TIZANIDINE 2 MG/1
TABLET ORAL
Qty: 90 TABLET | Refills: 1 | Status: SHIPPED | OUTPATIENT
Start: 2023-01-30

## 2023-01-30 NOTE — TELEPHONE ENCOUNTER
Last visit: 12/15/22  Last Med refill: 9/29/22  Does patient have enough medication for 72 hours: No:     Next Visit Date:  Future Appointments   Date Time Provider Pradeep Alexander   12/18/2023  1:45 PM Connie Bell  [address] Maintenance   Topic Date Due    Diabetic retinal exam  08/21/2020    Lipids  10/07/2022    Shingles vaccine (1 of 2) 12/15/2023 (Originally 5/28/1990)    COVID-19 Vaccine (4 - Booster for Moderna series) 12/15/2023 (Originally 3/2/2022)    DTaP/Tdap/Td vaccine (1 - Tdap) 10/18/2026 (Originally 5/28/1959)    A1C test (Diabetic or Prediabetic)  06/15/2023    Depression Screen  12/15/2023    Annual Wellness Visit (AWV)  12/16/2023    Diabetic foot exam  01/02/2024    DEXA (modify frequency per FRAX score)  Completed    Flu vaccine  Completed    Pneumococcal 65+ years Vaccine  Completed    Hepatitis A vaccine  Aged Out    Hib vaccine  Aged Out    Meningococcal (ACWY) vaccine  Aged Out       Hemoglobin A1C (%)   Date Value   12/15/2022 6.3   05/09/2022 6.6   10/07/2021 6.7 (H)             ( goal A1C is < 7)   Microalb/Crt.  Ratio (mcg/mg creat)   Date Value   10/06/2020 CANNOT BE CALCULATED     LDL Cholesterol (mg/dL)   Date Value   10/07/2021 48   09/30/2020 48       (goal LDL is <100)   AST (U/L)   Date Value   10/07/2021 16     ALT (U/L)   Date Value   10/07/2021 9     BUN (mg/dL)   Date Value   10/07/2021 16     BP Readings from Last 3 Encounters:   12/15/22 (!) 164/98   11/11/22 (!) 148/86   09/12/22 (!) 162/105          (goal 120/80)    All Future Testing planned in [facility]  Lab Frequency Next Occurrence   DRUG SCREEN, PAIN Once 03/17/2022   Lipid, Fasting Once 01/15/2023   Comprehensive Metabolic Panel Once 73/66/6772   Microalbumin / Creatinine Urine Ratio Once 01/15/2023   CBC with Auto Differential Once 01/15/2023   Iron and TIBC Once 01/15/2023   Ferritin Once 01/15/2023   Vitamin B12 & Folate Once 01/15/2023   TSH With Reflex Ft4 Once 01/15/2023 Patient Active Problem List:     Osteoarthritis     Essential hypertension     Type 2 diabetes mellitus with diabetic neuropathy, without long-term current use of insulin ([address]     Pure hypercholesterolemia     Constipation     Rectal pain     Diverticulosis of colon     Tubular adenoma of colon     History of colon polyps     Rectal pressure     Failed neck syndrome     Cervical stenosis of spine     Cervical radicular pain     Lumbar stenosis with neurogenic claudication     JANN on CPAP     Primary osteoarthritis involving multiple joints     Hx of cervical spine surgery     Chronic use of opiate drugs therapeutic purposes     Myelomalacia (HCC)     Lumbar facet joint syndrome     Gastroesophageal reflux disease     Sleeping difficulty

## 2023-02-20 ENCOUNTER — TELEPHONE (OUTPATIENT)
Dept: PAIN MANAGEMENT | Age: 83
End: 2023-02-20

## 2023-02-20 DIAGNOSIS — M96.1 FAILED NECK SYNDROME: ICD-10-CM

## 2023-02-20 DIAGNOSIS — M54.12 CERVICAL RADICULAR PAIN: ICD-10-CM

## 2023-02-20 DIAGNOSIS — M47.816 LUMBAR FACET JOINT SYNDROME: ICD-10-CM

## 2023-02-20 DIAGNOSIS — M48.062 LUMBAR STENOSIS WITH NEUROGENIC CLAUDICATION: ICD-10-CM

## 2023-02-20 RX ORDER — HYDROCODONE BITARTRATE AND ACETAMINOPHEN 5; 325 MG/1; MG/1
1 TABLET ORAL 2 TIMES DAILY PRN
Qty: 60 TABLET | Refills: 0 | Status: SHIPPED | OUTPATIENT
Start: 2023-02-20 | End: 2023-03-22

## 2023-03-23 RX ORDER — GABAPENTIN 600 MG/1
TABLET ORAL
Qty: 90 TABLET | Refills: 2 | Status: SHIPPED | OUTPATIENT
Start: 2023-03-23 | End: 2023-06-21

## 2023-03-23 NOTE — TELEPHONE ENCOUNTER
01/15/2023   Vitamin B12 & Folate Once 01/15/2023   TSH With Reflex Ft4 Once 01/15/2023               Patient Active Problem List:     Osteoarthritis     Essential hypertension     Type 2 diabetes mellitus with diabetic neuropathy, without long-term current use of insulin ([address]     Pure hypercholesterolemia     Constipation     Rectal pain     Diverticulosis of colon     Tubular adenoma of colon     History of colon polyps     Rectal pressure     Failed neck syndrome     Cervical stenosis of spine     Cervical radicular pain     Lumbar stenosis with neurogenic claudication     JANN on CPAP     Primary osteoarthritis involving multiple joints     Hx of cervical spine surgery     Chronic use of opiate drugs therapeutic purposes     Myelomalacia (HCC)     Lumbar facet joint syndrome     Gastroesophageal reflux disease     Sleeping difficulty

## 2023-03-24 ENCOUNTER — HOSPITAL ENCOUNTER (OUTPATIENT)
Age: 83
Setting detail: SPECIMEN
Discharge: HOME | End: 2023-03-24

## 2023-03-24 ENCOUNTER — OFFICE VISIT (OUTPATIENT)
Dept: PAIN MANAGEMENT | Age: 83
End: 2023-03-24

## 2023-03-24 VITALS — WEIGHT: 152 LBS | BODY MASS INDEX: 27.97 KG/M2 | HEIGHT: 62 IN

## 2023-03-24 DIAGNOSIS — Z98.890 HX OF CERVICAL SPINE SURGERY: ICD-10-CM

## 2023-03-24 DIAGNOSIS — M54.12 CERVICAL RADICULAR PAIN: Chronic | ICD-10-CM

## 2023-03-24 DIAGNOSIS — M96.1 FAILED NECK SYNDROME: Chronic | ICD-10-CM

## 2023-03-24 DIAGNOSIS — Z79.891 CHRONIC USE OF OPIATE DRUG FOR THERAPEUTIC PURPOSE: Primary | ICD-10-CM

## 2023-03-24 ASSESSMENT — ENCOUNTER SYMPTOMS
COUGH: 0
RESPIRATORY NEGATIVE: 1
BACK PAIN: 1
EYES NEGATIVE: 1
SHORTNESS OF BREATH: 0
CONSTIPATION: 0

## 2023-03-24 NOTE — PROGRESS NOTES
Year: Never true    Ran Out of Food in the Last Year: Never true   Transportation Needs: Not on file   Physical Activity: Insufficiently Active    Days of Exercise per Week: 5 days    Minutes of Exercise per Session: 20 min   Stress: Not on file   Social Connections: Not on file   Intimate Partner Violence: Not on file   Housing Stability: Not on file       Review of Systems:  Review of Systems   Constitutional: Negative for chills and fever. Eyes: Negative. Cardiovascular:  Negative for chest pain. Respiratory: Negative. Negative for cough and shortness of breath. Musculoskeletal:  Positive for back pain. Gastrointestinal:  Negative for constipation. Physical Exam:  Ht 5' 2\" (1.575 m)   Wt 152 lb (68.9 kg)   BMI 27.80 kg/m²     Physical Exam  Cardiovascular:      Rate and Rhythm: Normal rate. Pulmonary:      Effort: Pulmonary effort is normal.   Musculoskeletal:      Cervical back: Decreased range of motion. Comments: Gait is not assessed, pt in w/c for visit    Skin:     General: Skin is warm and dry. Neurological:      Mental Status: She is alert and oriented to person, place, and time. Assessment:  Problem List Items Addressed This Visit       Failed neck syndrome (Chronic)    Cervical radicular pain (Chronic)    Relevant Orders    Cervical/Thoracic Epidural Steroid Injection    Hx of cervical spine surgery    Relevant Orders    Cervical/Thoracic Epidural Steroid Injection    Chronic use of opiate drugs therapeutic purposes - Primary    Relevant Orders    Drugs of abuse 9 serum w/ reflex          Treatment Plan:  Patient relates current medications are helping the pain. Patient reports taking pain medications as prescribed, denies obtaining medications from different sources and denies use of illegal drugs. Medication risk and benefits have been discussed. Patient denies side effects from medications like nausea, vomiting, constipation or drowsiness.  Patient reports current

## 2023-03-27 LAB
AMPHETAMINE: NEGATIVE NG/ML
BARBITURATES: NEGATIVE NG/ML
BENZODIAZEPINES: NEGATIVE NG/ML
BUPRENORPHINE: NEGATIVE NG/ML
COCAINE: NEGATIVE NG/ML
DRUGS OF ABUSE COMMENT: NORMAL
METHADONE: NEGATIVE NG/ML
METHAMPHETAMINE: NEGATIVE NG/ML
OPIATES: NEGATIVE NG/ML
OXYCODONE: NEGATIVE NG/ML
PHENCYCLIDINE: NEGATIVE NG/ML
THC: NEGATIVE NG/ML

## 2023-03-31 DIAGNOSIS — I10 ESSENTIAL HYPERTENSION: ICD-10-CM

## 2023-03-31 RX ORDER — CLONIDINE HYDROCHLORIDE 0.1 MG/1
TABLET ORAL
Qty: 180 TABLET | Refills: 1 | Status: SHIPPED | OUTPATIENT
Start: 2023-03-31

## 2023-03-31 NOTE — TELEPHONE ENCOUNTER
Last visit: 12/15/22  Last Med refill: 9/30/22  Does patient have enough medication for 72 hours: No:     Next Visit Date:  Future Appointments   Date Time Provider Pradeep Catherine   4/12/2023 10:30 AM Barbara Patton DPM [facility] Jerry Newman   4/17/2023  9:45 AM Connie Larson MD [facility]   4/24/2023 10:45 AM Kwesi Faith MD [facility] PN Mark Davey   5/12/2023  9:40 AM YOLANDA Pérez - CNP [facility]   12/18/2023  1:45 PM Connie Larson  [facility]   Topic Date Due    Diabetic retinal exam  08/21/2020    Lipids  10/07/2022    Shingles vaccine (1 of 2) 12/15/2023 (Originally 5/28/1990)    COVID-19 Vaccine (4 - Booster for Moderna series) 12/15/2023 (Originally 3/2/2022)    DTaP/Tdap/Td vaccine (1 - Tdap) 10/18/2026 (Originally 5/28/1959)    A1C test (Diabetic or Prediabetic)  06/15/2023    Depression Screen  12/15/2023    Annual Wellness Visit (AWV)  12/16/2023    Diabetic foot exam  01/02/2024    DEXA (modify frequency per FRAX score)  Completed    Flu vaccine  Completed    Pneumococcal 65+ years Vaccine  Completed    Hepatitis A vaccine  Aged Out    Hib vaccine  Aged Out    Meningococcal (ACWY) vaccine  Aged Out       Hemoglobin A1C (%)   Date Value   12/15/2022 6.3   05/09/2022 6.6   10/07/2021 6.7 (H)             ( goal A1C is < 7)   Microalb/Crt.  Ratio (mcg/mg creat)   Date Value   10/06/2020 CANNOT BE CALCULATED     LDL Cholesterol (mg/dL)   Date Value   10/07/2021 48   09/30/2020 48       (goal LDL is <100)   AST (U/L)   Date Value   10/07/2021 16     ALT (U/L)   Date Value   10/07/2021 9     BUN (mg/dL)   Date Value   10/07/2021 16     BP Readings from Last 3 Encounters:   12/15/22 (!) 164/98   11/11/22 (!) 148/86   09/12/22 (!) 162/105          (goal 120/80)    All Future Testing planned in [facility]  Lab Frequency Next Occurrence   Lipid, Fasting Once 01/15/2023   Comprehensive Metabolic Panel Once 06/98/0962   CBC with Auto Differential Once

## 2023-04-03 DIAGNOSIS — G47.9 SLEEPING DIFFICULTY: ICD-10-CM

## 2023-04-03 NOTE — TELEPHONE ENCOUNTER
Last visit: 12/15/22  Last Med refill: 12/27/22  Does patient have enough medication for 72 hours: No:     Next Visit Date:  Future Appointments   Date Time Provider Pradeep Catherine   4/12/2023 10:30 AM APARNA Brown Podiatry Vani Harris   4/17/2023  9:45 AM Connie Ruby MD [facility] FP [facility]   4/24/2023 10:45 AM Jewel Wood MD [facility] PN Genora Coltonini   5/12/2023  9:40 AM Clarissa Isabel APRN - CNP [facility]   12/18/2023  1:45 PM Connie Ruby  [facility] [address] [facility]   Topic Date Due    Diabetic retinal exam  08/21/2020    Lipids  10/07/2022    Shingles vaccine (1 of 2) 12/15/2023 (Originally 5/28/1990)    COVID-19 Vaccine (4 - Booster for Moderna series) 12/15/2023 (Originally 3/2/2022)    DTaP/Tdap/Td vaccine (1 - Tdap) 10/18/2026 (Originally 5/28/1959)    A1C test (Diabetic or Prediabetic)  06/15/2023    Depression Screen  12/15/2023    Annual Wellness Visit (AWV)  12/16/2023    Diabetic foot exam  01/02/2024    DEXA (modify frequency per FRAX score)  Completed    Flu vaccine  Completed    Pneumococcal 65+ years Vaccine  Completed    Hepatitis A vaccine  Aged Out    Hib vaccine  Aged Out    Meningococcal (ACWY) vaccine  Aged Out    Hepatitis C screen  Discontinued       Hemoglobin A1C (%)   Date Value   12/15/2022 6.3   05/09/2022 6.6   10/07/2021 6.7 (H)             ( goal A1C is < 7)   Microalb/Crt.  Ratio (mcg/mg creat)   Date Value   10/06/2020 CANNOT BE CALCULATED     LDL Cholesterol (mg/dL)   Date Value   10/07/2021 48   09/30/2020 48       (goal LDL is <100)   AST (U/L)   Date Value   10/07/2021 16     ALT (U/L)   Date Value   10/07/2021 9     BUN (mg/dL)   Date Value   10/07/2021 16     BP Readings from Last 3 Encounters:   12/15/22 (!) 164/98   11/11/22 (!) 148/86   09/12/22 (!) 162/105          (goal 120/80)    All Future Testing planned in [facility]  Lab Frequency Next Occurrence   Lipid, Fasting Once 01/15/2023   Comprehensive Metabolic Panel Once

## 2023-04-04 RX ORDER — ZOLPIDEM TARTRATE 5 MG/1
TABLET ORAL
Qty: 30 TABLET | Refills: 1 | Status: SHIPPED | OUTPATIENT
Start: 2023-04-04 | End: 2023-06-03

## 2023-04-12 ENCOUNTER — OFFICE VISIT (OUTPATIENT)
Dept: PODIATRY | Age: 83
End: 2023-04-12
Payer: MEDICARE

## 2023-04-12 VITALS — WEIGHT: 152 LBS | BODY MASS INDEX: 27.97 KG/M2 | HEIGHT: 62 IN

## 2023-04-12 DIAGNOSIS — I73.9 PERIPHERAL VASCULAR DISORDER (HCC): ICD-10-CM

## 2023-04-12 DIAGNOSIS — M79.605 PAIN IN BOTH LOWER EXTREMITIES: ICD-10-CM

## 2023-04-12 DIAGNOSIS — B35.1 DERMATOPHYTOSIS OF NAIL: ICD-10-CM

## 2023-04-12 DIAGNOSIS — M79.604 PAIN IN BOTH LOWER EXTREMITIES: ICD-10-CM

## 2023-04-12 DIAGNOSIS — E11.40 TYPE 2 DIABETES MELLITUS WITH DIABETIC NEUROPATHY, WITHOUT LONG-TERM CURRENT USE OF INSULIN (HCC): Primary | ICD-10-CM

## 2023-04-12 PROCEDURE — 99999 PR OFFICE/OUTPT VISIT,PROCEDURE ONLY: CPT | Performed by: PODIATRIST

## 2023-04-12 PROCEDURE — 11721 DEBRIDE NAIL 6 OR MORE: CPT | Performed by: PODIATRIST

## 2023-04-17 ENCOUNTER — OFFICE VISIT (OUTPATIENT)
Dept: FAMILY MEDICINE CLINIC | Age: 83
End: 2023-04-17
Payer: MEDICARE

## 2023-04-17 ENCOUNTER — HOSPITAL ENCOUNTER (OUTPATIENT)
Age: 83
Setting detail: SPECIMEN
Discharge: HOME | End: 2023-04-17
Payer: MEDICARE

## 2023-04-17 VITALS
HEART RATE: 82 BPM | OXYGEN SATURATION: 93 % | SYSTOLIC BLOOD PRESSURE: 158 MMHG | TEMPERATURE: 98.1 F | DIASTOLIC BLOOD PRESSURE: 90 MMHG

## 2023-04-17 DIAGNOSIS — M15.9 PRIMARY OSTEOARTHRITIS INVOLVING MULTIPLE JOINTS: ICD-10-CM

## 2023-04-17 DIAGNOSIS — K21.9 GASTROESOPHAGEAL REFLUX DISEASE, UNSPECIFIED WHETHER ESOPHAGITIS PRESENT: ICD-10-CM

## 2023-04-17 DIAGNOSIS — E11.40 TYPE 2 DIABETES MELLITUS WITH DIABETIC NEUROPATHY, WITHOUT LONG-TERM CURRENT USE OF INSULIN (HCC): Primary | ICD-10-CM

## 2023-04-17 DIAGNOSIS — I10 UNCONTROLLED HYPERTENSION: ICD-10-CM

## 2023-04-17 DIAGNOSIS — D50.9 IRON DEFICIENCY ANEMIA, UNSPECIFIED IRON DEFICIENCY ANEMIA TYPE: ICD-10-CM

## 2023-04-17 DIAGNOSIS — G95.89 MYELOMALACIA (HCC): ICD-10-CM

## 2023-04-17 DIAGNOSIS — G47.9 SLEEPING DIFFICULTY: ICD-10-CM

## 2023-04-17 DIAGNOSIS — Z79.891 CHRONIC USE OF OPIATE DRUG FOR THERAPEUTIC PURPOSE: ICD-10-CM

## 2023-04-17 DIAGNOSIS — E53.8 B12 DEFICIENCY: ICD-10-CM

## 2023-04-17 LAB
CREATININE URINE: 36.3 MG/DL (ref 28–217)
HBA1C MFR BLD: 6.1 %
MICROALBUMIN/CREAT 24H UR: <12 MG/L
MICROALBUMIN/CREAT UR-RTO: NORMAL MCG/MG CREAT

## 2023-04-17 PROCEDURE — 99214 OFFICE O/P EST MOD 30 MIN: CPT | Performed by: INTERNAL MEDICINE

## 2023-04-17 PROCEDURE — 1090F PRES/ABSN URINE INCON ASSESS: CPT | Performed by: INTERNAL MEDICINE

## 2023-04-17 PROCEDURE — 36415 COLL VENOUS BLD VENIPUNCTURE: CPT

## 2023-04-17 PROCEDURE — 83036 HEMOGLOBIN GLYCOSYLATED A1C: CPT | Performed by: INTERNAL MEDICINE

## 2023-04-17 PROCEDURE — 1036F TOBACCO NON-USER: CPT | Performed by: INTERNAL MEDICINE

## 2023-04-17 PROCEDURE — 82043 UR ALBUMIN QUANTITATIVE: CPT

## 2023-04-17 PROCEDURE — G8417 CALC BMI ABV UP PARAM F/U: HCPCS | Performed by: INTERNAL MEDICINE

## 2023-04-17 PROCEDURE — 3044F HG A1C LEVEL LT 7.0%: CPT | Performed by: INTERNAL MEDICINE

## 2023-04-17 PROCEDURE — 3078F DIAST BP <80 MM HG: CPT | Performed by: INTERNAL MEDICINE

## 2023-04-17 PROCEDURE — G8399 PT W/DXA RESULTS DOCUMENT: HCPCS | Performed by: INTERNAL MEDICINE

## 2023-04-17 PROCEDURE — 1123F ACP DISCUSS/DSCN MKR DOCD: CPT | Performed by: INTERNAL MEDICINE

## 2023-04-17 PROCEDURE — 82570 ASSAY OF URINE CREATININE: CPT

## 2023-04-17 PROCEDURE — G8427 DOCREV CUR MEDS BY ELIG CLIN: HCPCS | Performed by: INTERNAL MEDICINE

## 2023-04-17 PROCEDURE — 3074F SYST BP LT 130 MM HG: CPT | Performed by: INTERNAL MEDICINE

## 2023-04-17 RX ORDER — FERROUS SULFATE 325(65) MG
325 TABLET ORAL
Qty: 90 TABLET | Refills: 1 | Status: SHIPPED | OUTPATIENT
Start: 2023-04-17

## 2023-04-17 RX ORDER — LISINOPRIL 10 MG/1
10 TABLET ORAL DAILY
Qty: 30 TABLET | Refills: 5 | Status: SHIPPED | OUTPATIENT
Start: 2023-04-17 | End: 2023-04-24 | Stop reason: SDUPTHER

## 2023-04-17 SDOH — ECONOMIC STABILITY: FOOD INSECURITY: WITHIN THE PAST 12 MONTHS, THE FOOD YOU BOUGHT JUST DIDN'T LAST AND YOU DIDN'T HAVE MONEY TO GET MORE.: NEVER TRUE

## 2023-04-17 SDOH — ECONOMIC STABILITY: FOOD INSECURITY: WITHIN THE PAST 12 MONTHS, YOU WORRIED THAT YOUR FOOD WOULD RUN OUT BEFORE YOU GOT MONEY TO BUY MORE.: NEVER TRUE

## 2023-04-17 SDOH — ECONOMIC STABILITY: HOUSING INSECURITY
IN THE LAST 12 MONTHS, WAS THERE A TIME WHEN YOU DID NOT HAVE A STEADY PLACE TO SLEEP OR SLEPT IN A SHELTER (INCLUDING NOW)?: NO

## 2023-04-17 SDOH — ECONOMIC STABILITY: INCOME INSECURITY: HOW HARD IS IT FOR YOU TO PAY FOR THE VERY BASICS LIKE FOOD, HOUSING, MEDICAL CARE, AND HEATING?: NOT HARD AT ALL

## 2023-04-17 ASSESSMENT — PATIENT HEALTH QUESTIONNAIRE - PHQ9
SUM OF ALL RESPONSES TO PHQ QUESTIONS 1-9: 1
SUM OF ALL RESPONSES TO PHQ9 QUESTIONS 1 & 2: 1
SUM OF ALL RESPONSES TO PHQ QUESTIONS 1-9: 1
2. FEELING DOWN, DEPRESSED OR HOPELESS: 1
1. LITTLE INTEREST OR PLEASURE IN DOING THINGS: 0

## 2023-04-17 ASSESSMENT — ENCOUNTER SYMPTOMS
CHOKING: 0
BLOOD IN STOOL: 0
ABDOMINAL PAIN: 0
VOMITING: 0
DIARRHEA: 0
ANAL BLEEDING: 0
CHEST TIGHTNESS: 0
CONSTIPATION: 0
SHORTNESS OF BREATH: 0
COUGH: 0
NAUSEA: 0
WHEEZING: 0

## 2023-04-17 ASSESSMENT — VISUAL ACUITY: OU: 1

## 2023-04-19 ENCOUNTER — TELEPHONE (OUTPATIENT)
Dept: PAIN MANAGEMENT | Age: 83
End: 2023-04-19

## 2023-04-21 DIAGNOSIS — I10 UNCONTROLLED HYPERTENSION: ICD-10-CM

## 2023-04-24 ENCOUNTER — HOSPITAL ENCOUNTER (OUTPATIENT)
Dept: PAIN MANAGEMENT | Facility: CLINIC | Age: 83
Discharge: HOME | End: 2023-04-24
Payer: MEDICARE

## 2023-04-24 VITALS
DIASTOLIC BLOOD PRESSURE: 118 MMHG | SYSTOLIC BLOOD PRESSURE: 198 MMHG | HEART RATE: 71 BPM | TEMPERATURE: 96.8 F | OXYGEN SATURATION: 88 % | RESPIRATION RATE: 12 BRPM

## 2023-04-24 DIAGNOSIS — R52 PAIN MANAGEMENT: ICD-10-CM

## 2023-04-24 DIAGNOSIS — M48.02 CERVICAL STENOSIS OF SPINE: Primary | Chronic | ICD-10-CM

## 2023-04-24 DIAGNOSIS — M48.02 CERVICAL SPINAL STENOSIS: Primary | ICD-10-CM

## 2023-04-24 LAB — GLUCOSE BLD-MCNC: 101 MG/DL (ref 65–105)

## 2023-04-24 PROCEDURE — 2580000003 HC RX 258: Performed by: ANESTHESIOLOGY

## 2023-04-24 PROCEDURE — 82947 ASSAY GLUCOSE BLOOD QUANT: CPT

## 2023-04-24 PROCEDURE — 2500000003 HC RX 250 WO HCPCS: Performed by: ANESTHESIOLOGY

## 2023-04-24 PROCEDURE — 6360000002 HC RX W HCPCS: Performed by: ANESTHESIOLOGY

## 2023-04-24 PROCEDURE — 62321 NJX INTERLAMINAR CRV/THRC: CPT

## 2023-04-24 PROCEDURE — 6360000004 HC RX CONTRAST MEDICATION: Performed by: ANESTHESIOLOGY

## 2023-04-24 RX ORDER — LIDOCAINE HYDROCHLORIDE 10 MG/ML
INJECTION, SOLUTION EPIDURAL; INFILTRATION; INTRACAUDAL; PERINEURAL
Status: COMPLETED | OUTPATIENT
Start: 2023-04-24 | End: 2023-04-24

## 2023-04-24 RX ORDER — 0.9 % SODIUM CHLORIDE 0.9 %
INTRAVENOUS SOLUTION INTRAVENOUS CONTINUOUS PRN
Status: COMPLETED | OUTPATIENT
Start: 2023-04-24 | End: 2023-04-24

## 2023-04-24 RX ORDER — FENTANYL CITRATE 50 UG/ML
INJECTION, SOLUTION INTRAMUSCULAR; INTRAVENOUS
Status: COMPLETED | OUTPATIENT
Start: 2023-04-24 | End: 2023-04-24

## 2023-04-24 RX ORDER — HYDROCODONE BITARTRATE AND ACETAMINOPHEN 5; 325 MG/1; MG/1
1 TABLET ORAL 2 TIMES DAILY PRN
Qty: 60 TABLET | Refills: 0 | Status: SHIPPED | OUTPATIENT
Start: 2023-04-24 | End: 2023-05-24

## 2023-04-24 RX ORDER — MIDAZOLAM HYDROCHLORIDE 2 MG/2ML
INJECTION, SOLUTION INTRAMUSCULAR; INTRAVENOUS
Status: COMPLETED | OUTPATIENT
Start: 2023-04-24 | End: 2023-04-24

## 2023-04-24 RX ORDER — DEXAMETHASONE SODIUM PHOSPHATE 10 MG/ML
INJECTION, SOLUTION INTRAMUSCULAR; INTRAVENOUS
Status: COMPLETED | OUTPATIENT
Start: 2023-04-24 | End: 2023-04-24

## 2023-04-24 RX ORDER — LISINOPRIL 10 MG/1
10 TABLET ORAL DAILY
Qty: 90 TABLET | Refills: 1 | Status: SHIPPED | OUTPATIENT
Start: 2023-04-24

## 2023-04-24 RX ADMIN — DEXAMETHASONE SODIUM PHOSPHATE 10 MG: 10 INJECTION, SOLUTION INTRAMUSCULAR; INTRAVENOUS at 11:39

## 2023-04-24 RX ADMIN — IOHEXOL 3 ML: 180 INJECTION INTRAVENOUS at 11:39

## 2023-04-24 RX ADMIN — MIDAZOLAM HYDROCHLORIDE 1 MG: 1 INJECTION, SOLUTION INTRAMUSCULAR; INTRAVENOUS at 11:38

## 2023-04-24 RX ADMIN — FENTANYL CITRATE 50 MCG: 50 INJECTION, SOLUTION INTRAMUSCULAR; INTRAVENOUS at 11:38

## 2023-04-24 RX ADMIN — LIDOCAINE HYDROCHLORIDE 5 ML: 10 INJECTION, SOLUTION EPIDURAL; INFILTRATION; INTRACAUDAL at 11:39

## 2023-04-24 RX ADMIN — SODIUM CHLORIDE 500 ML: 0.9 INJECTION, SOLUTION INTRAVENOUS at 11:38

## 2023-04-24 ASSESSMENT — PAIN DESCRIPTION - DESCRIPTORS: DESCRIPTORS: ACHING;DISCOMFORT

## 2023-04-24 ASSESSMENT — PAIN - FUNCTIONAL ASSESSMENT
PAIN_FUNCTIONAL_ASSESSMENT: 0-10
PAIN_FUNCTIONAL_ASSESSMENT: NONE - DENIES PAIN

## 2023-04-24 NOTE — H&P
Pain Pre-Op H&P Note    Harshal Chavez MD    HPI: Conrad Davis  presents with     Neck pain   Is chronic onset many years ago located over the cervical spine  Extension of pain over right arm  Associated with right arm numbness  Aggravated with neck movement  MRI cervical spine in 2019 showed a C6 level cervical spinal stenosis and myelomalacia changes at C3-C4 and C6-C7 level  Patient is not interested in any open cervical spine surgery    Past Medical History:   Diagnosis Date    Arthritis     Cataract     Cataracts, bilateral     Cervical spondylosis with myelopathy     Depression     Diverticulosis of colon     Dysuria     Dysuria     Gastroesophageal reflux disease 2021    Gout     Headache(784.0)     Hematuria     History of colon polyps     Insomnia     Lumbar stenosis with neurogenic claudication     Neck pain     JANN (obstructive sleep apnea)     Osteoarthrosis, unspecified whether generalized or localized, unspecified site 10/5/2012    Pelvic pain     Pure hypercholesterolemia 10/5/2012    Shoulder blade pain     right  side    Sinus problem     Stroke (HCC)     Tinnitus     Tubular adenoma of colon     Type II or unspecified type diabetes mellitus without mention of complication, not stated as uncontrolled 10/5/2012    Dr. Guzman Martinez last visit 2022 [facility].    Under care of team 2022    PCP: Dr. Guzman Martinez, [facility], last visit 2022    Unspecified essential hypertension 10/5/2012    Dr. Guzman Martinez    Unspecified sleep apnea     Wears dentures        Past Surgical History:   Procedure Laterality Date     SECTION      x2    COLONOSCOPY  06    Dr Lacy Rice  5 12 15    extensive diverticulosis, tubular adenoma    CYSTOSCOPY  2022    Bilateral Retrograde Pyelogram     CYSTOSCOPY Bilateral 3/9/2022    CYSTOSCOPY, RETROGRADE [facility]    EPIDURAL STEROID

## 2023-04-24 NOTE — OP NOTE
Preoperative Diagnosis:   M54.12 Radiculopathy, cervical region  Cervical disc herniation and radiculitis  Postoperative Diagnosis:    M54.12 Radiculopathy, cervical region  Cervical disc herniation and radiculitis    Procedure Performed:  Cervical epidural steroid injection under fluoroscopy guidance    BLOOD LOSS: NONE    Procedure: The Patient was seen in the preop area, chart was reviewed, informed consent was obtained. Patient was taken to procedure room and was placed in prone position. Vital signs were monitored through out the  Procedure. A time out was completed. The site was prepped and draped in sterile manner. The target point was marked at The interlaminar space at C7/T1 . Skin and deep tissues were anesthetized with 1 % lidocaine. A  19-gauge Tuohy epidural needlele was advanced  under fluoroscopy guidance in AP view. Epidural space was identified using CODY technique. A 19 G catheter was threaded up in epidural space for 2 levels. Position was confirmed in Lateral view. Then after negative aspiration contrast dye was injected with live fluoroscopy in AP views that showed  spread of the contrast in the epidural space  and no vascular runoff or intrathecal spread. Finally 5 ml of treatment solution containing 4 ml of PF NS and 1 ml of dexamethasone 10 mg / ml was injected. The needle was removed and a Band-Aid was placed over the needle  insertion site. The patient's vital signs remained stable and the patient tolerated the procedure well. SEDATION NOTE:    ASA CLASSIFICATION  3  MP   CLASSIFICATION  3    Moderate intravenous conscious sedation was supervised by Dr. Caitie Flanagan  The patient was independently monitored by a Registered Nurse assigned to the Procedure Room  Monitoring included automated blood pressure, continuous EKG, Capnography and continuous pulse oximetry. The detailed Conscious Record is permanently stored in the [place] [id number].      The following is the

## 2023-04-24 NOTE — DISCHARGE INSTRUCTIONS

## 2023-04-27 ENCOUNTER — CARE COORDINATION (OUTPATIENT)
Dept: CARE COORDINATION | Age: 83
End: 2023-04-27

## 2023-04-27 SDOH — ECONOMIC STABILITY: HOUSING INSECURITY: IN THE LAST 12 MONTHS, HOW MANY PLACES HAVE YOU LIVED?: 1

## 2023-04-27 SDOH — ECONOMIC STABILITY: TRANSPORTATION INSECURITY
IN THE PAST 12 MONTHS, HAS THE LACK OF TRANSPORTATION KEPT YOU FROM MEDICAL APPOINTMENTS OR FROM GETTING MEDICATIONS?: NO

## 2023-04-27 SDOH — ECONOMIC STABILITY: INCOME INSECURITY: IN THE LAST 12 MONTHS, WAS THERE A TIME WHEN YOU WERE NOT ABLE TO PAY THE MORTGAGE OR RENT ON TIME?: NO

## 2023-04-27 SDOH — ECONOMIC STABILITY: TRANSPORTATION INSECURITY
IN THE PAST 12 MONTHS, HAS LACK OF TRANSPORTATION KEPT YOU FROM MEETINGS, WORK, OR FROM GETTING THINGS NEEDED FOR DAILY LIVING?: NO

## 2023-04-27 ASSESSMENT — SOCIAL DETERMINANTS OF HEALTH (SDOH)
DO YOU BELONG TO ANY CLUBS OR ORGANIZATIONS SUCH AS CHURCH GROUPS UNIONS, FRATERNAL OR ATHLETIC GROUPS, OR SCHOOL GROUPS?: NO
HOW OFTEN DO YOU ATTENT MEETINGS OF THE CLUB OR ORGANIZATION YOU BELONG TO?: NEVER
HOW OFTEN DO YOU GET TOGETHER WITH FRIENDS OR RELATIVES?: MORE THAN THREE TIMES A WEEK
HOW OFTEN DO YOU ATTEND CHURCH OR RELIGIOUS SERVICES?: 1 TO 4 TIMES PER YEAR
IN A TYPICAL WEEK, HOW MANY TIMES DO YOU TALK ON THE PHONE WITH FAMILY, FRIENDS, OR NEIGHBORS?: THREE TIMES A WEEK

## 2023-04-27 NOTE — CARE COORDINATION
Ambulatory Care Coordination Note  2023    Patient Current Location:  [place] [place] contacted the family by telephone. Verified name and  with family as identifiers. Provided introduction to self, and explanation of the ACM role. ACM: Nishi Mann RN    Challenges to be reviewed by the provider   Additional needs identified to be addressed with provider: No  none               Method of communication with provider: none. Spoke with daughter Madelyn Dance, patient not available right now. ED visit  for HTN, was sent there from pain mgmt clinic where she got a cervical neck steroid injection, BP very high while there-highest 221/123. Was given a dose of clonidine 0.1 mg in ED, BP down to 159/84 at discharge. Discussed RPM to monitor BP daily and measures can be sent to her [facility] chart, daughter in agreement but need to ask patient. No symptoms at all when BP was high, she has been taking all of her medications. SDOH- patient lives by herself but daughter sees her everyday, drives her to all appts. No financial stresses, affording all medication copays. Patient able to walk short distances with walker or uses a wheelchair. No falls. DM- A1c 6.1. On metformin daily, not currently checking blood sugars. Discussed is to schedule nurse visit at PCP office and bring home BP cuff with her so can be checked, daughter will schedule appt. Attempted to call patient 2 hours later, left VM message asking for return call. CC Plan:   -Follow up next week to check blood pressures, ask about RPM and ACP, review medications. Diabetes Assessment    Medic Alert ID: No  Meal Planning: Avoidance of concentrated sweets   How often do you test your blood sugar?: No Testing   Do you have barriers with adherence to non-pharmacologic self-management interventions?  (Nutrition/Exercise/Self-Monitoring): Yes   Have you ever had to go to the ED for symptoms of low blood sugar?: No       No patient-reported symptoms   Do you have

## 2023-05-01 ENCOUNTER — CARE COORDINATION (OUTPATIENT)
Dept: CARE COORDINATION | Age: 83
End: 2023-05-01

## 2023-05-01 NOTE — CARE COORDINATION
Left VM message asking patient to return call for care management, check blood pressures. She has not scheduled nurse visit at PCP office for BP check yet. Spoke with daughter Theo Aguilera, she will schedule the appt and discuss RPM with the patient and call writer back to inform of decision. She stated hard to get Sanna on the phone, she rarely answers the phone.     Future Appointments   Date Time Provider Pradeep Alexander   5/12/2023  9:40 AM [facility]   6/21/2023 10:30 AM Elaine Whitten DPM [facility]   8/17/2023  9:15 AM Connie Bee MD [facility] FP [facility]   12/18/2023  1:45 PM Connie Bee MD [facility] NORM Stubbs

## 2023-05-04 ENCOUNTER — CARE COORDINATION (OUTPATIENT)
Dept: CARE COORDINATION | Age: 83
End: 2023-05-04

## 2023-05-04 NOTE — CARE COORDINATION
Left VM messages on patient's and daughter's phones asking for return call to check BP, discuss RPM enrollment. Still has not scheduled nurse visit appt at PCP office to check BP, check her home BP cuff. Will call again next week.     Future Appointments   Date Time Provider Pradeep Alexander   5/12/2023  9:40 AM YOLANDA Davis CNP [facility]   6/21/2023 10:30 [facility]   8/17/2023  9:15 AM Connie Rizvi MD [facility] FP [facility]   12/18/2023  1:45 PM Connie Rizvi MD [facility] NORM Ayala

## 2023-05-09 ENCOUNTER — CARE COORDINATION (OUTPATIENT)
Dept: CARE COORDINATION | Age: 83
End: 2023-05-09

## 2023-05-09 NOTE — CARE COORDINATION
Left VM message on patient's phone asking she return call to discuss blood pressures, RPM enrollment. Attempted to call daughter, no voicemail. She is scheduled for nurse visit at office tomorrow for BP check. Will call again next week.     Future Appointments   Date Time Provider Pradeep Alexander   5/10/2023 11:00 AM MANISHA Brand [facility]   5/12/2023  9:40 AM Bernetta Phalen, APRN - CNP [facility]   6/21/2023 10:30 AM Gustavo Scott DPM [facility]   8/17/2023  9:15 AM Connie Guzman MD [facility]   12/18/2023  1:45 PM Connie Guzman MD [facility] FP [facility]

## 2023-05-17 ENCOUNTER — NURSE ONLY (OUTPATIENT)
Dept: FAMILY MEDICINE CLINIC | Age: 83
End: 2023-05-17
Payer: MEDICARE

## 2023-05-17 VITALS — SYSTOLIC BLOOD PRESSURE: 100 MMHG | HEART RATE: 80 BPM | OXYGEN SATURATION: 95 % | DIASTOLIC BLOOD PRESSURE: 70 MMHG

## 2023-05-17 DIAGNOSIS — I10 ESSENTIAL HYPERTENSION: Primary | ICD-10-CM

## 2023-05-17 PROCEDURE — 99211 OFF/OP EST MAY X REQ PHY/QHP: CPT | Performed by: INTERNAL MEDICINE

## 2023-05-17 NOTE — PROGRESS NOTES
Pt is here today for a BP check. Vitals are as follows. Sitting    arm     see vital tab         Pt denies CP, HA or SOB.

## 2023-05-18 ENCOUNTER — CARE COORDINATION (OUTPATIENT)
Dept: CARE COORDINATION | Age: 83
End: 2023-05-18

## 2023-05-18 DIAGNOSIS — I10 ESSENTIAL HYPERTENSION: Primary | ICD-10-CM

## 2023-05-18 NOTE — PROGRESS NOTES
5/18/23 6:38 PM       Remote Patient Monitoring Treatment Plan    Received request from ACSTAS/VICTORIA Lynn RN   to order remote patient monitoring for in home monitoring of HTN and order completed. Patient will be monitoring blood pressure   pulse ox   weight. Please set alert for ONLY weight gain of 5# in 7 days. Pt has no documented hx of HF. Patient will engage in Remote Patient Monitoring each day to develop the skills necessary for self management. RPM Care Team Responsibilities:   Alerts will be reviewed daily and addressed within 2-4 hours during operational hours (Monday -Friday 9 am-4 pm)  Alert response and intervention documented in patient medical record  Alert response escalated to PCP per protocol and documented in patient medical record  Patient monitored over approximately  days  Discharge from program based on self-management readiness    See care coordination encounters for additional details.       Casie Trevino DNP, FNP-C, Remote Patient Monitoring NP, () [phone]

## 2023-05-18 NOTE — CARE COORDINATION
Ambulatory Care Coordination Note  2023    Patient Current Location: [address] [place] [address] St contacted the family by telephone. Verified name and  with family as identifiers. Provided introduction to self, and explanation of the ACM role. Challenges to be reviewed by the provider   Additional needs identified to be addressed with provider: No  none               Method of communication with provider: none. ACM: Kamari Silva RN    Attempted to call patient, left VM message asking for return call. Spoke with daughter Anival Robison. Had BP check at office yesterday, also checked her home BP cuff, numbers very close to office machine. Anival Robison stated Lamine Dewey has had some episodes of lightheadedness lately, she spoke with her mom about RPM who is agreeable. CC Plan:   -Enrolled in RPM. Follow up next week to check if activated and using. General Assessment    Do you have any symptoms that are causing concern?: Yes  Progression since Onset: Intermittent - Waxing/Waning  Reported Symptoms: Other (Comment: lightheadedness)             Offered patient enrollment in the Remote Patient Monitoring (RPM) program for in-home monitoring: Yes, patient enrolled:     Remote Patient Monitoring Enrollment Note      Date/Time: 2023 10:57 AM    Offered patient enrollment in the [facility] Remote Patient Monitoring (RPM) program for in home monitoring for HTN. Patient accepted RPM services. Patient will be monitoring the following daily:  blood pressure reading, blood pressure heart rate, pulse ox reading, and weight    ACM reviewed the information below with patient:    Emergency Contact (name and contact number): jeuss Robison [phone]    [x] A member from the care coordination team will reach out to notify the patient once the RPM kit is ordered. [x] Once the kit is delivered, the [facility] team will contact the patient after UPS deliver to assist with set up.             [x] Determined BP cuff size: regular (9.05\"-15.74\")

## 2023-05-23 ENCOUNTER — CARE COORDINATION (OUTPATIENT)
Dept: CARE COORDINATION | Age: 83
End: 2023-05-23

## 2023-05-23 NOTE — CARE COORDINATION
RPM is not activated yet. Attempted to call daughter Jamari Cesar to see if received RPM  kit of not, no voicemail, unable to leave a message. Will call again next week.     Future Appointments   Date Time Provider Pradeep Alexander   6/9/2023  9:00 AM YOLANDA Moura - CNP [facility]   6/21/2023 10:30 AM Minnie Shoulders, APARNA [facility]   8/17/2023  9:15 AM Connie Bailey MD [facility]   12/18/2023  1:45 PM Connie Bailey MD [facility] NORM García

## 2023-05-30 ENCOUNTER — CARE COORDINATION (OUTPATIENT)
Dept: CASE MANAGEMENT | Age: 83
End: 2023-05-30

## 2023-05-30 NOTE — CARE COORDINATION
Request from [address] Angela Lucero RN.,  Please contact patient regarding RPM kit as well as B/P. No answer, VM not set up on both patient and daughter phone.     Rocoi Santiago, [address] [facility] Coordination Transition

## 2023-06-01 ENCOUNTER — CARE COORDINATION (OUTPATIENT)
Dept: CARE COORDINATION | Age: 83
End: 2023-06-01

## 2023-06-01 NOTE — CARE COORDINATION
in mental status.     None Recently Recorded    Barriers: overwhelmed by complexity of regimen and lack of education  Plan for overcoming my barriers: ACM calls, RPM enrollment, education  Confidence: 8/10  Anticipated Goal Completion Date: 8/20/23                Future Appointments   Date Time Provider Pradeep Alexander   6/9/2023  9:00 AM YOLANDA Bolanos - CNP [facility]   6/21/2023 10:30 AM Ana Abreu DPM [facility]   8/17/2023  9:15 AM MD Graciela Parker [facility]   12/18/2023  1:45 PM MD Graciela Parker

## 2023-06-01 NOTE — CARE COORDINATION
AC notified writer that the patient daughter did not receive the RPM kit. UPS tracking says the kit was sent back due to unable to obtain correct address. Writer spoke to Ha, she confirmed shipping address :[address],[place], [place] [address]  Writer placed a kit order with new address including the [address]. The daughter was informed that UPS will delivery the kit within 1-3 business days.

## 2023-06-08 ENCOUNTER — TELEPHONE (OUTPATIENT)
Dept: ADMINISTRATIVE | Age: 83
End: 2023-06-08

## 2023-06-08 NOTE — TELEPHONE ENCOUNTER
Patient calling stating she has no transportation for the 06/09/2023, she will run out of medication in 2 days, next opening is in July, unable to reach office please call pt [phone] [facility]/sc

## 2023-06-09 ENCOUNTER — OFFICE VISIT (OUTPATIENT)
Dept: PAIN MANAGEMENT | Age: 83
End: 2023-06-09
Payer: MEDICARE

## 2023-06-09 VITALS — HEIGHT: 62 IN | WEIGHT: 152 LBS | BODY MASS INDEX: 27.97 KG/M2 | HEART RATE: 74 BPM | OXYGEN SATURATION: 94 %

## 2023-06-09 DIAGNOSIS — M48.062 LUMBAR STENOSIS WITH NEUROGENIC CLAUDICATION: Primary | Chronic | ICD-10-CM

## 2023-06-09 DIAGNOSIS — M47.816 LUMBAR FACET JOINT SYNDROME: Chronic | ICD-10-CM

## 2023-06-09 DIAGNOSIS — M54.12 CERVICAL RADICULAR PAIN: Chronic | ICD-10-CM

## 2023-06-09 DIAGNOSIS — Z98.890 HX OF CERVICAL SPINE SURGERY: ICD-10-CM

## 2023-06-09 DIAGNOSIS — M48.02 CERVICAL SPINAL STENOSIS: ICD-10-CM

## 2023-06-09 DIAGNOSIS — Z79.891 CHRONIC USE OF OPIATE DRUG FOR THERAPEUTIC PURPOSE: ICD-10-CM

## 2023-06-09 PROCEDURE — 1123F ACP DISCUSS/DSCN MKR DOCD: CPT | Performed by: NURSE PRACTITIONER

## 2023-06-09 PROCEDURE — 99213 OFFICE O/P EST LOW 20 MIN: CPT | Performed by: NURSE PRACTITIONER

## 2023-06-09 PROCEDURE — 1036F TOBACCO NON-USER: CPT | Performed by: NURSE PRACTITIONER

## 2023-06-09 PROCEDURE — 1090F PRES/ABSN URINE INCON ASSESS: CPT | Performed by: NURSE PRACTITIONER

## 2023-06-09 PROCEDURE — G8399 PT W/DXA RESULTS DOCUMENT: HCPCS | Performed by: NURSE PRACTITIONER

## 2023-06-09 PROCEDURE — G8427 DOCREV CUR MEDS BY ELIG CLIN: HCPCS | Performed by: NURSE PRACTITIONER

## 2023-06-09 PROCEDURE — G8417 CALC BMI ABV UP PARAM F/U: HCPCS | Performed by: NURSE PRACTITIONER

## 2023-06-09 RX ORDER — HYDROCODONE BITARTRATE AND ACETAMINOPHEN 5; 325 MG/1; MG/1
1 TABLET ORAL 2 TIMES DAILY PRN
Qty: 60 TABLET | Refills: 0 | Status: SHIPPED | OUTPATIENT
Start: 2023-06-09 | End: 2023-07-09

## 2023-06-09 RX ORDER — ZOLPIDEM TARTRATE 5 MG/1
5 TABLET ORAL NIGHTLY PRN
COMMUNITY
Start: 2023-06-05

## 2023-06-09 ASSESSMENT — ENCOUNTER SYMPTOMS
BACK PAIN: 1
BOWEL INCONTINENCE: 0

## 2023-06-09 NOTE — TELEPHONE ENCOUNTER
Called pt danna that she has not been here sense 01/06/2023 and wants her meds called in she cancelled her last 3 visits on the Vm I advised her to call the office to get an appointment in July we do not have any other openings.  Do to our office policy meds cannot be fill over the phone and cannot be fill for NO SHOWS or Cancellations

## 2023-06-09 NOTE — PROGRESS NOTES
more than 1 year ago. The problem occurs constantly. The problem has been gradually worsening. The pain is associated with nothing. The pain is present in the right side, midline and occipital region. The quality of the pain is described as aching. The pain is at a severity of 6/10. The pain is moderate. The symptoms are aggravated by position and twisting. The pain is Same all the time. Associated symptoms include headaches and numbness. Pertinent negatives include no tingling or weakness. She has tried bed rest and oral narcotics for the symptoms. The treatment provided mild relief. S/P: Cervical epidural steroid injection under fluoroscopy guidance      Outcome   Any improvement of activity? Yes   Any side effects (appetite,leg cramping,facial fleshing):no   Increase of pain:  No  Pain score Today:  4  % of pain relief: 60%  Pain diary (medial branch block): No    Hemoglobin A1C   Date Value Ref Range Status   04/17/2023 6.1 % Final     Patient denies any new neurological symptoms. No bowel or bladder incontinence, no weakness, and no falling. Pill count: last filled 4/24/23    Morphine equivalent: 10    Periodic Controlled Substance Monitoring: Possible medication side effects, risk of tolerance/dependence & alternative treatments discussed., No signs of potential drug abuse or diversion identified. , Assessed functional status., Obtaining appropriate analgesic effect of treatment.  Dana Monroy, APRN - CNP)      Past Medical History:   Diagnosis Date    Arthritis     Cataract     Cataracts, bilateral     Cervical spondylosis with myelopathy     Depression     Diverticulosis of colon 2015    Dysuria     Dysuria     Gastroesophageal reflux disease 4/13/2021    Gout     Headache(784.0)     Hematuria     History of colon polyps 2015    Insomnia     Lumbar stenosis with neurogenic claudication     Neck pain     JANN (obstructive sleep apnea)     Osteoarthrosis, unspecified whether generalized or localized,

## 2023-06-19 ENCOUNTER — CARE COORDINATION (OUTPATIENT)
Dept: CARE COORDINATION | Age: 83
End: 2023-06-19

## 2023-06-19 NOTE — CARE COORDINATION
Ambulatory Care Coordination Note  2023    Patient Current Location:  Home: [address]  [address] [address]     ACM contacted the patient by telephone. Verified name and  with patient as identifiers. Provided introduction to self, and explanation of the ACM role. Challenges to be reviewed by the provider   Additional needs identified to be addressed with provider: No  none               Method of communication with provider: none. ACM: Phuong Kwan RN    Spoke with patient. She is waiting for her daughter to set up the equipment (RPM). BP yesterday was 128/80. No chest pain, palpitations or dyspnea. Occasionally gets dizzy when first stands up, has fallen back on cough or bed, no injuries. Has fatigue, not worse than normal. Some lower leg swelling, stated always has it, not worsening. Doesn't weigh herself at home. Writer called and left VM message on daughter's phone, gave her RPM install phone number to call to set up RPM equipment, asked she call writer back to discuss. CC Plan:   -Follow up next week to see if RPM installed or not. Diabetes Assessment    Medic Alert ID: No  Meal Planning: Avoidance of concentrated sweets   How often do you test your blood sugar?: No Testing   Do you have barriers with adherence to non-pharmacologic self-management interventions? (Nutrition/Exercise/Self-Monitoring): Yes   Have you ever had to go to the ED for symptoms of low blood sugar?: No       Do you have hyperglycemia symptoms?: No   Do you have hypoglycemia symptoms?: No   Blood Sugar Monitoring Regimen: Not Testing           General Assessment    Do you have any symptoms that are causing concern?: Yes  Progression since Onset: Intermittent - Waxing/Waning  Reported Symptoms: Fatigue              Offered patient enrollment in the Remote Patient Monitoring (RPM) program for in-home monitoring: Yes, patient already enrolled.     Lab Results       None            Care Coordination

## 2023-06-21 ENCOUNTER — OFFICE VISIT (OUTPATIENT)
Dept: PODIATRY | Age: 83
End: 2023-06-21
Payer: MEDICARE

## 2023-06-21 VITALS — HEIGHT: 62 IN | WEIGHT: 152 LBS | BODY MASS INDEX: 27.97 KG/M2

## 2023-06-21 DIAGNOSIS — E11.40 TYPE 2 DIABETES MELLITUS WITH DIABETIC NEUROPATHY, WITHOUT LONG-TERM CURRENT USE OF INSULIN (HCC): Primary | ICD-10-CM

## 2023-06-21 DIAGNOSIS — M79.604 PAIN IN BOTH LOWER EXTREMITIES: ICD-10-CM

## 2023-06-21 DIAGNOSIS — I73.9 PERIPHERAL VASCULAR DISORDER (HCC): ICD-10-CM

## 2023-06-21 DIAGNOSIS — R60.0 EDEMA OF LOWER EXTREMITY: ICD-10-CM

## 2023-06-21 DIAGNOSIS — M79.605 PAIN IN BOTH LOWER EXTREMITIES: ICD-10-CM

## 2023-06-21 DIAGNOSIS — B35.1 DERMATOPHYTOSIS OF NAIL: ICD-10-CM

## 2023-06-21 PROCEDURE — 1090F PRES/ABSN URINE INCON ASSESS: CPT | Performed by: PODIATRIST

## 2023-06-21 PROCEDURE — 1036F TOBACCO NON-USER: CPT | Performed by: PODIATRIST

## 2023-06-21 PROCEDURE — 1123F ACP DISCUSS/DSCN MKR DOCD: CPT | Performed by: PODIATRIST

## 2023-06-21 PROCEDURE — G8417 CALC BMI ABV UP PARAM F/U: HCPCS | Performed by: PODIATRIST

## 2023-06-21 PROCEDURE — G8427 DOCREV CUR MEDS BY ELIG CLIN: HCPCS | Performed by: PODIATRIST

## 2023-06-21 PROCEDURE — 3044F HG A1C LEVEL LT 7.0%: CPT | Performed by: PODIATRIST

## 2023-06-21 PROCEDURE — G8399 PT W/DXA RESULTS DOCUMENT: HCPCS | Performed by: PODIATRIST

## 2023-06-21 PROCEDURE — 99213 OFFICE O/P EST LOW 20 MIN: CPT | Performed by: PODIATRIST

## 2023-06-21 PROCEDURE — 11721 DEBRIDE NAIL 6 OR MORE: CPT | Performed by: PODIATRIST

## 2023-06-28 ENCOUNTER — CARE COORDINATION (OUTPATIENT)
Dept: CARE COORDINATION | Age: 83
End: 2023-06-28

## 2023-07-03 ENCOUNTER — CARE COORDINATION (OUTPATIENT)
Dept: CARE COORDINATION | Age: 83
End: 2023-07-03

## 2023-07-03 NOTE — CARE COORDINATION
Left VM message on daughter Sarahi's phone asking for call back to see if she was able to contact [facility] about setting up RPM. Will call again next week.     Future Appointments   Date Time Provider [address]   7/14/2023  9:20 AM YOLANDA Hayes - CNP [facility]   8/17/2023  9:15 AM Connie Pierre MD [facility]   8/30/2023 10:00 AM Nohemi Villanueva DPM [facility]   12/18/2023  1:45 PM Connie Pierre MD [facility] Migue López

## 2023-07-06 ENCOUNTER — CARE COORDINATION (OUTPATIENT)
Dept: CARE COORDINATION | Age: 83
End: 2023-07-06

## 2023-07-06 NOTE — CARE COORDINATION
Remote Alert Monitoring Note  Rpm alert to be reviewed by the provider   yellow alert   blood pressure reading (176/88)   Additional needs to be addressed by N/A: No      Date/Time:  7/6/2023 4:40 PM  Patient Current Location: [place]  LPN attempted to contact patient by telephone regarding yellow alert received for blood pressure reading (176/88). 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 Refer to 911 immediately if:  Patient unresponsive or unable to provide history  Change in cognition or sudden confusion  Patient unable to respond in complete sentences  Intense chest pain/tightness  Any concern for any clinical emergency  Red Alert: Provider response time of 1 hr required for any red alert requiring intervention  Yellow Alert: Provider response time of 3hr required for any escalated yellow alert  Clinical Interventions: Reviewed and followed up on alerts and treatments-Pt has not returned call as of this time. Will route to [facility] to update. Plan/Follow Up: Will continue to review, monitor and address alerts with follow up based on severity of symptoms and risk factors.

## 2023-07-06 NOTE — CARE COORDINATION
Date/Time:  7/6/2023 1:54 PM  LPN attempted to reach patient by telephone regarding red alert in remote patient monitoring program. Left HIPPA compliant message requesting a return call. Will attempt to reach patient again.

## 2023-07-07 ENCOUNTER — CARE COORDINATION (OUTPATIENT)
Dept: CARE COORDINATION | Age: 83
End: 2023-07-07

## 2023-07-07 NOTE — TELEPHONE ENCOUNTER
Take clonidine 0.1mg now, increase it to 0.2mg BID.    Seems like her BP has not been well controlled, but being addressed now due to red alert   Continue to monitor BP, goal to decrease BP by 20-30 mmHg to minimize incidence of SE   If develops dizziness, chest pain, or worsening edema - ER

## 2023-07-07 NOTE — CARE COORDINATION
Remote Alert Monitoring Note  Rpm alert to be reviewed by the provider   red alert   blood pressure reading (223/101) and weight (increase 5 lbs x 7 days)   Additional needs to be addressed by  Waiting on BP recheck prior to routing :  Pt to recheck BP around 2 pm.      Date/Time:  2023 12:32 PM  [facility] red alert received for blood pressure reading (223/101) and weight increase (5 lbs x 7 days). Verified patients name and  as identifiers. 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 Refer to 911 immediately if:  Patient unresponsive or unable to provide history  Change in cognition or sudden confusion  Patient unable to respond in complete sentences  Intense chest pain/tightness  Any concern for any clinical emergency  Red Alert: Provider response time of 1 hr required for any red alert requiring intervention  Yellow Alert: Provider response time of 3hr required for any escalated yellow alert  BP Triage  Are you having any Chest Pain? no   Are you having any Shortness of Breath? no   Do you have a headache or have any vision changes? no   Are you having any numbness or tingling? no   Are you having any other health concerns or issues? no   Weight Scale Triage  Was your weight obtained upon rising/waking today? no   Was your weight obtained after voiding and/or use of the bathroom today? no   Did you weigh yourself in the same amount of clothing today, compared to how you typically do? yes   Was the scale bumped or moved prior to today's weight? yes   Is your scale on a flat/hard surface? yes   Did you obtain your weight with shoes on? no   If yes, is this something you normally do during your daily weights? NA   Were you standing up straight on the scale today?  yes   Were you leaning on anything while obtaining your weight today? no   Clinical Interventions: Reviewed and followed up on alerts and treatments-Spoke with pt who is in no apparent

## 2023-07-07 NOTE — CARE COORDINATION
23 3:08 PM Patient is currently enrolled in Remote Patient Monitoring for HTN. Noted message from PCP, Dr. Richar Reilly, \"Take clonidine 0.1mg now, increase it to 0.2mg BID. Seems like her BP has not been well controlled, but being addressed now due to red alert   Continue to monitor BP, goal to decrease BP by 20-30 mmHg to minimize incidence of SE   If develops dizziness, chest pain, or worsening edema - ER\". Reached out to pt and after verifying pt name and  as identifiers, updated given. Pt verbalizes understanding. She requested nurse to call her daughter [facility] and update also. Attempted to reach [facility] and no answer at this time. HIPAA compliant voice message left requesting return call. Plan/Follow Up: Will continue to review, monitor and address alerts with follow up based on severity of symptoms and risk factors.

## 2023-07-07 NOTE — CARE COORDINATION
Left VM messages on patient's and daughter's phones asking for return call to complete RPM welcome call, care management follow up. Will call again in a few days.     Future Appointments   Date Time Provider [address]   7/14/2023  9:20 AM YOLANDA Shaver CNP [facility]   8/17/2023  9:15 AM Connie Pro MD [facility] FP [facility]   8/30/2023 10:00 AM Moy De León DPM [facility]   12/18/2023  1:45 PM Connie Pro MD [facility] NORM [place]

## 2023-07-07 NOTE — CARE COORDINATION
7/7/23 2:14 PM Patient is currently enrolled in Remote Patient Monitoring for HTN. Reached back out to pt to request she recheck BP at this time with updated reading of 191/99. Reviewed medications and pt verbalizes that she takes Lisinopril, Clonidine and carvedilol as ordered. Continues to verbalize asymptomatic. Will add RPM metrics and route to ACM and PCP for review.

## 2023-07-07 NOTE — CARE COORDINATION
7/7/23 2:43 PM Patient is currently enrolled in Remote Patient Monitoring for HTN. This nurse reached out to PCP office and spoke with Shivani Bolden.  PCP not in office today, but she will send message to request PCP review encounter.

## 2023-07-10 ENCOUNTER — CARE COORDINATION (OUTPATIENT)
Dept: CARE COORDINATION | Age: 83
End: 2023-07-10

## 2023-07-10 NOTE — CARE COORDINATION
7/10/23 4:17 pm Patient is currently enrolled in Remote Patient Monitoring for HTN. No updated BP received as of this time. Spoke with Mukesh MAGDALENO RN and updated that pt having difficulty placing cuff for accurate reading. Plan/Follow Up: Will continue to review, monitor and address alerts with follow up based on severity of symptoms and risk factors.

## 2023-07-10 NOTE — CARE COORDINATION
Remote Alert Monitoring Note  Rpm alert to be reviewed by the provider   red alert   blood pressure reading (197/105)   Additional needs to be addressed by N/A: No      Date/Time:  7/10/2023 12:10 PM  Patient Current Location: [facility] contacted patient by telephone regarding red alert received for blood pressure reading (197/105). Verified patients name and  as identifiers. 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 Refer to 911 immediately if:  Patient unresponsive or unable to provide history  Change in cognition or sudden confusion  Patient unable to respond in complete sentences  Intense chest pain/tightness  Any concern for any clinical emergency  Red Alert: Provider response time of 1 hr required for any red alert requiring intervention  Yellow Alert: Provider response time of 3hr required for any escalated yellow alert  BP Triage  Are you having any Chest Pain? no   Are you having any Shortness of Breath? no   Do you have a headache or have any vision changes? no   Are you having any numbness or tingling? no   Are you having any other health concerns or issues? no   Clinical Interventions: Reviewed and followed up on alerts and treatments-Spoke with pt who is in no apparent distress. Reviewed HTN medications and pt verbalizes taking as ordered. Taking increased does of 0.2 mg of Clonidine BID as ordered on Friday, 23. States she took am medications around 10 o'clock this morning. Discussed cuff placement and pt agreeable to recheck BP at this time with updated reading of 209/78. She verbalizes that she has a very difficult time getting cuff on due to having poor strength in her hands. She requested nurse to follow up with her daughter Maurice Dickson. Per Maurice Dickson she was able to assist over the weekend, but it is difficult for her to get to her mothers house daily. States she will try to go to her mom's house later today to assist with BP recheck. Will route to [facility] to update. Plan/Follow Up:  Will continue

## 2023-07-11 ENCOUNTER — CARE COORDINATION (OUTPATIENT)
Dept: CARE COORDINATION | Age: 83
End: 2023-07-11

## 2023-07-11 NOTE — CARE COORDINATION
Ambulatory Care Coordination Note  2023    Patient Current Location:  Home: [address] [facility] [address]      She had high BP on RPM but had trouble with cuff. Spoke with patient, she has figured out how to use cuff and is doing it herself, /87 today. Had a dizzy spell 2 days ago while was in bed, it went away. No dizziness when up walking. Has leg swelling, weight has been good, only minimal fluctuations. Encouraged to elevate legs during the day, limit her salt intake, continue with RPM readings daily. Remote Patient Monitoring Welcome Note   Remote Patient Monitoring Welcome Note Patient Current Location: Home: [address] [facility] [address] Verified patients name and  as identifiers. Completed and confirmed the following: Emergency Contact: daughter Cee Dietrich  Patient received all RPM equipment (tablet, scale, blood pressure device and cuff, and pulse oximeter) ? Cuff Size: regular (9.05\"-15.74\") ? Weight Scale: regular (<330lbs)  Instructed patient keep box for use when returning equipment  Reviewed Patient Welcome Letter with patient  Reviewed expectations for patient and care team ? Monitoring hours M-F 9-4pm ? Completing monitoring by 12pm on  so that alerts can be responded to in the same day ? Patient weighs self at same time every day (or after urinating and waking up) ? Take blood pressure 1-2 hrs after medications ? RPM team may have different phone area code (including [place], [place], [place] or [place])                         Instructed patient to keep scale on flat surface  Instructed patient to keep tablet plugged in at all times  Instructed how to contact IT support ([phone])  Provided Remote Patient Monitoring care  information All questions answered at this time.  ---- Current Patient Metrics ---- Blood Pressure: -/-, -bpm Pulseox: -%, -bpm Survey: - Weight: -lbs Note Created at: 2023 10:28 AM ET ---- Time-Spent: 5 minutes 0 seconds     CC Plan:

## 2023-07-13 DIAGNOSIS — G47.9 SLEEPING DIFFICULTY: ICD-10-CM

## 2023-07-13 RX ORDER — ZOLPIDEM TARTRATE 5 MG/1
5 TABLET ORAL NIGHTLY PRN
Qty: 30 TABLET | Refills: 1 | Status: SHIPPED | OUTPATIENT
Start: 2023-07-13 | End: 2023-09-11

## 2023-07-13 NOTE — TELEPHONE ENCOUNTER
Last visit: 4/17/23  Last Med refill: 4/4/23  Does patient have enough medication for 72 hours: No:     Next Visit Date:  Future Appointments   Date Time Provider [address]   7/14/2023  9:20 AM YOLANDA Andre - CNP [facility]   8/17/2023  9:15 AM Connie Prieto MD [facility] FP [facility]   8/30/2023 10:00 AM Georges Bailey DPM [facility]   12/18/2023  1:45 PM Connie Prieto MD [address] Maintenance   Topic Date Due    Diabetic retinal exam  08/21/2020    Shingles vaccine (1 of 2) 12/15/2023 (Originally 5/28/1990)    COVID-19 Vaccine (4 - Booster for Jessa Ding series) 12/15/2023 (Originally 3/2/2022)    DTaP/Tdap/Td vaccine (1 - Tdap) 10/18/2026 (Originally 5/28/1959)    Flu vaccine (1) 08/01/2023    A1C test (Diabetic or Prediabetic)  10/17/2023    Annual Wellness Visit (AWV)  12/16/2023    Lipids  04/14/2024    Depression Screen  04/17/2024    Diabetic foot exam  06/28/2024    DEXA (modify frequency per FRAX score)  Completed    Pneumococcal 65+ years Vaccine  Completed    Hepatitis A vaccine  Aged Out    Hib vaccine  Aged Out    Meningococcal (ACWY) vaccine  Aged Out    Hepatitis C screen  Discontinued       Hemoglobin A1C (%)   Date Value   04/17/2023 6.1   12/15/2022 6.3   05/09/2022 6.6             ( goal A1C is < 7)   Microalb/Crt.  Ratio (mcg/mg creat)   Date Value   04/17/2023 Can not be calculated     LDL Cholesterol (mg/dL)   Date Value   04/14/2023 64   10/07/2021 48       (goal LDL is <100)   AST (U/L)   Date Value   04/14/2023 17     ALT (U/L)   Date Value   04/14/2023 10     BUN (mg/dL)   Date Value   04/14/2023 11     BP Readings from Last 3 Encounters:   07/12/23 (!) 169/88   05/17/23 100/70   04/24/23 (!) 198/118          (goal 120/80)    All Future Testing planned in [facility]  Lab Frequency Next Occurrence   Cervical/Thoracic Epidural Steroid Injection Once 04/24/2023               Patient Active Problem List:     Osteoarthritis     Essential

## 2023-07-14 ENCOUNTER — CARE COORDINATION (OUTPATIENT)
Dept: CARE COORDINATION | Age: 83
End: 2023-07-14

## 2023-07-14 ENCOUNTER — OFFICE VISIT (OUTPATIENT)
Dept: PAIN MANAGEMENT | Age: 83
End: 2023-07-14
Payer: MEDICARE

## 2023-07-14 VITALS — HEIGHT: 62 IN | BODY MASS INDEX: 27.6 KG/M2 | OXYGEN SATURATION: 92 % | WEIGHT: 150 LBS | HEART RATE: 66 BPM

## 2023-07-14 DIAGNOSIS — M48.062 LUMBAR STENOSIS WITH NEUROGENIC CLAUDICATION: Primary | Chronic | ICD-10-CM

## 2023-07-14 DIAGNOSIS — Z79.891 CHRONIC USE OF OPIATE DRUG FOR THERAPEUTIC PURPOSE: ICD-10-CM

## 2023-07-14 DIAGNOSIS — M15.9 PRIMARY OSTEOARTHRITIS INVOLVING MULTIPLE JOINTS: ICD-10-CM

## 2023-07-14 DIAGNOSIS — M47.816 LUMBAR FACET JOINT SYNDROME: Chronic | ICD-10-CM

## 2023-07-14 DIAGNOSIS — M48.02 CERVICAL SPINAL STENOSIS: ICD-10-CM

## 2023-07-14 PROCEDURE — G8427 DOCREV CUR MEDS BY ELIG CLIN: HCPCS | Performed by: NURSE PRACTITIONER

## 2023-07-14 PROCEDURE — 99214 OFFICE O/P EST MOD 30 MIN: CPT | Performed by: NURSE PRACTITIONER

## 2023-07-14 PROCEDURE — G8399 PT W/DXA RESULTS DOCUMENT: HCPCS | Performed by: NURSE PRACTITIONER

## 2023-07-14 PROCEDURE — 1123F ACP DISCUSS/DSCN MKR DOCD: CPT | Performed by: NURSE PRACTITIONER

## 2023-07-14 PROCEDURE — 1036F TOBACCO NON-USER: CPT | Performed by: NURSE PRACTITIONER

## 2023-07-14 PROCEDURE — 1090F PRES/ABSN URINE INCON ASSESS: CPT | Performed by: NURSE PRACTITIONER

## 2023-07-14 PROCEDURE — G8417 CALC BMI ABV UP PARAM F/U: HCPCS | Performed by: NURSE PRACTITIONER

## 2023-07-14 RX ORDER — HYDROCODONE BITARTRATE AND ACETAMINOPHEN 5; 325 MG/1; MG/1
1 TABLET ORAL 2 TIMES DAILY PRN
Qty: 60 TABLET | Refills: 0 | Status: SHIPPED | OUTPATIENT
Start: 2023-07-26 | End: 2023-08-25

## 2023-07-14 ASSESSMENT — ENCOUNTER SYMPTOMS
BACK PAIN: 1
GASTROINTESTINAL NEGATIVE: 1
RESPIRATORY NEGATIVE: 1

## 2023-07-14 NOTE — CARE COORDINATION
Remote Patient Monitoring Note  Date/Time:  7/14/2023 3:35 PM  Patient Current Location: [address] [place] [address]  LPN attempted to contact patient by telephone regarding yellow alert received for no weight x 2 days. 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 Clinical Interventions:  Left HIPAA compliant voice message for pt to update metrics for review. Plan/Follow Up: Will continue to review, monitor and address alerts with follow up based on severity of symptoms and risk factors.

## 2023-07-14 NOTE — PROGRESS NOTES
(Start on 7/26/2023)    Primary osteoarthritis involving multiple joints    Relevant Medications    HYDROcodone-acetaminophen (NORCO) 5-325 MG per tablet (Start on 7/26/2023)    Chronic use of opiate drugs therapeutic purposes    Relevant Orders    DRUG SCREEN, PAIN     Other Visit Diagnoses       Cervical spinal stenosis        Relevant Medications    HYDROcodone-acetaminophen (NORCO) 5-325 MG per tablet (Start on 7/26/2023)               Treatment Plan:  Patient relates current medications are helping the pain. Patient reports taking pain medications as prescribed, denies obtaining medications from different sources and denies use of illegal drugs. Medication risk and benefits have been discussed. Patient denies side effects from medications like nausea, vomiting, constipation or drowsiness. Patient reports current activities of daily living are possible due to medications and would like to continue them. As always, we encourage daily stretching and strengthening exercises, and recommend minimizing use of pain medications unless patient cannot get through daily activities due to pain. Due to the high risk nature of this patient's pain medication close monitoring is required. Continue current medication management, pt has been stable and compliant. Script written for norco  SABIHA obtained today for monitoring purposes. Last dose of medication was  yesterday   Follow up appointment made for 4 weeks    I have reviewed the chief complaint and history of present illness (including ROS and PFSH) and vital documentation by my staff and I agree with their documentation and have added where applicable.

## 2023-07-17 ENCOUNTER — CARE COORDINATION (OUTPATIENT)
Dept: CARE COORDINATION | Age: 83
End: 2023-07-17

## 2023-07-17 NOTE — CARE COORDINATION
7/17/23 4:06 PM Patient is currently enrolled in Remote Patient Monitoring for HTN. Received call from pts daughter Albaro Copeland) who assists with using RPM equipment. She states she is not able to assist pt daily, but will try to help with BP monitoring every other day. Pt will continue to enter oxygen and weights daily. Will route to [facility] to update. Plan/Follow Up: Will continue to review, monitor and address alerts with follow up based on severity of symptoms and risk factors.

## 2023-07-17 NOTE — CARE COORDINATION
Remote Patient Monitoring Note  Date/Time:  7/17/2023 2:04 PM  LPN reviewed patients reported daily Remote Patient Monitoring metrics. All reported metrics are within alert parameters. Daughter did not call this nurse, but metrics entered. Plan/Follow Up: Will continue to review, monitor and address alerts with follow up based on severity of symptoms and risk factors.

## 2023-07-17 NOTE — CARE COORDINATION
23 11:45 AM Patient is currently enrolled in Remote Patient Monitoring for HTN. Pt called this nurse to ask if there was a wrist cuff available for BP monitoring. Pt name and  verified as identifiers. Updated pt that wrist cuff not available through HRS. Pt states she can not get cuff on by herself and her daughter can not come over everyday to assist.  States he daughter is on her way not to help today and she will have daughter call this nurse when she arrives.

## 2023-07-18 ENCOUNTER — CARE COORDINATION (OUTPATIENT)
Dept: CARE COORDINATION | Age: 83
End: 2023-07-18

## 2023-07-18 NOTE — CARE COORDINATION
Remote Patient Monitoring Note  Date/Time:  7/18/2023 4:22 PM  Patient Current Location: [place]  LPN attempted to contact patient by telephone regarding yellow alert received for no weight x 2 days. 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 Clinical Interventions:  Left HIPAA compliant voice message for pt to update weight for review. Plan/Follow Up: Will continue to review, monitor and address alerts with follow up based on severity of symptoms and risk factors.

## 2023-07-19 ENCOUNTER — CARE COORDINATION (OUTPATIENT)
Dept: CARE COORDINATION | Age: 83
End: 2023-07-19

## 2023-07-19 NOTE — CARE COORDINATION
Remote Patient Monitoring Note  Date/Time:  2023 3:39 PM  [facility], Albaro Copeland  by telephone regarding yellow alert received for no weight x 2 days. Verified patients name and  as identifiers. 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 Clinical Interventions: Reviewed and followed up on alerts and treatments-Spoke with Albaro Copeland who is with pt at this time. They are not home, but will update weight later today. Plan/Follow Up: Will continue to review, monitor and address alerts with follow up based on severity of symptoms and risk factors.

## 2023-07-21 ENCOUNTER — CARE COORDINATION (OUTPATIENT)
Dept: CARE COORDINATION | Facility: CLINIC | Age: 83
End: 2023-07-21

## 2023-07-21 NOTE — CARE COORDINATION
Remote Patient Monitoring Note      Date/Time:  7/21/2023 11:58 AM  Patient Current Location: [place]  LPN attempted to contact patient by telephone regarding red alert received for blood pressure reading (183/92), pulse ox reading (91%SpO2), and weight increase (of 5 lbs in last 7 days). Background: Pt enrolled in RPM r/t HTN  Clinical Interventions:  Left HIPAA compliant message requesting a return call. Plan/Follow Up: Will continue to review, monitor and address alerts with follow up based on severity of symptoms and risk factors.

## 2023-07-24 ENCOUNTER — CARE COORDINATION (OUTPATIENT)
Dept: CARE COORDINATION | Age: 83
End: 2023-07-24

## 2023-07-24 NOTE — CARE COORDINATION
Remote Alert Monitoring Note  Rpm alert to be reviewed by the provider   red alert   pulse ox reading (89%)   Additional needs to be addressed by N/A: No      Date/Time:  2023 12:26 PM  Patient Current Location: [facility] contacted patient by telephone regarding red alert received for pulse ox reading (89%). Verified patients name and  as identifiers. 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 Refer to 911 immediately if:  Patient unresponsive or unable to provide history  Change in cognition or sudden confusion  Patient unable to respond in complete sentences  Intense chest pain/tightness  Any concern for any clinical emergency  Red Alert: Provider response time of 1 hr required for any red alert requiring intervention  Yellow Alert: Provider response time of 3hr required for any escalated yellow alert  O2 Triage  Are you having any Chest Pain? no   Are you having any Shortness of Breath? no   Swelling in your hands or feet? Yes, hands   Are you having any other health concerns or issues? no   Clinical Interventions: Reviewed and followed up on alerts and treatments-Pt speaking in full sentences, no apparent distress. States she does have some swelling in her hands today. Weight stable. She is agreeable to recheck oxygen level at this time with updated reading of 93%. Discussed diet and pt stats she can't remember what she's been eating other than a lot of grapes. Educated pt make sure to limit sodium intake and avoid salt shaker. Also, if she is in no distress and reading below 92%, she should reposition oximeter and recheck reading. Verbalizes understanding. Plan/Follow Up: Will continue to review, monitor and address alerts with follow up based on severity of symptoms and risk factors.

## 2023-07-25 ENCOUNTER — CARE COORDINATION (OUTPATIENT)
Dept: CARE COORDINATION | Age: 83
End: 2023-07-25

## 2023-07-25 NOTE — CARE COORDINATION
Ambulatory Care Coordination Note  7/25/2023    Patient Current Location: [place]      Left  message asking patient to return call. Spoke with daughter Williams Polanco. She feels patient is doing well. No dizziness. Doesn't eat much \"real food\" but likes to eat fruit, states nothing tastes good. Weight is steady, not losing weight. Doing BP on RPM every other day when daughter can go to her house to help with the cuff. Last 2 readings good when daughter put the cuff on. Had some leg swelling a few days ago and weight was up, weight down now and swelling gone. Patient elevating legs more during the day. No dyspnea. CC Plan:   -Follow up in a few weeks after PCP appt to review any med changes, orders, review RPM metrics. Diabetes Assessment    Medic Alert ID: No  Meal Planning: Avoidance of concentrated sweets   How often do you test your blood sugar?: No Testing   Do you have barriers with adherence to non-pharmacologic self-management interventions? (Nutrition/Exercise/Self-Monitoring): Yes   Have you ever had to go to the ED for symptoms of low blood sugar?: No       No patient-reported symptoms         and   General Assessment    Do you have any symptoms that are causing concern?: No            Offered patient enrollment in the Remote Patient Monitoring (RPM) program for in-home monitoring: Yes, patient already enrolled. Lab Results       None            Care Coordination Interventions    Referral from Primary Care Provider: No  Suggested Interventions and Community Resources          Goals Addressed                   This Visit's Progress     Self Monitoring   On track     Blood Pressure - I will take my blood pressure as directed - Daily  I will notify my provider of any trends of increasing or decreasing blood pressures over a month period of time.   I will notify my provider of any changes in blood pressure associated with symptoms of dizziness, falls, passing out, headache, confusion/change in mental

## 2023-07-28 ENCOUNTER — CARE COORDINATION (OUTPATIENT)
Dept: CARE COORDINATION | Age: 83
End: 2023-07-28

## 2023-07-28 NOTE — CARE COORDINATION
Remote Alert Monitoring Note  Rpm alert to be reviewed by the provider   red alert   weight (increase of 5 lbs x 7 days)   Additional needs to be addressed by N/A: No      Date/Time:  7/28/2023 1:04 PM  [facility] 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 Refer to 911 immediately if:  Patient unresponsive or unable to provide history  Change in cognition or sudden confusion  Patient unable to respond in complete sentences  Intense chest pain/tightness  Any concern for any clinical emergency  Red Alert: Provider response time of 1 hr required for any red alert requiring intervention  Yellow Alert: Provider response time of 3hr required for any escalated yellow alert  Clinical Interventions: Reviewed and followed up on alerts and treatments-Per HRS chart review, pt had multiple weights recorded on 7/26/23 at 11:41 am and 11:43 am with difference of 4.3 lbs. Second weight consistent with last weight recorded on 7/24/23. Lower weight of 143.6 lbs removed from RPM metrics. Weight today down 2.6 lbs from 7 day look back. Plan/Follow Up: Will continue to review, monitor and address alerts with follow up based on severity of symptoms and risk factors.

## 2023-07-31 ENCOUNTER — CARE COORDINATION (OUTPATIENT)
Dept: CARE COORDINATION | Age: 83
End: 2023-07-31

## 2023-07-31 NOTE — CARE COORDINATION
Remote Alert Monitoring Note  Rpm alert to be reviewed by the provider   red alert   pulse ox reading (87%)   Additional needs to be addressed by N/A: No      Date/Time:  2023 11:06 AM  Patient Current Location: [facility] contacted patient by telephone. Verified patients name and  as identifiers. 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 Refer to 911 immediately if:  Patient unresponsive or unable to provide history  Change in cognition or sudden confusion  Patient unable to respond in complete sentences  Intense chest pain/tightness  Any concern for any clinical emergency  Red Alert: Provider response time of 1 hr required for any red alert requiring intervention  Yellow Alert: Provider response time of 3hr required for any escalated yellow alert  O2 Triage  Are you having any Chest Pain? no   Are you having any Shortness of Breath? no   Swelling in your hands or feet? no   Are you having any other health concerns or issues? no   Clinical Interventions: Reviewed and followed up on alerts and treatments-Pt speaking in full sentences, no apparent distress. States she doesn't know why is was so low. Denies any SOB/dyspnea, edema to hands or cold hands. She is agreeable to recheck oxygen level at this time with updated reading of 93%. Educated pt that if reading below 92% and she is in no distress, she should reposition oximeter and recheck metric. Verbalizes understanding. Plan/Follow Up: Will continue to review, monitor and address alerts with follow up based on severity of symptoms and risk factors.

## 2023-08-02 ENCOUNTER — TELEPHONE (OUTPATIENT)
Dept: FAMILY MEDICINE CLINIC | Age: 83
End: 2023-08-02

## 2023-08-02 ENCOUNTER — CARE COORDINATION (OUTPATIENT)
Dept: CARE COORDINATION | Facility: CLINIC | Age: 83
End: 2023-08-02

## 2023-08-02 NOTE — CARE COORDINATION
Remote Alert Monitoring Note  Rpm alert to be reviewed by the provider   red alert   blood pressure reading (136/127)   Additional needs to be addressed by N/A: No                    Date/Time:  2023 1:11 PM  Patient Current Location: Home: [place] [address]  LPN contacted patient by telephone. Verified patients name and  as identifiers. Background: Pt enrolled in RPM r/t HTN  Refer to 911 immediately if:  Patient unresponsive or unable to provide history  Change in cognition or sudden confusion  Patient unable to respond in complete sentences  Intense chest pain/tightness  Any concern for any clinical emergency  Red Alert: Provider response time of 1 hr required for any red alert requiring intervention  Yellow Alert: Provider response time of 3hr required for any escalated yellow alert    BP Triage  Are you having any Chest Pain? no   Are you having any Shortness of Breath? no   Do you have a headache or have any vision changes? no   Are you having any numbness or tingling? no   Are you having any other health concerns or issues? no     Have you taken your medications as instructed by your doctor today? Yes   Clinical Interventions: Reviewed and followed up on alerts and treatments-Discussed BP of 136/127 with pt. Pt denies CP, SOB, headache, vision changes, numbness, and tingling. Pt stated, \"I probably didn't do it right\" and said that her daughter usually helps her obtain metrics but was unable to assist today. Pt declined BP recheck at this time and stated, \"It's hard for me to get this cuff on my arm. It'll probably be the same\". Pt denied acute distress, is asymptomatic, and v/u of when to notify provider and when to seek medical care. Escalated alert to RN-FYI update provided     Plan/Follow Up: Will continue to review, monitor and address alerts with follow up based on severity of symptoms and risk factors.

## 2023-08-02 NOTE — TELEPHONE ENCOUNTER
----- Message from Tylor Dukes sent at 8/1/2023  3:12 PM EDT -----  Subject: Message to Provider    QUESTIONS  Information for Provider? Patient's daughter, Marilu Kenyon, is calling in and   she stated that she needs a note from the PCP that her daughter can move   in with her or her mom can move in with her, either way, so that she can   take care of her at home and be her caregiver. Please call back to go over   this information. Thank you.  ---------------------------------------------------------------------------  --------------  Jessie Pool INFO  [phone]; OK to leave message on voicemail  ---------------------------------------------------------------------------  --------------  SCRIPT ANSWERS  Relationship to Patient? Other/Third Party  Representative Name? daughter  Is the representative on the Communication Release of Information (KAVITA)   form in Epic?  Yes

## 2023-08-02 NOTE — TELEPHONE ENCOUNTER
What things does Sanna need help with that her daughter will need to be her caregiver? Asking because we need to provide that documentation in the letter.

## 2023-08-04 NOTE — TELEPHONE ENCOUNTER
Spoke with daughter and states she helps her in and out of the shower. States she has already fallen. States she also helps cook meals, helps check her bp, makes sure she is taking her medications and making sure she is eating meals.

## 2023-08-06 DIAGNOSIS — E78.00 PURE HYPERCHOLESTEROLEMIA: ICD-10-CM

## 2023-08-07 ENCOUNTER — CARE COORDINATION (OUTPATIENT)
Dept: CARE COORDINATION | Age: 83
End: 2023-08-07

## 2023-08-07 RX ORDER — ATORVASTATIN CALCIUM 40 MG/1
TABLET, FILM COATED ORAL
Qty: 90 TABLET | Refills: 1 | Status: SHIPPED | OUTPATIENT
Start: 2023-08-07

## 2023-08-07 NOTE — CARE COORDINATION
Remote Alert Monitoring Note  Rpm alert to be reviewed by the provider   red alert   pulse ox reading (91%)   Additional needs to be addressed by N/A: No      Date/Time:  2023 11:59 AM  Patient Current Location: [facility] contacted patient by telephone. Verified patients name and  as identifiers. 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 Refer to 911 immediately if:  Patient unresponsive or unable to provide history  Change in cognition or sudden confusion  Patient unable to respond in complete sentences  Intense chest pain/tightness  Any concern for any clinical emergency  Red Alert: Provider response time of 1 hr required for any red alert requiring intervention  Yellow Alert: Provider response time of 3hr required for any escalated yellow alert  O2 Triage  Are you having any Chest Pain? no   Are you having any Shortness of Breath? no   Swelling in your hands or feet? yes   Are you having any other health concerns or issues? no   Clinical Interventions: Reviewed and followed up on alerts and treatments-Spoke with pt who is in no apparent distress. States she has a little swelling in her hands and willing to recheck metric at this time with updated oxygen level of 93%. Educated pt that if oxygen level below 92% and she is in no distress, she should reposition oximeter and recheck metric. Verbalizes understanding. Plan/Follow Up: Will continue to review, monitor and address alerts with follow up based on severity of symptoms and risk factors.

## 2023-08-07 NOTE — TELEPHONE ENCOUNTER
Last visit: 5/17/23  Last Med refill: 12/15/22  Does patient have enough medication for 72 hours: No:     Next Visit Date:  Future Appointments   Date Time Provider [address]   8/25/2023 10:20 AM Sumi Miller APRN - CNP [facility]   8/25/2023 11:15 AM [facility]   8/30/2023 10:00 AM Nohemi Villanueva DPM [facility]   8/31/2023 12:00 PM [facility]   12/18/2023  1:45 PM Connie Pierre MD [address] Maintenance   Topic Date Due    Diabetic retinal exam  08/21/2020    Flu vaccine (1) 08/01/2023    Shingles vaccine (1 of 2) 12/15/2023 (Originally 5/28/1992)    COVID-19 Vaccine (4 - Booster for Moderna series) 12/15/2023 (Originally 3/2/2022)    DTaP/Tdap/Td vaccine (1 - Tdap) 10/18/2026 (Originally 5/28/1959)    A1C test (Diabetic or Prediabetic)  10/17/2023    Annual Wellness Visit (AWV)  12/16/2023    Lipids  04/14/2024    Depression Screen  04/17/2024    Diabetic foot exam  06/28/2024    DEXA (modify frequency per FRAX score)  Completed    Pneumococcal 65+ years Vaccine  Completed    Hepatitis A vaccine  Aged Out    Hib vaccine  Aged Out    Meningococcal (ACWY) vaccine  Aged Out    Hepatitis C screen  Discontinued       Hemoglobin A1C (%)   Date Value   04/17/2023 6.1   12/15/2022 6.3   05/09/2022 6.6             ( goal A1C is < 7)   No components found for: LABMICR  LDL Cholesterol (mg/dL)   Date Value   04/14/2023 64   10/07/2021 48       (goal LDL is <100)   AST (U/L)   Date Value   04/14/2023 17     ALT (U/L)   Date Value   04/14/2023 10     BUN (mg/dL)   Date Value   04/14/2023 11     BP Readings from Last 3 Encounters:   08/07/23 132/64   05/17/23 100/70   04/24/23 (!) 198/118          (goal 120/80)    All Future Testing planned in [facility]  Lab Frequency Next Occurrence   Cervical/Thoracic Epidural Steroid Injection Once 04/24/2023               Patient Active Problem List:     Osteoarthritis

## 2023-08-11 ENCOUNTER — CARE COORDINATION (OUTPATIENT)
Dept: CARE COORDINATION | Facility: CLINIC | Age: 83
End: 2023-08-11

## 2023-08-11 NOTE — CARE COORDINATION
Remote Patient Monitoring Note      Date/Time:  8/11/2023 1:30 PM  Patient Current Location: [place]  LPN attempted to contact patient by telephone regarding red alert received for weight increase (21.6 lbs since 8/9/23). Suspected error in recorded weight from 8/9/23 noted. Background: Pt enrolled in RPM r/t HTN  Clinical Interventions:  Left HIPAA compliant message requesting a return call. Plan/Follow Up: Will continue to review, monitor and address alerts with follow up based on severity of symptoms and risk factors.

## 2023-08-11 NOTE — CARE COORDINATION
Remote Alert Monitoring Note  Rpm alert to be reviewed by the provider   red alert   weight (21.6 lb increase since 23)   Additional needs to be addressed by N/A: No                    Date/Time:  2023 3:19 PM  Patient [place] [place] [address]  LPN contacted patient by telephone. Verified patients name and  as identifiers. 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 yes   Was your weight obtained after voiding and/or use of the bathroom today? no   Did you weigh yourself in the same amount of clothing today, compared to how you typically do? yes   Was the scale bumped or moved prior to today's weight? no   Is your scale on a flat/hard surface? yes   Did you obtain your weight with shoes on? no   If yes, is this something you normally do during your daily weights? NA   Were you standing up straight on the scale today? yes   Were you leaning on anything while obtaining your weight today? no      Weight Triage  Are you weighing any different than you did yesterday? (time of day, clothes and shoes on or off, etc)? NA   Do you have any shortness of breath? no   Do you have any swelling in your hands of feet? no   Are you having any other health concerns or issues? no       Clinical Interventions: Reviewed and followed up on alerts and treatments-Discussed alert for 21.6 lb weight gain and suspected error in recorded weight from 23 with pt.  Pt stated, \"I believe that was wrong because

## 2023-08-18 ENCOUNTER — CARE COORDINATION (OUTPATIENT)
Dept: CARE COORDINATION | Age: 83
End: 2023-08-18

## 2023-08-18 DIAGNOSIS — M48.02 CERVICAL STENOSIS OF SPINE: Primary | ICD-10-CM

## 2023-08-18 RX ORDER — GABAPENTIN 600 MG/1
600 TABLET ORAL 3 TIMES DAILY
Qty: 90 TABLET | Refills: 2 | Status: SHIPPED | OUTPATIENT
Start: 2023-08-18 | End: 2023-11-16

## 2023-08-18 NOTE — CARE COORDINATION
Ambulatory Care Coordination Note  8/18/2023    Patient Current Location:  Home: [place]  [facility] [address] [place] [address]      Spoke with patient. She had lightheadedness yesterday morning, no fall. Gets occasionally 1-2 times a week, doesn't last long. Still doing RPM vitals a few times a week with daughter's help. Big variance of weight and BP, probably user error since no symptoms. Attempted to call her daughter Kim Maguire to review medications. PCP had increased clonidine to 0.2 mg twice a day on 7/7 from 0.1 mg daily, wanted to review that with daughter and her other meds. Patient complains of chronic constipation. She is on iron and takes Norco regularly from pain mgmt. She takes something for it, doesn't know what it is. Daughter manages her medications. No chest pain or palpitations, no leg swelling, no dyspnea. Eating and drinking ok. CC Plan:   -Will follow up in a few weeks after upcoming PCP appt to review notes, any medication changes. Diabetes Assessment    Medic Alert ID: No  Meal Planning: Avoidance of concentrated sweets   How often do you test your blood sugar?: No Testing   Do you have barriers with adherence to non-pharmacologic self-management interventions? (Nutrition/Exercise/Self-Monitoring): Yes   Have you ever had to go to the ED for symptoms of low blood sugar?: No       No patient-reported symptoms         and   General Assessment    Do you have any symptoms that are causing concern?: Yes  Progression since Onset: Intermittent - Waxing/Waning  Reported Symptoms: Other (Comment: lightedheadedness)            Offered patient enrollment in the Remote Patient Monitoring (RPM) program for in-home monitoring: Yes, patient already enrolled.     Lab Results       None            Care Coordination Interventions    Referral from Primary Care Provider: No  Suggested Interventions and Community Resources          Goals Addressed                   This Visit's Progress     Self Monitoring   On track

## 2023-08-22 DIAGNOSIS — K59.03 THERAPEUTIC OPIOID-INDUCED CONSTIPATION (OIC): ICD-10-CM

## 2023-08-22 DIAGNOSIS — T40.2X5A THERAPEUTIC OPIOID-INDUCED CONSTIPATION (OIC): ICD-10-CM

## 2023-08-22 NOTE — TELEPHONE ENCOUNTER
Last visit: 04/17/2023  Last Med refill: 12/2022-30 day with 3 refills  Does patient have enough medication for 72 hours: No:     Next Visit Date:  Future Appointments   Date Time Provider [address]   8/25/2023 10:20 AM Og Gtz APRN - CNP [facility]   8/25/2023 11:15 AM Bimal Ferguson APRN - CNP [facility]   8/30/2023 10:00 AM Otilia Goodman DPM [facility]   8/31/2023 12:00 PM [facility]   12/18/2023  1:45 PM Connie Reilly MD [address] Maintenance   Topic Date Due    Diabetic retinal exam  08/21/2020    Flu vaccine (1) 08/01/2023    Shingles vaccine (1 of 2) 12/15/2023 (Originally 5/28/1992)    COVID-19 Vaccine (4 - Booster for Moderna series) 12/15/2023 (Originally 3/2/2022)    DTaP/Tdap/Td vaccine (1 - Tdap) 10/18/2026 (Originally 5/28/1959)    A1C test (Diabetic or Prediabetic)  10/17/2023    Annual Wellness Visit (AWV)  12/16/2023    Lipids  04/14/2024    Depression Screen  04/17/2024    Diabetic foot exam  06/28/2024    DEXA (modify frequency per FRAX score)  Completed    Pneumococcal 65+ years Vaccine  Completed    Hepatitis A vaccine  Aged Out    Hib vaccine  Aged Out    Meningococcal (ACWY) vaccine  Aged Out    Hepatitis C screen  Discontinued       Hemoglobin A1C (%)   Date Value   04/17/2023 6.1   12/15/2022 6.3   05/09/2022 6.6             ( goal A1C is < 7)   No components found for: LABMICR  LDL Cholesterol (mg/dL)   Date Value   04/14/2023 64   10/07/2021 48       (goal LDL is <100)   AST (U/L)   Date Value   04/14/2023 17     ALT (U/L)   Date Value   04/14/2023 10     BUN (mg/dL)   Date Value   04/14/2023 11     BP Readings from Last 3 Encounters:   08/17/23 (!) 164/80   05/17/23 100/70   04/24/23 (!) 198/118          (goal 120/80)    All Future Testing planned in [facility]  Lab Frequency Next Occurrence   Cervical/Thoracic Epidural Steroid Injection Once 04/24/2023               Patient Active Problem

## 2023-08-23 ENCOUNTER — CARE COORDINATION (OUTPATIENT)
Dept: CARE COORDINATION | Age: 83
End: 2023-08-23

## 2023-08-23 RX ORDER — NALOXEGOL OXALATE 12.5 MG/1
TABLET, FILM COATED ORAL
Qty: 30 TABLET | Refills: 3 | Status: SHIPPED | OUTPATIENT
Start: 2023-08-23

## 2023-08-23 NOTE — CARE COORDINATION
Date/Time:  8/23/2023 12:10 PM  LPN attempted to reach  pt and EC  by telephone regarding red alert in remote patient monitoring program. Left HIPPA compliant message requesting a return call. Will attempt to reach patient again.

## 2023-08-23 NOTE — CARE COORDINATION
8/23/23 4:04 PM Patient is currently enrolled in Remote Patient Monitoring for HTN. Pt has not updated BP as of this time. Plan/Follow Up: Will continue to review, monitor and address alerts with follow up based on severity of symptoms and risk factors.

## 2023-08-23 NOTE — CARE COORDINATION
Remote Alert Monitoring Note  Rpm alert to be reviewed by the provider   red alert   blood pressure reading (183/78)   Additional needs to be addressed by N/A: No      Date/Time:  2023 12:23 PM  Patient Current Location: [place]  Pt returned call at this time. Verified patients name and  as identifiers. 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 Refer to 911 immediately if:  Patient unresponsive or unable to provide history  Change in cognition or sudden confusion  Patient unable to respond in complete sentences  Intense chest pain/tightness  Any concern for any clinical emergency  Red Alert: Provider response time of 1 hr required for any red alert requiring intervention  Yellow Alert: Provider response time of 3hr required for any escalated yellow alert  BP Triage  Are you having any Chest Pain? no   Are you having any Shortness of Breath? no   Do you have a headache or have any vision changes? no   Are you having any numbness or tingling? no   Are you having any other health concerns or issues? no   Clinical Interventions: Reviewed and followed up on alerts and treatments-Spoke with pt who is in no apparent distress. Attempted to review HTN medications and pt states her daughter puts them in a pill container and she took todays medications at 9:05 this morning. Her daughter assists with RPM equipment and is not available to help at this time, but will be able to later this afternoon. Pt will update BP when daughter available. Plan/Follow Up: Will continue to review, monitor and address alerts with follow up based on severity of symptoms and risk factors.

## 2023-08-24 ENCOUNTER — CARE COORDINATION (OUTPATIENT)
Dept: CARE COORDINATION | Age: 83
End: 2023-08-24

## 2023-08-24 DIAGNOSIS — I10 ESSENTIAL HYPERTENSION: ICD-10-CM

## 2023-08-24 NOTE — CARE COORDINATION
Remote Alert Monitoring Note  Rpm alert to be reviewed by the provider   red alert   weight (increase of 5 lbs x 7 days)   Additional needs to be addressed by N/A: No      Date/Time:  2023 12:18 PM  [facility] Verified patients name and  as identifiers. 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 Refer to 911 immediately if:  Patient unresponsive or unable to provide history  Change in cognition or sudden confusion  Patient unable to respond in complete sentences  Intense chest pain/tightness  Any concern for any clinical emergency  Red Alert: Provider response time of 1 hr required for any red alert requiring intervention  Yellow Alert: Provider response time of 3hr required for any escalated yellow alert  Weight Scale Triage  Was your weight obtained upon rising/waking today? no   Was your weight obtained after voiding and/or use of the bathroom today? yes   Did you weigh yourself in the same amount of clothing today, compared to how you typically do? yes   Was the scale bumped or moved prior to today's weight? no   Is your scale on a flat/hard surface? yes   Did you obtain your weight with shoes on? no   If yes, is this something you normally do during your daily weights? NA   Were you standing up straight on the scale today? yes   Were you leaning on anything while obtaining your weight today? no   Clinical Interventions: Reviewed and followed up on alerts and treatments-Reviewed with pt that she had a weight decrease on 23 of 5.4 lbs. She stated she wasn't having much of an appetite, but  now appetite improved and she is back at her usual weight. Denies any new/increasing edema, but states she has eaten out a couple of times in the last few days. Educated pt to limit sodium intake, do not add salt and drink plenty of water. Verbalizes understanding. Plan/Follow Up:  Will continue to review, monitor and address alerts with follow up based

## 2023-08-25 NOTE — TELEPHONE ENCOUNTER
Last visit: 4/17/23  Last Med refill: 12/12/22  Does patient have enough medication for 72 hours: No:     Next Visit Date:  Future Appointments   Date Time Provider [address]   8/30/2023 10:00 AM Amanda Polanco DPM [facility]   8/31/2023 12:00 PM Connie Gold MD [facility] FP [facility]   9/22/2023  9:30 AM Damaso Catalan, APRN - [facility]   10/2/2023 10:20 AM Kelly Morgan APRN - CNP [facility]   12/18/2023  1:45 PM Connie Gold MD [address] Maintenance   Topic Date Due    Diabetic retinal exam  08/21/2020    Flu vaccine (1) 08/01/2023    Shingles vaccine (1 of 2) 12/15/2023 (Originally [birth year]/28/1992)    COVID-19 Vaccine (4 - Booster for Moderna series) 12/15/2023 (Originally 3/2/2022)    DTaP/Tdap/Td vaccine (1 - Tdap) 10/18/2026 (Originally 5/28/1959)    A1C test (Diabetic or Prediabetic)  10/17/2023    Annual Wellness Visit (AWV)  12/16/2023    Lipids  04/14/2024    Depression Screen  04/17/2024    Diabetic foot exam  06/28/2024    DEXA (modify frequency per FRAX score)  Completed    Pneumococcal 65+ years Vaccine  Completed    Hepatitis A vaccine  Aged Out    Hib vaccine  Aged Out    Meningococcal (ACWY) vaccine  Aged Out    Hepatitis C screen  Discontinued       Hemoglobin A1C (%)   Date Value   04/17/2023 6.1   12/15/2022 6.3   05/09/2022 6.6             ( goal A1C is < 7)   No components found for: LABMICR  LDL Cholesterol (mg/dL)   Date Value   04/14/2023 64   10/07/2021 48       (goal LDL is <100)   AST (U/L)   Date Value   04/14/2023 17     ALT (U/L)   Date Value   04/14/2023 10     BUN (mg/dL)   Date Value   04/14/2023 11     BP Readings from Last 3 Encounters:   08/24/23 131/86   05/17/23 100/70   04/24/23 (!) 198/118          (goal 120/80)    All Future Testing planned in [facility]  Lab Frequency Next Occurrence   Cervical/Thoracic Epidural Steroid Injection Once 04/24/2023               Patient Active Problem List:     Osteoarthritis

## 2023-08-28 RX ORDER — CARVEDILOL 3.12 MG/1
TABLET ORAL
Qty: 180 TABLET | Refills: 1 | Status: SHIPPED | OUTPATIENT
Start: 2023-08-28

## 2023-08-30 ENCOUNTER — CARE COORDINATION (OUTPATIENT)
Dept: CARE COORDINATION | Age: 83
End: 2023-08-30

## 2023-08-30 DIAGNOSIS — M54.12 CERVICAL RADICULAR PAIN: Chronic | ICD-10-CM

## 2023-08-30 DIAGNOSIS — I10 ESSENTIAL HYPERTENSION: ICD-10-CM

## 2023-08-30 RX ORDER — TIZANIDINE 2 MG/1
TABLET ORAL
Qty: 90 TABLET | Refills: 1 | OUTPATIENT
Start: 2023-08-30

## 2023-08-30 RX ORDER — TIZANIDINE 2 MG/1
TABLET ORAL
Qty: 90 TABLET | Refills: 1 | Status: SHIPPED | OUTPATIENT
Start: 2023-08-30

## 2023-08-30 RX ORDER — CLONIDINE HYDROCHLORIDE 0.2 MG/1
0.2 TABLET ORAL 2 TIMES DAILY
Qty: 180 TABLET | Refills: 0 | Status: SHIPPED | OUTPATIENT
Start: 2023-08-30

## 2023-08-30 NOTE — CARE COORDINATION
Remote Alert Monitoring Note  Rpm alert to be reviewed by the provider   red alert   pulse ox reading (89%)   Additional needs to be addressed by N/A: No      Date/Time:  2023 12:16 PM  Patient Current Location: [facility] contacted patient by telephone. Verified patients name and  as identifiers. 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 Refer to 911 immediately if:  Patient unresponsive or unable to provide history  Change in cognition or sudden confusion  Patient unable to respond in complete sentences  Intense chest pain/tightness  Any concern for any clinical emergency  Red Alert: Provider response time of 1 hr required for any red alert requiring intervention  Yellow Alert: Provider response time of 3hr required for any escalated yellow alert  O2 Triage  Are you having any Chest Pain? no   Are you having any Shortness of Breath? no   Swelling in your hands or feet? no   Are you having any other health concerns or issues? no   Clinical Interventions: Reviewed and followed up on alerts and treatments-Spoke with pt who is in no apparent distress. She denies any SOB/dyspnea at this time. Pt is agreeable to recheck oxygen level at this time with updated reading of 90%. Pts daughter present and she is going to try changing batteries in oximeter later and will recheck again. Plan/Follow Up: Will continue to review, monitor and address alerts with follow up based on severity of symptoms and risk factors.

## 2023-08-30 NOTE — CARE COORDINATION
8/30/23 4:00 PM Patient is currently enrolled in Remote Patient Monitoring for HTN. Pt has not updated oxygen level as of this time. Plan/Follow Up: Will continue to review, monitor and address alerts with follow up based on severity of symptoms and risk factors.

## 2023-08-30 NOTE — TELEPHONE ENCOUNTER
Last visit: 5/17/23  Last Med refill: 3/31/23, 6/12/23  Does patient have enough medication for 72 hours: No:     Next Visit Date:  Future Appointments   Date Time Provider [address]   8/31/2023 12:00 PM Connie Farah MD [facility] FP [facility]   9/22/2023  9:30 AM YOLANDA Costello Arm - TRUDI [facility]   10/2/2023 10:20 AM Isabel Garcia MD [facility]   12/18/2023  1:45 PM Connie Farah MD [address] Maintenance   Topic Date Due    Diabetic retinal exam  08/21/2020    Flu vaccine (1) 08/01/2023    Shingles vaccine (1 of 2) 12/15/2023 (Originally 5/28/1992)    COVID-19 Vaccine (4 - Booster for Moderna series) 12/15/2023 (Originally 3/2/2022)    DTaP/Tdap/Td vaccine (1 - Tdap) 10/18/2026 (Originally 5/28/1959)    A1C test (Diabetic or Prediabetic)  10/17/2023    Annual Wellness Visit (AWV)  12/16/2023    Lipids  04/14/2024    Depression Screen  04/17/2024    Diabetic foot exam  06/28/2024    DEXA (modify frequency per FRAX score)  Completed    Pneumococcal 65+ years Vaccine  Completed    Hepatitis A vaccine  Aged Out    Hib vaccine  Aged Out    Meningococcal (ACWY) vaccine  Aged Out    Hepatitis C screen  Discontinued       Hemoglobin A1C (%)   Date Value   04/17/2023 6.1   12/15/2022 6.3   05/09/2022 6.6             ( goal A1C is < 7)   No components found for: LABMICR  LDL Cholesterol (mg/dL)   Date Value   04/14/2023 64   10/07/2021 48       (goal LDL is <100)   AST (U/L)   Date Value   04/14/2023 17     ALT (U/L)   Date Value   04/14/2023 10     BUN (mg/dL)   Date Value   04/14/2023 11     BP Readings from Last 3 Encounters:   08/30/23 135/75   05/17/23 100/70   04/24/23 (!) 198/118          (goal 120/80)    All Future Testing planned in [facility]  Lab Frequency Next Occurrence   Cervical/Thoracic Epidural Steroid Injection Once 04/24/2023               Patient Active Problem List:     Osteoarthritis     Essential hypertension     Type 2 diabetes mellitus

## 2023-09-05 ENCOUNTER — CARE COORDINATION (OUTPATIENT)
Dept: CARE COORDINATION | Age: 83
End: 2023-09-05

## 2023-09-05 NOTE — CARE COORDINATION
Remote Alert Monitoring Note  Rpm alert to be reviewed by the provider   red alert   weight (increase of 5 lbs x 7days)   Additional needs to be addressed by N/A: No      Date/Time:  2023 12:27 PM  [facility] contacted patient by telephone. Pt name and  verified as identifiers. 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 Refer to 911 immediately if:  Patient unresponsive or unable to provide history  Change in cognition or sudden confusion  Patient unable to respond in complete sentences  Intense chest pain/tightness  Any concern for any clinical emergency  Red Alert: Provider response time of 1 hr required for any red alert requiring intervention  Yellow Alert: Provider response time of 3hr required for any escalated yellow alert  Clinical Interventions: Reviewed and followed up on alerts and treatments-Pts daughter, Maurice Dickson answered phone and able to speak with pt and daughter who assists pt with obtaining weights. Per pt and daughter, pt is having a very difficult time standing on scale and they have concerns for pt falling. She is requiring a lot of assist with scale and frequently having to hold onto the wall to stabilize herself. Will route to ACM and NP to review for continued weight monitoring. Plan/Follow Up: Will continue to review, monitor and address alerts with follow up based on severity of symptoms and risk factors.

## 2023-09-05 NOTE — PROGRESS NOTES
Remote Patient Monitoring Change to Monitoring Parameters    Patient currently being managed with remote patient monitoring for HTN. Request received to deactivate weight monitoring on pt's RPM care plan in order to accommodate patient's baseline measurements, or associated changes in patient's status. There are concerns that pt cannot safely weigh at this time. Her RPM care plan has been updated to reflect this change. She will continue to be offered monitoring of BP, pulse ox and HR. See care coordination encounters for additional details.

## 2023-09-05 NOTE — CARE COORDINATION
Attempted to call for care management, discuss checking weights with RPM. Unable to leave message on patient's phone (no voicemail). Left  message asking daughter to return call to discuss.     Future Appointments   Date Time Provider [address]   9/22/2023  9:30 AM [facility]DA [facility]   10/2/2023 10:20 AM Ubaldo Briones MD [facility]   12/18/2023  1:45 PM Connie Mendoza MD [facility] N[facility] [place]

## 2023-09-08 VITALS
SYSTOLIC BLOOD PRESSURE: 116 MMHG | DIASTOLIC BLOOD PRESSURE: 99 MMHG | OXYGEN SATURATION: 92 % | WEIGHT: 145.2 LBS | BODY MASS INDEX: 26.56 KG/M2 | HEART RATE: 61 BPM

## 2023-09-11 ENCOUNTER — CARE COORDINATION (OUTPATIENT)
Dept: CARE COORDINATION | Age: 83
End: 2023-09-11

## 2023-09-11 NOTE — CARE COORDINATION
Remote Patient Monitoring Note  Date/Time:  9/11/2023 3:49 PM  Patient Current Location: [place]  LPN attempted to contact patient by telephone regarding yellow alert received for no BP x > 2 days. 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 Clinical Interventions: Reviewed and followed up on alerts and treatments-Left HIPAA compliant voice message for pt to update metrics for review. Plan/Follow Up: Will continue to review, monitor and address alerts with follow up based on severity of symptoms and risk factors.

## 2023-09-12 ENCOUNTER — CARE COORDINATION (OUTPATIENT)
Dept: CARE COORDINATION | Age: 83
End: 2023-09-12

## 2023-09-12 NOTE — CARE COORDINATION
Remote Patient Monitoring Note  Date/Time:  2023 1:32 PM  Patient Current Location: [place]  LPN contacted patient by telephone regarding yellow alert received for no BP x > 2 days. Verified patients name and  as identifiers. 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 Clinical Interventions: Reviewed and followed up on alerts and treatments-Left message for pt to return call. Reached out to daughter/EC and she states she has been helping her mother to update metrics daily. She is not with mother at this time, but requests that tech support call tomorrow morning around 8:30 to trouble shoot equipment. This nurse reached out to tech support and they will call Warren lee tomorrow morning at 8:30. Plan/Follow Up: Will continue to review, monitor and address alerts with follow up based on severity of symptoms and risk factors.

## 2023-09-13 ENCOUNTER — CARE COORDINATION (OUTPATIENT)
Dept: CARE COORDINATION | Age: 83
End: 2023-09-13

## 2023-09-13 NOTE — CARE COORDINATION
9/13/23 11:56 AM Patient is currently enrolled in Remote Patient Monitoring for HTN. Attempted to reach pt regarding RPM red alerts. Phone answered by daughter, Ari Nair and she states they are on the other line and will call back when able to review.

## 2023-09-13 NOTE — CARE COORDINATION
Remote Alert Monitoring Note  Rpm alert to be reviewed by the provider   red alert   blood pressure reading (87/57) and pulse ox reading (88%)   Additional needs to be addressed by PCP: Pt is enrolled in Remote Patient Monitoring r/t HTN. Unable to reach pt, but was able to speak with daughter, Stephon Caldera who states her mom has not been feeling well. She called 911  evening due to pt feeling like she was having a stroke, but pt refused to be transferred to hospital.  Stephon Caldera states she just left her mom to go to dinner and pt was doing ok. She will recheck pts metrics when she gets home and continue to encourage her to go to ED if she has any new symptoms. RPM metrics added for review. Date/Time:  2023 3:02 PM  Patient Current Location: [facility] attempted to contact patient, JAE by telephone. Verified patients name and  as identifiers. 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 Refer to 911 immediately if:  Patient unresponsive or unable to provide history  Change in cognition or sudden confusion  Patient unable to respond in complete sentences  Intense chest pain/tightness  Any concern for any clinical emergency  Red Alert: Provider response time of 1 hr required for any red alert requiring intervention  Yellow Alert: Provider response time of 3hr required for any escalated yellow alert  Clinical Interventions: Reviewed and followed up on alerts and treatments-Pt does not answer phone. Left HIPAA compliant voice message for pt to return call. Able to reach daughter Stephon Caldera (signed HIPAA release in chart) who states her mom has not been feeling well. She called 911  evening due to pt feeling like she was having a stroke, but pt refused to be transferred to hospital.  Stephon Caldera states she just left her mom to go to dinner and pt was doing ok. She will recheck pts metrics when she gets home and continue to encourage her to go to ED if she has any new symptoms.   Will add RPM metrics and route to ACM and PCP

## 2023-09-14 ENCOUNTER — CARE COORDINATION (OUTPATIENT)
Dept: CARE COORDINATION | Age: 83
End: 2023-09-14

## 2023-09-14 NOTE — CARE COORDINATION
Remote Patient Monitoring Note  Date/Time:  2023 11:45 AM  LPN reviewed patients reported daily Remote Patient Monitoring metrics. All reported metrics are within alert parameters. Reached out to pt to follow up from conversation with daughter yesterday. Pt name and  verified as identifiers. Pt states she is feeling much better today and per daughter, mother is looking better at this time. Plan/Follow Up:  Will continue to review, monitor and address alerts with follow up based on severity of symptoms and risk factors

## 2023-09-15 DIAGNOSIS — M1A.09X0 CHRONIC GOUT OF MULTIPLE SITES, UNSPECIFIED CAUSE: ICD-10-CM

## 2023-09-15 NOTE — TELEPHONE ENCOUNTER
Curt Burgos is calling to request a refill on the following medication(s):    Medication Request:  Requested Prescriptions     Pending Prescriptions Disp Refills    allopurinol (ZYLOPRIM) 100 MG tablet [Pharmacy Med Name: ALLOPURINOL 100 MG TABLET] 90 tablet 1     Sig: take 1 tablet by mouth once daily       Last Visit Date (If Applicable):  4/20/1088    Next Visit Date:    9/27/2023

## 2023-09-18 RX ORDER — ALLOPURINOL 100 MG/1
TABLET ORAL
Qty: 90 TABLET | Refills: 1 | Status: SHIPPED | OUTPATIENT
Start: 2023-09-18

## 2023-09-19 ENCOUNTER — CARE COORDINATION (OUTPATIENT)
Dept: CARE COORDINATION | Age: 83
End: 2023-09-19

## 2023-09-19 NOTE — CARE COORDINATION
Ambulatory Care Coordination Note  9/19/2023    Patient Current Location:  Home: [place]  [facility] [address] [place] [address]      Spoke with patient. She still has dizziness off and on, had a recent fall, denied any injury. Feels ok today. BP readings varying with RPM:      113/62 9/18/23   176/82 9/17/23     87/57 9/13/23  No chest pain, dyspnea, palpitations or any other complaints. Eats well and she feels drinking enough fluids. No leg swelling. Reminded of PCP appt next week and encouraged to continue monitoring vitals with RPM at least every other day, call doctor's office if continues to have symptoms or if they worsen. CC Plan:   -Follow in a few weeks to review PCP progress note, symptoms, RPM readings, address ACP. General Assessment    Do you have any symptoms that are causing concern?: Yes  Progression since Onset: Intermittent - Waxing/Waning  Reported Symptoms: Other (Comment: dizziness)              Offered patient enrollment in the Remote Patient Monitoring (RPM) program for in-home monitoring: Yes, patient already enrolled. Lab Results       None                 Goals Addressed                   This Visit's Progress     Self Monitoring   On track     Blood Pressure - I will take my blood pressure as directed - Daily  I will notify my provider of any trends of increasing or decreasing blood pressures over a month period of time. I will notify my provider of any changes in blood pressure associated with symptoms of dizziness, falls, passing out, headache, confusion/change in mental status.     None Recently Recorded    Barriers: overwhelmed by complexity of regimen and lack of education  Plan for overcoming my barriers: ACM calls, RPM enrollment, education  Confidence: 8/10  Anticipated Goal Completion Date: 8/20/23                Future Appointments   Date Time Provider [address]   9/22/2023  9:30 AM YOLANDA Cruz CNP OB/Gyn [facility]   9/27/2023 11:15 AM Connie

## 2023-09-22 ENCOUNTER — HOSPITAL ENCOUNTER (OUTPATIENT)
Age: 83
Setting detail: SPECIMEN
Discharge: HOME | End: 2023-09-22

## 2023-09-22 ENCOUNTER — OFFICE VISIT (OUTPATIENT)
Dept: OBGYN CLINIC | Age: 83
End: 2023-09-22

## 2023-09-22 VITALS
SYSTOLIC BLOOD PRESSURE: 136 MMHG | HEIGHT: 62 IN | BODY MASS INDEX: 27.97 KG/M2 | WEIGHT: 152 LBS | DIASTOLIC BLOOD PRESSURE: 82 MMHG

## 2023-09-22 DIAGNOSIS — N89.8 VAGINAL ITCHING: ICD-10-CM

## 2023-09-22 DIAGNOSIS — R30.0 DYSURIA: ICD-10-CM

## 2023-09-22 DIAGNOSIS — N95.2 POST-MENOPAUSAL ATROPHIC VAGINITIS: Primary | ICD-10-CM

## 2023-09-22 LAB
BILIRUB UR QL STRIP: NEGATIVE
CANDIDA SPECIES: NEGATIVE
CLARITY UR: CLEAR
COLOR UR: YELLOW
EPI CELLS #/AREA URNS HPF: ABNORMAL /HPF (ref 0–5)
GARDNERELLA VAGINALIS: NEGATIVE
GLUCOSE UR STRIP-MCNC: ABNORMAL MG/DL
HGB UR QL STRIP.AUTO: NEGATIVE
KETONES UR STRIP-MCNC: NEGATIVE MG/DL
LEUKOCYTE ESTERASE UR QL STRIP: NEGATIVE
NITRITE UR QL STRIP: NEGATIVE
PH UR STRIP: 6.5 [PH] (ref 5–8)
PROT UR STRIP-MCNC: NEGATIVE MG/DL
RBC #/AREA URNS HPF: ABNORMAL /HPF (ref 0–4)
SOURCE: NORMAL
SP GR UR STRIP: 1.02 (ref 1–1.03)
TRICHOMONAS: NEGATIVE
UROBILINOGEN UR STRIP-ACNC: NORMAL EU/DL (ref 0–1)
WBC #/AREA URNS HPF: ABNORMAL /HPF (ref 0–5)

## 2023-09-22 RX ORDER — ESTRADIOL 0.1 MG/G
CREAM VAGINAL
Qty: 42.5 G | Refills: 1 | Status: SHIPPED | OUTPATIENT
Start: 2023-09-22

## 2023-09-22 RX ORDER — TRIAMCINOLONE ACETONIDE 5 MG/G
OINTMENT TOPICAL
Qty: 15 G | Refills: 3 | Status: SHIPPED | OUTPATIENT
Start: 2023-09-22 | End: 2023-09-29

## 2023-09-22 ASSESSMENT — ENCOUNTER SYMPTOMS
GASTROINTESTINAL NEGATIVE: 1
RESPIRATORY NEGATIVE: 1

## 2023-09-23 LAB
MICROORGANISM SPEC CULT: NORMAL
SPECIMEN DESCRIPTION: NORMAL

## 2023-09-27 ENCOUNTER — CARE COORDINATION (OUTPATIENT)
Dept: CARE COORDINATION | Age: 83
End: 2023-09-27

## 2023-09-27 ENCOUNTER — HOSPITAL ENCOUNTER (OUTPATIENT)
Age: 83
Setting detail: SPECIMEN
Discharge: HOME | End: 2023-09-27

## 2023-09-27 ENCOUNTER — OFFICE VISIT (OUTPATIENT)
Dept: FAMILY MEDICINE CLINIC | Age: 83
End: 2023-09-27
Payer: MEDICARE

## 2023-09-27 VITALS
OXYGEN SATURATION: 95 % | DIASTOLIC BLOOD PRESSURE: 58 MMHG | SYSTOLIC BLOOD PRESSURE: 104 MMHG | HEART RATE: 60 BPM | BODY MASS INDEX: 26.94 KG/M2 | WEIGHT: 147.3 LBS | TEMPERATURE: 97.5 F

## 2023-09-27 DIAGNOSIS — Z13.29 SCREENING FOR THYROID DISORDER: ICD-10-CM

## 2023-09-27 DIAGNOSIS — E11.40 TYPE 2 DIABETES MELLITUS WITH DIABETIC NEUROPATHY, WITHOUT LONG-TERM CURRENT USE OF INSULIN (HCC): ICD-10-CM

## 2023-09-27 DIAGNOSIS — I10 PRIMARY HYPERTENSION: ICD-10-CM

## 2023-09-27 DIAGNOSIS — I49.9 IRREGULAR HEART RATE: ICD-10-CM

## 2023-09-27 DIAGNOSIS — D50.9 IRON DEFICIENCY ANEMIA, UNSPECIFIED IRON DEFICIENCY ANEMIA TYPE: ICD-10-CM

## 2023-09-27 DIAGNOSIS — I10 ESSENTIAL HYPERTENSION: ICD-10-CM

## 2023-09-27 DIAGNOSIS — Z23 NEED FOR IMMUNIZATION AGAINST INFLUENZA: Primary | ICD-10-CM

## 2023-09-27 DIAGNOSIS — M54.12 CERVICAL RADICULAR PAIN: Chronic | ICD-10-CM

## 2023-09-27 LAB
ALBUMIN SERPL-MCNC: 3.4 G/DL (ref 3.5–5.2)
ALBUMIN/GLOB SERPL: 1 {RATIO}
ALP SERPL-CCNC: 107 U/L (ref 35–104)
ALT SERPL-CCNC: 24 U/L (ref 10–35)
ANION GAP SERPL CALCULATED.3IONS-SCNC: 11 MMOL/L (ref 9–16)
AST SERPL-CCNC: 24 U/L (ref 10–35)
BASOPHILS # BLD: 0.06 K/UL (ref 0–0.2)
BASOPHILS NFR BLD: 1 % (ref 0–2)
BILIRUB SERPL-MCNC: 0.5 MG/DL (ref 0–1.2)
BUN SERPL-MCNC: 18 MG/DL (ref 8–23)
CALCIUM SERPL-MCNC: 8.6 MG/DL (ref 8.6–10.4)
CHLORIDE SERPL-SCNC: 109 MMOL/L (ref 98–107)
CO2 SERPL-SCNC: 24 MMOL/L (ref 20–31)
CREAT SERPL-MCNC: 0.7 MG/DL (ref 0.5–0.9)
EOSINOPHIL # BLD: 0.21 K/UL (ref 0–0.44)
EOSINOPHILS RELATIVE PERCENT: 4 % (ref 1–4)
ERYTHROCYTE [DISTWIDTH] IN BLOOD BY AUTOMATED COUNT: 17.2 % (ref 11.8–14.4)
GFR SERPL CREATININE-BSD FRML MDRD: >60 ML/MIN/1.73M2
GLUCOSE SERPL-MCNC: 132 MG/DL (ref 74–99)
HBA1C MFR BLD: 6.7 %
HCT VFR BLD AUTO: 33.8 % (ref 36.3–47.1)
HGB BLD-MCNC: 10.1 G/DL (ref 11.9–15.1)
IMM GRANULOCYTES # BLD AUTO: <0.03 K/UL (ref 0–0.3)
IMM GRANULOCYTES NFR BLD: 0 %
LYMPHOCYTES NFR BLD: 2.75 K/UL (ref 1.1–3.7)
LYMPHOCYTES RELATIVE PERCENT: 54 % (ref 24–43)
MCH RBC QN AUTO: 23.9 PG (ref 25.2–33.5)
MCHC RBC AUTO-ENTMCNC: 29.9 G/DL (ref 28.4–34.8)
MCV RBC AUTO: 80.1 FL (ref 82.6–102.9)
MONOCYTES NFR BLD: 0.49 K/UL (ref 0.1–1.2)
MONOCYTES NFR BLD: 10 % (ref 3–12)
NEUTROPHILS NFR BLD: 31 % (ref 36–65)
NEUTS SEG NFR BLD: 1.56 K/UL (ref 1.5–8.1)
NRBC BLD-RTO: 0 PER 100 WBC
PLATELET # BLD AUTO: 327 K/UL (ref 138–453)
PMV BLD AUTO: 11.2 FL (ref 8.1–13.5)
POTASSIUM SERPL-SCNC: 3.8 MMOL/L (ref 3.7–5.3)
PROT SERPL-MCNC: 6.4 G/DL (ref 6.6–8.7)
RBC # BLD AUTO: 4.22 M/UL (ref 3.95–5.11)
RBC # BLD: ABNORMAL 10*6/UL
SODIUM SERPL-SCNC: 144 MMOL/L (ref 136–145)
TSH SERPL DL<=0.05 MIU/L-ACNC: 2.59 UIU/ML (ref 0.27–4.2)
WBC OTHER # BLD: 5.1 K/UL (ref 3.5–11.3)

## 2023-09-27 PROCEDURE — G8427 DOCREV CUR MEDS BY ELIG CLIN: HCPCS | Performed by: INTERNAL MEDICINE

## 2023-09-27 PROCEDURE — 3078F DIAST BP <80 MM HG: CPT | Performed by: INTERNAL MEDICINE

## 2023-09-27 PROCEDURE — 1123F ACP DISCUSS/DSCN MKR DOCD: CPT | Performed by: INTERNAL MEDICINE

## 2023-09-27 PROCEDURE — G8417 CALC BMI ABV UP PARAM F/U: HCPCS | Performed by: INTERNAL MEDICINE

## 2023-09-27 PROCEDURE — 3074F SYST BP LT 130 MM HG: CPT | Performed by: INTERNAL MEDICINE

## 2023-09-27 PROCEDURE — 1090F PRES/ABSN URINE INCON ASSESS: CPT | Performed by: INTERNAL MEDICINE

## 2023-09-27 PROCEDURE — G8399 PT W/DXA RESULTS DOCUMENT: HCPCS | Performed by: INTERNAL MEDICINE

## 2023-09-27 PROCEDURE — 1036F TOBACCO NON-USER: CPT | Performed by: INTERNAL MEDICINE

## 2023-09-27 PROCEDURE — 93000 ELECTROCARDIOGRAM COMPLETE: CPT | Performed by: INTERNAL MEDICINE

## 2023-09-27 PROCEDURE — 99214 OFFICE O/P EST MOD 30 MIN: CPT | Performed by: INTERNAL MEDICINE

## 2023-09-27 PROCEDURE — 3044F HG A1C LEVEL LT 7.0%: CPT | Performed by: INTERNAL MEDICINE

## 2023-09-27 PROCEDURE — 83036 HEMOGLOBIN GLYCOSYLATED A1C: CPT | Performed by: INTERNAL MEDICINE

## 2023-09-27 PROCEDURE — 90694 VACC AIIV4 NO PRSRV 0.5ML IM: CPT | Performed by: INTERNAL MEDICINE

## 2023-09-27 PROCEDURE — G0008 ADMIN INFLUENZA VIRUS VAC: HCPCS | Performed by: INTERNAL MEDICINE

## 2023-09-27 RX ORDER — AMITRIPTYLINE HYDROCHLORIDE 25 MG/1
25 TABLET, FILM COATED ORAL NIGHTLY
Qty: 90 TABLET | Refills: 1 | Status: SHIPPED | OUTPATIENT
Start: 2023-09-27

## 2023-09-27 RX ORDER — FERROUS SULFATE 325(65) MG
325 TABLET ORAL
Qty: 90 TABLET | Refills: 1 | Status: SHIPPED | OUTPATIENT
Start: 2023-09-27

## 2023-09-27 RX ORDER — LISINOPRIL 5 MG/1
5 TABLET ORAL DAILY
Qty: 90 TABLET | Refills: 1 | Status: SHIPPED | OUTPATIENT
Start: 2023-09-27

## 2023-09-27 RX ORDER — ZOLPIDEM TARTRATE 5 MG/1
5 TABLET ORAL NIGHTLY PRN
COMMUNITY
Start: 2023-09-11

## 2023-09-27 RX ORDER — CLONIDINE HYDROCHLORIDE 0.1 MG/1
TABLET ORAL
Qty: 180 TABLET | Refills: 1 | OUTPATIENT
Start: 2023-09-27

## 2023-09-27 ASSESSMENT — ENCOUNTER SYMPTOMS
CONSTIPATION: 0
BLOOD IN STOOL: 0
ANAL BLEEDING: 0
DIARRHEA: 0
WHEEZING: 0
CHOKING: 0
NAUSEA: 0
CHEST TIGHTNESS: 0
VOMITING: 0
ABDOMINAL PAIN: 0
COUGH: 0

## 2023-09-27 ASSESSMENT — VISUAL ACUITY: OU: 1

## 2023-09-27 NOTE — CARE COORDINATION
9/27/23 10:57 AM Patient is currently enrolled in Remote Patient Monitoring for HTN. Pt has a scheduled office visit this morning at 11:15. RPM metrics added for review.

## 2023-09-27 NOTE — PROGRESS NOTES
[facility]  [facility] Bakari Beltran [address]  [address] [address]  Dept: [phone]  Dept Fax: [phone]      Curt Burgos is a 82 y.o. female who presents today for hermedical conditions/complaints as noted below. Curt Burgos is c/o of Diabetes (F/u) and Hypertension (F/u)        Assessment/Plan:     1. Need for immunization against influenza  -     Influenza, FLUAD, (age 72 y+), IM, Preservative Free, 0.5 mL  2. Type 2 diabetes mellitus with diabetic neuropathy, without long-term current use of insulin (HCC)  -     POCT glycosylated hemoglobin (Hb A1C)  3. Iron deficiency anemia, unspecified iron deficiency anemia type  -     ferrous sulfate (IRON 325) 325 (65 Fe) MG tablet; Take 1 tablet by mouth daily (with breakfast), Disp-90 tablet, R-1Normal  -     CBC with Auto Differential; Future  4. Primary hypertension  -     lisinopril (PRINIVIL;ZESTRIL) 5 MG tablet; Take 1 tablet by mouth daily, Disp-90 tablet, R-1Normal  -     Comprehensive Metabolic Panel; Future  5. Cervical radicular pain  -     amitriptyline (ELAVIL) 25 MG tablet; Take 1 tablet by mouth nightly at bedtime. , Disp-90 tablet, R-1Normal  6. Screening for thyroid disorder  -     TSH With Reflex Ft4; Future  7. Irregular heart rate  -     EKG 12 Lead - Clinic Performed; Future          No follow-ups on file. HPI     On 9/11 she had episode of dizziness and weakness, starting around 4AM. Called 911 for shortness of breath, dizziness, palpitations. Found to be hypoxic, declined ER. Remained in bed for several days after due to weakness and fatigue, just starting to leave her bed again. See RPM notes for details re: vitals and course. Dizzy now when sits up from laying down or goes to stand, lasts a minute then resolves. Occasionally associated with palpitations.           BP Readings from Last 3 Encounters:   09/27/23 (!) 104/58   09/22/23 136/82   09/26/23 128/73              Past Medical History:

## 2023-09-27 NOTE — PROGRESS NOTES
Pt is here today for a regular 4 mo DM f/u    Pt has been having headaches on the LT side of her head, does check BP at home, BP has been okay getting them about every day     Pt states she has some white stuff in her RT eye in the morning     Pt states her legs are swollen most of the time

## 2023-09-27 NOTE — TELEPHONE ENCOUNTER
Jarad Osborne is calling to request a refill on the following medication(s):    Medication Request:  Requested Prescriptions     Pending Prescriptions Disp Refills    cloNIDine (CATAPRES) 0.1 MG tablet [Pharmacy Med Name: CLONIDINE HCL 0.1 MG TABLET] 180 tablet 1     Sig: take 1 tablet by mouth twice a day       Last Visit Date (If Applicable):  13/41/4764    Next Visit Date:    Visit date not found

## 2023-10-02 ENCOUNTER — OFFICE VISIT (OUTPATIENT)
Dept: PAIN MANAGEMENT | Age: 83
End: 2023-10-02
Payer: MEDICARE

## 2023-10-02 VITALS — BODY MASS INDEX: 27.05 KG/M2 | HEART RATE: 62 BPM | OXYGEN SATURATION: 95 % | WEIGHT: 147 LBS | HEIGHT: 62 IN

## 2023-10-02 DIAGNOSIS — M48.062 LUMBAR STENOSIS WITH NEUROGENIC CLAUDICATION: Chronic | ICD-10-CM

## 2023-10-02 DIAGNOSIS — Z79.891 CHRONIC USE OF OPIATE DRUG FOR THERAPEUTIC PURPOSE: ICD-10-CM

## 2023-10-02 DIAGNOSIS — Z98.890 HX OF CERVICAL SPINE SURGERY: ICD-10-CM

## 2023-10-02 DIAGNOSIS — M48.02 CERVICAL STENOSIS OF SPINE: Primary | Chronic | ICD-10-CM

## 2023-10-02 DIAGNOSIS — G95.89 MYELOMALACIA (HCC): ICD-10-CM

## 2023-10-02 PROCEDURE — G8399 PT W/DXA RESULTS DOCUMENT: HCPCS | Performed by: ANESTHESIOLOGY

## 2023-10-02 PROCEDURE — 1123F ACP DISCUSS/DSCN MKR DOCD: CPT | Performed by: ANESTHESIOLOGY

## 2023-10-02 PROCEDURE — G8484 FLU IMMUNIZE NO ADMIN: HCPCS | Performed by: ANESTHESIOLOGY

## 2023-10-02 PROCEDURE — 1036F TOBACCO NON-USER: CPT | Performed by: ANESTHESIOLOGY

## 2023-10-02 PROCEDURE — G8427 DOCREV CUR MEDS BY ELIG CLIN: HCPCS | Performed by: ANESTHESIOLOGY

## 2023-10-02 PROCEDURE — G8417 CALC BMI ABV UP PARAM F/U: HCPCS | Performed by: ANESTHESIOLOGY

## 2023-10-02 PROCEDURE — 99213 OFFICE O/P EST LOW 20 MIN: CPT | Performed by: ANESTHESIOLOGY

## 2023-10-02 PROCEDURE — 1090F PRES/ABSN URINE INCON ASSESS: CPT | Performed by: ANESTHESIOLOGY

## 2023-10-02 RX ORDER — HYDROCODONE BITARTRATE AND ACETAMINOPHEN 5; 325 MG/1; MG/1
1 TABLET ORAL 2 TIMES DAILY PRN
Qty: 60 TABLET | Refills: 0 | Status: SHIPPED | OUTPATIENT
Start: 2023-10-02 | End: 2023-11-01

## 2023-10-02 ASSESSMENT — ENCOUNTER SYMPTOMS
BACK PAIN: 1
GASTROINTESTINAL NEGATIVE: 1
RESPIRATORY NEGATIVE: 1

## 2023-10-02 NOTE — PROGRESS NOTES
surgery    4. Myelomalacia ([address])    5. Lumbar stenosis with neurogenic claudication        No orders of the defined types were placed in this encounter. Orders Placed This Encounter   Medications    HYDROcodone-acetaminophen (NORCO) 5-325 MG per tablet     Sig: Take 1 tablet by mouth 2 times daily as needed for Pain for up to 30 days. Max Daily Amount: 2 tablets     Dispense:  60 tablet     Refill:  0     Reduce doses taken as pain becomes manageable Reduce doses taken as pain becomes manageable      Controlled Substance Monitoring:    Acute and Chronic Pain Monitoring:   RX Monitoring Periodic Controlled Substance Monitoring Chronic Pain > 50 MEDD   10/2/2023  10:43 AM No signs of potential drug abuse or diversion identified. ;Possible medication side effects, risk of tolerance/dependence & alternative treatments discussed. ;Assessed functional status. Obtained or confirmed \"Consent for Opioid Use\" on file.                Electronically signed by Jose G Vanessa MD on 10/2/2023 at 10:56 AM

## 2023-10-03 ENCOUNTER — CARE COORDINATION (OUTPATIENT)
Dept: CARE COORDINATION | Age: 83
End: 2023-10-03

## 2023-10-03 NOTE — CARE COORDINATION
Remote Patient Monitoring Note  Date/Time:  10/3/2023 3:50 PM  Patient Current Location: [place]  LPN attempted to contact patient by telephone regarding yellow alert received for no BP x 2 days. 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 Clinical Interventions: Reviewed and followed up on alerts and treatments-Left HIPAA compliant voice message requesting pt update RPM metrics for review. Plan/Follow Up: Will continue to review, monitor and address alerts with follow up based on severity of symptoms and risk factors.

## 2023-10-03 NOTE — CARE COORDINATION
Left VM message on both daughter Sarahi's and patient's phones asking for return call to discuss recent PCP appt. Her lisinopril dose was reduced to 5 mg daily from 10 mg daily, reminded to do RPM vitals a few times a week at least so BP can be monitored especially with medication dose change.     Future Appointments   Date Time Provider [address]   12/1/2023  9:00 AM Pegge Dose, APRN - CNP [facility]   12/18/2023  1:45 PM Connie Sy MD [address] [facility]

## 2023-10-05 ENCOUNTER — CARE COORDINATION (OUTPATIENT)
Dept: CARE COORDINATION | Age: 83
End: 2023-10-05

## 2023-10-05 NOTE — CARE COORDINATION
Remote Alert Monitoring Note  Rpm alert to be reviewed by the provider   red alert   pulse ox reading (89%)   Additional needs to be addressed by N/A: No      Date/Time:  10/5/2023 12:15 PM  Patient Current Location: [facility] contacted patient by telephone. Verified patients name and  as identifiers. 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 Refer to 911 immediately if:  Patient unresponsive or unable to provide history  Change in cognition or sudden confusion  Patient unable to respond in complete sentences  Intense chest pain/tightness  Any concern for any clinical emergency  Red Alert: Provider response time of 1 hr required for any red alert requiring intervention  Yellow Alert: Provider response time of 3hr required for any escalated yellow alert  O2 Triage  Are you having any Chest Pain? no   Are you having any Shortness of Breath? no   Swelling in your hands or feet? Yes   Are you having any other health concerns or issues? no   Clinical Interventions: Reviewed and followed up on alerts and treatments-Spoke with pt who is in no apparent distress. Denies any SOB/dyspnea, but does have some edema to her hands. She is agreeable to recheck oxygen level at this time with updated reading of 92%. Educated pt that she should limit salt intake and avoid fast/processed foods. Verbalizes understanding. Plan/Follow Up: Will continue to review, monitor and address alerts with follow up based on severity of symptoms and risk factors.

## 2023-10-06 ENCOUNTER — CARE COORDINATION (OUTPATIENT)
Dept: CARE COORDINATION | Age: 83
End: 2023-10-06

## 2023-10-06 NOTE — CARE COORDINATION
Remote Alert Monitoring Note  Rpm alert to be reviewed by the provider   red alert   pulse ox reading (89%)   Additional needs to be addressed by N/A: No      Date/Time:  10/6/2023 12:11 PM  Patient Current Location: [facility] contacted patient by telephone. Verified patients name and  as identifiers. 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 Refer to 911 immediately if:  Patient unresponsive or unable to provide history  Change in cognition or sudden confusion  Patient unable to respond in complete sentences  Intense chest pain/tightness  Any concern for any clinical emergency  Red Alert: Provider response time of 1 hr required for any red alert requiring intervention  Yellow Alert: Provider response time of 3hr required for any escalated yellow alert  O2 Triage  Are you having any Chest Pain? no   Are you having any Shortness of Breath? no   Swelling in your hands or feet? Yes, mild to hands   Are you having any other health concerns or issues? no   Clinical Interventions: Reviewed and followed up on alerts and treatments-Pt speaking in full sentences, no apparent distress. Pt denies any SOB/dyspnea at this time, but does have some mild edema to both hands. Discussed diet and pt states she has not been eating fast food, processed food or adding salt. She states that tablet is recording oxygen level prior to oximeter getting above 92%. She is agreeable to recheck oxygen level at this time with updated reading of 93%. Plan/Follow Up: Will continue to review, monitor and address alerts with follow up based on severity of symptoms and risk factors.

## 2023-10-09 ENCOUNTER — CARE COORDINATION (OUTPATIENT)
Dept: CASE MANAGEMENT | Age: 83
End: 2023-10-09

## 2023-10-09 NOTE — CARE COORDINATION
Remote Alert Monitoring Note  Rpm alert to be reviewed by the provider   red alert   pulse ox reading (91)   Additional needs to be addressed by PCP: No                    Date/Time:  10/9/2023 11:13 AM  Patient Current Location: Home: [place] [address]  LPN contacted patient by telephone. Verified patients name and  as identifiers. Background: HTN  Refer to 911 immediately if:  Patient unresponsive or unable to provide history  Change in cognition or sudden confusion  Patient unable to respond in complete sentences  Intense chest pain/tightness  Any concern for any clinical emergency  Red Alert: Provider response time of 1 hr required for any red alert requiring intervention  Yellow Alert: Provider response time of 3hr required for any escalated yellow alert    O2 Triage  Are you having any Chest Pain? no   Are you having any Shortness of Breath? no   Swelling in your hands or feet? no     Are you having any other health concerns or issues? no      Clinical Interventions: Reviewed and followed up on alerts and treatments-Spoke to patient and daughter , she is speaking in clear full sentences denies chest pain, SOB,, dizziness has cold fingers recheck of Pulse ox is now 9 % no concerns    Plan/Follow Up: Will continue to review, monitor and address alerts with follow up based on severity of symptoms and risk factors.

## 2023-10-10 ENCOUNTER — CARE COORDINATION (OUTPATIENT)
Dept: CARE COORDINATION | Age: 83
End: 2023-10-10

## 2023-10-10 DIAGNOSIS — D64.9 ANEMIA, UNSPECIFIED TYPE: Primary | ICD-10-CM

## 2023-10-10 DIAGNOSIS — R74.8 ELEVATED LIVER ENZYMES: ICD-10-CM

## 2023-10-10 NOTE — ACP (ADVANCE CARE PLANNING)
Advance Care Planning   Healthcare Decision Maker:    Primary Decision Maker: Jaleesa Granda - Jing - [phone]    Click here to complete Healthcare Decision Makers including selection of the Healthcare Decision Maker Relationship (ie \"Primary\"). Today we documented Decision Maker(s) consistent with Legal Next of Kin hierarchy. She declined information about ACP documents at this time.

## 2023-10-10 NOTE — CARE COORDINATION
Ambulatory Care Coordination Note  10/10/2023    Patient Current Location:  Home: [place]  [address] [place] [address]      She has no symptoms or concerns. No dizziness lately, no chest pain or dyspnea or leg swelling. Eats ok but not much appetite, stated is not new or worse. No urinary or bowel complaints. She is doing RPM readings mostly every day and blood pressures more stable. CC Plan:   -Follow up in a few weeks to review if ready to graduate from [address] [place] [address] you have any symptoms that are causing concern?: No            Offered patient enrollment in the Remote Patient Monitoring (RPM) program for in-home monitoring: Yes, patient already enrolled. Lab Results       None                 Goals Addressed                   This Visit's Progress     Self Monitoring   On track     Blood Pressure - I will take my blood pressure as directed - Daily  I will notify my provider of any trends of increasing or decreasing blood pressures over a month period of time. I will notify my provider of any changes in blood pressure associated with symptoms of dizziness, falls, passing out, headache, confusion/change in mental status.     None Recently Recorded    Barriers: overwhelmed by complexity of regimen and lack of education  Plan for overcoming my barriers: ACM calls, RPM enrollment, education  Confidence: 8/10  Anticipated Goal Completion Date: 8/20/23                Future Appointments   Date Time Provider [facility] [address]   12/1/2023  9:00 AM YOLANDA Mukherjee CNP Pain [facility]   12/18/2023  1:45 PM MD Graciela Linder

## 2023-10-10 NOTE — CARE COORDINATION
Remote Alert Monitoring Note      Date/Time:  10/10/2023 11:57 AM  Patient Current Location: Home: [address] [address]    LPN contacted patient by telephone regarding red alert received for pulse ox reading (88%). Verified patients name and  as identifiers. Rpm alert to be reviewed by the provider                         Background: High Blood-Pressure    Refer to 911 immediately if:  Patient unresponsive or unable to provide history  Change in cognition or sudden confusion  Patient unable to respond in complete sentences  Intense chest pain/tightness  Any concern for any clinical emergency  Red Alert: Provider response time of 1 hr required for any red alert requiring intervention  Yellow Alert: Provider response time of 3hr required for any escalated yellow alert    O2 Triage  Are you having any Chest Pain? no   Are you having any Shortness of Breath? no   Swelling in your hands or feet? no     Are you having any other health concerns or issues? no       Have you taken your medications as instructed by your doctor today? Yes       Clinical Interventions: Reviewed and followed up on alerts and treatments-. Pt is asymptomatic and in no apparent distress, speaking in full sentences. Patient states her fingers were slightly cold upon checking her SP02 initially  She did try another finger and got a normal reading of 93%. Patient reeducated on warming hands prior to checking to ensure an accurate reading. She voiced understanding. Plan/Follow Up: Will continue to review, monitor and address alerts with follow up based on severity of symptoms and risk factors.     Anni Saul LPN  [facility]/ CTN/ Remote Patient monitoring  [phone]

## 2023-10-11 ENCOUNTER — CARE COORDINATION (OUTPATIENT)
Dept: CASE MANAGEMENT | Age: 83
End: 2023-10-11

## 2023-10-11 NOTE — CARE COORDINATION
Date/Time:  10/11/2023 11:38 AM  LPN attempted to reach patient by telephone regarding red alert BP in remote patient monitoring program. Left HIPPA compliant message requesting a return call. Will attempt to reach patient again.

## 2023-10-12 ENCOUNTER — CARE COORDINATION (OUTPATIENT)
Dept: CARE COORDINATION | Age: 83
End: 2023-10-12

## 2023-10-12 NOTE — CARE COORDINATION
Remote Alert Monitoring Note      Date/Time:  10/12/2023 11:59 AM  Patient Current Location: Home: [address] [facility] [address]    LPN contacted  daughter Ottoniel Mina  by telephone regarding red alert received for pulse ox reading (90%). Verified patients name and  as identifiers. Rpm alert to be reviewed by the provider   red alert                      Background:  High Blood-Pressure    Refer to 911 immediately if:  Patient unresponsive or unable to provide history  Change in cognition or sudden confusion  Patient unable to respond in complete sentences  Intense chest pain/tightness  Any concern for any clinical emergency  Red Alert: Provider response time of 1 hr required for any red alert requiring intervention  Yellow Alert: Provider response time of 3hr required for any escalated yellow alert    O2 Triage  Are you having any Chest Pain? no   Are you having any Shortness of Breath? no   Swelling in your hands or feet? no     Are you having any other health concerns or issues? no       Have you taken your medications as instructed by your doctor today? Yes   Pulse Ox Triage  Were your hands cold when you used your pulse oximeter today? yes   Are your hands typically cold? yes   Have tried warming your hands and rechecking your SpO2? (rubbing hands together, running them under warm water) yes   Did you complete an activity (ambulation, make bed, cook meal, shower) before checking your SpO2 today? no   Do you use oxygen? no   Was your oxygen on when you took today's reading? NA   Do you have nail polish on? no       Clinical Interventions: Reviewed and followed up on alerts and treatments-. Pt is asymptomatic and in no apparent distress, speaking in full sentences. Spoke with daughter Ottoniel Mina and patient in the background resting. They states patient is feeling fine, denying any SOB. They did try to repeat SP02 but fingers remain cold and they got a lower reading of 88%.   Daughter states patient is fine

## 2023-10-16 ENCOUNTER — CARE COORDINATION (OUTPATIENT)
Dept: CARE COORDINATION | Age: 83
End: 2023-10-16

## 2023-10-16 NOTE — CARE COORDINATION
Remote Alert Monitoring Note      Date/Time:  10/16/2023 12:20 PM  Patient Current Location: Home: [place] [address] [address] [place] [address]    LPN contacted patient and her daughter Toribio Webber by telephone regarding red alert received for pulse ox reading (84%). Verified patients name and  as identifiers. Rpm alert to be reviewed by the provider                         Background:  High Blood-Pressure    Refer to 911 immediately if:  Patient unresponsive or unable to provide history  Change in cognition or sudden confusion  Patient unable to respond in complete sentences  Intense chest pain/tightness  Any concern for any clinical emergency  Red Alert: Provider response time of 1 hr required for any red alert requiring intervention  Yellow Alert: Provider response time of 3hr required for any escalated yellow alert    O2 Triage  Are you having any Chest Pain? no   Are you having any Shortness of Breath? no   Swelling in your hands or feet? Improved Minimal swelling in feet     Are you having any other health concerns or issues? no       Have you taken your medications as instructed by your doctor today? Yes       Clinical Interventions: Reviewed and followed up on alerts and treatments-. Pt is asymptomatic and in no apparent distress, speaking in full sentences. Patient was able to recheck her SP02 while on the call using a different finger and got a normal reading of 99%. Plan/Follow Up: Will continue to review, monitor and address alerts with follow up based on severity of symptoms and risk factors.     Elizabeth Chicas LPN  [facility]/ CTN/ Remote Patient monitoring  [phone]

## 2023-10-18 ENCOUNTER — CARE COORDINATION (OUTPATIENT)
Dept: CARE COORDINATION | Age: 83
End: 2023-10-18

## 2023-10-18 NOTE — CARE COORDINATION
Remote Patient Monitoring Note      Date/Time:  10/18/2023 12:51 PM    LPN attempted to contact patient by telephone regarding red alert received for pulse ox reading (90%). Background: High Blood-Pressure    Clinical Interventions:   HIPAA compliant message left on  requesting a return call. Plan/Follow Up: Will continue to review, monitor and address alerts with follow up based on severity of symptoms and risk factors.        Rand Watson LPN  [facility]/ CTN/ Remote Patient Monitoring  [phone]

## 2023-10-18 NOTE — CARE COORDINATION
Remote Patient Monitoring Note      Date/Time:  10/18/2023 2:15 PM    LPN attempted to contact patient by telephone regarding red alert received for pulse ox reading (90%). Background: High Blood-Pressure     Clinical Interventions:   HIPAA compliant message left on  requesting a return call. ACM updated. Plan/Follow Up: Will continue to review, monitor and address alerts with follow up based on severity of symptoms and risk factors.        Sammi Verdugo LPN  [facility]/ CTN/ Remote Patient Monitoring  [phone]

## 2023-10-20 ENCOUNTER — CARE COORDINATION (OUTPATIENT)
Dept: CASE MANAGEMENT | Age: 83
End: 2023-10-20

## 2023-10-20 NOTE — CARE COORDINATION
Remote Alert Monitoring Note  Rpm alert to be reviewed by the provider   red alert   pulse ox reading (88)   Additional needs to be addressed by N/A: No                    Date/Time:  10/20/2023 12:00 PM  Patient Current Location: Home: [place] [address]  LPN contacted patient by telephone. Verified patients name and  as identifiers. Background: HTN  Refer to 911 immediately if:  Patient unresponsive or unable to provide history  Change in cognition or sudden confusion  Patient unable to respond in complete sentences  Intense chest pain/tightness  Any concern for any clinical emergency  Red Alert: Provider response time of 1 hr required for any red alert requiring intervention  Yellow Alert: Provider response time of 3hr required for any escalated yellow alert    O2 Triage  Are you having any Chest Pain? no   Are you having any Shortness of Breath? no   Swelling in your hands or feet? no     Are you having any other health concerns or issues? no      Clinical Interventions: Reviewed and followed up on alerts and treatments-spoke to patient daughter she states mom is doing fine denies chest pain, SOB, dizziness states she has a slight cold but is not SOB has no concerns  patient is not wearing nail polish and they are using different fingers to check, daughter is not concerned and declines another recheck will let ACM know of readings  Plan/Follow Up: Will continue to review, monitor and address alerts with follow up based on severity of symptoms and risk factors.

## 2023-10-24 ENCOUNTER — TELEPHONE (OUTPATIENT)
Dept: FAMILY MEDICINE CLINIC | Age: 83
End: 2023-10-24

## 2023-10-24 ENCOUNTER — CARE COORDINATION (OUTPATIENT)
Dept: CARE COORDINATION | Age: 83
End: 2023-10-24

## 2023-10-24 NOTE — CARE COORDINATION
Remote Alert Monitoring Note      Date/Time:  10/24/2023 12:59 PM  Patient Current [address]  daughter Deanna Hdez  by telephone regarding red alert received for pulse ox reading (91%). Verified patients name and  as identifiers. Rpm alert to be reviewed by the provider   red alert                      Background: High Blood-Pressure    Refer to 911 immediately if:  Patient unresponsive or unable to provide history  Change in cognition or sudden confusion  Patient unable to respond in complete sentences  Intense chest pain/tightness  Any concern for any clinical emergency  Red Alert: Provider response time of 1 hr required for any red alert requiring intervention  Yellow Alert: Provider response time of 3hr required for any escalated yellow alert    O2 Triage  Are you having any Chest Pain? no   Are you having any Shortness of Breath? no   Swelling in your hands or feet? no     Are you having any other health concerns or issues? Covid       Have you taken your medications as instructed by your doctor today? Yes       Clinical Interventions: Reviewed and followed up on alerts and treatments-. Spoke with daughter Deanna Hdez. She states the patient has tested positive for COVID and has been doing a lot of coughing possibly causing the fluctuation in Sp02 reading. She states her PCP office is aware and since this is day 4 she will be able to have blood work done tomorrow as ordered. She states patient is currently sleeping but will recheck when she wakes. Will provided FYI to AC. Plan/Follow Up: Will continue to review, monitor and address alerts with follow up based on severity of symptoms and risk factors.     Andrew Irvin LPN  [facility]/ CTN/ Remote Patient monitoring  [phone]

## 2023-10-24 NOTE — TELEPHONE ENCOUNTER
Pt tested positive for COVID on 10/20/2023, pt BP was 113/77 today     Pts oxygen level was at 90, did go down to 88/89    Pt not feeling light headed or dizzy,     Pts daughter was advised that if pts oxygen goes under 88 to either get pt to the ER or call the rescue squad so pt can be started on oxygen

## 2023-10-25 ENCOUNTER — HOSPITAL ENCOUNTER (OUTPATIENT)
Age: 83
Setting detail: SPECIMEN
Discharge: HOME | End: 2023-10-25

## 2023-10-25 DIAGNOSIS — R74.8 ELEVATED LIVER ENZYMES: ICD-10-CM

## 2023-10-25 DIAGNOSIS — D64.9 ANEMIA, UNSPECIFIED TYPE: ICD-10-CM

## 2023-10-25 LAB
ALBUMIN SERPL-MCNC: 3.3 G/DL (ref 3.5–5.2)
ALBUMIN/GLOB SERPL: 1 {RATIO} (ref 1–2.5)
ALP SERPL-CCNC: 75 U/L (ref 35–104)
ALT SERPL-CCNC: 11 U/L (ref 5–33)
AST SERPL-CCNC: 25 U/L
BILIRUB DIRECT SERPL-MCNC: 0.1 MG/DL
BILIRUB INDIRECT SERPL-MCNC: 0.4 MG/DL (ref 0–1)
BILIRUB SERPL-MCNC: 0.5 MG/DL (ref 0.3–1.2)
FERRITIN SERPL-MCNC: 115 NG/ML (ref 13–150)
FOLATE SERPL-MCNC: 10.1 NG/ML
IMM RETICS NFR: 20 % (ref 2.7–18.3)
IRON SATN MFR SERPL: 18 % (ref 20–55)
IRON SERPL-MCNC: 49 UG/DL (ref 37–145)
PROT SERPL-MCNC: 6.7 G/DL (ref 6.4–8.3)
RETIC HEMOGLOBIN: 25.6 PG (ref 28.2–35.7)
RETICS # AUTO: 0.05 M/UL (ref 0.03–0.08)
RETICS/RBC NFR AUTO: 1.1 % (ref 0.5–1.9)
TIBC SERPL-MCNC: 267 UG/DL (ref 250–450)
UNSATURATED IRON BINDING CAPACITY: 218 UG/DL (ref 112–347)
VIT B12 SERPL-MCNC: 509 PG/ML (ref 232–1245)

## 2023-10-26 ENCOUNTER — CARE COORDINATION (OUTPATIENT)
Dept: CARE COORDINATION | Age: 83
End: 2023-10-26

## 2023-10-26 NOTE — CARE COORDINATION
Attempted to call to check symptoms since recent COVID infection. Left VM message on both patient's and daughter Sarahi's phones asking for call back. Vitals today on RPM: 116/61, pulse 71, pulse ox 95% (had been low low week 87-89). Will call again next week.     Future Appointments   Date Time Provider [address]   12/1/2023  9:00 AM YOLANDA Toussaint - CNP [facility]   12/18/2023  1:45 PM Domo Murcia MD [address] [facility]

## 2023-10-31 ENCOUNTER — CARE COORDINATION (OUTPATIENT)
Dept: CASE MANAGEMENT | Age: 83
End: 2023-10-31

## 2023-10-31 NOTE — CARE COORDINATION
Remote Alert Monitoring Note  Rpm alert to be reviewed by the provider   red alert   pulse ox reading (91)   Additional needs to be addressed by N/A: No                    Date/Time:  10/31/2023 12:14 PM  Patient Current Location: Home: [address] [place] [address] [place] [address]  LPN contacted family by telephone. Verified patients name and  as identifiers. Background: HTN  Refer to 911 immediately if:  Patient unresponsive or unable to provide history  Change in cognition or sudden confusion  Patient unable to respond in complete sentences  Intense chest pain/tightness  Any concern for any clinical emergency  Red Alert: Provider response time of 1 hr required for any red alert requiring intervention  Yellow Alert: Provider response time of 3hr required for any escalated yellow alert    O2 Triage  Are you having any Chest Pain? no   Are you having any Shortness of Breath? no   Swelling in your hands or feet? no     Are you having any other health concerns or issues? no      Clinical Interventions: Reviewed and followed up on alerts and treatments-spoke to patient she states she is doing well denies chest pain , SOB, dizziness, recheck of Pulse ox is now WNL    Plan/Follow Up: Will continue to review, monitor and address alerts with follow up based on severity of symptoms and risk factors.

## 2023-11-01 ENCOUNTER — CARE COORDINATION (OUTPATIENT)
Dept: CASE MANAGEMENT | Age: 83
End: 2023-11-01

## 2023-11-01 NOTE — CARE COORDINATION
Remote Alert Monitoring Note  Rpm alert to be reviewed by the provider   red alert   pulse ox reading (91)   Additional needs to be addressed by N/A: No                    Date/Time:  2023 1:18 PM  Patient Current Location: Home: [place] [address]  LPN contacted patient by telephone. Verified patients name and  as identifiers. Background: HTN  Refer to 911 immediately if:  Patient unresponsive or unable to provide history  Change in cognition or sudden confusion  Patient unable to respond in complete sentences  Intense chest pain/tightness  Any concern for any clinical emergency  Red Alert: Provider response time of 1 hr required for any red alert requiring intervention  Yellow Alert: Provider response time of 3hr required for any escalated yellow alert    O2 Triage  Are you having any Chest Pain? no   Are you having any Shortness of Breath? no   Swelling in your hands or feet? no     Are you having any other health concerns or issues? no      Clinical Interventions: Reviewed and followed up on alerts and treatments-spoke to patient she denies chest pain, SO, and dizziness, recheck of pulse ox is now  80 daughter states there are no concerns and mom has cold fingers they tried to warm them up but still reading at 91    Plan/Follow Up: Will continue to review, monitor and address alerts with follow up based on severity of symptoms and risk factors.

## 2023-11-02 ENCOUNTER — CARE COORDINATION (OUTPATIENT)
Dept: CASE MANAGEMENT | Age: 83
End: 2023-11-02

## 2023-11-02 DIAGNOSIS — D50.9 IRON DEFICIENCY ANEMIA, UNSPECIFIED IRON DEFICIENCY ANEMIA TYPE: Primary | ICD-10-CM

## 2023-11-02 NOTE — CARE COORDINATION
Date/Time:  11/2/2023 1:17 PM  LPN attempted to reach patient by telephone regarding red alert pulse ox 91 in remote patient monitoring program. Left HIPPA compliant message requesting a return call. Will attempt to reach patient again.   update patient did not return call

## 2023-11-06 ENCOUNTER — CARE COORDINATION (OUTPATIENT)
Dept: CARE COORDINATION | Age: 83
End: 2023-11-06

## 2023-11-06 ENCOUNTER — CARE COORDINATION (OUTPATIENT)
Dept: PRIMARY CARE CLINIC | Age: 83
End: 2023-11-06

## 2023-11-06 DIAGNOSIS — I10 ESSENTIAL HYPERTENSION: Primary | ICD-10-CM

## 2023-11-06 DIAGNOSIS — J30.89 CHRONIC NON-SEASONAL ALLERGIC RHINITIS: ICD-10-CM

## 2023-11-06 DIAGNOSIS — G47.9 SLEEPING DIFFICULTY: Primary | ICD-10-CM

## 2023-11-06 RX ORDER — LORATADINE 10 MG/1
TABLET ORAL
Qty: 90 TABLET | Refills: 1 | Status: SHIPPED | OUTPATIENT
Start: 2023-11-06

## 2023-11-06 RX ORDER — ZOLPIDEM TARTRATE 5 MG/1
5 TABLET ORAL NIGHTLY PRN
Qty: 30 TABLET | Refills: 1 | Status: SHIPPED | OUTPATIENT
Start: 2023-11-06 | End: 2024-01-05

## 2023-11-06 NOTE — CARE COORDINATION
-CCSS placed call to patient to arrange RPM kit  through [address] [place] [address]. -Writer spoke to the daughter. She states that [employer] will not be able to get in because [place] and there is no door bell. The daughter is willing to drop off the kit to [employer] location. Address confirmed. -Writer contact [employer] to have a return label created. -The daughter was notified once she receives the return label , she can then drop off the kit to any [employer] location. She voiced understanding.

## 2023-11-06 NOTE — CARE COORDINATION
education  Confidence: 8/10  Anticipated Goal Completion Date: 8/20/23                Future Appointments   Date Time Provider [address]   12/1/2023  9:00 AM YOLANDA Currie - CNP [facility]   12/18/2023  1:45 PM Jory Hargrove MD [facility]

## 2023-11-06 NOTE — PROGRESS NOTES
Remote Patient Order Discontinued    Received request from Jl Esparza RN  to discontinue order for remote patient monitoring of HTN and order completed.

## 2023-11-11 DIAGNOSIS — E11.40 TYPE 2 DIABETES MELLITUS WITH DIABETIC NEUROPATHY, WITHOUT LONG-TERM CURRENT USE OF INSULIN (HCC): ICD-10-CM

## 2023-11-13 RX ORDER — ISOPROPYL ALCOHOL 70 ML/100ML
SWAB TOPICAL
Qty: 100 EACH | Refills: 3 | Status: SHIPPED | OUTPATIENT
Start: 2023-11-13

## 2023-11-13 RX ORDER — LANCETS
EACH MISCELLANEOUS
Qty: 100 EACH | Refills: 3 | Status: SHIPPED | OUTPATIENT
Start: 2023-11-13

## 2023-11-13 NOTE — TELEPHONE ENCOUNTER
Babita Mcdaniel is calling to request a refill on the following medication(s):    Medication Request:  Requested Prescriptions     Pending Prescriptions Disp Refills    Accu-Chek Softclix Lancets MISC [Pharmacy Med Name: Narinder Chavarria 100 each 3     Sig: use once daily    RA Alcohol Swabs 70 % PADS [Pharmacy Med Name: RA ALCOHOL SWABS]       Sig: use as directed once daily       Last Visit Date (If Applicable):  0/26/5476    Next Visit Date:    12/18/2023

## 2023-11-21 ENCOUNTER — TELEPHONE (OUTPATIENT)
Dept: FAMILY MEDICINE CLINIC | Age: 83
End: 2023-11-21

## 2023-11-21 NOTE — TELEPHONE ENCOUNTER
Patient's daughter is to call and provide a fax number so that the [facility] Form\" can be faxed.     The form is in Dr Hiren Lockett basket  (Coral's paper work)

## 2023-12-01 ENCOUNTER — OFFICE VISIT (OUTPATIENT)
Dept: PAIN MANAGEMENT | Age: 83
End: 2023-12-01
Payer: MEDICARE

## 2023-12-01 VITALS — OXYGEN SATURATION: 96 % | HEART RATE: 72 BPM | BODY MASS INDEX: 27.05 KG/M2 | HEIGHT: 62 IN | WEIGHT: 147 LBS

## 2023-12-01 DIAGNOSIS — Z79.891 CHRONIC USE OF OPIATE DRUG FOR THERAPEUTIC PURPOSE: ICD-10-CM

## 2023-12-01 DIAGNOSIS — M48.02 CERVICAL STENOSIS OF SPINE: Chronic | ICD-10-CM

## 2023-12-01 DIAGNOSIS — Z98.890 HX OF CERVICAL SPINE SURGERY: ICD-10-CM

## 2023-12-01 DIAGNOSIS — M48.062 LUMBAR STENOSIS WITH NEUROGENIC CLAUDICATION: Chronic | ICD-10-CM

## 2023-12-01 PROCEDURE — 99213 OFFICE O/P EST LOW 20 MIN: CPT | Performed by: NURSE PRACTITIONER

## 2023-12-01 PROCEDURE — 1090F PRES/ABSN URINE INCON ASSESS: CPT | Performed by: NURSE PRACTITIONER

## 2023-12-01 PROCEDURE — 1036F TOBACCO NON-USER: CPT | Performed by: NURSE PRACTITIONER

## 2023-12-01 PROCEDURE — G8399 PT W/DXA RESULTS DOCUMENT: HCPCS | Performed by: NURSE PRACTITIONER

## 2023-12-01 PROCEDURE — G8484 FLU IMMUNIZE NO ADMIN: HCPCS | Performed by: NURSE PRACTITIONER

## 2023-12-01 PROCEDURE — 1123F ACP DISCUSS/DSCN MKR DOCD: CPT | Performed by: NURSE PRACTITIONER

## 2023-12-01 PROCEDURE — G8417 CALC BMI ABV UP PARAM F/U: HCPCS | Performed by: NURSE PRACTITIONER

## 2023-12-01 PROCEDURE — G8427 DOCREV CUR MEDS BY ELIG CLIN: HCPCS | Performed by: NURSE PRACTITIONER

## 2023-12-01 RX ORDER — HYDROCODONE BITARTRATE AND ACETAMINOPHEN 5; 325 MG/1; MG/1
1 TABLET ORAL 2 TIMES DAILY PRN
Qty: 60 TABLET | Refills: 0 | Status: SHIPPED | OUTPATIENT
Start: 2023-12-01 | End: 2023-12-31

## 2023-12-01 ASSESSMENT — ENCOUNTER SYMPTOMS
NAUSEA: 0
CONSTIPATION: 0
COUGH: 0
SHORTNESS OF BREATH: 0
BACK PAIN: 1
DIARRHEA: 1
VOMITING: 0

## 2023-12-01 NOTE — PROGRESS NOTES
extensive diverticulosis, tubular adenoma    CYSTOSCOPY  03/09/2022    Bilateral Retrograde Pyelogram     CYSTOSCOPY Bilateral 3/9/2022    CYSTOSCOPY, RETROGRADE PYELOGRAM performed by Twanna Scheuermann, MD at [address] [facility] 8/25/2017    EPIDURAL STEROID INJECTION cervical performed by Cam Quispe MD at [address] 11/20/2017    EPIDURAL STEROID INJECTION L5S1 performed by Cam Quispe MD at [address] [facility] Bilateral 5/20/2019    EPIDURAL STEROID INJECTION BILATERAL S1 performed by Cam Quispe MD at [id number] [place] [address] Left     Cataracts    HYSTERECTOMY (CERVIX STATUS UNKNOWN)  [facility]  10/1/06    NECK SURGERY  5/2011 & 2001    NERVE BLOCK Bilateral 02/17/2020    Lumbar Facet L2-L5    NERVE SURGERY Bilateral 9/5/2019    BILATERAL LUMBAR FACET STEROID INJECTION L2-L5 performed by Cam Quispe MD at [address] [facility]  08/25/2017    L5-S1 steroid injection    OTHER SURGICAL HISTORY  11/20/2017    VANITA L5-S1    OTHER SURGICAL HISTORY  10/01/2018    cervical steroid injection    OTHER SURGICAL HISTORY Bilateral 05/20/2019    EPIDURAL STEROID INJECTION BILATERAL S1 (Bilateral )    OTHER SURGICAL HISTORY  04/29/2021    lumbar VANITA    PAIN MANAGEMENT PROCEDURE Bilateral 2/17/2020    LUMBAR FACET L2-L5 performed by Cam Quispe MD at [address] N/A 3/22/2021    CERVICAL EPIDURAL STEROID INJECTION C7-T1 performed by Cam Quispe MD at [address] Bilateral 4/29/2021    BILATERAL L5 EPIDURAL STEROID INJECTION performed by Cam Quispe MD at [address] [facility] AA&/STRD TFRML EPI LUMBAR/SACRAL 1 LEVEL Bilateral 11/19/2018    BILATERAL L4 VANITA performed [facility]pe [facility] DX/THER SBST INTRLMNR CRV/THRC W/IMG GDN N/A 10/1/2018    EPIDURAL STEROID INJECTION CERVICAL C7-T1 performed by Cam Quispe MD at [address] [place] [address]

## 2023-12-15 ENCOUNTER — HOSPITAL ENCOUNTER (OUTPATIENT)
Age: 83
Setting detail: SPECIMEN
Discharge: HOME | End: 2023-12-15
Payer: MEDICARE

## 2023-12-15 ENCOUNTER — TELEPHONE (OUTPATIENT)
Dept: FAMILY MEDICINE CLINIC | Age: 83
End: 2023-12-15

## 2023-12-15 ENCOUNTER — TELEPHONE (OUTPATIENT)
Dept: ONCOLOGY | Age: 83
End: 2023-12-15

## 2023-12-15 DIAGNOSIS — D50.9 MICROCYTIC ANEMIA: ICD-10-CM

## 2023-12-15 LAB
ALBUMIN SERPL-MCNC: 3.7 G/DL (ref 3.5–5.2)
ALP SERPL-CCNC: 73 U/L (ref 35–104)
ALT SERPL-CCNC: 8 U/L (ref 5–33)
ANION GAP SERPL CALCULATED.3IONS-SCNC: 13 MMOL/L (ref 9–17)
AST SERPL-CCNC: 17 U/L
BASOPHILS # BLD: 0.07 K/UL (ref 0–0.2)
BASOPHILS NFR BLD: 1 %
BILIRUB SERPL-MCNC: 0.4 MG/DL (ref 0.3–1.2)
BUN SERPL-MCNC: 26 MG/DL (ref 8–23)
BUN/CREAT SERPL: 52 (ref 9–20)
CALCIUM SERPL-MCNC: 8.8 MG/DL (ref 8.6–10.4)
CHLORIDE SERPL-SCNC: 104 MMOL/L (ref 98–107)
CO2 SERPL-SCNC: 24 MMOL/L (ref 20–31)
CREAT SERPL-MCNC: 0.5 MG/DL (ref 0.5–0.9)
EOSINOPHIL # BLD: 0.37 K/UL (ref 0–0.4)
EOSINOPHILS RELATIVE PERCENT: 5 % (ref 1–4)
ERYTHROCYTE [DISTWIDTH] IN BLOOD BY AUTOMATED COUNT: 19.2 % (ref 11.8–14.4)
FERRITIN SERPL-MCNC: 55 NG/ML (ref 13–150)
FOLATE SERPL-MCNC: 8.4 NG/ML
GFR SERPL CREATININE-BSD FRML MDRD: >60 ML/MIN/1.73M2
GLUCOSE SERPL-MCNC: 124 MG/DL (ref 70–99)
HAPTOGLOB SERPL-MCNC: 144 MG/DL (ref 30–200)
HCT VFR BLD AUTO: 36.5 % (ref 36.3–47.1)
HGB BLD-MCNC: 11.2 G/DL (ref 11.9–15.1)
IMM GRANULOCYTES # BLD AUTO: 0 K/UL (ref 0–0.3)
IMM GRANULOCYTES NFR BLD: 0 %
IMM RETICS NFR: 8.9 % (ref 2.7–18.3)
IRON SATN MFR SERPL: 12 % (ref 20–55)
IRON SERPL-MCNC: 36 UG/DL (ref 37–145)
LDH SERPL-CCNC: 214 U/L (ref 135–214)
LYMPHOCYTES NFR BLD: 2.92 K/UL (ref 1–4.8)
LYMPHOCYTES RELATIVE PERCENT: 40 % (ref 24–44)
MCH RBC QN AUTO: 24.9 PG (ref 25.2–33.5)
MCHC RBC AUTO-ENTMCNC: 30.7 G/DL (ref 28.4–34.8)
MCV RBC AUTO: 81.3 FL (ref 82.6–102.9)
MONOCYTES NFR BLD: 0.44 K/UL (ref 0.2–0.8)
MONOCYTES NFR BLD: 6 % (ref 1–7)
MORPHOLOGY: ABNORMAL
NEUTROPHILS NFR BLD: 48 % (ref 36–66)
NEUTS SEG NFR BLD: 3.5 K/UL (ref 1.8–7.7)
NRBC BLD-RTO: 0 PER 100 WBC
PLATELET # BLD AUTO: 240 K/UL (ref 138–453)
PMV BLD AUTO: 9.8 FL (ref 8.1–13.5)
POTASSIUM SERPL-SCNC: 4.2 MMOL/L (ref 3.7–5.3)
PROT SERPL-MCNC: 6.8 G/DL (ref 6.4–8.3)
RBC # BLD AUTO: 4.49 M/UL (ref 3.95–5.11)
RETIC HEMOGLOBIN: 28.4 PG (ref 28.2–35.7)
RETICS # AUTO: 0.07 M/UL (ref 0.03–0.08)
RETICS/RBC NFR AUTO: 1.5 % (ref 0.5–1.9)
SODIUM SERPL-SCNC: 141 MMOL/L (ref 135–144)
TIBC SERPL-MCNC: 301 UG/DL (ref 250–450)
UNSATURATED IRON BINDING CAPACITY: 265 UG/DL (ref 112–347)
VIT B12 SERPL-MCNC: 352 PG/ML (ref 232–1245)
WBC OTHER # BLD: 7.3 K/UL (ref 3.5–11.3)

## 2023-12-15 PROCEDURE — 83010 ASSAY OF HAPTOGLOBIN QUANT: CPT

## 2023-12-15 PROCEDURE — 82746 ASSAY OF FOLIC ACID SERUM: CPT

## 2023-12-15 PROCEDURE — 82728 ASSAY OF FERRITIN: CPT

## 2023-12-15 PROCEDURE — 83550 IRON BINDING TEST: CPT

## 2023-12-15 PROCEDURE — 83540 ASSAY OF IRON: CPT

## 2023-12-15 PROCEDURE — 36415 COLL VENOUS BLD VENIPUNCTURE: CPT

## 2023-12-15 PROCEDURE — 82607 VITAMIN B-12: CPT

## 2023-12-15 PROCEDURE — 83615 LACTATE (LD) (LDH) ENZYME: CPT

## 2023-12-15 PROCEDURE — 80053 COMPREHEN METABOLIC PANEL: CPT

## 2023-12-15 PROCEDURE — 85045 AUTOMATED RETICULOCYTE COUNT: CPT

## 2023-12-15 PROCEDURE — 85025 COMPLETE CBC W/AUTO DIFF WBC: CPT

## 2023-12-15 NOTE — TELEPHONE ENCOUNTER
[facility]  Need labs today  Rv in January for discussion   LABS DONE ON EXIT  BLOOD STOOL SUPPLIES GIVEN TO PT ON EXIT  MD VISIT 1/19/24 @ 9:15AM  AVS PRINTED W/ INSTRUCTIONS AND GIVEN  TO PT ON EXIT

## 2023-12-15 NOTE — TELEPHONE ENCOUNTER
Patient's blood pressure, heart rate, and oxygen was dropped off to office. This was found on my desk once I returned from leave.   Patients log attached

## 2023-12-27 DIAGNOSIS — M48.02 CERVICAL STENOSIS OF SPINE: ICD-10-CM

## 2023-12-28 RX ORDER — GABAPENTIN 600 MG/1
600 TABLET ORAL 3 TIMES DAILY
Qty: 90 TABLET | Refills: 2 | Status: SHIPPED | OUTPATIENT
Start: 2023-12-28 | End: 2024-03-27

## 2023-12-28 NOTE — TELEPHONE ENCOUNTER
Last visit: 09/27/23  Last Med refill: 11/16/23  Does patient have enough medication for 72 hours: No:     Next Visit Date:  Future Appointments   Date Time Provider [address]   1/19/2024  9:15 AM Jasmin Wall MD [facility]   2/2/2024  9:00 AM Chetan Chávez, APRN - CNP [facility]   2/14/2024  9:45 AM Lin Rosales MD [address] Maintenance   Topic Date Due    Shingles vaccine (1 of 2) Never done    Respiratory Syncytial Virus (RSV) Pregnant or age 61 yrs+ (1 - 1-dose 60+ series) Never done    Diabetic retinal exam  08/21/2020    COVID-19 Vaccine (4 - 2023-24 season) 09/01/2023    Annual Wellness Visit (AWV)  12/16/2023    DTaP/Tdap/Td vaccine (1 - Tdap) 10/18/2026 (Originally 5/28/1959)    A1C test (Diabetic or Prediabetic)  03/27/2024    Lipids  04/14/2024    Depression Screen  04/17/2024    Diabetic foot exam  06/28/2024    DEXA (modify frequency per FRAX score)  Completed    Flu vaccine  Completed    Pneumococcal 65+ years Vaccine  Completed    Hepatitis A vaccine  Aged Out    Hepatitis B vaccine  Aged Out    Hib vaccine  Aged Out    Polio vaccine  Aged Out    Meningococcal (ACWY) vaccine  Aged Out    Hepatitis C screen  Discontinued       Hemoglobin A1C (%)   Date Value   09/27/2023 6.7   04/17/2023 6.1   12/15/2022 6.3             ( goal A1C is < 7)   No components found for: \"LABMICR\"  LDL Cholesterol (mg/dL)   Date Value   04/14/2023 64   10/07/2021 48       (goal LDL is <100)   AST (U/L)   Date Value   12/15/2023 17     ALT (U/L)   Date Value   12/15/2023 8     BUN (mg/dL)   Date Value   12/15/2023 26 (H)     BP Readings from Last 3 Encounters:   12/15/23 134/81   09/27/23 (!) 104/58   09/22/23 136/82          (goal 120/80)    All Future Testing planned in [facility]  Lab Frequency Next Occurrence   Cervical/Thoracic Epidural Steroid Injection Once 04/24/2023   Blood Occult Stool #1 Once 01/05/2024   CBC with Auto Differential     Comprehensive

## 2024-01-03 DIAGNOSIS — E11.40 TYPE 2 DIABETES MELLITUS WITH DIABETIC NEUROPATHY, WITHOUT LONG-TERM CURRENT USE OF INSULIN (HCC): ICD-10-CM

## 2024-01-03 DIAGNOSIS — M48.02 CERVICAL STENOSIS OF SPINE: ICD-10-CM

## 2024-01-04 RX ORDER — GABAPENTIN 600 MG/1
600 TABLET ORAL 3 TIMES DAILY
Qty: 90 TABLET | Refills: 2 | OUTPATIENT
Start: 2024-01-04

## 2024-01-04 NOTE — TELEPHONE ENCOUNTER
Comprehensive Metabolic Panel                 Patient Active Problem List:     Osteoarthritis     Essential hypertension     Type 2 diabetes mellitus with diabetic neuropathy, without long-term current use of insulin (HCC)     Pure hypercholesterolemia     Therapeutic opioid-induced constipation (OIC)     Rectal pain     Diverticulosis of colon     Tubular adenoma of colon     History of colon polyps     Rectal pressure     Failed neck syndrome     Cervical stenosis of spine     Cervical radicular pain     Lumbar stenosis with neurogenic claudication     JANN on CPAP     Primary osteoarthritis involving multiple joints     Hx of cervical spine surgery     Chronic use of opiate drugs therapeutic purposes     Myelomalacia (HCC)     Lumbar facet joint syndrome     Gastroesophageal reflux disease     Sleeping difficulty

## 2024-01-08 ENCOUNTER — HOSPITAL ENCOUNTER (OUTPATIENT)
Age: 84
Setting detail: SPECIMEN
Discharge: HOME | End: 2024-01-08
Payer: MEDICARE

## 2024-01-08 DIAGNOSIS — D50.9 MICROCYTIC ANEMIA: ICD-10-CM

## 2024-01-08 LAB
DATE, STOOL #1: NORMAL
HEMOCCULT SP1 STL QL: NEGATIVE
TIME, STOOL #1: 1330

## 2024-01-08 PROCEDURE — 82270 OCCULT BLOOD FECES: CPT

## 2024-02-02 ENCOUNTER — OFFICE VISIT (OUTPATIENT)
Dept: PAIN MANAGEMENT | Age: 84
End: 2024-02-02
Payer: MEDICARE

## 2024-02-02 VITALS — WEIGHT: 147 LBS | BODY MASS INDEX: 27.05 KG/M2 | OXYGEN SATURATION: 96 % | HEIGHT: 62 IN | HEART RATE: 69 BPM

## 2024-02-02 DIAGNOSIS — M48.02 CERVICAL SPINAL STENOSIS: ICD-10-CM

## 2024-02-02 DIAGNOSIS — M48.02 CERVICAL STENOSIS OF SPINE: Chronic | ICD-10-CM

## 2024-02-02 DIAGNOSIS — M48.062 LUMBAR STENOSIS WITH NEUROGENIC CLAUDICATION: Primary | Chronic | ICD-10-CM

## 2024-02-02 DIAGNOSIS — M54.12 CERVICAL RADICULAR PAIN: Chronic | ICD-10-CM

## 2024-02-02 DIAGNOSIS — Z79.891 CHRONIC USE OF OPIATE DRUG FOR THERAPEUTIC PURPOSE: ICD-10-CM

## 2024-02-02 DIAGNOSIS — M96.1 FAILED NECK SYNDROME: Chronic | ICD-10-CM

## 2024-02-02 DIAGNOSIS — M47.816 LUMBAR FACET JOINT SYNDROME: Chronic | ICD-10-CM

## 2024-02-02 PROCEDURE — G8399 PT W/DXA RESULTS DOCUMENT: HCPCS | Performed by: NURSE PRACTITIONER

## 2024-02-02 PROCEDURE — 1090F PRES/ABSN URINE INCON ASSESS: CPT | Performed by: NURSE PRACTITIONER

## 2024-02-02 PROCEDURE — 99214 OFFICE O/P EST MOD 30 MIN: CPT | Performed by: NURSE PRACTITIONER

## 2024-02-02 PROCEDURE — 1123F ACP DISCUSS/DSCN MKR DOCD: CPT | Performed by: NURSE PRACTITIONER

## 2024-02-02 PROCEDURE — G8427 DOCREV CUR MEDS BY ELIG CLIN: HCPCS | Performed by: NURSE PRACTITIONER

## 2024-02-02 PROCEDURE — G8417 CALC BMI ABV UP PARAM F/U: HCPCS | Performed by: NURSE PRACTITIONER

## 2024-02-02 PROCEDURE — G8484 FLU IMMUNIZE NO ADMIN: HCPCS | Performed by: NURSE PRACTITIONER

## 2024-02-02 PROCEDURE — 1036F TOBACCO NON-USER: CPT | Performed by: NURSE PRACTITIONER

## 2024-02-02 RX ORDER — HYDROCODONE BITARTRATE AND ACETAMINOPHEN 5; 325 MG/1; MG/1
1 TABLET ORAL 2 TIMES DAILY PRN
Qty: 60 TABLET | Refills: 0 | Status: SHIPPED | OUTPATIENT
Start: 2024-02-05 | End: 2024-03-06

## 2024-02-02 ASSESSMENT — ENCOUNTER SYMPTOMS
RESPIRATORY NEGATIVE: 1
GASTROINTESTINAL NEGATIVE: 1
SHORTNESS OF BREATH: 0
CONSTIPATION: 0
BACK PAIN: 1
COUGH: 0

## 2024-02-02 NOTE — PROGRESS NOTES
activities of daily living, quality of family life and social activities, and the physical activity);Obtaining appropriate analgesic effect of treatment.          Periodic Controlled Substance Monitoring: Possible medication side effects, risk of tolerance/dependence & alternative treatments discussed., No signs of potential drug abuse or diversion identified., Assessed functional status (ability to engage in work or other purposeful activities, the pain intensity and its interference with activities of daily living, quality of family life and social activities, and the physical activity), Obtaining appropriate analgesic effect of treatment. (Pilar Willoughby, APRN - CNP)      Past Medical History:   Diagnosis Date    Arthritis     Cataract     Cataracts, bilateral     Cervical spondylosis with myelopathy     Depression     Diverticulosis of colon     Dysuria     Dysuria     Gastroesophageal reflux disease 2021    Gout     Headache(784.0)     Hematuria     History of colon polyps     Insomnia     Lumbar stenosis with neurogenic claudication     Neck pain     JANN (obstructive sleep apnea)     Osteoarthrosis, unspecified whether generalized or localized, unspecified site 10/5/2012    Pelvic pain     Pure hypercholesterolemia 10/5/2012    Shoulder blade pain     right  side    Sinus problem     Stroke (HCC)     Tinnitus     Tubular adenoma of colon     Type II or unspecified type diabetes mellitus without mention of complication, not stated as uncontrolled 10/5/2012    Dr. Hale last visit 2022 [facility]    Under care of team 2022    PCP: Dr. Hlae, [place], last visit 2022    Unspecified essential hypertension 10/5/2012    Dr. Hale    Unspecified sleep apnea     Wears dentures        Past Surgical History:   Procedure Laterality Date     SECTION      x2    COLONOSCOPY  06    Dr Briscoe    COLONOSCOPY  5 12 15    extensive diverticulosis, tubular adenoma

## 2024-02-05 ENCOUNTER — OFFICE VISIT (OUTPATIENT)
Dept: ONCOLOGY | Age: 84
End: 2024-02-05
Payer: MEDICARE

## 2024-02-05 ENCOUNTER — HOSPITAL ENCOUNTER (OUTPATIENT)
Age: 84
Setting detail: SPECIMEN
Discharge: HOME | End: 2024-02-05
Payer: MEDICARE

## 2024-02-05 VITALS
DIASTOLIC BLOOD PRESSURE: 67 MMHG | RESPIRATION RATE: 16 BRPM | HEART RATE: 58 BPM | TEMPERATURE: 97.4 F | SYSTOLIC BLOOD PRESSURE: 131 MMHG

## 2024-02-05 DIAGNOSIS — E53.8 B12 DEFICIENCY: ICD-10-CM

## 2024-02-05 DIAGNOSIS — D50.9 MICROCYTIC ANEMIA: ICD-10-CM

## 2024-02-05 LAB
ALBUMIN SERPL-MCNC: 3.7 G/DL (ref 3.5–5.2)
ALP SERPL-CCNC: 88 U/L (ref 35–104)
ALT SERPL-CCNC: 9 U/L (ref 5–33)
ANION GAP SERPL CALCULATED.3IONS-SCNC: 7 MMOL/L (ref 9–17)
AST SERPL-CCNC: 16 U/L
BASOPHILS # BLD: 0.05 K/UL (ref 0–0.2)
BASOPHILS NFR BLD: 1 % (ref 0–2)
BILIRUB SERPL-MCNC: 0.3 MG/DL (ref 0.3–1.2)
BUN SERPL-MCNC: 17 MG/DL (ref 8–23)
BUN/CREAT SERPL: 24 (ref 9–20)
CALCIUM SERPL-MCNC: 8.5 MG/DL (ref 8.6–10.4)
CHLORIDE SERPL-SCNC: 106 MMOL/L (ref 98–107)
CO2 SERPL-SCNC: 28 MMOL/L (ref 20–31)
CREAT SERPL-MCNC: 0.7 MG/DL (ref 0.5–0.9)
EOSINOPHIL # BLD: 0.36 K/UL (ref 0–0.44)
EOSINOPHILS RELATIVE PERCENT: 6 % (ref 1–4)
ERYTHROCYTE [DISTWIDTH] IN BLOOD BY AUTOMATED COUNT: 17 % (ref 11.8–14.4)
GFR SERPL CREATININE-BSD FRML MDRD: >60 ML/MIN/1.73M2
GLUCOSE SERPL-MCNC: 133 MG/DL (ref 70–99)
HCT VFR BLD AUTO: 34.5 % (ref 36.3–47.1)
HGB BLD-MCNC: 10.6 G/DL (ref 11.9–15.1)
IMM GRANULOCYTES # BLD AUTO: 0.01 K/UL (ref 0–0.3)
IMM GRANULOCYTES NFR BLD: 0 %
LYMPHOCYTES NFR BLD: 2.99 K/UL (ref 1.1–3.7)
LYMPHOCYTES RELATIVE PERCENT: 47 % (ref 24–43)
MCH RBC QN AUTO: 25.5 PG (ref 25.2–33.5)
MCHC RBC AUTO-ENTMCNC: 30.7 G/DL (ref 28.4–34.8)
MCV RBC AUTO: 82.9 FL (ref 82.6–102.9)
MONOCYTES NFR BLD: 0.4 K/UL (ref 0.1–1.2)
MONOCYTES NFR BLD: 7 % (ref 3–12)
NEUTROPHILS NFR BLD: 39 % (ref 36–65)
NEUTS SEG NFR BLD: 2.39 K/UL (ref 1.5–8.1)
NRBC BLD-RTO: 0 PER 100 WBC
PLATELET # BLD AUTO: 219 K/UL (ref 138–453)
PMV BLD AUTO: 9.9 FL (ref 8.1–13.5)
POTASSIUM SERPL-SCNC: 4.4 MMOL/L (ref 3.7–5.3)
PROT SERPL-MCNC: 6.5 G/DL (ref 6.4–8.3)
RBC # BLD AUTO: 4.16 M/UL (ref 3.95–5.11)
RBC # BLD: ABNORMAL 10*6/UL
SODIUM SERPL-SCNC: 141 MMOL/L (ref 135–144)
WBC OTHER # BLD: 6.2 K/UL (ref 3.5–11.3)

## 2024-02-05 PROCEDURE — 3078F DIAST BP <80 MM HG: CPT | Performed by: INTERNAL MEDICINE

## 2024-02-05 PROCEDURE — 1123F ACP DISCUSS/DSCN MKR DOCD: CPT | Performed by: INTERNAL MEDICINE

## 2024-02-05 PROCEDURE — 1036F TOBACCO NON-USER: CPT | Performed by: INTERNAL MEDICINE

## 2024-02-05 PROCEDURE — G8484 FLU IMMUNIZE NO ADMIN: HCPCS | Performed by: INTERNAL MEDICINE

## 2024-02-05 PROCEDURE — 99211 OFF/OP EST MAY X REQ PHY/QHP: CPT

## 2024-02-05 PROCEDURE — G8417 CALC BMI ABV UP PARAM F/U: HCPCS | Performed by: INTERNAL MEDICINE

## 2024-02-05 PROCEDURE — 85025 COMPLETE CBC W/AUTO DIFF WBC: CPT

## 2024-02-05 PROCEDURE — G8399 PT W/DXA RESULTS DOCUMENT: HCPCS | Performed by: INTERNAL MEDICINE

## 2024-02-05 PROCEDURE — 36415 COLL VENOUS BLD VENIPUNCTURE: CPT

## 2024-02-05 PROCEDURE — 80053 COMPREHEN METABOLIC PANEL: CPT

## 2024-02-05 PROCEDURE — 1090F PRES/ABSN URINE INCON ASSESS: CPT | Performed by: INTERNAL MEDICINE

## 2024-02-05 PROCEDURE — 3075F SYST BP GE 130 - 139MM HG: CPT | Performed by: INTERNAL MEDICINE

## 2024-02-05 PROCEDURE — 99214 OFFICE O/P EST MOD 30 MIN: CPT | Performed by: INTERNAL MEDICINE

## 2024-02-05 PROCEDURE — G8427 DOCREV CUR MEDS BY ELIG CLIN: HCPCS | Performed by: INTERNAL MEDICINE

## 2024-02-05 RX ORDER — FERROUS GLUCONATE 324(37.5)
324 TABLET ORAL DAILY
Qty: 90 TABLET | Refills: 3 | Status: SHIPPED | OUTPATIENT
Start: 2024-02-05

## 2024-02-05 NOTE — PROGRESS NOTES
sbst intrlmnr crv/thrc w/img gdn (N/A, 10/1/2018); pr njx aa&/strd tfrml epi lumbar/sacral 1 level (Bilateral, 2018); other surgical history (Bilateral, 2019); epidural steroid injection (Bilateral, 2019); Nerve Surgery (Bilateral, 2019); Nerve Block (Bilateral, 2020); Pain management procedure (Bilateral, 2020); Pain management procedure (N/A, 3/22/2021); other surgical history (2021); Pain management procedure (Bilateral, 2021); eye surgery (Left);  section; Cystoscopy (2022); and Cystoscopy (Bilateral, 3/9/2022).     CURRENT MEDICATIONS:  has a current medication list which includes the following prescription(s): hydrocodone-acetaminophen, metformin, gabapentin, clonidine, accu-chek softclix lancets, ra alcohol swabs, loratadine, ferrous sulfate, lisinopril, amitriptyline, estradiol, allopurinol, tizanidine, carvedilol, movantik, atorvastatin, cyanocobalamin, polyethylene glycol, pantoprazole, fluticasone, handicap placard, blood glucose test strips, and aspirin.    ALLERGIES:  is allergic to seasonal.    FAMILY HISTORY: Negative for any hematological or oncological conditions.     SOCIAL HISTORY:  reports that she quit smoking about 22 years ago. Her smoking use included cigarettes. She started smoking about 37 years ago. She has a 7.5 pack-year smoking history. She has never used smokeless tobacco. She reports current alcohol use. She reports that she does not use drugs.    REVIEW OF SYSTEMS:   General: no fever or night sweats, Weight is stable.  Generalized fatigue, mild  ENT: No double or blurred vision, no tinnitus or hearing problem, no dysphagia or sore throat   Respiratory: No chest pain, no shortness of breath, no cough or hemoptysis.  Cardiovascular: Denies chest pain, PND or orthopnea.  Chronic lower extremity swelling and fatigue   Gastrointestinal:    No nausea or vomiting, abdominal pain, diarrhea or constipation.    Genitourinary: Denies

## 2024-02-14 ENCOUNTER — OFFICE VISIT (OUTPATIENT)
Dept: FAMILY MEDICINE CLINIC | Age: 84
End: 2024-02-14

## 2024-02-14 VITALS
DIASTOLIC BLOOD PRESSURE: 54 MMHG | HEART RATE: 68 BPM | OXYGEN SATURATION: 97 % | SYSTOLIC BLOOD PRESSURE: 106 MMHG | TEMPERATURE: 97.7 F

## 2024-02-14 DIAGNOSIS — H91.93 HEARING DIFFICULTY OF BOTH EARS: ICD-10-CM

## 2024-02-14 DIAGNOSIS — K21.9 GASTROESOPHAGEAL REFLUX DISEASE, UNSPECIFIED WHETHER ESOPHAGITIS PRESENT: ICD-10-CM

## 2024-02-14 DIAGNOSIS — Z00.00 MEDICARE ANNUAL WELLNESS VISIT, SUBSEQUENT: Primary | ICD-10-CM

## 2024-02-14 DIAGNOSIS — G95.89 MYELOMALACIA (HCC): ICD-10-CM

## 2024-02-14 DIAGNOSIS — M48.062 LUMBAR STENOSIS WITH NEUROGENIC CLAUDICATION: Chronic | ICD-10-CM

## 2024-02-14 DIAGNOSIS — T40.2X5A THERAPEUTIC OPIOID-INDUCED CONSTIPATION (OIC): ICD-10-CM

## 2024-02-14 DIAGNOSIS — E11.69 DYSLIPIDEMIA ASSOCIATED WITH TYPE 2 DIABETES MELLITUS (HCC): ICD-10-CM

## 2024-02-14 DIAGNOSIS — Z13.6 SCREENING FOR CARDIOVASCULAR CONDITION: ICD-10-CM

## 2024-02-14 DIAGNOSIS — E11.40 TYPE 2 DIABETES MELLITUS WITH DIABETIC NEUROPATHY, WITHOUT LONG-TERM CURRENT USE OF INSULIN (HCC): ICD-10-CM

## 2024-02-14 DIAGNOSIS — Z71.89 ACP (ADVANCE CARE PLANNING): ICD-10-CM

## 2024-02-14 DIAGNOSIS — E78.5 DYSLIPIDEMIA ASSOCIATED WITH TYPE 2 DIABETES MELLITUS (HCC): ICD-10-CM

## 2024-02-14 DIAGNOSIS — I10 ESSENTIAL HYPERTENSION: ICD-10-CM

## 2024-02-14 DIAGNOSIS — K59.03 THERAPEUTIC OPIOID-INDUCED CONSTIPATION (OIC): ICD-10-CM

## 2024-02-14 DIAGNOSIS — Z79.891 CHRONIC USE OF OPIATE DRUG FOR THERAPEUTIC PURPOSE: ICD-10-CM

## 2024-02-14 DIAGNOSIS — G47.33 OSA ON CPAP: ICD-10-CM

## 2024-02-14 LAB — HBA1C MFR BLD: 6.6 %

## 2024-02-14 RX ORDER — ZOLPIDEM TARTRATE 5 MG/1
5 TABLET ORAL NIGHTLY PRN
COMMUNITY
Start: 2024-02-06

## 2024-02-14 RX ORDER — CLONIDINE HYDROCHLORIDE 0.1 MG/1
0.1 TABLET ORAL 2 TIMES DAILY
Qty: 180 TABLET | Refills: 1 | Status: SHIPPED | OUTPATIENT
Start: 2024-02-14

## 2024-02-14 NOTE — PATIENT INSTRUCTIONS
may not be where you want to be. But you're in the process of getting there. Everyone starts somewhere.  How can you find safe ways to stay active?  Talk with your doctor about any physical challenges you're facing. Make a plan with your doctor if you have a health problem or aren't sure how to get started with activity.  If you're already active, ask your doctor if there is anything you should change to stay safe as your body and health change.  If you tend to feel dizzy after you take medicine, avoid activity at that time. Try being active before you take your medicine. This will reduce your risk of falls.  If you plan to be active at home, make sure to clear your space before you get started. Remove things like TV cords, coffee tables, and throw rugs. It's safest to have plenty of space to move freely.  The key to getting more active is to take it slow and steady. Try to improve only a little bit at a time. Pick just one area to improve on at first. And if an activity hurts, stop and talk to your doctor.  Where can you learn more?  Go to https://www.[employer].net/patientEd and enter P600 to learn more about \"Learning About Being Active as an Older Adult.\"  Current as of: June 6, 2023               Content Version: 13.9  © 3863-5322 [employer].   Care instructions adapted under license by [employer]. If you have questions about a medical condition or this instruction, always ask your healthcare professional. [employer] disclaims any warranty or liability for your use of this information.           Hearing Loss: Care Instructions  Overview     Hearing loss is a sudden or slow decrease in how well you hear. It can range from slight to profound. Permanent hearing loss can occur with aging. It also can happen when you are exposed long-term to loud noise. Examples include listening to loud music, riding motorcycles, or being around other loud machines.  Hearing loss can affect your work

## 2024-02-14 NOTE — PROGRESS NOTES
mouth 2 times daily Yes Connie Hale MD   Accu-Chek Softclix Lancets MISC use once daily Yes Connie Hale MD   RA Alcohol Swabs 70 % PADS use as directed once daily Yes Connie Hale MD   loratadine (CLARITIN) 10 MG tablet take 1 tablet by mouth once daily  Patient taking differently: take 1 tablet by mouth once daily PRN Yes Connie Hale MD   lisinopril (PRINIVIL;ZESTRIL) 5 MG tablet Take 1 tablet by mouth daily Yes Connie Hale MD   amitriptyline (ELAVIL) 25 MG tablet Take 1 tablet by mouth nightly at bedtime. Yes Connie Hale MD   estradiol (ESTRACE VAGINAL) 0.1 MG/GM vaginal cream Apply 0.5 grams to vaginal introitus and urethra Monday, Wednesday, Friday Yes Lilli Aragon APRN - CNP   allopurinol (ZYLOPRIM) 100 MG tablet take 1 tablet by mouth once daily Yes Connie Hale MD   tiZANidine (ZANAFLEX) 2 MG tablet TAKE 2 TABLETS BY MOUTH AT BEDTIME AND ONCE DURING THE DAY AS NEEDED Yes Connie Hale MD   carvedilol (COREG) 3.125 MG tablet take 1 tablet by mouth twice a day Yes Connie Hale MD   MOVANTIK 12.5 MG TABS tablet take 1 tablet IN THE MORNING before breakfast Yes Connie Hale MD   atorvastatin (LIPITOR) 40 MG tablet take 1 tablet by mouth once daily Yes Connie Hale MD   polyethylene glycol (GLYCOLAX) 17 GM/SCOOP powder take 17GM (DISSOLVED IN WATER) by mouth once daily Yes Connie Hale MD   pantoprazole (PROTONIX) 40 MG tablet take 1 tablet by mouth every morning BEFORE BREAKFAST Yes Connie Hale MD   fluticasone (FLONASE) 50 MCG/ACT nasal spray instill 1 spray into each nostril once daily Yes Connie Hale MD   Handicap Placard MISC by Does not apply route Exp: 8/9/2027 Yes Connie Hale MD   blood glucose monitor strips Check blood sugars daily as directed. Yes Connie Hale MD   aspirin 325 MG tablet Take 1 tablet by mouth daily Yes Provider, MD Radha   ferrous sulfate (IRON 325) 325 (65 Fe) MG

## 2024-02-19 DIAGNOSIS — E78.00 PURE HYPERCHOLESTEROLEMIA: ICD-10-CM

## 2024-02-19 DIAGNOSIS — K21.9 GASTROESOPHAGEAL REFLUX DISEASE, UNSPECIFIED WHETHER ESOPHAGITIS PRESENT: ICD-10-CM

## 2024-02-20 RX ORDER — ATORVASTATIN CALCIUM 40 MG/1
TABLET, FILM COATED ORAL
Qty: 90 TABLET | Refills: 1 | Status: SHIPPED | OUTPATIENT
Start: 2024-02-20

## 2024-02-20 RX ORDER — PANTOPRAZOLE SODIUM 40 MG/1
TABLET, DELAYED RELEASE ORAL
Qty: 90 TABLET | Refills: 1 | Status: SHIPPED | OUTPATIENT
Start: 2024-02-20

## 2024-03-04 ENCOUNTER — OFFICE VISIT (OUTPATIENT)
Dept: PAIN MANAGEMENT | Age: 84
End: 2024-03-04
Payer: MEDICARE

## 2024-03-04 VITALS — HEART RATE: 60 BPM | BODY MASS INDEX: 27.05 KG/M2 | HEIGHT: 62 IN | WEIGHT: 147 LBS | OXYGEN SATURATION: 98 %

## 2024-03-04 DIAGNOSIS — Z79.891 CHRONIC USE OF OPIATE DRUG FOR THERAPEUTIC PURPOSE: ICD-10-CM

## 2024-03-04 DIAGNOSIS — M47.816 LUMBAR FACET JOINT SYNDROME: Chronic | ICD-10-CM

## 2024-03-04 DIAGNOSIS — M48.062 LUMBAR STENOSIS WITH NEUROGENIC CLAUDICATION: Primary | Chronic | ICD-10-CM

## 2024-03-04 DIAGNOSIS — M54.12 CERVICAL RADICULAR PAIN: Chronic | ICD-10-CM

## 2024-03-04 DIAGNOSIS — M48.02 CERVICAL SPINAL STENOSIS: ICD-10-CM

## 2024-03-04 DIAGNOSIS — M96.1 FAILED NECK SYNDROME: Chronic | ICD-10-CM

## 2024-03-04 PROCEDURE — G8417 CALC BMI ABV UP PARAM F/U: HCPCS | Performed by: NURSE PRACTITIONER

## 2024-03-04 PROCEDURE — 1090F PRES/ABSN URINE INCON ASSESS: CPT | Performed by: NURSE PRACTITIONER

## 2024-03-04 PROCEDURE — G8484 FLU IMMUNIZE NO ADMIN: HCPCS | Performed by: NURSE PRACTITIONER

## 2024-03-04 PROCEDURE — 99214 OFFICE O/P EST MOD 30 MIN: CPT | Performed by: NURSE PRACTITIONER

## 2024-03-04 PROCEDURE — G8427 DOCREV CUR MEDS BY ELIG CLIN: HCPCS | Performed by: NURSE PRACTITIONER

## 2024-03-04 PROCEDURE — 1036F TOBACCO NON-USER: CPT | Performed by: NURSE PRACTITIONER

## 2024-03-04 PROCEDURE — 1123F ACP DISCUSS/DSCN MKR DOCD: CPT | Performed by: NURSE PRACTITIONER

## 2024-03-04 PROCEDURE — G8399 PT W/DXA RESULTS DOCUMENT: HCPCS | Performed by: NURSE PRACTITIONER

## 2024-03-04 RX ORDER — HYDROCODONE BITARTRATE AND ACETAMINOPHEN 5; 325 MG/1; MG/1
1 TABLET ORAL 2 TIMES DAILY PRN
Qty: 60 TABLET | Refills: 0 | Status: SHIPPED | OUTPATIENT
Start: 2024-03-06 | End: 2024-04-05

## 2024-03-04 ASSESSMENT — ENCOUNTER SYMPTOMS
CONSTIPATION: 0
DIARRHEA: 0
NAUSEA: 0
VOMITING: 0
BACK PAIN: 1

## 2024-03-04 NOTE — PROGRESS NOTES
constantly. The problem has been gradually worsening. The pain is associated with nothing. The pain is present in the right side, midline and occipital region. The quality of the pain is described as aching. The pain is at a severity of 5/10. The pain is moderate. The symptoms are aggravated by position and twisting. The pain is Same all the time. Associated symptoms include headaches and numbness. Pertinent negatives include no tingling or weakness. She has tried bed rest and oral narcotics for the symptoms. The treatment provided mild relief.         Medication Refill:     Pain score Today:  5  Adverse effects (Constipation / Nausea / Sedation / sexual Dysfunction / others) : no  Mood: fair  Sleep pattern and quality: poor  Activity level: poor    Pill count Today: Norco # due 3/6  Last dose taken  03/06/2024  OARRS report reviewed today: yes  ER/Hospitalizations/PCP visit related to pain since last visit:no   Any legal problems e.g. DUI etc.:No  Satisfied with current management: Yes    Opioid Contract:12/01/2023  Last Urine Dug screen dated:07/14/2023    Hemoglobin A1C   Date Value Ref Range Status   02/14/2024 6.6 % Final       Past Medical History, Past Surgical History, Social History, Allergies and Medications reviewed and updated in EPIC as indicated    Family History reviewed and is noncontributory.        Patient denies any new neurological symptoms. No bowel or bladder incontinence, no weakness, and no falling.    Pill count: appropriate    Morphine equivalent: 10    Periodic Controlled Substance Monitoring: Possible medication side effects, risk of tolerance/dependence & alternative treatments discussed., No signs of potential drug abuse or diversion identified., Assessed functional status (ability to engage in work or other purposeful activities, the pain intensity and its interference with activities of daily living, quality of family life and social activities, and the physical activity), Obtaining

## 2024-03-07 DIAGNOSIS — K59.03 DRUG INDUCED CONSTIPATION: ICD-10-CM

## 2024-03-08 NOTE — TELEPHONE ENCOUNTER
Last visit: 02/14/2024  Last Med refill: 08/13/2023  Does patient have enough medication for 72 hours: No:     Next Visit Date:  Future Appointments   Date Time Provider Department Center   4/19/2024 10:00 AM Pilar Willoughby APRN - CNP [facility]   6/19/2024 10:00 AM Connie Hale MD [facility]   8/5/2024 10:00 AM García Wall MD [facility]       Health Maintenance   Topic Date Due    Shingles vaccine (1 of 2) Never done    Respiratory Syncytial Virus (RSV) Pregnant or age 60 yrs+ (1 - 1-dose 60+ series) Never done    Diabetic retinal exam  08/21/2020    COVID-19 Vaccine (4 - 2023-24 season) 09/01/2023    DTaP/Tdap/Td vaccine (1 - Tdap) 10/18/2026 (Originally 5/28/1959)    Lipids  04/14/2024    Diabetic foot exam  06/28/2024    A1C test (Diabetic or Prediabetic)  08/14/2024    Depression Screen  02/14/2025    Annual Wellness Visit (Medicare)  02/14/2025    DEXA (modify frequency per FRAX score)  Completed    Flu vaccine  Completed    Pneumococcal 65+ years Vaccine  Completed    Hepatitis A vaccine  Aged Out    Hepatitis B vaccine  Aged Out    Hib vaccine  Aged Out    Polio vaccine  Aged Out    Meningococcal (ACWY) vaccine  Aged Out    Hepatitis C screen  Discontinued       Hemoglobin A1C (%)   Date Value   02/14/2024 6.6   09/27/2023 6.7   04/17/2023 6.1             ( goal A1C is < 7)   No components found for: \"LABMICR\"  LDL Cholesterol (mg/dL)   Date Value   04/14/2023 64   10/07/2021 48       (goal LDL is <100)   AST (U/L)   Date Value   02/05/2024 16     ALT (U/L)   Date Value   02/05/2024 9     BUN (mg/dL)   Date Value   02/05/2024 17     BP Readings from Last 3 Encounters:   02/14/24 (!) 106/54   02/05/24 131/67   12/15/23 134/81          (goal 120/80)    All Future Testing planned in [facility]  Lab Frequency Next Occurrence   Cervical/Thoracic Epidural Steroid Injection Once 04/24/2023   CBC with Auto Differential     Comprehensive Metabolic Panel                 Patient

## 2024-03-11 RX ORDER — POLYETHYLENE GLYCOL 3350 17 G/17G
POWDER, FOR SOLUTION ORAL
Qty: 510 G | Refills: 1 | Status: SHIPPED | OUTPATIENT
Start: 2024-03-11

## 2024-03-12 DIAGNOSIS — I10 ESSENTIAL HYPERTENSION: ICD-10-CM

## 2024-03-13 RX ORDER — CARVEDILOL 3.12 MG/1
TABLET ORAL
Qty: 180 TABLET | Refills: 1 | Status: SHIPPED | OUTPATIENT
Start: 2024-03-13

## 2024-03-13 NOTE — TELEPHONE ENCOUNTER
Last visit: 02/14/2024  Last Med refill: 12/05/2023  Does patient have enough medication for 72 hours: No:     Next Visit Date:  Future Appointments   Date Time Provider Department Center   4/19/2024 10:00 AM Pilar Willoughby APRN - CNP [facility]   5/2/2024 10:15 AM Miryam Ruiz DPM [facility]   6/19/2024 10:00 AM Connie Hale MD [facility]   8/5/2024 10:00 AM García Wall MD [facility]       Health Maintenance   Topic Date Due    Shingles vaccine (1 of 2) Never done    Respiratory Syncytial Virus (RSV) Pregnant or age 60 yrs+ (1 - 1-dose 60+ series) Never done    Diabetic retinal exam  08/21/2020    COVID-19 Vaccine (4 - 2023-24 season) 09/01/2023    DTaP/Tdap/Td vaccine (1 - Tdap) 10/18/2026 (Originally 5/28/1959)    Lipids  04/14/2024    Diabetic foot exam  06/28/2024    A1C test (Diabetic or Prediabetic)  08/14/2024    Depression Screen  02/14/2025    Annual Wellness Visit (Medicare)  02/14/2025    DEXA (modify frequency per FRAX score)  Completed    Flu vaccine  Completed    Pneumococcal 65+ years Vaccine  Completed    Hepatitis A vaccine  Aged Out    Hepatitis B vaccine  Aged Out    Hib vaccine  Aged Out    Polio vaccine  Aged Out    Meningococcal (ACWY) vaccine  Aged Out    Hepatitis C screen  Discontinued       Hemoglobin A1C (%)   Date Value   02/14/2024 6.6   09/27/2023 6.7   04/17/2023 6.1             ( goal A1C is < 7)   No components found for: \"LABMICR\"  LDL Cholesterol (mg/dL)   Date Value   04/14/2023 64   10/07/2021 48       (goal LDL is <100)   AST (U/L)   Date Value   02/05/2024 16     ALT (U/L)   Date Value   02/05/2024 9     BUN (mg/dL)   Date Value   02/05/2024 17     BP Readings from Last 3 Encounters:   02/14/24 (!) 106/54   02/05/24 131/67   12/15/23 134/81          (goal 120/80)    All Future Testing planned in [facility]  Lab Frequency Next Occurrence   Cervical/Thoracic Epidural Steroid Injection Once 04/24/2023   CBC with Auto Differential

## 2024-03-23 DIAGNOSIS — I10 PRIMARY HYPERTENSION: ICD-10-CM

## 2024-03-25 RX ORDER — LISINOPRIL 5 MG/1
5 TABLET ORAL DAILY
Qty: 90 TABLET | Refills: 1 | Status: SHIPPED | OUTPATIENT
Start: 2024-03-25

## 2024-03-25 NOTE — TELEPHONE ENCOUNTER
Problem List:     Osteoarthritis     Essential hypertension     Type 2 diabetes mellitus with diabetic neuropathy, without long-term current use of insulin (HCC)     Dyslipidemia associated with type 2 diabetes mellitus (HCC)     Therapeutic opioid-induced constipation (OIC)     Rectal pain     Diverticulosis of colon     Tubular adenoma of colon     History of colon polyps     Rectal pressure     Failed neck syndrome     Cervical stenosis of spine     Cervical radicular pain     Lumbar stenosis with neurogenic claudication     JANN on CPAP     Primary osteoarthritis involving multiple joints     Hx of cervical spine surgery     Chronic use of opiate drugs therapeutic purposes     Myelomalacia (HCC)     Lumbar facet joint syndrome     Gastroesophageal reflux disease     Sleeping difficulty

## 2024-04-12 DIAGNOSIS — G47.9 SLEEPING DIFFICULTY: Primary | ICD-10-CM

## 2024-04-12 DIAGNOSIS — M1A.09X0 CHRONIC GOUT OF MULTIPLE SITES, UNSPECIFIED CAUSE: ICD-10-CM

## 2024-04-12 RX ORDER — ALLOPURINOL 100 MG/1
TABLET ORAL
Qty: 90 TABLET | Refills: 1 | Status: SHIPPED | OUTPATIENT
Start: 2024-04-12

## 2024-04-12 RX ORDER — ZOLPIDEM TARTRATE 5 MG/1
5 TABLET ORAL NIGHTLY PRN
Qty: 30 TABLET | Refills: 1 | Status: SHIPPED | OUTPATIENT
Start: 2024-04-12 | End: 2024-06-11

## 2024-04-12 NOTE — TELEPHONE ENCOUNTER
Last visit: 02/14/2024  Last Med refill: 12/29/2023  Does patient have enough medication for 72 hours: No:     Next Visit Date:  Future Appointments   Date Time Provider Department Center   4/19/2024 10:00 AM Pilar Willoughby APRN - CNP [facility]   5/1/2024 10:15 AM Miryam Ruiz DPM [facility]   6/19/2024 10:00 AM Connie Hale MD [facility]   8/5/2024 10:00 AM García Wall MD [facility]       Health Maintenance   Topic Date Due    Shingles vaccine (1 of 2) Never done    Respiratory Syncytial Virus (RSV) Pregnant or age 60 yrs+ (1 - 1-dose 60+ series) Never done    Diabetic retinal exam  08/21/2020    COVID-19 Vaccine (4 - 2023-24 season) 09/01/2023    Lipids  04/14/2024    DTaP/Tdap/Td vaccine (1 - Tdap) 10/18/2026 (Originally 5/28/1959)    Diabetic foot exam  06/28/2024    A1C test (Diabetic or Prediabetic)  08/14/2024    Depression Screen  02/14/2025    Annual Wellness Visit (Medicare)  02/14/2025    DEXA (modify frequency per FRAX score)  Completed    Flu vaccine  Completed    Pneumococcal 65+ years Vaccine  Completed    Hepatitis A vaccine  Aged Out    Hepatitis B vaccine  Aged Out    Hib vaccine  Aged Out    Polio vaccine  Aged Out    Meningococcal (ACWY) vaccine  Aged Out    Hepatitis C screen  Discontinued       Hemoglobin A1C (%)   Date Value   02/14/2024 6.6   09/27/2023 6.7   04/17/2023 6.1             ( goal A1C is < 7)   No components found for: \"LABMICR\"  LDL Cholesterol (mg/dL)   Date Value   04/14/2023 64   10/07/2021 48       (goal LDL is <100)   AST (U/L)   Date Value   02/05/2024 16     ALT (U/L)   Date Value   02/05/2024 9     BUN (mg/dL)   Date Value   02/05/2024 17     BP Readings from Last 3 Encounters:   02/14/24 (!) 106/54   02/05/24 131/67   12/15/23 134/81          (goal 120/80)    All Future Testing planned in [facility]  Lab Frequency Next Occurrence   CBC with Auto Differential     Comprehensive Metabolic Panel                 Patient

## 2024-04-19 ENCOUNTER — OFFICE VISIT (OUTPATIENT)
Dept: PAIN MANAGEMENT | Age: 84
End: 2024-04-19
Payer: MEDICARE

## 2024-04-19 VITALS — HEIGHT: 62 IN | OXYGEN SATURATION: 98 % | HEART RATE: 63 BPM | BODY MASS INDEX: 27.05 KG/M2 | WEIGHT: 147 LBS

## 2024-04-19 DIAGNOSIS — Z79.891 CHRONIC USE OF OPIATE DRUG FOR THERAPEUTIC PURPOSE: ICD-10-CM

## 2024-04-19 DIAGNOSIS — M54.12 CERVICAL RADICULAR PAIN: Chronic | ICD-10-CM

## 2024-04-19 DIAGNOSIS — M48.02 CERVICAL SPINAL STENOSIS: ICD-10-CM

## 2024-04-19 DIAGNOSIS — M96.1 FAILED NECK SYNDROME: Chronic | ICD-10-CM

## 2024-04-19 DIAGNOSIS — M47.816 LUMBAR FACET JOINT SYNDROME: Chronic | ICD-10-CM

## 2024-04-19 DIAGNOSIS — M48.062 LUMBAR STENOSIS WITH NEUROGENIC CLAUDICATION: Primary | Chronic | ICD-10-CM

## 2024-04-19 PROCEDURE — G8427 DOCREV CUR MEDS BY ELIG CLIN: HCPCS | Performed by: NURSE PRACTITIONER

## 2024-04-19 PROCEDURE — G8417 CALC BMI ABV UP PARAM F/U: HCPCS | Performed by: NURSE PRACTITIONER

## 2024-04-19 PROCEDURE — 1036F TOBACCO NON-USER: CPT | Performed by: NURSE PRACTITIONER

## 2024-04-19 PROCEDURE — 99214 OFFICE O/P EST MOD 30 MIN: CPT | Performed by: NURSE PRACTITIONER

## 2024-04-19 PROCEDURE — G8399 PT W/DXA RESULTS DOCUMENT: HCPCS | Performed by: NURSE PRACTITIONER

## 2024-04-19 PROCEDURE — 1090F PRES/ABSN URINE INCON ASSESS: CPT | Performed by: NURSE PRACTITIONER

## 2024-04-19 PROCEDURE — 1123F ACP DISCUSS/DSCN MKR DOCD: CPT | Performed by: NURSE PRACTITIONER

## 2024-04-19 RX ORDER — AMITRIPTYLINE HYDROCHLORIDE 25 MG/1
25 TABLET, FILM COATED ORAL NIGHTLY
Qty: 90 TABLET | Refills: 1 | Status: SHIPPED | OUTPATIENT
Start: 2024-04-19

## 2024-04-19 RX ORDER — HYDROCODONE BITARTRATE AND ACETAMINOPHEN 5; 325 MG/1; MG/1
1 TABLET ORAL 2 TIMES DAILY PRN
Qty: 60 TABLET | Refills: 0 | Status: SHIPPED | OUTPATIENT
Start: 2024-04-29 | End: 2024-05-29

## 2024-04-19 ASSESSMENT — ENCOUNTER SYMPTOMS
SHORTNESS OF BREATH: 0
RESPIRATORY NEGATIVE: 1
GASTROINTESTINAL NEGATIVE: 1
CONSTIPATION: 0
COUGH: 0
BACK PAIN: 1

## 2024-04-19 NOTE — TELEPHONE ENCOUNTER
Last visit: 02/14/2024  Last Med refill: 01/04/2024  Does patient have enough medication for 72 hours: No:     Next Visit Date:  Future Appointments   Date Time Provider Department Center   4/19/2024 10:00 AM Pilar Willoughby APRN - CNP [facility]   5/1/2024 10:15 AM Miryam Ruiz DPM [facility]   6/19/2024 10:00 AM Connie Hale MD [facility]   8/5/2024 10:00 AM García Wall MD [facility]       Health Maintenance   Topic Date Due    Shingles vaccine (1 of 2) Never done    Respiratory Syncytial Virus (RSV) Pregnant or age 60 yrs+ (1 - 1-dose 60+ series) Never done    Diabetic retinal exam  08/21/2020    COVID-19 Vaccine (4 - 2023-24 season) 09/01/2023    Lipids  04/14/2024    DTaP/Tdap/Td vaccine (1 - Tdap) 10/18/2026 (Originally 5/28/1959)    Diabetic foot exam  06/28/2024    A1C test (Diabetic or Prediabetic)  08/14/2024    Depression Screen  02/14/2025    Annual Wellness Visit (Medicare)  02/14/2025    DEXA (modify frequency per FRAX score)  Completed    Flu vaccine  Completed    Pneumococcal 65+ years Vaccine  Completed    Hepatitis A vaccine  Aged Out    Hepatitis B vaccine  Aged Out    Hib vaccine  Aged Out    Polio vaccine  Aged Out    Meningococcal (ACWY) vaccine  Aged Out    Hepatitis C screen  Discontinued       Hemoglobin A1C (%)   Date Value   02/14/2024 6.6   09/27/2023 6.7   04/17/2023 6.1             ( goal A1C is < 7)   No components found for: \"LABMICR\"  LDL Cholesterol (mg/dL)   Date Value   04/14/2023 64   10/07/2021 48       (goal LDL is <100)   AST (U/L)   Date Value   02/05/2024 16     ALT (U/L)   Date Value   02/05/2024 9     BUN (mg/dL)   Date Value   02/05/2024 17     BP Readings from Last 3 Encounters:   02/14/24 (!) 106/54   02/05/24 131/67   12/15/23 134/81          (goal 120/80)    All Future Testing planned in [facility]  Lab Frequency Next Occurrence   CBC with Auto Differential     Comprehensive Metabolic Panel                 Patient

## 2024-04-19 NOTE — PROGRESS NOTES
spray, instill 1 spray into each nostril once daily, Disp: 16 g, Rfl: 2    Handicap Placard MISC, by Does not apply route Exp: 2027, Disp: 1 each, Rfl: 0    blood glucose monitor strips, Check blood sugars daily as directed., Disp: 50 strip, Rfl: 6    aspirin 325 MG tablet, Take 1 tablet by mouth daily, Disp: , Rfl:     Family History   Problem Relation Age of Onset    Breast Cancer Sister     Cancer Sister         liver    Diabetes Daughter        Social History     Socioeconomic History    Marital status: Single     Spouse name: Not on file    Number of children: Not on file    Years of education: Not on file    Highest education level: Not on file   Occupational History    Not on file   Tobacco Use    Smoking status: Former     Current packs/day: 0.00     Average packs/day: 0.5 packs/day for 15.0 years (7.5 ttl pk-yrs)     Types: Cigarettes     Start date: 10/5/1986     Quit date: 10/5/2001     Years since quittin.5    Smokeless tobacco: Never   Vaping Use    Vaping Use: Never used   Substance and Sexual Activity    Alcohol use: Yes     Alcohol/week: 0.0 standard drinks of alcohol     Comment: once every few months    Drug use: No    Sexual activity: Not Currently   Other Topics Concern    Not on file   Social History Narrative    Not on file     Social Determinants of Health     Financial Resource Strain: Low Risk  (2023)    Overall Financial Resource Strain (CARDIA)     Difficulty of Paying Living Expenses: Not hard at all   Food Insecurity: Not on file (2023)   Transportation Needs: No Transportation Needs (2023)    PRAPARE - Transportation     Lack of Transportation (Medical): No     Lack of Transportation (Non-Medical): No   Physical Activity: Inactive (2024)    Exercise Vital Sign     Days of Exercise per Week: 0 days     Minutes of Exercise per Session: 0 min   Stress: No Stress Concern Present (2023)    [employer] - Occupational Stress

## 2024-05-01 ENCOUNTER — OFFICE VISIT (OUTPATIENT)
Dept: PODIATRY | Age: 84
End: 2024-05-01
Payer: MEDICARE

## 2024-05-01 VITALS — HEIGHT: 62 IN | BODY MASS INDEX: 27.05 KG/M2 | WEIGHT: 147 LBS

## 2024-05-01 DIAGNOSIS — E11.40 TYPE 2 DIABETES MELLITUS WITH DIABETIC NEUROPATHY, WITHOUT LONG-TERM CURRENT USE OF INSULIN (HCC): Primary | ICD-10-CM

## 2024-05-01 DIAGNOSIS — B35.1 DERMATOPHYTOSIS OF NAIL: ICD-10-CM

## 2024-05-01 DIAGNOSIS — M79.605 PAIN IN BOTH LOWER EXTREMITIES: ICD-10-CM

## 2024-05-01 DIAGNOSIS — M79.604 PAIN IN BOTH LOWER EXTREMITIES: ICD-10-CM

## 2024-05-01 DIAGNOSIS — I73.9 PERIPHERAL VASCULAR DISORDER (HCC): ICD-10-CM

## 2024-05-01 PROCEDURE — G8427 DOCREV CUR MEDS BY ELIG CLIN: HCPCS | Performed by: PODIATRIST

## 2024-05-01 PROCEDURE — 1036F TOBACCO NON-USER: CPT | Performed by: PODIATRIST

## 2024-05-01 PROCEDURE — 99213 OFFICE O/P EST LOW 20 MIN: CPT | Performed by: PODIATRIST

## 2024-05-01 PROCEDURE — 11721 DEBRIDE NAIL 6 OR MORE: CPT | Performed by: PODIATRIST

## 2024-05-01 PROCEDURE — G8417 CALC BMI ABV UP PARAM F/U: HCPCS | Performed by: PODIATRIST

## 2024-05-01 PROCEDURE — G8399 PT W/DXA RESULTS DOCUMENT: HCPCS | Performed by: PODIATRIST

## 2024-05-01 PROCEDURE — 1123F ACP DISCUSS/DSCN MKR DOCD: CPT | Performed by: PODIATRIST

## 2024-05-01 PROCEDURE — 3044F HG A1C LEVEL LT 7.0%: CPT | Performed by: PODIATRIST

## 2024-05-01 PROCEDURE — 1090F PRES/ABSN URINE INCON ASSESS: CPT | Performed by: PODIATRIST

## 2024-05-01 RX ORDER — KETOCONAZOLE 20 MG/G
CREAM TOPICAL
Qty: 30 G | Refills: 2 | Status: SHIPPED | OUTPATIENT
Start: 2024-05-01

## 2024-05-01 NOTE — PROGRESS NOTES
[facility] [address]  [address]  [facility] [address]  Dept: [phone]  Dept Fax: [phone]    DIABETIC PROGRESS NOTE  Date of patient's visit: 5/1/2024  Patient's Name:  Sanna Duncan YOB: 1940            Patient Care Team:  Connie Hale MD as PCP - General (Internal Medicine)  Connie Hale MD as PCP - Empaneled Provider  Mckay Collado MD as Consulting Physician (Colon and Rectal Surgery)  Ralph Martini MD as Consulting Physician (Pain Management)  Miryam Ruiz DPM as Physician (Podiatry)          Chief Complaint   Patient presents with    Diabetes     Diabetic foot care     Peripheral Neuropathy     Bilateral feet     Nail Problem     Left great toenail discolored x 2 month        Subjective:   Sanna Duncan comes to clinic for Diabetes (Diabetic foot care ), Peripheral Neuropathy (Bilateral feet ), and Nail Problem (Left great toenail discolored x 2 month )    she is a diabetic and states that needs toenails trimmed today.  Pt currently has complaint of thickened, elongated nails that they cannot manage by themselves.   Pt's primary care physician is Connie Hale MD last seen 2/14/24.   Pt's last blood sugar was 130 .  Pt has a new complaint of increased nail discoloration and thickening.  Pt has tried changing shoes but it has not helped the pain       Lab Results   Component Value Date    LABA1C 6.6 02/14/2024      Complains of numbness in the feet bilat.  Past Medical History:   Diagnosis Date    Arthritis     Cataract     Cataracts, bilateral     Cervical spondylosis with myelopathy     Depression     Diverticulosis of colon 2015    Dysuria     Dysuria     Gastroesophageal reflux disease 4/13/2021    Gout     Headache(784.0)     Hematuria     History of colon polyps 2015    Insomnia     Lumbar stenosis with neurogenic claudication     Neck pain     JANN (obstructive sleep apnea)

## 2024-05-31 DIAGNOSIS — M48.02 CERVICAL STENOSIS OF SPINE: ICD-10-CM

## 2024-05-31 NOTE — TELEPHONE ENCOUNTER
Last visit: 2/14/24  Last Med refill: 12/28/23  Does patient have enough medication for 72 hours: No:     Next Visit Date:  Future Appointments   Date Time Provider Department Center   6/11/2024 10:30 AM Ralph Martini MD [facility]   6/19/2024 10:00 AM Connie Hale MD [facility]   7/24/2024 10:00 AM Miryam Ruiz DPM [facility]   8/5/2024 10:00 AM García Wall MD [facility]       Health Maintenance   Topic Date Due    Shingles vaccine (1 of 2) Never done    Respiratory Syncytial Virus (RSV) Pregnant or age 60 yrs+ (1 - 1-dose 60+ series) Never done    Diabetic retinal exam  08/21/2020    COVID-19 Vaccine (4 - 2023-24 season) 09/01/2023    Lipids  04/14/2024    DTaP/Tdap/Td vaccine (1 - Tdap) 10/18/2026 (Originally 5/28/1959)    A1C test (Diabetic or Prediabetic)  08/14/2024    Depression Screen  02/14/2025    Diabetic foot exam  05/01/2025    DEXA (modify frequency per FRAX score)  Completed    Annual Wellness Visit (Medicare Advantage)  Completed    Flu vaccine  Completed    Pneumococcal 65+ years Vaccine  Completed    Hepatitis A vaccine  Aged Out    Hepatitis B vaccine  Aged Out    Hib vaccine  Aged Out    Polio vaccine  Aged Out    Meningococcal (ACWY) vaccine  Aged Out    Hepatitis C screen  Discontinued       Hemoglobin A1C (%)   Date Value   02/14/2024 6.6   09/27/2023 6.7   04/17/2023 6.1             ( goal A1C is < 7)   No components found for: \"LABMICR\"  No components found for: \"LDLCHOLESTEROL\", \"LDLCALC\"    (goal LDL is <100)   AST (U/L)   Date Value   02/05/2024 16     ALT (U/L)   Date Value   02/05/2024 9     BUN (mg/dL)   Date Value   02/05/2024 17     BP Readings from Last 3 Encounters:   02/14/24 (!) 106/54   02/05/24 131/67   12/15/23 134/81          (goal 120/80)    All Future Testing planned in [facility]  Lab Frequency Next Occurrence   CBC with Auto Differential     Comprehensive Metabolic Panel                 Patient Active Problem List:

## 2024-06-03 RX ORDER — GABAPENTIN 600 MG/1
600 TABLET ORAL 3 TIMES DAILY
Qty: 90 TABLET | Refills: 2 | Status: SHIPPED | OUTPATIENT
Start: 2024-06-03 | End: 2024-09-01

## 2024-06-11 ENCOUNTER — OFFICE VISIT (OUTPATIENT)
Dept: PAIN MANAGEMENT | Age: 84
End: 2024-06-11
Payer: MEDICARE

## 2024-06-11 VITALS — OXYGEN SATURATION: 98 % | HEART RATE: 63 BPM | HEIGHT: 62 IN | WEIGHT: 147 LBS | BODY MASS INDEX: 27.05 KG/M2

## 2024-06-11 DIAGNOSIS — G47.33 OSA ON CPAP: ICD-10-CM

## 2024-06-11 DIAGNOSIS — M15.9 PRIMARY OSTEOARTHRITIS INVOLVING MULTIPLE JOINTS: ICD-10-CM

## 2024-06-11 DIAGNOSIS — Z98.890 HX OF CERVICAL SPINE SURGERY: ICD-10-CM

## 2024-06-11 DIAGNOSIS — G95.89 MYELOMALACIA (HCC): ICD-10-CM

## 2024-06-11 DIAGNOSIS — K59.03 THERAPEUTIC OPIOID-INDUCED CONSTIPATION (OIC): ICD-10-CM

## 2024-06-11 DIAGNOSIS — Z79.891 CHRONIC USE OF OPIATE DRUG FOR THERAPEUTIC PURPOSE: Primary | ICD-10-CM

## 2024-06-11 DIAGNOSIS — T40.2X5A THERAPEUTIC OPIOID-INDUCED CONSTIPATION (OIC): ICD-10-CM

## 2024-06-11 PROCEDURE — 1036F TOBACCO NON-USER: CPT | Performed by: ANESTHESIOLOGY

## 2024-06-11 PROCEDURE — G8417 CALC BMI ABV UP PARAM F/U: HCPCS | Performed by: ANESTHESIOLOGY

## 2024-06-11 PROCEDURE — G8427 DOCREV CUR MEDS BY ELIG CLIN: HCPCS | Performed by: ANESTHESIOLOGY

## 2024-06-11 PROCEDURE — 99213 OFFICE O/P EST LOW 20 MIN: CPT | Performed by: ANESTHESIOLOGY

## 2024-06-11 PROCEDURE — G8399 PT W/DXA RESULTS DOCUMENT: HCPCS | Performed by: ANESTHESIOLOGY

## 2024-06-11 PROCEDURE — 1123F ACP DISCUSS/DSCN MKR DOCD: CPT | Performed by: ANESTHESIOLOGY

## 2024-06-11 PROCEDURE — 1090F PRES/ABSN URINE INCON ASSESS: CPT | Performed by: ANESTHESIOLOGY

## 2024-06-11 RX ORDER — HYDROCODONE BITARTRATE AND ACETAMINOPHEN 5; 325 MG/1; MG/1
1 TABLET ORAL 2 TIMES DAILY PRN
Qty: 60 TABLET | Refills: 0 | Status: SHIPPED | OUTPATIENT
Start: 2024-06-11 | End: 2024-07-11

## 2024-06-11 ASSESSMENT — ENCOUNTER SYMPTOMS
DIARRHEA: 0
VOMITING: 0
CONSTIPATION: 1
NAUSEA: 0
SHORTNESS OF BREATH: 0

## 2024-06-11 NOTE — PROGRESS NOTES
The patient is a 84 y.o.Non- / non  female. PMH significant for cervical spinal stenosis, lumbar stenosis. Presenting for medication refill F/U, continues to endorse neck and low back pain, states neck pain is worse than back pain. States that low back pain does not radiate, states it is midline. Endorses mild new left arm pain which started two weeks ago, but otherwise no new symptoms, no significant numbness or weakness. States her Norco and gabapentin remain effective for her pain control. States she has constipation, has 1-2 BM per week, states she takes a laxative, presumably Glycolax.     Chief Complaint   Patient presents with    Neck Pain    Medication Refill        Neck Pain   Associated symptoms include numbness. Pertinent negatives include no chest pain, fever, headaches or weakness.   Medication Refill  Associated symptoms include neck pain and numbness. Pertinent negatives include no chest pain, chills, fatigue, fever, headaches, nausea, vomiting or weakness.     Chief Complaint:  Medication Refill:     Pain score Today:  4  Adverse effects (Constipation / Nausea / Sedation / sexual Dysfunction / others) : no  Mood: fair  Sleep pattern and quality: poor  Activity level: fair    Pill count Today: 11   Last dose taken  6/11/24 5am  OARRS report reviewed today: yes  ER/Hospitalizations/PCP visit related to pain since last visit:no   Any legal problems e.g. DUI etc.:No  Satisfied with current management: Yes    Opioid Contract:12/01/23  Last Urine Dug screen dated:07/14/23    Hemoglobin A1C   Date Value Ref Range Status   02/14/2024 6.6 % Final       Past Medical History, Past Surgical History, Social History, Allergies and Medications reviewed and updated in EPIC as indicated    Family History reviewed and is noncontributory.         Past Medical History:   Diagnosis Date    Arthritis     Cataract     Cataracts, bilateral     Cervical spondylosis with myelopathy     Depression     
OR    MI NJX DX/THER SBST INTRLMNR CRV/THRC W/IMG GDN N/A 10/1/2018    EPIDURAL STEROID INJECTION CERVICAL C7-T1 performed by Ralph Martini MD at [facility] OR       Social History     Socioeconomic History    Marital status: Single     Spouse name: None    Number of children: None    Years of education: None    Highest education level: None   Tobacco Use    Smoking status: Former     Current packs/day: 0.00     Average packs/day: 0.5 packs/day for 15.0 years (7.5 ttl pk-yrs)     Types: Cigarettes     Start date: 10/5/1986     Quit date: 10/5/2001     Years since quittin.6    Smokeless tobacco: Never   Vaping Use    Vaping Use: Never used   Substance and Sexual Activity    Alcohol use: Yes     Alcohol/week: 0.0 standard drinks of alcohol     Comment: once every few months    Drug use: No    Sexual activity: Not Currently     Social Determinants of Health     Financial Resource Strain: Low Risk  (2023)    Overall Financial Resource Strain (CARDIA)     Difficulty of Paying Living Expenses: Not hard at all   Transportation Needs: No Transportation Needs (2023)    PRAPARE - Transportation     Lack of Transportation (Medical): No     Lack of Transportation (Non-Medical): No   Physical Activity: Inactive (2024)    Exercise Vital Sign     Days of Exercise per Week: 0 days     Minutes of Exercise per Session: 0 min   Stress: No Stress Concern Present (2023)    [employer] - Occupational Stress Questionnaire     Feeling of Stress : Not at all   Social Connections: Moderately Isolated (2023)    Social Connection and Isolation Panel [NHANES]     Frequency of Communication with Friends and Family: Three times a week     Frequency of Social Gatherings with Friends and Family: More than three times a week     Attends [employer]: 1 to 4 times per year     Active Member of Clubs or Organizations: No     Attends Club or Organization Meetings: Never     Marital Status:

## 2024-06-19 ENCOUNTER — OFFICE VISIT (OUTPATIENT)
Dept: FAMILY MEDICINE CLINIC | Age: 84
End: 2024-06-19
Payer: MEDICARE

## 2024-06-19 ENCOUNTER — HOSPITAL ENCOUNTER (OUTPATIENT)
Age: 84
Setting detail: SPECIMEN
Discharge: HOME | End: 2024-06-19

## 2024-06-19 VITALS
OXYGEN SATURATION: 93 % | DIASTOLIC BLOOD PRESSURE: 70 MMHG | HEART RATE: 81 BPM | SYSTOLIC BLOOD PRESSURE: 128 MMHG | TEMPERATURE: 98.4 F

## 2024-06-19 DIAGNOSIS — E11.69 DYSLIPIDEMIA DUE TO TYPE 2 DIABETES MELLITUS (HCC): ICD-10-CM

## 2024-06-19 DIAGNOSIS — E11.40 TYPE 2 DIABETES MELLITUS WITH DIABETIC NEUROPATHY, WITHOUT LONG-TERM CURRENT USE OF INSULIN (HCC): Primary | ICD-10-CM

## 2024-06-19 DIAGNOSIS — R30.0 BURNING WITH URINATION: ICD-10-CM

## 2024-06-19 DIAGNOSIS — I10 PRIMARY HYPERTENSION: ICD-10-CM

## 2024-06-19 DIAGNOSIS — G47.33 OSA ON CPAP: ICD-10-CM

## 2024-06-19 DIAGNOSIS — F11.20 OPIOID DEPENDENCE WITH CURRENT USE (HCC): ICD-10-CM

## 2024-06-19 DIAGNOSIS — E11.40 TYPE 2 DIABETES MELLITUS WITH DIABETIC NEUROPATHY, WITHOUT LONG-TERM CURRENT USE OF INSULIN (HCC): ICD-10-CM

## 2024-06-19 DIAGNOSIS — N95.2 POST-MENOPAUSAL ATROPHIC VAGINITIS: ICD-10-CM

## 2024-06-19 DIAGNOSIS — M1A.09X0 CHRONIC GOUT OF MULTIPLE SITES, UNSPECIFIED CAUSE: ICD-10-CM

## 2024-06-19 DIAGNOSIS — E78.5 DYSLIPIDEMIA DUE TO TYPE 2 DIABETES MELLITUS (HCC): ICD-10-CM

## 2024-06-19 DIAGNOSIS — Z13.29 SCREENING FOR THYROID DISORDER: ICD-10-CM

## 2024-06-19 DIAGNOSIS — E55.9 VITAMIN D DEFICIENCY: ICD-10-CM

## 2024-06-19 DIAGNOSIS — M48.062 LUMBAR STENOSIS WITH NEUROGENIC CLAUDICATION: Chronic | ICD-10-CM

## 2024-06-19 LAB
BILIRUBIN, POC: NEGATIVE
BLOOD URINE, POC: NEGATIVE
CLARITY, POC: ABNORMAL
COLOR, POC: ABNORMAL
CREAT UR-MCNC: 57.5 MG/DL (ref 28–217)
GLUCOSE URINE, POC: NEGATIVE
HBA1C MFR BLD: 6.6 %
KETONES, POC: NEGATIVE
LEUKOCYTE EST, POC: NEGATIVE
MICROALBUMIN UR-MCNC: <12 MG/L (ref 0–20)
MICROALBUMIN/CREAT UR-RTO: NORMAL MCG/MG CREAT (ref 0–25)
NITRITE, POC: NEGATIVE
PH, POC: 8
PROTEIN, POC: ABNORMAL
SPECIFIC GRAVITY, POC: 1.01
UROBILINOGEN, POC: 0.2

## 2024-06-19 PROCEDURE — G8417 CALC BMI ABV UP PARAM F/U: HCPCS | Performed by: INTERNAL MEDICINE

## 2024-06-19 PROCEDURE — 1036F TOBACCO NON-USER: CPT | Performed by: INTERNAL MEDICINE

## 2024-06-19 PROCEDURE — 83036 HEMOGLOBIN GLYCOSYLATED A1C: CPT | Performed by: INTERNAL MEDICINE

## 2024-06-19 PROCEDURE — 3078F DIAST BP <80 MM HG: CPT | Performed by: INTERNAL MEDICINE

## 2024-06-19 PROCEDURE — G8427 DOCREV CUR MEDS BY ELIG CLIN: HCPCS | Performed by: INTERNAL MEDICINE

## 2024-06-19 PROCEDURE — 1123F ACP DISCUSS/DSCN MKR DOCD: CPT | Performed by: INTERNAL MEDICINE

## 2024-06-19 PROCEDURE — 81003 URINALYSIS AUTO W/O SCOPE: CPT | Performed by: INTERNAL MEDICINE

## 2024-06-19 PROCEDURE — G8399 PT W/DXA RESULTS DOCUMENT: HCPCS | Performed by: INTERNAL MEDICINE

## 2024-06-19 PROCEDURE — 3074F SYST BP LT 130 MM HG: CPT | Performed by: INTERNAL MEDICINE

## 2024-06-19 PROCEDURE — 99214 OFFICE O/P EST MOD 30 MIN: CPT | Performed by: INTERNAL MEDICINE

## 2024-06-19 PROCEDURE — 3044F HG A1C LEVEL LT 7.0%: CPT | Performed by: INTERNAL MEDICINE

## 2024-06-19 PROCEDURE — 1090F PRES/ABSN URINE INCON ASSESS: CPT | Performed by: INTERNAL MEDICINE

## 2024-06-19 RX ORDER — ESTRADIOL 0.1 MG/G
CREAM VAGINAL
Qty: 42.5 G | Refills: 1 | Status: SHIPPED | OUTPATIENT
Start: 2024-06-19

## 2024-06-19 SDOH — ECONOMIC STABILITY: FOOD INSECURITY: WITHIN THE PAST 12 MONTHS, YOU WORRIED THAT YOUR FOOD WOULD RUN OUT BEFORE YOU GOT MONEY TO BUY MORE.: NEVER TRUE

## 2024-06-19 SDOH — ECONOMIC STABILITY: FOOD INSECURITY: WITHIN THE PAST 12 MONTHS, THE FOOD YOU BOUGHT JUST DIDN'T LAST AND YOU DIDN'T HAVE MONEY TO GET MORE.: NEVER TRUE

## 2024-06-19 SDOH — ECONOMIC STABILITY: INCOME INSECURITY: HOW HARD IS IT FOR YOU TO PAY FOR THE VERY BASICS LIKE FOOD, HOUSING, MEDICAL CARE, AND HEATING?: NOT VERY HARD

## 2024-06-19 NOTE — PROGRESS NOTES
prescribedmedications.  All patient questions answered.  Pt voiced understanding. Reviewedhealth maintenance.  Instructed to continue current medications, diet and exercise.Patient agreed with treatment plan. Follow up as directed.     Electronically signedby ELAINE ROMERO MD on 6/19/2024

## 2024-06-20 LAB
MICROORGANISM SPEC CULT: NORMAL
SERVICE CMNT-IMP: NORMAL
SPECIMEN DESCRIPTION: NORMAL

## 2024-07-05 ASSESSMENT — ENCOUNTER SYMPTOMS
CHOKING: 0
SHORTNESS OF BREATH: 0
COUGH: 0
NAUSEA: 0
DIARRHEA: 0
VOMITING: 0
ABDOMINAL PAIN: 0
CONSTIPATION: 0
ANAL BLEEDING: 0
WHEEZING: 0
BLOOD IN STOOL: 0
CHEST TIGHTNESS: 0

## 2024-07-05 ASSESSMENT — VISUAL ACUITY: OU: 1

## 2024-07-10 DIAGNOSIS — J30.89 CHRONIC NON-SEASONAL ALLERGIC RHINITIS: ICD-10-CM

## 2024-07-11 RX ORDER — LORATADINE 10 MG/1
TABLET ORAL
Qty: 90 TABLET | Refills: 1 | Status: SHIPPED | OUTPATIENT
Start: 2024-07-11

## 2024-07-11 NOTE — TELEPHONE ENCOUNTER
Last visit: 06/19/2024  Last Med refill: 02/20/2024  Does patient have enough medication for 72 hours: No:     Next Visit Date:  Future Appointments   Date Time Provider Department Center   7/24/2024 10:00 AM Miryam Ruiz DPM [facility]   7/26/2024 10:00 AM Pilar Willoughby APRN - CNP [facility]   8/5/2024 10:00 AM García Wall MD [facility]   10/24/2024  9:15 AM Connie Hale MD [facility]       Health Maintenance   Topic Date Due    Shingles vaccine (1 of 2) Never done    Respiratory Syncytial Virus (RSV) Pregnant or age 60 yrs+ (1 - 1-dose 60+ series) Never done    Diabetic retinal exam  08/21/2020    COVID-19 Vaccine (4 - 2023-24 season) 09/01/2023    Lipids  04/14/2024    DTaP/Tdap/Td vaccine (1 - Tdap) 10/18/2026 (Originally 5/28/1959)    Flu vaccine (1) 08/01/2024    A1C test (Diabetic or Prediabetic)  12/19/2024    Depression Screen  02/14/2025    Diabetic foot exam  05/01/2025    DEXA (modify frequency per FRAX score)  Completed    Annual Wellness Visit (Medicare Advantage)  Completed    Pneumococcal 65+ years Vaccine  Completed    Hepatitis A vaccine  Aged Out    Hepatitis B vaccine  Aged Out    Hib vaccine  Aged Out    Polio vaccine  Aged Out    Meningococcal (ACWY) vaccine  Aged Out    Hepatitis C screen  Discontinued       Hemoglobin A1C (%)   Date Value   06/19/2024 6.6   02/14/2024 6.6   09/27/2023 6.7             ( goal A1C is < 7)   No components found for: \"LABMICR\"  No components found for: \"LDLCHOLESTEROL\", \"LDLCALC\"    (goal LDL is <100)   AST (U/L)   Date Value   02/05/2024 16     ALT (U/L)   Date Value   02/05/2024 9     BUN (mg/dL)   Date Value   02/05/2024 17     BP Readings from Last 3 Encounters:   06/19/24 128/70   02/14/24 (!) 106/54   02/05/24 131/67          (goal 120/80)    All Future Testing planned in [facility]  Lab Frequency Next Occurrence   Lipid, Fasting Once 06/19/2024   TSH With Reflex Ft4 Once 06/19/2024   Vitamin D 25 Hydroxy

## 2024-07-24 ENCOUNTER — OFFICE VISIT (OUTPATIENT)
Dept: PODIATRY | Age: 84
End: 2024-07-24
Payer: MEDICARE

## 2024-07-24 VITALS — BODY MASS INDEX: 27.05 KG/M2 | HEIGHT: 62 IN | WEIGHT: 147 LBS

## 2024-07-24 DIAGNOSIS — M79.605 PAIN IN BOTH LOWER EXTREMITIES: ICD-10-CM

## 2024-07-24 DIAGNOSIS — B35.1 DERMATOPHYTOSIS OF NAIL: ICD-10-CM

## 2024-07-24 DIAGNOSIS — R60.0 EDEMA OF LOWER EXTREMITY: ICD-10-CM

## 2024-07-24 DIAGNOSIS — M79.604 PAIN IN BOTH LOWER EXTREMITIES: ICD-10-CM

## 2024-07-24 DIAGNOSIS — I73.9 PERIPHERAL VASCULAR DISORDER (HCC): ICD-10-CM

## 2024-07-24 DIAGNOSIS — E11.40 TYPE 2 DIABETES MELLITUS WITH DIABETIC NEUROPATHY, WITHOUT LONG-TERM CURRENT USE OF INSULIN (HCC): Primary | ICD-10-CM

## 2024-07-24 PROCEDURE — 11721 DEBRIDE NAIL 6 OR MORE: CPT | Performed by: PODIATRIST

## 2024-07-24 PROCEDURE — 3044F HG A1C LEVEL LT 7.0%: CPT | Performed by: PODIATRIST

## 2024-07-24 PROCEDURE — 1036F TOBACCO NON-USER: CPT | Performed by: PODIATRIST

## 2024-07-24 PROCEDURE — G8427 DOCREV CUR MEDS BY ELIG CLIN: HCPCS | Performed by: PODIATRIST

## 2024-07-24 PROCEDURE — 1090F PRES/ABSN URINE INCON ASSESS: CPT | Performed by: PODIATRIST

## 2024-07-24 PROCEDURE — G8399 PT W/DXA RESULTS DOCUMENT: HCPCS | Performed by: PODIATRIST

## 2024-07-24 PROCEDURE — 99213 OFFICE O/P EST LOW 20 MIN: CPT | Performed by: PODIATRIST

## 2024-07-24 PROCEDURE — G8417 CALC BMI ABV UP PARAM F/U: HCPCS | Performed by: PODIATRIST

## 2024-07-24 PROCEDURE — 1123F ACP DISCUSS/DSCN MKR DOCD: CPT | Performed by: PODIATRIST

## 2024-07-26 ENCOUNTER — OFFICE VISIT (OUTPATIENT)
Dept: PAIN MANAGEMENT | Age: 84
End: 2024-07-26
Payer: MEDICARE

## 2024-07-26 VITALS — HEIGHT: 62 IN | HEART RATE: 73 BPM | WEIGHT: 147 LBS | BODY MASS INDEX: 27.05 KG/M2 | OXYGEN SATURATION: 94 %

## 2024-07-26 DIAGNOSIS — T40.2X5A THERAPEUTIC OPIOID-INDUCED CONSTIPATION (OIC): ICD-10-CM

## 2024-07-26 DIAGNOSIS — M47.816 LUMBAR FACET JOINT SYNDROME: Chronic | ICD-10-CM

## 2024-07-26 DIAGNOSIS — K59.03 THERAPEUTIC OPIOID-INDUCED CONSTIPATION (OIC): ICD-10-CM

## 2024-07-26 DIAGNOSIS — Z79.891 CHRONIC USE OF OPIATE DRUG FOR THERAPEUTIC PURPOSE: ICD-10-CM

## 2024-07-26 DIAGNOSIS — M48.062 LUMBAR STENOSIS WITH NEUROGENIC CLAUDICATION: Primary | Chronic | ICD-10-CM

## 2024-07-26 PROCEDURE — G8399 PT W/DXA RESULTS DOCUMENT: HCPCS | Performed by: NURSE PRACTITIONER

## 2024-07-26 PROCEDURE — 99214 OFFICE O/P EST MOD 30 MIN: CPT | Performed by: NURSE PRACTITIONER

## 2024-07-26 PROCEDURE — 1036F TOBACCO NON-USER: CPT | Performed by: NURSE PRACTITIONER

## 2024-07-26 PROCEDURE — 1123F ACP DISCUSS/DSCN MKR DOCD: CPT | Performed by: NURSE PRACTITIONER

## 2024-07-26 PROCEDURE — 1090F PRES/ABSN URINE INCON ASSESS: CPT | Performed by: NURSE PRACTITIONER

## 2024-07-26 PROCEDURE — G8427 DOCREV CUR MEDS BY ELIG CLIN: HCPCS | Performed by: NURSE PRACTITIONER

## 2024-07-26 PROCEDURE — G8417 CALC BMI ABV UP PARAM F/U: HCPCS | Performed by: NURSE PRACTITIONER

## 2024-07-26 RX ORDER — ZOLPIDEM TARTRATE 5 MG/1
TABLET ORAL
COMMUNITY
Start: 2024-07-11

## 2024-07-26 RX ORDER — HYDROCODONE BITARTRATE AND ACETAMINOPHEN 5; 325 MG/1; MG/1
1 TABLET ORAL 2 TIMES DAILY PRN
Qty: 60 TABLET | Refills: 0 | Status: SHIPPED | OUTPATIENT
Start: 2024-07-26 | End: 2024-08-25

## 2024-07-26 ASSESSMENT — ENCOUNTER SYMPTOMS
BOWEL INCONTINENCE: 0
SHORTNESS OF BREATH: 0
COUGH: 0
CONSTIPATION: 1
BACK PAIN: 1

## 2024-07-26 NOTE — PROGRESS NOTES
Chief Complaint   Patient presents with    Back Pain    Medication Refill     norco        [facility]    history of chronic neck and chronic lower back pain  She is diagnosed with cervical spinal stenosis and lumbar spinal stenosis  Neck pain is located midline into left upper trapezius. Reports constant bilat. finger/hand numbness, denies HA. Worse with ROM.   Her back pain is minimal at this time but at times located in lower back pain in the lumbar area with radiation down both legs. aggravated with standing and walking and does notice bilat ankle and foot swelling at times  Patient is on chronic low-dose opioid therapy with Norco 5 mg twice a day along with gabapentin. Does take norco sparingly  Finds the medication helpful allowing her to do daily life activities and be independent  Not a candidate for NSAIDs because of renal insufficiency and advanced age     Procedures  4/24/23  CERVICAL EPIDURAL STEROID INJECTION C7-T1 with 60% relief-  not ready to repeat      4/29/21  BILATERAL L5 EPIDURAL STEROID INJECTION reports 70% relief with improved function - not ready to repeat      HPI   Neck Pain   This is a chronic problem. The current episode started more than 1 year ago. The pain is associated with nothing. The pain is present in the left side, midline and right side. The quality of the pain is described as aching. The pain is at a severity of 6/10. The symptoms are aggravated by bending and position. Associated symptoms include numbness and tingling (fingers). Pertinent negatives include no chest pain or fever. The treatment provided mild relief.   Back Pain  This is a chronic problem. The current episode started more than 1 year ago. The problem occurs constantly. The problem is unchanged. The pain is present in the lumbar spine. The quality of the pain is described as aching. The pain does not radiate. The pain is at a severity of 5/10. The pain is mild. The pain is Worse during the night. The symptoms

## 2024-07-31 NOTE — PROGRESS NOTES
[facility] PODIATRY [address]  [address]  [facility] [address]  Dept: [phone]  Dept Fax: [phone]    DIABETIC PROGRESS NOTE  Date of patient's visit: 7/31/2024  Patient's Name:  Sanna Duncan YOB: 1940            Patient Care Team:  Connie Hale MD as PCP - General (Internal Medicine)  Connie Hale MD as PCP - Empaneled Provider  Mckay Collado MD as Consulting Physician (Colon and Rectal Surgery)  Ralph Martini MD as Consulting Physician (Pain Management)  Miryam Ruiz DPM as Physician (Podiatry)          Chief Complaint   Patient presents with    Diabetes     Diabetic foot care, patient states did not check blood sugar today    Peripheral Neuropathy     Bilateral foot        Subjective:   Sanna Duncan comes to clinic for Diabetes (Diabetic foot care, patient states did not check blood sugar today) and Peripheral Neuropathy (Bilateral foot )    she is a diabetic and states that needs toenails trimmed today.  Pt currently has complaint of thickened, elongated nails that they cannot manage by themselves.   Pt's primary care physician is Connie Hale MD last seen 2/14/24.   Pt's last blood sugar was 130 .  Pt has a new complaint of increased swelling to regis LE.       Lab Results   Component Value Date    LABA1C 6.6 06/19/2024      Complains of numbness in the feet bilat.  Past Medical History:   Diagnosis Date    Arthritis     Cataract     Cataracts, bilateral     Cervical spondylosis with myelopathy     Depression     Diverticulosis of colon 2015    Dysuria     Dysuria     Gastroesophageal reflux disease 4/13/2021    Gout     Headache(784.0)     Hematuria     History of colon polyps 2015    Insomnia     Lumbar stenosis with neurogenic claudication     Neck pain     JANN (obstructive sleep apnea)     Osteoarthrosis, unspecified whether generalized or localized, unspecified site 10/5/2012    Pelvic pain

## 2024-08-02 DIAGNOSIS — M54.12 CERVICAL RADICULAR PAIN: Chronic | ICD-10-CM

## 2024-08-02 RX ORDER — TIZANIDINE 2 MG/1
TABLET ORAL
Qty: 90 TABLET | Refills: 1 | Status: SHIPPED | OUTPATIENT
Start: 2024-08-02

## 2024-08-02 NOTE — TELEPHONE ENCOUNTER
Hydroxy Once 06/19/2024   CBC with Auto Differential     Comprehensive Metabolic Panel                 Patient Active Problem List:     Osteoarthritis     Essential hypertension     Type 2 diabetes mellitus with diabetic neuropathy, without long-term current use of insulin (HCC)     Dyslipidemia associated with type 2 diabetes mellitus (HCC)     Therapeutic opioid-induced constipation (OIC)     Rectal pain     Diverticulosis of colon     Tubular adenoma of colon     History of colon polyps     Rectal pressure     Failed neck syndrome     Cervical stenosis of spine     Cervical radicular pain     Lumbar stenosis with neurogenic claudication     JANN on CPAP     Primary osteoarthritis involving multiple joints     Hx of cervical spine surgery     Chronic use of opiate drugs therapeutic purposes     Myelomalacia (HCC)     Lumbar facet joint syndrome     Gastroesophageal reflux disease     Sleeping difficulty

## 2024-08-06 DIAGNOSIS — M54.12 CERVICAL RADICULAR PAIN: Chronic | ICD-10-CM

## 2024-08-06 DIAGNOSIS — E78.00 PURE HYPERCHOLESTEROLEMIA: ICD-10-CM

## 2024-08-06 DIAGNOSIS — M1A.09X0 CHRONIC GOUT OF MULTIPLE SITES, UNSPECIFIED CAUSE: ICD-10-CM

## 2024-08-06 RX ORDER — AMITRIPTYLINE HYDROCHLORIDE 25 MG/1
25 TABLET, FILM COATED ORAL NIGHTLY
Qty: 90 TABLET | Refills: 1 | Status: SHIPPED | OUTPATIENT
Start: 2024-08-06

## 2024-08-06 RX ORDER — ALLOPURINOL 100 MG/1
100 TABLET ORAL DAILY
Qty: 90 TABLET | Refills: 1 | Status: SHIPPED | OUTPATIENT
Start: 2024-08-06

## 2024-08-06 NOTE — TELEPHONE ENCOUNTER
Last visit: 06/19/24  Last Med refill: PHARMACY CHANGE  Does patient have enough medication for 72 hours: No:     Next Visit Date:  Future Appointments   Date Time Provider Department Center   8/16/2024  3:30 PM García Wall MD [facility]   8/23/2024  1:20 PM Pilar Willoughby, APRN - CNP [facility]   10/23/2024 10:30 AM Miryam Ruiz DPM [facility]   10/24/2024  9:15 AM Connie Hale MD [facility]       Health Maintenance   Topic Date Due    Shingles vaccine (1 of 2) Never done    Respiratory Syncytial Virus (RSV) Pregnant or age 60 yrs+ (1 - 1-dose 60+ series) Never done    Diabetic retinal exam  08/21/2020    COVID-19 Vaccine (4 - 2023-24 season) 09/01/2023    Lipids  04/14/2024    Flu vaccine (1) 08/01/2024    DTaP/Tdap/Td vaccine (1 - Tdap) 10/18/2026 (Originally 5/28/1959)    A1C test (Diabetic or Prediabetic)  12/19/2024    Depression Screen  02/14/2025    Diabetic foot exam  07/31/2025    DEXA (modify frequency per FRAX score)  Completed    Annual Wellness Visit (Medicare Advantage)  Completed    Pneumococcal 65+ years Vaccine  Completed    Hepatitis A vaccine  Aged Out    Hepatitis B vaccine  Aged Out    Hib vaccine  Aged Out    Polio vaccine  Aged Out    Meningococcal (ACWY) vaccine  Aged Out    Hepatitis C screen  Discontinued       Hemoglobin A1C (%)   Date Value   06/19/2024 6.6   02/14/2024 6.6   09/27/2023 6.7             ( goal A1C is < 7)   No components found for: \"LABMICR\"  No components found for: \"LDLCHOLESTEROL\", \"LDLCALC\"    (goal LDL is <100)   AST (U/L)   Date Value   02/05/2024 16     ALT (U/L)   Date Value   02/05/2024 9     BUN (mg/dL)   Date Value   02/05/2024 17     BP Readings from Last 3 Encounters:   06/19/24 128/70   02/14/24 (!) 106/54   02/05/24 131/67          (goal 120/80)    All Future Testing planned in [facility]  Lab Frequency Next Occurrence   Lipid, Fasting Once 06/19/2024   TSH With Reflex Ft4 Once 06/19/2024   Vitamin D 25

## 2024-08-30 ENCOUNTER — OFFICE VISIT (OUTPATIENT)
Dept: PAIN MANAGEMENT | Age: 84
End: 2024-08-30
Payer: MEDICARE

## 2024-08-30 VITALS — BODY MASS INDEX: 27.05 KG/M2 | HEIGHT: 62 IN | WEIGHT: 147 LBS

## 2024-08-30 DIAGNOSIS — M15.9 PRIMARY OSTEOARTHRITIS INVOLVING MULTIPLE JOINTS: ICD-10-CM

## 2024-08-30 DIAGNOSIS — M54.12 CERVICAL RADICULAR PAIN: Chronic | ICD-10-CM

## 2024-08-30 DIAGNOSIS — M48.062 LUMBAR STENOSIS WITH NEUROGENIC CLAUDICATION: Primary | Chronic | ICD-10-CM

## 2024-08-30 DIAGNOSIS — M48.02 CERVICAL STENOSIS OF SPINE: Chronic | ICD-10-CM

## 2024-08-30 DIAGNOSIS — Z79.891 CHRONIC USE OF OPIATE DRUG FOR THERAPEUTIC PURPOSE: ICD-10-CM

## 2024-08-30 DIAGNOSIS — M47.816 LUMBAR FACET JOINT SYNDROME: Chronic | ICD-10-CM

## 2024-08-30 PROCEDURE — 99214 OFFICE O/P EST MOD 30 MIN: CPT | Performed by: NURSE PRACTITIONER

## 2024-08-30 PROCEDURE — G8427 DOCREV CUR MEDS BY ELIG CLIN: HCPCS | Performed by: NURSE PRACTITIONER

## 2024-08-30 PROCEDURE — G8399 PT W/DXA RESULTS DOCUMENT: HCPCS | Performed by: NURSE PRACTITIONER

## 2024-08-30 PROCEDURE — G8417 CALC BMI ABV UP PARAM F/U: HCPCS | Performed by: NURSE PRACTITIONER

## 2024-08-30 PROCEDURE — 1123F ACP DISCUSS/DSCN MKR DOCD: CPT | Performed by: NURSE PRACTITIONER

## 2024-08-30 PROCEDURE — 1036F TOBACCO NON-USER: CPT | Performed by: NURSE PRACTITIONER

## 2024-08-30 PROCEDURE — 1090F PRES/ABSN URINE INCON ASSESS: CPT | Performed by: NURSE PRACTITIONER

## 2024-08-30 RX ORDER — HYDROCODONE BITARTRATE AND ACETAMINOPHEN 5; 325 MG/1; MG/1
1 TABLET ORAL 2 TIMES DAILY PRN
Qty: 60 TABLET | Refills: 0 | Status: SHIPPED | OUTPATIENT
Start: 2024-09-05 | End: 2024-10-05

## 2024-08-30 ASSESSMENT — ENCOUNTER SYMPTOMS: BACK PAIN: 1

## 2024-08-30 NOTE — PROGRESS NOTES
Chief Complaint   Patient presents with    Medication Refill     Norco    Neck Pain         [facility]     history of chronic neck and chronic lower back pain  She is diagnosed with cervical spinal stenosis and lumbar spinal stenosis  Neck pain is located midline into left upper trapezius. Reports constant bilat. finger/hand numbness, denies HA. Worse with ROM.   Her back pain is minimal at this time but at times located in lower back pain in the lumbar area with radiation down both legs. aggravated with standing and walking and does notice bilat ankle and foot swelling at times  Patient is on chronic low-dose opioid therapy with Norco 5 mg twice a day along with gabapentin. Does take norco sparingly  Finds the medication helpful allowing her to do daily life activities and be independent  Not a candidate for NSAIDs because of renal insufficiency and advanced age     Procedures  4/24/23  CERVICAL EPIDURAL STEROID INJECTION C7-T1 with 60% relief-  not ready to repeat      4/29/21  BILATERAL L5 EPIDURAL STEROID INJECTION reports 70% relief with improved function - not ready to repeat      HPI   Neck Pain   This is a chronic problem. The current episode started more than 1 year ago. The problem occurs constantly. The problem has been unchanged. The pain is associated with nothing. The pain is present in the right side and occipital region. The quality of the pain is described as shooting. The pain is at a severity of 5/10. The pain is moderate. Exacerbated by: unsure what brings on the pain. The pain is Worse during the night. Stiffness is present All day. Associated symptoms include headaches, numbness, tingling and weakness. She has tried home exercises, NSAIDs and muscle relaxants for the symptoms. The treatment provided mild relief.   Back Pain  This is a chronic problem. The current episode started more than 1 year ago. The problem has been waxing and waning since onset. The pain is at a severity of 0/10. The

## 2024-09-09 DIAGNOSIS — I10 PRIMARY HYPERTENSION: ICD-10-CM

## 2024-09-09 DIAGNOSIS — E78.00 PURE HYPERCHOLESTEROLEMIA: ICD-10-CM

## 2024-09-09 DIAGNOSIS — E53.8 B12 DEFICIENCY: ICD-10-CM

## 2024-09-09 DIAGNOSIS — I10 ESSENTIAL HYPERTENSION: ICD-10-CM

## 2024-09-09 DIAGNOSIS — G47.9 SLEEPING DIFFICULTY: Primary | ICD-10-CM

## 2024-09-10 RX ORDER — CLONIDINE HYDROCHLORIDE 0.1 MG/1
0.1 TABLET ORAL 2 TIMES DAILY
Qty: 180 TABLET | Refills: 1 | Status: SHIPPED | OUTPATIENT
Start: 2024-09-10

## 2024-09-10 RX ORDER — ATORVASTATIN CALCIUM 40 MG/1
40 TABLET, FILM COATED ORAL DAILY
Qty: 90 TABLET | Refills: 1 | Status: SHIPPED | OUTPATIENT
Start: 2024-09-10

## 2024-09-10 RX ORDER — FERROUS GLUCONATE 324(37.5)
324 TABLET ORAL DAILY
Qty: 90 TABLET | Refills: 3 | Status: SHIPPED | OUTPATIENT
Start: 2024-09-10

## 2024-09-10 RX ORDER — LISINOPRIL 5 MG/1
5 TABLET ORAL DAILY
Qty: 90 TABLET | Refills: 1 | Status: SHIPPED | OUTPATIENT
Start: 2024-09-10

## 2024-09-10 RX ORDER — ZOLPIDEM TARTRATE 5 MG/1
5 TABLET ORAL NIGHTLY PRN
Qty: 30 TABLET | Refills: 1 | Status: SHIPPED | OUTPATIENT
Start: 2024-09-10 | End: 2024-11-09

## 2024-09-10 RX ORDER — CARVEDILOL 3.12 MG/1
TABLET ORAL
Qty: 180 TABLET | Refills: 1 | Status: SHIPPED | OUTPATIENT
Start: 2024-09-10

## 2024-10-04 ENCOUNTER — OFFICE VISIT (OUTPATIENT)
Dept: PAIN MANAGEMENT | Age: 84
End: 2024-10-04
Payer: MEDICARE

## 2024-10-04 VITALS — BODY MASS INDEX: 27.05 KG/M2 | WEIGHT: 147 LBS | HEIGHT: 62 IN

## 2024-10-04 DIAGNOSIS — M48.062 LUMBAR STENOSIS WITH NEUROGENIC CLAUDICATION: Chronic | ICD-10-CM

## 2024-10-04 DIAGNOSIS — Z79.891 CHRONIC USE OF OPIATE DRUG FOR THERAPEUTIC PURPOSE: ICD-10-CM

## 2024-10-04 DIAGNOSIS — M47.816 LUMBAR FACET JOINT SYNDROME: Chronic | ICD-10-CM

## 2024-10-04 PROCEDURE — 1090F PRES/ABSN URINE INCON ASSESS: CPT | Performed by: NURSE PRACTITIONER

## 2024-10-04 PROCEDURE — G8484 FLU IMMUNIZE NO ADMIN: HCPCS | Performed by: NURSE PRACTITIONER

## 2024-10-04 PROCEDURE — G8417 CALC BMI ABV UP PARAM F/U: HCPCS | Performed by: NURSE PRACTITIONER

## 2024-10-04 PROCEDURE — G8399 PT W/DXA RESULTS DOCUMENT: HCPCS | Performed by: NURSE PRACTITIONER

## 2024-10-04 PROCEDURE — G8427 DOCREV CUR MEDS BY ELIG CLIN: HCPCS | Performed by: NURSE PRACTITIONER

## 2024-10-04 PROCEDURE — 1036F TOBACCO NON-USER: CPT | Performed by: NURSE PRACTITIONER

## 2024-10-04 PROCEDURE — 1123F ACP DISCUSS/DSCN MKR DOCD: CPT | Performed by: NURSE PRACTITIONER

## 2024-10-04 PROCEDURE — 99213 OFFICE O/P EST LOW 20 MIN: CPT | Performed by: NURSE PRACTITIONER

## 2024-10-04 RX ORDER — HYDROCODONE BITARTRATE AND ACETAMINOPHEN 5; 325 MG/1; MG/1
1 TABLET ORAL 2 TIMES DAILY PRN
Qty: 60 TABLET | Refills: 0 | Status: SHIPPED | OUTPATIENT
Start: 2024-10-16 | End: 2024-11-15

## 2024-10-04 ASSESSMENT — ENCOUNTER SYMPTOMS
SHORTNESS OF BREATH: 0
CONSTIPATION: 0
BACK PAIN: 1
COUGH: 0

## 2024-10-04 NOTE — PROGRESS NOTES
Chief Complaint   Patient presents with    Medication Refill     Norco     Neck Pain         [facility]     history of chronic neck and chronic lower back pain  She is diagnosed with cervical spinal stenosis and lumbar spinal stenosis  Neck pain is located midline into left upper trapezius. Reports constant bilat. finger/hand numbness, denies HA. Worse with ROM.   Her back pain is minimal at this time but at times located in lower back pain in the lumbar area with radiation down both legs. aggravated with standing and walking and does notice bilat ankle and foot swelling at times  Patient is on chronic low-dose opioid therapy with Norco 5 mg twice a day along with gabapentin. Does take norco sparingly  Finds the medication helpful allowing her to do daily life activities and be independent  Not a candidate for NSAIDs because of renal insufficiency and advanced age     Procedures  4/24/23  CERVICAL EPIDURAL STEROID INJECTION C7-T1 with 60% relief-  not ready to repeat      4/29/21  BILATERAL L5 EPIDURAL STEROID INJECTION reports 70% relief with improved function - not ready to repeat      HPI   Neck Pain   This is a chronic problem. The current episode started more than 1 year ago. The problem occurs constantly. The problem has been unchanged. The pain is associated with nothing. The pain is present in the midline. The quality of the pain is described as shooting. The pain is at a severity of 5/10. Pain severity now: unable to answer. The symptoms are aggravated by bending and twisting. The pain is Same all the time. Stiffness is present All day. Associated symptoms include headaches, numbness, tingling and weakness. Pertinent negatives include no chest pain or fever. She has tried NSAIDs for the symptoms. The treatment provided mild relief.       Patient denies any new neurological symptoms. No bowel or bladder incontinence, no weakness, and no falling.    Pill count: Norco-25 10/6 prescription adjusted

## 2024-10-06 DIAGNOSIS — M48.02 CERVICAL STENOSIS OF SPINE: ICD-10-CM

## 2024-10-06 DIAGNOSIS — M54.12 CERVICAL RADICULAR PAIN: Chronic | ICD-10-CM

## 2024-10-07 RX ORDER — GABAPENTIN 600 MG/1
600 TABLET ORAL 3 TIMES DAILY
Qty: 90 TABLET | Refills: 2 | Status: SHIPPED | OUTPATIENT
Start: 2024-10-07 | End: 2025-01-05

## 2024-10-07 RX ORDER — TIZANIDINE 2 MG/1
TABLET ORAL
Qty: 90 TABLET | Refills: 1 | Status: SHIPPED | OUTPATIENT
Start: 2024-10-07

## 2024-10-07 NOTE — TELEPHONE ENCOUNTER
Last visit: 06/19/24  Last Med refill: 09/05/24 & 08/02/24  Does patient have enough medication for 72 hours:     Next Visit Date:  Future Appointments   Date Time Provider Department Center   10/23/2024 10:30 AM Miryam Ruiz DPM [facility]   10/24/2024  9:15 AM Connie Hael MD [facility]   11/12/2024 11:00 AM Ralph Martini MD [facility]       Health Maintenance   Topic Date Due    Shingles vaccine (1 of 2) Never done    Respiratory Syncytial Virus (RSV) Pregnant or age 60 yrs+ (1 - 1-dose 60+ series) Never done    Diabetic retinal exam  08/21/2020    Lipids  04/14/2024    Flu vaccine (1) 08/01/2024    COVID-19 Vaccine (4 - 2023-24 season) 09/01/2024    DTaP/Tdap/Td vaccine (1 - Tdap) 10/18/2026 (Originally 5/28/1959)    A1C test (Diabetic or Prediabetic)  12/19/2024    Depression Screen  02/14/2025    Diabetic foot exam  07/31/2025    DEXA (modify frequency per FRAX score)  Completed    Annual Wellness Visit (Medicare Advantage)  Completed    Pneumococcal 65+ years Vaccine  Completed    Hepatitis A vaccine  Aged Out    Hepatitis B vaccine  Aged Out    Hib vaccine  Aged Out    Polio vaccine  Aged Out    Meningococcal (ACWY) vaccine  Aged Out    Hepatitis C screen  Discontinued       Hemoglobin A1C (%)   Date Value   06/19/2024 6.6   02/14/2024 6.6   09/27/2023 6.7             ( goal A1C is < 7)   No components found for: \"LABMICR\"  No components found for: \"LDLCHOLESTEROL\", \"LDLCALC\"    (goal LDL is <100)   AST (U/L)   Date Value   02/05/2024 16     ALT (U/L)   Date Value   02/05/2024 9     BUN (mg/dL)   Date Value   02/05/2024 17     BP Readings from Last 3 Encounters:   06/19/24 128/70   02/14/24 (!) 106/54   02/05/24 131/67          (goal 120/80)    All Future Testing planned in [facility]  Lab Frequency Next Occurrence   Lipid, Fasting Once 06/19/2024   TSH With Reflex Ft4 Once 06/19/2024   Vitamin D 25 Hydroxy Once 06/19/2024   CBC with Auto Differential     Comprehensive

## 2024-10-23 ENCOUNTER — OFFICE VISIT (OUTPATIENT)
Dept: PODIATRY | Age: 84
End: 2024-10-23
Payer: MEDICARE

## 2024-10-23 VITALS — WEIGHT: 147 LBS | BODY MASS INDEX: 27.05 KG/M2 | HEIGHT: 62 IN

## 2024-10-23 DIAGNOSIS — E11.40 TYPE 2 DIABETES MELLITUS WITH DIABETIC NEUROPATHY, WITHOUT LONG-TERM CURRENT USE OF INSULIN (HCC): Primary | ICD-10-CM

## 2024-10-23 DIAGNOSIS — B35.1 DERMATOPHYTOSIS OF NAIL: ICD-10-CM

## 2024-10-23 DIAGNOSIS — M79.604 PAIN IN BOTH LOWER EXTREMITIES: ICD-10-CM

## 2024-10-23 DIAGNOSIS — M79.605 PAIN IN BOTH LOWER EXTREMITIES: ICD-10-CM

## 2024-10-23 DIAGNOSIS — R60.0 EDEMA OF LOWER EXTREMITY: ICD-10-CM

## 2024-10-23 DIAGNOSIS — I73.9 PERIPHERAL VASCULAR DISORDER (HCC): ICD-10-CM

## 2024-10-23 PROCEDURE — G8399 PT W/DXA RESULTS DOCUMENT: HCPCS | Performed by: PODIATRIST

## 2024-10-23 PROCEDURE — 1126F AMNT PAIN NOTED NONE PRSNT: CPT | Performed by: PODIATRIST

## 2024-10-23 PROCEDURE — G8427 DOCREV CUR MEDS BY ELIG CLIN: HCPCS | Performed by: PODIATRIST

## 2024-10-23 PROCEDURE — 11721 DEBRIDE NAIL 6 OR MORE: CPT | Performed by: PODIATRIST

## 2024-10-23 PROCEDURE — 3044F HG A1C LEVEL LT 7.0%: CPT | Performed by: PODIATRIST

## 2024-10-23 PROCEDURE — G8417 CALC BMI ABV UP PARAM F/U: HCPCS | Performed by: PODIATRIST

## 2024-10-23 PROCEDURE — 1090F PRES/ABSN URINE INCON ASSESS: CPT | Performed by: PODIATRIST

## 2024-10-23 PROCEDURE — 1123F ACP DISCUSS/DSCN MKR DOCD: CPT | Performed by: PODIATRIST

## 2024-10-23 PROCEDURE — G8482 FLU IMMUNIZE ORDER/ADMIN: HCPCS | Performed by: PODIATRIST

## 2024-10-23 PROCEDURE — 1036F TOBACCO NON-USER: CPT | Performed by: PODIATRIST

## 2024-10-23 PROCEDURE — 99213 OFFICE O/P EST LOW 20 MIN: CPT | Performed by: PODIATRIST

## 2024-10-23 PROCEDURE — 1125F AMNT PAIN NOTED PAIN PRSNT: CPT | Performed by: PODIATRIST

## 2024-10-23 NOTE — PROGRESS NOTES
were reviewed PRIOR to the patients arrival and with the patient today.    Previous patient encounter was reviewed.  Encounters from the patients other medical providers were reviewed and noted.   I personally interpreted the imaging and labs and discussed the results with the patient. Time was spent educating the patient and their families/caregivers on proper care of the feet and ankles.  All the above diagnosis were addressed at todays visit and all questions were answered.  A total of 20 minutes was spent with this patients encounter which included charting after the patients visit     Pt will follow up in 9 weeks or sooner if any problems arise. Diagnosis was discussed with the pt and all of their questions were answered in detail. Proper foot hygiene and care was discussed with the pt. Informed patient on proper diabetic foot care and importance of tight glycemic control.  Patient to check feet daily and contact the office with any questions/problems/concerns.   Other comorbidity noted and will be managed by PCP.  10/23/2024    Electronically signed by Miryam Ruiz DPM on 10/23/2024 at 11:18 AM  10/23/2024

## 2024-10-24 ENCOUNTER — OFFICE VISIT (OUTPATIENT)
Dept: FAMILY MEDICINE CLINIC | Age: 84
End: 2024-10-24

## 2024-10-24 VITALS
TEMPERATURE: 97.7 F | OXYGEN SATURATION: 98 % | HEART RATE: 75 BPM | DIASTOLIC BLOOD PRESSURE: 86 MMHG | SYSTOLIC BLOOD PRESSURE: 138 MMHG | WEIGHT: 147 LBS | BODY MASS INDEX: 26.89 KG/M2

## 2024-10-24 DIAGNOSIS — I10 ESSENTIAL HYPERTENSION: ICD-10-CM

## 2024-10-24 DIAGNOSIS — Z23 NEED FOR IMMUNIZATION AGAINST INFLUENZA: ICD-10-CM

## 2024-10-24 DIAGNOSIS — G47.9 SLEEPING DIFFICULTY: ICD-10-CM

## 2024-10-24 DIAGNOSIS — E11.40 TYPE 2 DIABETES MELLITUS WITH DIABETIC NEUROPATHY, WITHOUT LONG-TERM CURRENT USE OF INSULIN (HCC): Primary | ICD-10-CM

## 2024-10-24 DIAGNOSIS — K21.9 GASTROESOPHAGEAL REFLUX DISEASE, UNSPECIFIED WHETHER ESOPHAGITIS PRESENT: ICD-10-CM

## 2024-10-24 LAB — HBA1C MFR BLD: 6.3 %

## 2024-10-24 RX ORDER — LANCETS
1 EACH MISCELLANEOUS DAILY
Qty: 100 EACH | Refills: 3 | Status: SHIPPED | OUTPATIENT
Start: 2024-10-24

## 2024-10-24 RX ORDER — PANTOPRAZOLE SODIUM 40 MG/1
TABLET, DELAYED RELEASE ORAL
Qty: 90 TABLET | Refills: 1 | Status: SHIPPED | OUTPATIENT
Start: 2024-10-24

## 2024-10-24 RX ORDER — GLUCOSAMINE HCL/CHONDROITIN SU 500-400 MG
CAPSULE ORAL
Qty: 50 STRIP | Refills: 6 | Status: SHIPPED | OUTPATIENT
Start: 2024-10-24

## 2024-10-24 RX ORDER — CLONIDINE HYDROCHLORIDE 0.1 MG/1
0.1 TABLET ORAL 2 TIMES DAILY
Qty: 180 TABLET | Refills: 1 | Status: SHIPPED | OUTPATIENT
Start: 2024-10-24

## 2024-10-24 NOTE — PROGRESS NOTES
[facility]  [facility]  [address] [place] [id number]  Dept: [phone]  Dept Fax: [phone]      Sanna Duncan is a 84 y.o. female who presents today for hermedical conditions/complaints as noted below.  Sanna Duncan is c/o of Hypertension (F/u), Diabetes (F/u), Gastroesophageal Reflux (F/u), and Other (Discuss toenail, big toe on LT foot and the one next to the big toe, looks funny, did see Miryam Ruiz yesterday was prescribed a cream )        Assessment/Plan:     1. Type 2 diabetes mellitus with diabetic neuropathy, without long-term current use of insulin (HCC)  -     metFORMIN (GLUCOPHAGE) 1000 MG tablet; Take 1 tablet by mouth daily, Disp-180 tablet, R-1Normal  -     blood glucose monitor strips; Check blood sugars daily as directed., Disp-50 strip, R-6, Normal  -     Accu-Chek Softclix Lancets MISC; DAILY Starting Thu 10/24/2024, Disp-100 each, R-3, Normal  -     POCT glycosylated hemoglobin (Hb A1C)  2. Gastroesophageal reflux disease, unspecified whether esophagitis present  -     pantoprazole (PROTONIX) 40 MG tablet; take 1 tablet by mouth every morning BEFORE BREAKFAST, Disp-90 tablet, R-1Normal  3. Essential hypertension  -     cloNIDine (CATAPRES) 0.1 MG tablet; Take 1 tablet by mouth 2 times daily, Disp-180 tablet, R-1Normal  4. Need for immunization against influenza  -     Influenza, FLUAD Trivalent, (age 65 y+), IM, Preservative Free, 0.5mL  5. Sleeping difficulty  -     zolpidem (AMBIEN) 10 MG tablet; Take 1 tablet by mouth nightly as needed for Sleep for up to 30 days. Max Daily Amount: 10 mg, Disp-30 tablet, R-0Normal          No follow-ups on file.      HPI     Diabetes - doing well with current regimen. Denies hypoglycemia, hyperglycemia, fatigue, polyuria, polydipsia, paresthesias, vision changes, dizziness.  Eye exam was not done.  Not following with podiatry.  Patient is taking ACE inhibitor/ARB.  Complications of diabetes include HLD.

## 2024-10-29 RX ORDER — ZOLPIDEM TARTRATE 10 MG/1
10 TABLET ORAL NIGHTLY PRN
Qty: 30 TABLET | Refills: 0 | Status: SHIPPED | OUTPATIENT
Start: 2024-10-29 | End: 2024-11-28

## 2024-10-29 ASSESSMENT — VISUAL ACUITY: OU: 1

## 2024-10-29 ASSESSMENT — ENCOUNTER SYMPTOMS
CHOKING: 0
WHEEZING: 0
DIARRHEA: 0
NAUSEA: 0
SHORTNESS OF BREATH: 0
BACK PAIN: 1
BLOOD IN STOOL: 0
VOMITING: 0
ANAL BLEEDING: 0
ABDOMINAL PAIN: 0
CONSTIPATION: 0
COUGH: 0
CHEST TIGHTNESS: 0

## 2024-11-25 DIAGNOSIS — M1A.09X0 CHRONIC GOUT OF MULTIPLE SITES, UNSPECIFIED CAUSE: ICD-10-CM

## 2024-11-26 ENCOUNTER — OFFICE VISIT (OUTPATIENT)
Dept: PAIN MANAGEMENT | Age: 84
End: 2024-11-26
Payer: MEDICARE

## 2024-11-26 VITALS — HEIGHT: 62 IN | WEIGHT: 147 LBS | BODY MASS INDEX: 27.05 KG/M2

## 2024-11-26 DIAGNOSIS — M15.0 PRIMARY OSTEOARTHRITIS INVOLVING MULTIPLE JOINTS: ICD-10-CM

## 2024-11-26 DIAGNOSIS — T40.2X5A THERAPEUTIC OPIOID-INDUCED CONSTIPATION (OIC): ICD-10-CM

## 2024-11-26 DIAGNOSIS — G47.33 OSA ON CPAP: ICD-10-CM

## 2024-11-26 DIAGNOSIS — K59.03 THERAPEUTIC OPIOID-INDUCED CONSTIPATION (OIC): ICD-10-CM

## 2024-11-26 DIAGNOSIS — Z98.890 HX OF CERVICAL SPINE SURGERY: ICD-10-CM

## 2024-11-26 DIAGNOSIS — M48.062 LUMBAR STENOSIS WITH NEUROGENIC CLAUDICATION: Primary | Chronic | ICD-10-CM

## 2024-11-26 DIAGNOSIS — G95.89 MYELOMALACIA (HCC): ICD-10-CM

## 2024-11-26 PROCEDURE — 1125F AMNT PAIN NOTED PAIN PRSNT: CPT | Performed by: ANESTHESIOLOGY

## 2024-11-26 PROCEDURE — G8482 FLU IMMUNIZE ORDER/ADMIN: HCPCS | Performed by: ANESTHESIOLOGY

## 2024-11-26 PROCEDURE — 1160F RVW MEDS BY RX/DR IN RCRD: CPT | Performed by: ANESTHESIOLOGY

## 2024-11-26 PROCEDURE — 1159F MED LIST DOCD IN RCRD: CPT | Performed by: ANESTHESIOLOGY

## 2024-11-26 PROCEDURE — 1090F PRES/ABSN URINE INCON ASSESS: CPT | Performed by: ANESTHESIOLOGY

## 2024-11-26 PROCEDURE — 99213 OFFICE O/P EST LOW 20 MIN: CPT | Performed by: ANESTHESIOLOGY

## 2024-11-26 PROCEDURE — 1036F TOBACCO NON-USER: CPT | Performed by: ANESTHESIOLOGY

## 2024-11-26 PROCEDURE — G8427 DOCREV CUR MEDS BY ELIG CLIN: HCPCS | Performed by: ANESTHESIOLOGY

## 2024-11-26 PROCEDURE — G8399 PT W/DXA RESULTS DOCUMENT: HCPCS | Performed by: ANESTHESIOLOGY

## 2024-11-26 PROCEDURE — G8417 CALC BMI ABV UP PARAM F/U: HCPCS | Performed by: ANESTHESIOLOGY

## 2024-11-26 PROCEDURE — 1123F ACP DISCUSS/DSCN MKR DOCD: CPT | Performed by: ANESTHESIOLOGY

## 2024-11-26 RX ORDER — ALLOPURINOL 100 MG/1
100 TABLET ORAL DAILY
Qty: 90 TABLET | Refills: 1 | Status: SHIPPED | OUTPATIENT
Start: 2024-11-26

## 2024-11-26 RX ORDER — HYDROCODONE BITARTRATE AND ACETAMINOPHEN 5; 325 MG/1; MG/1
1 TABLET ORAL 2 TIMES DAILY PRN
Qty: 60 TABLET | Refills: 0 | Status: SHIPPED | OUTPATIENT
Start: 2024-11-26 | End: 2024-12-26

## 2024-11-26 ASSESSMENT — ENCOUNTER SYMPTOMS
SINUS PRESSURE: 0
DIARRHEA: 0
CONSTIPATION: 1
NAUSEA: 0
SINUS PAIN: 0
VOMITING: 0

## 2024-11-26 NOTE — TELEPHONE ENCOUNTER
Last visit: 10/24/2024  Last Med refill: 08/06/2024  Does patient have enough medication for 72 hours: Yes    Next Visit Date:  Future Appointments   Date Time Provider Department Center   11/26/2024  3:40 PM Ralph Martini MD [facility]   1/20/2025 10:00 AM Miryam Ruiz DPM [facility]   2/25/2025 10:30 AM Connie Hale MD [facility]       Health Maintenance   Topic Date Due    Shingles vaccine (1 of 2) Never done    Respiratory Syncytial Virus (RSV) Pregnant or age 60 yrs+ (1 - 1-dose 75+ series) Never done    Diabetic retinal exam  08/21/2020    Lipids  04/14/2024    COVID-19 Vaccine (4 - 2023-24 season) 09/01/2024    DTaP/Tdap/Td vaccine (1 - Tdap) 10/18/2026 (Originally 5/28/1959)    GFR test (Diabetes, CKD 3-4, OR last GFR 15-59)  02/05/2025    Depression Screen  02/14/2025    A1C test (Diabetic or Prediabetic)  04/24/2025    Diabetic Alb to Cr ratio (uACR) test  06/19/2025    Diabetic foot exam  10/29/2025    DEXA (modify frequency per FRAX score)  Completed    Annual Wellness Visit (Medicare Advantage)  Completed    Flu vaccine  Completed    Pneumococcal 65+ years Vaccine  Completed    Hepatitis A vaccine  Aged Out    Hepatitis B vaccine  Aged Out    Hib vaccine  Aged Out    Polio vaccine  Aged Out    Meningococcal (ACWY) vaccine  Aged Out    Hepatitis C screen  Discontinued       Hemoglobin A1C (%)   Date Value   10/24/2024 6.3   06/19/2024 6.6   02/14/2024 6.6             ( goal A1C is < 7)   No components found for: \"LABMICR\"  No components found for: \"LDLCHOLESTEROL\", \"LDLCALC\"    (goal LDL is <100)   AST (U/L)   Date Value   02/05/2024 16     ALT (U/L)   Date Value   02/05/2024 9     BUN (mg/dL)   Date Value   02/05/2024 17     BP Readings from Last 3 Encounters:   10/24/24 138/86   06/19/24 128/70   02/14/24 (!) 106/54          (goal 120/80)    All Future Testing planned in [facility]  Lab Frequency Next Occurrence   Lipid, Fasting Once 06/19/2024   TSH With Reflex

## 2024-11-26 NOTE — PROGRESS NOTES
acute distress.    Vital signs: (most recent): Height 1.575 m (5' 2\"), weight 66.7 kg (147 lb), not currently breastfeeding.  Vital signs are normal.  No fever.    Output: Producing urine and producing stool.    HEENT: Normal HEENT exam.    Lungs:  Normal effort and normal respiratory rate.  She is not in respiratory distress.    Heart: Normal rate.    Abdomen: Distension: .conts.    Extremities: Normal range of motion.  There is no deformity.    Neurological: Patient is alert and oriented to person, place and time.  Normal strength.  Patient has normal coordination.    Pupils:  Pupils are equal, round, and reactive to light.  Pupils are equal.   Skin:  Warm and dry.  No rash or cyanosis.     Assessment & Plan  1. Lumbar stenosis with neurogenic claudication    2. Myelomalacia (HCC)    3. JANN on CPAP    4. Primary osteoarthritis involving multiple joints    5. Therapeutic opioid-induced constipation (OIC)    6. Hx of cervical spine surgery        No orders of the defined types were placed in this encounter.     Orders Placed This Encounter   Medications    HYDROcodone-acetaminophen (NORCO) 5-325 MG per tablet     Sig: Take 1 tablet by mouth 2 times daily as needed for Pain for up to 30 days. Max Daily Amount: 2 tablets     Dispense:  60 tablet     Refill:  0     Reduce doses taken as pain becomes manageable Reduce doses taken as pain becomes manageable      Controlled Substance Monitoring:    Acute and Chronic Pain Monitoring:   RX Monitoring Periodic Controlled Substance Monitoring Chronic Pain > 50 MEDD   11/26/2024   4:00 PM Possible medication side effects, risk of tolerance/dependence & alternative treatments discussed.;No signs of potential drug abuse or diversion identified.;Obtaining appropriate analgesic effect of treatment. Obtained or confirmed \"Consent for Opioid Use\" on file.               Electronically signed by Ralph Martini MD on 11/26/2024 at 4:11 PM   MVC

## 2024-12-20 DIAGNOSIS — M54.12 CERVICAL RADICULAR PAIN: Chronic | ICD-10-CM

## 2024-12-23 NOTE — TELEPHONE ENCOUNTER
Last visit: 10/24/24  Last Med refill: 10/28/24  Does patient have enough medication for 72 hours: Yes    Next Visit Date:  Future Appointments   Date Time Provider Department Center   1/3/2025 10:50 AM Ralph Martini MD [facility]   1/20/2025 10:00 AM Miryam Ruiz DPM [facility]   2/25/2025 10:30 AM Connie Hale MD [facility] DEP       Health Maintenance   Topic Date Due    Shingles vaccine (1 of 2) Never done    Respiratory Syncytial Virus (RSV) Pregnant or age 60 yrs+ (1 - 1-dose 75+ series) Never done    Diabetic retinal exam  08/21/2020    Lipids  04/14/2024    COVID-19 Vaccine (4 - 2023-24 season) 09/01/2024    DTaP/Tdap/Td vaccine (1 - Tdap) 10/18/2026 (Originally 5/28/1959)    GFR test (Diabetes, CKD 3-4, OR last GFR 15-59)  02/05/2025    Depression Screen  02/14/2025    A1C test (Diabetic or Prediabetic)  04/24/2025    Diabetic Alb to Cr ratio (uACR) test  06/19/2025    Diabetic foot exam  10/29/2025    DEXA (modify frequency per FRAX score)  Completed    Annual Wellness Visit (Medicare Advantage)  Completed    Flu vaccine  Completed    Pneumococcal 65+ years Vaccine  Completed    Hepatitis A vaccine  Aged Out    Hepatitis B vaccine  Aged Out    Hib vaccine  Aged Out    Polio vaccine  Aged Out    Meningococcal (ACWY) vaccine  Aged Out    Hepatitis C screen  Discontinued       Hemoglobin A1C (%)   Date Value   10/24/2024 6.3   06/19/2024 6.6   02/14/2024 6.6             ( goal A1C is < 7)   No components found for: \"LABMICR\"  No components found for: \"LDLCHOLESTEROL\", \"LDLCALC\"    (goal LDL is <100)   AST (U/L)   Date Value   02/05/2024 16     ALT (U/L)   Date Value   02/05/2024 9     BUN (mg/dL)   Date Value   02/05/2024 17     BP Readings from Last 3 Encounters:   10/24/24 138/86   06/19/24 128/70   02/14/24 (!) 106/54          (goal 120/80)    All Future Testing planned in [facility]  Lab Frequency Next Occurrence   Lipid, Fasting Once 06/19/2024   TSH With Reflex Ft4 Once

## 2025-01-03 ENCOUNTER — OFFICE VISIT (OUTPATIENT)
Dept: PAIN MANAGEMENT | Age: 85
End: 2025-01-03

## 2025-01-03 VITALS — HEIGHT: 62 IN | BODY MASS INDEX: 27.05 KG/M2 | WEIGHT: 147 LBS

## 2025-01-03 DIAGNOSIS — M15.0 PRIMARY OSTEOARTHRITIS INVOLVING MULTIPLE JOINTS: ICD-10-CM

## 2025-01-03 DIAGNOSIS — M48.02 CERVICAL STENOSIS OF SPINE: Chronic | ICD-10-CM

## 2025-01-03 DIAGNOSIS — Z98.890 HX OF CERVICAL SPINE SURGERY: ICD-10-CM

## 2025-01-03 DIAGNOSIS — Z79.891 CHRONIC USE OF OPIATE DRUG FOR THERAPEUTIC PURPOSE: Primary | ICD-10-CM

## 2025-01-03 DIAGNOSIS — M48.062 LUMBAR STENOSIS WITH NEUROGENIC CLAUDICATION: Chronic | ICD-10-CM

## 2025-01-03 RX ORDER — HYDROCODONE BITARTRATE AND ACETAMINOPHEN 5; 325 MG/1; MG/1
1 TABLET ORAL 2 TIMES DAILY PRN
Qty: 60 TABLET | Refills: 0 | Status: SHIPPED | OUTPATIENT
Start: 2025-01-03 | End: 2025-02-02

## 2025-01-03 RX ORDER — NALOXEGOL OXALATE 12.5 MG/1
12.5 TABLET, FILM COATED ORAL
COMMUNITY
Start: 2024-10-27

## 2025-01-03 ASSESSMENT — ENCOUNTER SYMPTOMS
COUGH: 0
DIARRHEA: 0
SORE THROAT: 0
CONSTIPATION: 0
BACK PAIN: 1

## 2025-01-03 NOTE — PROGRESS NOTES
The patient is a 84 y.o.Non- / non  female.    Chief Complaint   Patient presents with    Back Pain     Med refill       PMH     history of chronic neck and chronic lower back pain  She is diagnosed with cervical spinal stenosis and lumbar spinal stenosis  Neck pain is located midline into left upper trapezius. Reports constant bilat. finger/hand numbness, denies HA. Worse with ROM.   Her back pain is minimal at this time but at times located in lower back pain in the lumbar area with radiation down both legs. aggravated with standing and walking and does notice bilat ankle and foot swelling at times  Patient is on chronic low-dose opioid therapy with Norco 5 mg twice a day along with gabapentin. Does take norco sparingly  Finds the medication helpful allowing her to do daily life activities and be independent  Not a candidate for NSAIDs because of renal insufficiency and advanced age     Procedures  4/24/23  CERVICAL EPIDURAL STEROID INJECTION C7-T1 with 60% relief-  not ready to repeat      4/29/21  BILATERAL L5 EPIDURAL STEROID INJECTION reports 70% relief with improved function for 2 weeks - not ready to repeat     Pain score Today:  5  Adverse effects (Constipation / Nausea / Sedation / sexual Dysfunction / others) : none  Mood: fair  Sleep pattern and quality: poor  Activity level: poor    Pill count Today:21  Last dose taken  1/3/2025  OAR[place] report reviewed today: yes  ER/Hospitalizations/PCP visit related to pain since last visit:no   Any legal problems e.g. DUI etc.:No  Satisfied with current management: Yes    Opioid Contract:11/27/2024  Last Urine Dug screen dated:7/25/2023    Hemoglobin A1C   Date Value Ref Range Status   10/24/2024 6.3 % Final       Past Medical History, Past Surgical History, Social History, Allergies and Medications reviewed and updated in EPIC as indicated    Family History reviewed and is noncontributory.            Past Medical History:   Diagnosis Date    Arthritis

## 2025-01-08 DIAGNOSIS — G47.9 SLEEPING DIFFICULTY: ICD-10-CM

## 2025-01-08 NOTE — TELEPHONE ENCOUNTER
Last visit: 10/24/2024  Last Med refill: 10/29/2024  Does patient have enough medication for 72 hours: No:     Next Visit Date:  Future Appointments   Date Time Provider Department Center   1/20/2025 10:00 AM Miryam Ruiz DPM [facility]   1/30/2025  2:00 PM Nohemi Angulo, APRN - CNP [facility]   2/25/2025 10:30 AM Connie Hale MD [facility] DE[facility]       Health Maintenance   Topic Date Due    Shingles vaccine (1 of 2) Never done    Respiratory Syncytial Virus (RSV) Pregnant or age 60 yrs+ (1 - 1-dose 75+ series) Never done    Diabetic retinal exam  08/21/2020    Lipids  04/14/2024    COVID-19 Vaccine (4 - 2023-24 season) 09/01/2024    Annual Wellness Visit (Medicare Advantage)  01/01/2025    GFR test (Diabetes, CKD 3-4, OR last GFR 15-59)  02/05/2025    DTaP/Tdap/Td vaccine (1 - Tdap) 10/18/2026 (Originally 5/28/1959)    Depression Screen  02/14/2025    A1C test (Diabetic or Prediabetic)  04/24/2025    Diabetic Alb to Cr ratio (uACR) test  06/19/2025    Diabetic foot exam  10/29/2025    DEXA (modify frequency per FRAX score)  Completed    Flu vaccine  Completed    Pneumococcal 65+ years Vaccine  Completed    Hepatitis A vaccine  Aged Out    Hepatitis B vaccine  Aged Out    Hib vaccine  Aged Out    Polio vaccine  Aged Out    Meningococcal (ACWY) vaccine  Aged Out    Hepatitis C screen  Discontinued       Hemoglobin A1C (%)   Date Value   10/24/2024 6.3   06/19/2024 6.6   02/14/2024 6.6             ( goal A1C is < 7)   No components found for: \"LABMICR\"  No components found for: \"LDLCHOLESTEROL\", \"LDLCALC\"    (goal LDL is <100)   AST (U/L)   Date Value   02/05/2024 16     ALT (U/L)   Date Value   02/05/2024 9     BUN (mg/dL)   Date Value   02/05/2024 17     BP Readings from Last 3 Encounters:   10/24/24 138/86   06/19/24 128/70   02/14/24 (!) 106/54          (goal 120/80)    All Future Testing planned in [facility]  Lab Frequency Next Occurrence   Lipid, Fasting Once 06/19/2024

## 2025-01-09 RX ORDER — ZOLPIDEM TARTRATE 10 MG/1
10 TABLET ORAL NIGHTLY PRN
Qty: 30 TABLET | Refills: 0 | Status: SHIPPED | OUTPATIENT
Start: 2025-01-09 | End: 2025-02-08

## 2025-01-22 DIAGNOSIS — K59.03 DRUG INDUCED CONSTIPATION: ICD-10-CM

## 2025-01-22 NOTE — TELEPHONE ENCOUNTER
Last visit: 10/24/2024  Last Med refill: 03/11/2024  Does patient have enough medication for 72 hours: No    Next Visit Date:  Future Appointments   Date Time Provider Department Center   1/30/2025  2:00 PM Nohemi Angulo, APRN - CNP [facility]   2/25/2025 10:30 AM [facility]   4/7/2025 10:00 AM Miryam Ruiz DPM [facility]       Health Maintenance   Topic Date Due    Shingles vaccine (1 of 2) Never done    Respiratory Syncytial Virus (RSV) Pregnant or age 60 yrs+ (1 - 1-dose 75+ series) Never done    Diabetic retinal exam  08/21/2020    Lipids  04/14/2024    COVID-19 Vaccine (4 - 2023-24 season) 09/01/2024    Annual Wellness Visit (Medicare Advantage)  01/01/2025    GFR test (Diabetes, CKD 3-4, OR last GFR 15-59)  02/05/2025    Depression Screen  02/14/2025    DTaP/Tdap/Td vaccine (1 - Tdap) 10/18/2026 (Originally 5/28/1959)    A1C test (Diabetic or Prediabetic)  04/24/2025    Diabetic Alb to Cr ratio (uACR) test  06/19/2025    Diabetic foot exam  10/29/2025    DEXA (modify frequency per FRAX score)  Completed    Flu vaccine  Completed    Pneumococcal 65+ years Vaccine  Completed    Hepatitis A vaccine  Aged Out    Hepatitis B vaccine  Aged Out    Hib vaccine  Aged Out    Polio vaccine  Aged Out    Meningococcal (ACWY) vaccine  Aged Out    Hepatitis C screen  Discontinued       Hemoglobin A1C (%)   Date Value   10/24/2024 6.3   06/19/2024 6.6   02/14/2024 6.6             ( goal A1C is < 7)   No components found for: \"LABMICR\"  No components found for: \"LDLCHOLESTEROL\", \"LDLCALC\"    (goal LDL is <100)   AST (U/L)   Date Value   02/05/2024 16     ALT (U/L)   Date Value   02/05/2024 9     BUN (mg/dL)   Date Value   02/05/2024 17     BP Readings from Last 3 Encounters:   10/24/24 138/86   06/19/24 128/70   02/14/24 (!) 106/54          (goal 120/80)    All Future Testing planned in [facility]  Lab Frequency Next Occurrence   Lipid, Fasting Once 06/19/2024   TSH

## 2025-01-23 RX ORDER — POLYETHYLENE GLYCOL 3350 17 G/17G
POWDER, FOR SOLUTION ORAL
Qty: 510 G | Refills: 1 | Status: SHIPPED | OUTPATIENT
Start: 2025-01-23

## 2025-01-30 ENCOUNTER — HOSPITAL ENCOUNTER (OUTPATIENT)
Age: 85
Setting detail: SPECIMEN
Discharge: HOME | End: 2025-01-30

## 2025-01-30 ENCOUNTER — OFFICE VISIT (OUTPATIENT)
Dept: PAIN MANAGEMENT | Age: 85
End: 2025-01-30
Payer: MEDICARE

## 2025-01-30 VITALS — HEIGHT: 62 IN | BODY MASS INDEX: 27.05 KG/M2 | WEIGHT: 147 LBS

## 2025-01-30 DIAGNOSIS — Z79.891 CHRONIC USE OF OPIATE DRUG FOR THERAPEUTIC PURPOSE: ICD-10-CM

## 2025-01-30 DIAGNOSIS — M48.02 CERVICAL STENOSIS OF SPINE: Chronic | ICD-10-CM

## 2025-01-30 DIAGNOSIS — Z98.890 HX OF CERVICAL SPINE SURGERY: ICD-10-CM

## 2025-01-30 DIAGNOSIS — M48.062 LUMBAR STENOSIS WITH NEUROGENIC CLAUDICATION: Chronic | ICD-10-CM

## 2025-01-30 DIAGNOSIS — M15.0 PRIMARY OSTEOARTHRITIS INVOLVING MULTIPLE JOINTS: ICD-10-CM

## 2025-01-30 PROCEDURE — 99214 OFFICE O/P EST MOD 30 MIN: CPT | Performed by: NURSE PRACTITIONER

## 2025-01-30 PROCEDURE — 1125F AMNT PAIN NOTED PAIN PRSNT: CPT | Performed by: NURSE PRACTITIONER

## 2025-01-30 PROCEDURE — G8417 CALC BMI ABV UP PARAM F/U: HCPCS | Performed by: NURSE PRACTITIONER

## 2025-01-30 PROCEDURE — G8427 DOCREV CUR MEDS BY ELIG CLIN: HCPCS | Performed by: NURSE PRACTITIONER

## 2025-01-30 PROCEDURE — 1160F RVW MEDS BY RX/DR IN RCRD: CPT | Performed by: NURSE PRACTITIONER

## 2025-01-30 PROCEDURE — 1036F TOBACCO NON-USER: CPT | Performed by: NURSE PRACTITIONER

## 2025-01-30 PROCEDURE — G8399 PT W/DXA RESULTS DOCUMENT: HCPCS | Performed by: NURSE PRACTITIONER

## 2025-01-30 PROCEDURE — 1090F PRES/ABSN URINE INCON ASSESS: CPT | Performed by: NURSE PRACTITIONER

## 2025-01-30 PROCEDURE — 1159F MED LIST DOCD IN RCRD: CPT | Performed by: NURSE PRACTITIONER

## 2025-01-30 PROCEDURE — 1123F ACP DISCUSS/DSCN MKR DOCD: CPT | Performed by: NURSE PRACTITIONER

## 2025-01-30 RX ORDER — HYDROCODONE BITARTRATE AND ACETAMINOPHEN 5; 325 MG/1; MG/1
1 TABLET ORAL 2 TIMES DAILY PRN
Qty: 60 TABLET | Refills: 0 | Status: CANCELLED | OUTPATIENT
Start: 2025-01-30 | End: 2025-03-01

## 2025-01-30 ASSESSMENT — ENCOUNTER SYMPTOMS
BACK PAIN: 1
SHORTNESS OF BREATH: 0
BOWEL INCONTINENCE: 0
COUGH: 0
CONSTIPATION: 0

## 2025-01-30 NOTE — PROGRESS NOTES
have been discussed. Patient denies side effects from medications like nausea, vomiting, constipation or drowsiness. Patient reports current activities of daily living are possible due to medications and would like to continue them.     As always, we encourage daily stretching and strengthening exercises, and recommend minimizing use of pain medications unless patient cannot get through daily activities due to pain.    We have discussed with the patient the effect the patient’s medical condition and opioid medication may have on the patient’s ability to safely operate a vehicle. Pt verbalized understanding.     Due to the high risk nature of this patient's pain medication close monitoring is required.   Continue current medication management, pt has been stable and compliant.  Too early to refill norco - pt will call for refill  UDS for standard monitoring purposes  Follow up appointment made for 6 weeks    I have reviewed the chief complaint and history of present illness (including ROS and PFSH) and vital documentation by my staff and I agree with their documentation and have added where applicable.

## 2025-02-05 RX ORDER — NALOXEGOL OXALATE 12.5 MG/1
12.5 TABLET, FILM COATED ORAL
Qty: 30 TABLET | OUTPATIENT
Start: 2025-02-05

## 2025-02-05 NOTE — TELEPHONE ENCOUNTER
Sanna Duncan is calling to request a refill on the following medication(s):    Last Visit Date (If Applicable):  10/24/2024    Next Visit Date:    2/25/2025    Medication Request:  Requested Prescriptions     Pending Prescriptions Disp Refills    MOVANTIK 12.5 MG TABS tablet 30 tablet      Sig: Take 1 tablet by mouth every morning (before breakfast)

## 2025-02-06 DIAGNOSIS — M48.02 CERVICAL STENOSIS OF SPINE: ICD-10-CM

## 2025-02-06 NOTE — TELEPHONE ENCOUNTER
Last visit: 10/24/2024  Last Med refill: 10/07/2024  Does patient have enough medication for 72 hours: No    Next Visit Date:  Future Appointments   Date Time Provider Department Center   2/25/2025 10:30 AM Connie [facility] MD [facility] DE[facility]   4/7/2025 10:00 AM Miryam Ruiz DPM [facility]   4/15/2025 10:00 AM Ralph Martini MD [facility]       Health Maintenance   Topic Date Due    Shingles vaccine (1 of 2) Never done    Respiratory Syncytial Virus (RSV) Pregnant or age 60 yrs+ (1 - 1-dose 75+ series) Never done    Diabetic retinal exam  08/21/2020    Lipids  04/14/2024    COVID-19 Vaccine (4 - 2024-25 season) 09/01/2024    Annual Wellness Visit (Medicare Advantage)  01/01/2025    GFR test (Diabetes, CKD 3-4, OR last GFR 15-59)  02/05/2025    Depression Screen  02/14/2025    DTaP/Tdap/Td vaccine (1 - Tdap) 10/18/2026 (Originally 5/28/1959)    A1C test (Diabetic or Prediabetic)  04/24/2025    Diabetic Alb to Cr ratio (uACR) test  06/19/2025    Diabetic foot exam  10/29/2025    DEXA (modify frequency per FRAX score)  Completed    Flu vaccine  Completed    Pneumococcal 50+ years Vaccine  Completed    Hepatitis A vaccine  Aged Out    Hepatitis B vaccine  Aged Out    Hib vaccine  Aged Out    Polio vaccine  Aged Out    Meningococcal (ACWY) vaccine  Aged Out    Hepatitis C screen  Discontinued       Hemoglobin A1C (%)   Date Value   10/24/2024 6.3   06/19/2024 6.6   02/14/2024 6.6             ( goal A1C is < 7)   No components found for: \"LABMICR\"  No components found for: \"LDLCHOLESTEROL\", \"LDLCALC\"    (goal LDL is <100)   AST (U/L)   Date Value   02/05/2024 16     ALT (U/L)   Date Value   02/05/2024 9     BUN (mg/dL)   Date Value   02/05/2024 17     BP Readings from Last 3 Encounters:   10/24/24 138/86   06/19/24 128/70   02/14/24 (!) 106/54          (goal 120/80)    All Future Testing planned in [facility]  Lab Frequency Next Occurrence   Lipid, Fasting Once 06/19/2024   TSH With Reflex Ft4

## 2025-02-07 RX ORDER — GABAPENTIN 600 MG/1
600 TABLET ORAL 3 TIMES DAILY
Qty: 90 TABLET | Refills: 2 | Status: SHIPPED | OUTPATIENT
Start: 2025-02-07 | End: 2025-05-08

## 2025-02-10 ENCOUNTER — TELEPHONE (OUTPATIENT)
Dept: FAMILY MEDICINE CLINIC | Age: 85
End: 2025-02-10

## 2025-02-10 NOTE — TELEPHONE ENCOUNTER
Patient is calling to request antibiotic for a UTI. She states has had on and off for a year now. She states for the past 2 days has had burning and pain when voiding.   An appointment was offered but had to decline due to no transportation, daughter is not there.  She does come by rides but needs to give 48 hour notice.  I did suggest UC once her daughter got home but she is requesting something be sent.

## 2025-02-11 RX ORDER — SULFAMETHOXAZOLE AND TRIMETHOPRIM 800; 160 MG/1; MG/1
1 TABLET ORAL 2 TIMES DAILY
Qty: 10 TABLET | Refills: 0 | Status: SHIPPED | OUTPATIENT
Start: 2025-02-11 | End: 2025-02-16

## 2025-02-14 ENCOUNTER — TELEPHONE (OUTPATIENT)
Dept: PAIN MANAGEMENT | Age: 85
End: 2025-02-14

## 2025-02-14 DIAGNOSIS — M48.062 LUMBAR STENOSIS WITH NEUROGENIC CLAUDICATION: Chronic | ICD-10-CM

## 2025-02-14 DIAGNOSIS — M15.0 PRIMARY OSTEOARTHRITIS INVOLVING MULTIPLE JOINTS: ICD-10-CM

## 2025-02-14 DIAGNOSIS — Z79.891 CHRONIC USE OF OPIATE DRUG FOR THERAPEUTIC PURPOSE: ICD-10-CM

## 2025-02-14 DIAGNOSIS — Z98.890 HX OF CERVICAL SPINE SURGERY: ICD-10-CM

## 2025-02-14 DIAGNOSIS — M48.02 CERVICAL STENOSIS OF SPINE: Chronic | ICD-10-CM

## 2025-02-14 NOTE — TELEPHONE ENCOUNTER
Patient last seen on 01/30/25, has follow up pended for April, refill due 02/17/25 per last OV note. Please advise.

## 2025-02-18 DIAGNOSIS — G47.9 SLEEPING DIFFICULTY: ICD-10-CM

## 2025-02-18 RX ORDER — ZOLPIDEM TARTRATE 10 MG/1
10 TABLET ORAL NIGHTLY PRN
Qty: 30 TABLET | Refills: 1 | Status: SHIPPED | OUTPATIENT
Start: 2025-02-18 | End: 2025-04-19

## 2025-02-18 RX ORDER — HYDROCODONE BITARTRATE AND ACETAMINOPHEN 5; 325 MG/1; MG/1
1 TABLET ORAL 2 TIMES DAILY PRN
Qty: 60 TABLET | Refills: 0 | Status: SHIPPED | OUTPATIENT
Start: 2025-02-18 | End: 2025-03-20

## 2025-02-18 NOTE — TELEPHONE ENCOUNTER
Last visit: 10/24/24  Last Med refill: 01/09/25  Does patient have enough medication for 72 hours: No:     Next Visit Date:  Future Appointments   Date Time Provider Department Center   2/25/2025 10:30 AM Connie [facility] MD [facility] DE[facility]   4/7/2025 10:00 AM Miryam Ruiz DPM [facility]   4/15/2025 10:00 AM Ralph Martini MD [facility]       Health Maintenance   Topic Date Due    Shingles vaccine (1 of 2) Never done    Respiratory Syncytial Virus (RSV) Pregnant or age 60 yrs+ (1 - 1-dose 75+ series) Never done    Diabetic retinal exam  08/21/2020    Lipids  04/14/2024    COVID-19 Vaccine (4 - 2024-25 season) 09/01/2024    Annual Wellness Visit (Medicare Advantage)  01/01/2025    GFR test (Diabetes, CKD 3-4, OR last GFR 15-59)  02/05/2025    Depression Screen  02/14/2025    DTaP/Tdap/Td vaccine (1 - Tdap) 10/18/2026 (Originally 5/28/1959)    A1C test (Diabetic or Prediabetic)  04/24/2025    Diabetic Alb to Cr ratio (uACR) test  06/19/2025    Diabetic foot exam  10/29/2025    DEXA (modify frequency per FRAX score)  Completed    Flu vaccine  Completed    Pneumococcal 50+ years Vaccine  Completed    Hepatitis A vaccine  Aged Out    Hepatitis B vaccine  Aged Out    Hib vaccine  Aged Out    Polio vaccine  Aged Out    Meningococcal (ACWY) vaccine  Aged Out    Hepatitis C screen  Discontinued       Hemoglobin A1C (%)   Date Value   10/24/2024 6.3   06/19/2024 6.6   02/14/2024 6.6             ( goal A1C is < 7)   No components found for: \"LABMICR\"  No components found for: \"LDLCHOLESTEROL\", \"LDLCALC\"    (goal LDL is <100)   AST (U/L)   Date Value   02/05/2024 16     ALT (U/L)   Date Value   02/05/2024 9     BUN (mg/dL)   Date Value   02/05/2024 17     BP Readings from Last 3 Encounters:   10/24/24 138/86   06/19/24 128/70   02/14/24 (!) 106/54          (goal 120/80)    All Future Testing planned in [facility]  Lab Frequency Next Occurrence   Lipid, Fasting Once 06/19/2024   TSH With Reflex Ft4

## 2025-03-03 ENCOUNTER — OFFICE VISIT (OUTPATIENT)
Dept: FAMILY MEDICINE CLINIC | Age: 85
End: 2025-03-03
Payer: MEDICARE

## 2025-03-03 ENCOUNTER — HOSPITAL ENCOUNTER (OUTPATIENT)
Age: 85
Setting detail: SPECIMEN
Discharge: HOME | End: 2025-03-03

## 2025-03-03 VITALS
OXYGEN SATURATION: 96 % | TEMPERATURE: 97.3 F | HEART RATE: 68 BPM | BODY MASS INDEX: 27.31 KG/M2 | SYSTOLIC BLOOD PRESSURE: 128 MMHG | WEIGHT: 149.3 LBS | DIASTOLIC BLOOD PRESSURE: 76 MMHG

## 2025-03-03 DIAGNOSIS — E11.40 TYPE 2 DIABETES MELLITUS WITH DIABETIC NEUROPATHY, WITHOUT LONG-TERM CURRENT USE OF INSULIN (HCC): ICD-10-CM

## 2025-03-03 DIAGNOSIS — H53.9 VISION CHANGES: ICD-10-CM

## 2025-03-03 DIAGNOSIS — E78.5 DYSLIPIDEMIA DUE TO TYPE 2 DIABETES MELLITUS (HCC): ICD-10-CM

## 2025-03-03 DIAGNOSIS — H91.93 HEARING DIFFICULTY OF BOTH EARS: ICD-10-CM

## 2025-03-03 DIAGNOSIS — G95.89 MYELOMALACIA (HCC): ICD-10-CM

## 2025-03-03 DIAGNOSIS — Z13.0 SCREENING, ANEMIA, DEFICIENCY, IRON: ICD-10-CM

## 2025-03-03 DIAGNOSIS — G31.84 MCI (MILD COGNITIVE IMPAIRMENT): ICD-10-CM

## 2025-03-03 DIAGNOSIS — K59.03 THERAPEUTIC OPIOID-INDUCED CONSTIPATION (OIC): ICD-10-CM

## 2025-03-03 DIAGNOSIS — K21.9 GASTROESOPHAGEAL REFLUX DISEASE, UNSPECIFIED WHETHER ESOPHAGITIS PRESENT: ICD-10-CM

## 2025-03-03 DIAGNOSIS — Z13.6 SCREENING FOR CARDIOVASCULAR CONDITION: ICD-10-CM

## 2025-03-03 DIAGNOSIS — E11.69 DYSLIPIDEMIA DUE TO TYPE 2 DIABETES MELLITUS (HCC): ICD-10-CM

## 2025-03-03 DIAGNOSIS — I10 ESSENTIAL HYPERTENSION: ICD-10-CM

## 2025-03-03 DIAGNOSIS — Z13.29 SCREENING FOR THYROID DISORDER: ICD-10-CM

## 2025-03-03 DIAGNOSIS — T40.2X5A THERAPEUTIC OPIOID-INDUCED CONSTIPATION (OIC): ICD-10-CM

## 2025-03-03 DIAGNOSIS — Z00.00 MEDICARE ANNUAL WELLNESS VISIT, SUBSEQUENT: Primary | ICD-10-CM

## 2025-03-03 DIAGNOSIS — E55.9 VITAMIN D DEFICIENCY: ICD-10-CM

## 2025-03-03 LAB
25(OH)D3 SERPL-MCNC: <6 NG/ML (ref 30–100)
ALBUMIN SERPL-MCNC: 3.7 G/DL (ref 3.5–5.2)
ALBUMIN/GLOB SERPL: 1.3 {RATIO} (ref 1–2.5)
ALP SERPL-CCNC: 84 U/L (ref 35–104)
ALT SERPL-CCNC: 11 U/L (ref 10–35)
ANION GAP SERPL CALCULATED.3IONS-SCNC: 11 MMOL/L (ref 9–16)
AST SERPL-CCNC: 24 U/L (ref 10–35)
BASOPHILS # BLD: 0.07 K/UL (ref 0–0.2)
BASOPHILS NFR BLD: 1 % (ref 0–2)
BILIRUB SERPL-MCNC: 0.3 MG/DL (ref 0–1.2)
BUN SERPL-MCNC: 14 MG/DL (ref 8–23)
CALCIUM SERPL-MCNC: 9 MG/DL (ref 8.6–10.4)
CHLORIDE SERPL-SCNC: 106 MMOL/L (ref 98–107)
CHOLEST SERPL-MCNC: 139 MG/DL (ref 0–199)
CHOLESTEROL/HDL RATIO: 2.6
CO2 SERPL-SCNC: 24 MMOL/L (ref 20–31)
CREAT SERPL-MCNC: 0.7 MG/DL (ref 0.6–0.9)
EOSINOPHIL # BLD: 0.58 K/UL (ref 0–0.44)
EOSINOPHILS RELATIVE PERCENT: 10 % (ref 1–4)
ERYTHROCYTE [DISTWIDTH] IN BLOOD BY AUTOMATED COUNT: 17.6 % (ref 11.8–14.4)
GFR, ESTIMATED: 86 ML/MIN/1.73M2
GLUCOSE SERPL-MCNC: 107 MG/DL (ref 74–99)
HBA1C MFR BLD: 5.9 %
HCT VFR BLD AUTO: 35.1 % (ref 36.3–47.1)
HDLC SERPL-MCNC: 54 MG/DL
HGB BLD-MCNC: 10.5 G/DL (ref 11.9–15.1)
IMM GRANULOCYTES # BLD AUTO: <0.03 K/UL (ref 0–0.3)
IMM GRANULOCYTES NFR BLD: 0 %
LDLC SERPL CALC-MCNC: 60 MG/DL (ref 0–100)
LYMPHOCYTES NFR BLD: 3.1 K/UL (ref 1.1–3.7)
LYMPHOCYTES RELATIVE PERCENT: 56 % (ref 24–43)
MCH RBC QN AUTO: 25 PG (ref 25.2–33.5)
MCHC RBC AUTO-ENTMCNC: 29.9 G/DL (ref 28.4–34.8)
MCV RBC AUTO: 83.6 FL (ref 82.6–102.9)
MONOCYTES NFR BLD: 0.52 K/UL (ref 0.1–1.2)
MONOCYTES NFR BLD: 9 % (ref 3–12)
NEUTROPHILS NFR BLD: 24 % (ref 36–65)
NEUTS SEG NFR BLD: 1.38 K/UL (ref 1.5–8.1)
NRBC BLD-RTO: 0 PER 100 WBC
PLATELET # BLD AUTO: 261 K/UL (ref 138–453)
PMV BLD AUTO: 10.9 FL (ref 8.1–13.5)
POTASSIUM SERPL-SCNC: 4.2 MMOL/L (ref 3.7–5.3)
PROT SERPL-MCNC: 6.6 G/DL (ref 6.6–8.7)
RBC # BLD AUTO: 4.2 M/UL (ref 3.95–5.11)
RBC # BLD: ABNORMAL 10*6/UL
SODIUM SERPL-SCNC: 141 MMOL/L (ref 136–145)
TRIGL SERPL-MCNC: 123 MG/DL (ref 0–149)
TSH SERPL DL<=0.05 MIU/L-ACNC: 2.22 UIU/ML (ref 0.27–4.2)
VLDLC SERPL CALC-MCNC: 25 MG/DL (ref 1–30)
WBC OTHER # BLD: 5.7 K/UL (ref 3.5–11.3)

## 2025-03-03 PROCEDURE — 3074F SYST BP LT 130 MM HG: CPT | Performed by: INTERNAL MEDICINE

## 2025-03-03 PROCEDURE — 3078F DIAST BP <80 MM HG: CPT | Performed by: INTERNAL MEDICINE

## 2025-03-03 PROCEDURE — 1159F MED LIST DOCD IN RCRD: CPT | Performed by: INTERNAL MEDICINE

## 2025-03-03 PROCEDURE — 3044F HG A1C LEVEL LT 7.0%: CPT | Performed by: INTERNAL MEDICINE

## 2025-03-03 PROCEDURE — G0446 INTENS BEHAVE THER CARDIO DX: HCPCS | Performed by: INTERNAL MEDICINE

## 2025-03-03 PROCEDURE — 1123F ACP DISCUSS/DSCN MKR DOCD: CPT | Performed by: INTERNAL MEDICINE

## 2025-03-03 PROCEDURE — G0439 PPPS, SUBSEQ VISIT: HCPCS | Performed by: INTERNAL MEDICINE

## 2025-03-03 PROCEDURE — 83036 HEMOGLOBIN GLYCOSYLATED A1C: CPT | Performed by: INTERNAL MEDICINE

## 2025-03-03 RX ORDER — NALOXEGOL OXALATE 12.5 MG/1
12.5 TABLET, FILM COATED ORAL
Qty: 90 TABLET | Refills: 1 | Status: CANCELLED | OUTPATIENT
Start: 2025-03-03

## 2025-03-03 SDOH — ECONOMIC STABILITY: FOOD INSECURITY: WITHIN THE PAST 12 MONTHS, THE FOOD YOU BOUGHT JUST DIDN'T LAST AND YOU DIDN'T HAVE MONEY TO GET MORE.: NEVER TRUE

## 2025-03-03 SDOH — ECONOMIC STABILITY: FOOD INSECURITY: WITHIN THE PAST 12 MONTHS, YOU WORRIED THAT YOUR FOOD WOULD RUN OUT BEFORE YOU GOT MONEY TO BUY MORE.: NEVER TRUE

## 2025-03-03 ASSESSMENT — LIFESTYLE VARIABLES
HOW MANY STANDARD DRINKS CONTAINING ALCOHOL DO YOU HAVE ON A TYPICAL DAY: 1 OR 2
HOW OFTEN DO YOU HAVE A DRINK CONTAINING ALCOHOL: MONTHLY OR LESS

## 2025-03-03 ASSESSMENT — PATIENT HEALTH QUESTIONNAIRE - PHQ9
1. LITTLE INTEREST OR PLEASURE IN DOING THINGS: NOT AT ALL
SUM OF ALL RESPONSES TO PHQ QUESTIONS 1-9: 0
SUM OF ALL RESPONSES TO PHQ QUESTIONS 1-9: 0
2. FEELING DOWN, DEPRESSED OR HOPELESS: NOT AT ALL
SUM OF ALL RESPONSES TO PHQ QUESTIONS 1-9: 0
SUM OF ALL RESPONSES TO PHQ QUESTIONS 1-9: 0

## 2025-03-03 NOTE — PROGRESS NOTES
Medicare Annual Wellness Visit    Sanna Duncan is here for Diabetes (F/u), Hypertension (F/u), and Medicare AWV    Assessment & Plan   Medicare annual wellness visit, subsequent  Type 2 diabetes mellitus with diabetic neuropathy, without long-term current use of insulin (HCC)  -     POCT glycosylated hemoglobin (Hb A1C)  Therapeutic opioid-induced constipation (OIC)  -     naloxegol (MOVANTIK) 25 MG TABS tablet; Take 1 tablet by mouth every morning (before breakfast), Disp-30 tablet, R-5Normal  Myelomalacia (HCC)  Hearing difficulty of both ears  -     AFL - Mya Lindsay, AuD, Audiology, [place]  Screening, anemia, deficiency, iron  -     CBC with Auto Differential; Future  Essential hypertension  -     Comprehensive Metabolic Panel; Future  Gastroesophageal reflux disease, unspecified whether esophagitis present  Dyslipidemia due to type 2 diabetes mellitus (HCC)  -     Comprehensive Metabolic Panel; Future  MCI (mild cognitive impairment)  Vision changes  Screening for cardiovascular condition  -     [place] Intensive Behavior Counseling for Cardiovascular Diseases, 8-15 minutes []       No follow-ups on file.     Subjective   The following acute and/or chronic problems were also addressed today:  Not sure who is prescribing her movantik - taking it daily, but has a BM couple of times a week . Taking miralax only PRN because when she takes the scoop she has diarrhea. J  Diabetes - doing well with current regimen. Denies hypoglycemia, hyperglycemia, fatigue, polyuria, polydipsia, paresthesias, vision changes, dizziness.  Eye exam was not done.  Not following with podiatry.  Patient is taking ACE inhibitor/ARB.  Complications of diabetes include HLD.   Hypertension-tolerating current regimen without chest pain, palpitations, dizziness, peripheral edema, dyspnea on exertion, orthopnea, paroxysmal nocturnal dyspnea.  Hyperlipidemia-tolerating current regimen without myalgias, dyspepsia, jaundice.  Sleep is good

## 2025-03-03 NOTE — PATIENT INSTRUCTIONS
Increase your Movantik to 25 mg daily - take 2 pills daily of current 12.5mg dose till you run out then start the 25 mg pills called in today.   Start taking MiraLAX 1 tablespoon daily with 8 ounces of water, double the dose every 3 days till you find the dose that works for you to have daily soft bowel movements without straining.  Please remember to have adequate fluid intake and the fiber in your diet.  Stop metformin - monitor blood sugars couple times a week since memory issues may be due to lower blood sugars.            Preventing Falls: Care Instructions  Injuries and health problems such as trouble walking or poor eyesight can increase your risk of falling. So can some medicines. But there are things you can do to help prevent falls. You can exercise to get stronger. You can also arrange your home to make it safer.    Talk to your doctor about the medicines you take. Ask if any of them increase the risk of falls and whether they can be changed or stopped.   Try to exercise regularly. It can help improve your strength and balance. This can help lower your risk of falling.         Practice fall safety and prevention.   Wear low-heeled shoes that fit well and give your feet good support. Talk to your doctor if you have foot problems that make this hard.  Carry a cellphone or wear a medical alert device that you can use to call for help.  Use stepladders instead of chairs to reach high objects. Don't climb if you're at risk for falls. Ask for help, if needed.  Wear the correct eyeglasses, if you need them.        Make your home safer.   Remove rugs, cords, clutter, and furniture from walkways.  Keep your house well lit. Use night-lights in hallways and bathrooms.  Install and use sturdy handrails on stairways.  Wear nonskid footwear, even inside. Don't walk barefoot or in socks without shoes.        Be safe outside.   Use handrails, curb cuts, and ramps whenever possible.  Keep your hands free by using a

## 2025-03-24 DIAGNOSIS — I10 ESSENTIAL HYPERTENSION: ICD-10-CM

## 2025-03-24 RX ORDER — CLONIDINE HYDROCHLORIDE 0.1 MG/1
0.1 TABLET ORAL 2 TIMES DAILY
Qty: 180 TABLET | Refills: 1 | Status: SHIPPED | OUTPATIENT
Start: 2025-03-24

## 2025-03-24 NOTE — TELEPHONE ENCOUNTER
Last visit: 03/03/25  Last Med refill: 12/21/24  Does patient have enough medication for 72 hours: Yes    Next Visit Date:  Future Appointments   Date Time Provider Department Center   4/7/2025 10:00 AM Miryam Ruiz DPM [facility]   4/15/2025 10:00 AM Ralph Martini MD [facility]   7/8/2025 10:15 AM Connie Hale MD [facility] DEP       Health Maintenance   Topic Date Due    Shingles vaccine (1 of 2) Never done    Respiratory Syncytial Virus (RSV) Pregnant or age 60 yrs+ (1 - 1-dose 75+ series) Never done    Diabetic retinal exam  08/21/2020    COVID-19 Vaccine (4 - 2024-25 season) 09/01/2024    DTaP/Tdap/Td vaccine (1 - Tdap) 10/18/2026 (Originally 5/28/1959)    Diabetic Alb to Cr ratio (uACR) test  06/19/2025    A1C test (Diabetic or Prediabetic)  09/03/2025    Diabetic foot exam  10/29/2025    Lipids  03/03/2026    Depression Screen  03/03/2026    GFR test (Diabetes, CKD 3-4, OR last GFR 15-59)  03/03/2026    DEXA (modify frequency per FRAX score)  Completed    Annual Wellness Visit (Medicare Advantage)  Completed    Flu vaccine  Completed    Pneumococcal 50+ years Vaccine  Completed    Hepatitis A vaccine  Aged Out    Hepatitis B vaccine  Aged Out    Hib vaccine  Aged Out    Polio vaccine  Aged Out    Meningococcal (ACWY) vaccine  Aged Out    Meningococcal B vaccine  Aged Out    Hepatitis C screen  Discontinued       Hemoglobin A1C (%)   Date Value   03/03/2025 5.9   10/24/2024 6.3   06/19/2024 6.6             ( goal A1C is < 7)   No components found for: \"LABMICR\"  No components found for: \"LDLCHOLESTEROL\", \"LDLCALC\"    (goal LDL is <100)   AST (U/L)   Date Value   03/03/2025 24     ALT (U/L)   Date Value   03/03/2025 11     BUN (mg/dL)   Date Value   03/03/2025 14     BP Readings from Last 3 Encounters:   03/03/25 128/76   10/24/24 138/86   06/19/24 128/70          (goal 120/80)    All Future Testing planned in [facility]  Lab Frequency Next Occurrence               Patient

## 2025-03-31 DIAGNOSIS — E78.00 PURE HYPERCHOLESTEROLEMIA: ICD-10-CM

## 2025-03-31 DIAGNOSIS — I10 PRIMARY HYPERTENSION: ICD-10-CM

## 2025-03-31 DIAGNOSIS — I10 ESSENTIAL HYPERTENSION: ICD-10-CM

## 2025-03-31 NOTE — TELEPHONE ENCOUNTER
Last visit: 03/03/25  Last Med refill: 12/20/24  Does patient have enough medication for 72 hours: No:     Next Visit Date:  Future Appointments   Date Time Provider Department Center   4/7/2025 10:00 AM Miryam Ruiz DPM [facility]   4/15/2025 10:00 AM Ralph Martini MD [facility]   7/8/2025 10:15 AM Connie Hale MD [facility] DEP       Health Maintenance   Topic Date Due    Shingles vaccine (1 of 2) Never done    Respiratory Syncytial Virus (RSV) Pregnant or age 60 yrs+ (1 - 1-dose 75+ series) Never done    Diabetic retinal exam  08/21/2020    COVID-19 Vaccine (4 - 2024-25 season) 09/01/2024    DTaP/Tdap/Td vaccine (1 - Tdap) 10/18/2026 (Originally 5/28/1959)    Diabetic Alb to Cr ratio (uACR) test  06/19/2025    A1C test (Diabetic or Prediabetic)  09/03/2025    Diabetic foot exam  10/29/2025    Lipids  03/03/2026    Depression Screen  03/03/2026    GFR test (Diabetes, CKD 3-4, OR last GFR 15-59)  03/03/2026    DEXA (modify frequency per FRAX score)  Completed    Annual Wellness Visit (Medicare Advantage)  Completed    Flu vaccine  Completed    Pneumococcal 50+ years Vaccine  Completed    Hepatitis A vaccine  Aged Out    Hepatitis B vaccine  Aged Out    Hib vaccine  Aged Out    Polio vaccine  Aged Out    Meningococcal (ACWY) vaccine  Aged Out    Meningococcal B vaccine  Aged Out    Hepatitis C screen  Discontinued       Hemoglobin A1C (%)   Date Value   03/03/2025 5.9   10/24/2024 6.3   06/19/2024 6.6             ( goal A1C is < 7)   No components found for: \"LABMICR\"  No components found for: \"LDLCHOLESTEROL\", \"LDLCALC\"    (goal LDL is <100)   AST (U/L)   Date Value   03/03/2025 24     ALT (U/L)   Date Value   03/03/2025 11     BUN (mg/dL)   Date Value   03/03/2025 14     BP Readings from Last 3 Encounters:   03/03/25 128/76   10/24/24 138/86   06/19/24 128/70          (goal 120/80)    All Future Testing planned in [facility]  Lab Frequency Next Occurrence               Patient

## 2025-04-01 RX ORDER — ATORVASTATIN CALCIUM 40 MG/1
40 TABLET, FILM COATED ORAL DAILY
Qty: 90 TABLET | Refills: 1 | Status: SHIPPED | OUTPATIENT
Start: 2025-04-01

## 2025-04-01 RX ORDER — CARVEDILOL 3.12 MG/1
TABLET ORAL
Qty: 180 TABLET | Refills: 1 | Status: SHIPPED | OUTPATIENT
Start: 2025-04-01

## 2025-04-01 RX ORDER — LISINOPRIL 5 MG/1
5 TABLET ORAL DAILY
Qty: 90 TABLET | Refills: 1 | Status: SHIPPED | OUTPATIENT
Start: 2025-04-01

## 2025-04-02 DIAGNOSIS — M54.12 CERVICAL RADICULAR PAIN: Chronic | ICD-10-CM

## 2025-04-03 RX ORDER — TIZANIDINE 2 MG/1
TABLET ORAL
Qty: 90 TABLET | Refills: 1 | Status: SHIPPED | OUTPATIENT
Start: 2025-04-03

## 2025-04-03 NOTE — TELEPHONE ENCOUNTER
Last visit: 3/3/25  Last Med refill: 10/7/24  Does patient have enough medication for 72 hours: Yes    Next Visit Date:  Future Appointments   Date Time Provider Department Center   4/7/2025 10:00 AM Miryam Ruiz DPM [facility]   4/15/2025 10:00 AM Ralph Martini MD [facility]   7/8/2025 10:15 AM Connie Hale MD [facility] DEP       Health Maintenance   Topic Date Due    Shingles vaccine (1 of 2) Never done    Respiratory Syncytial Virus (RSV) Pregnant or age 60 yrs+ (1 - 1-dose 75+ series) Never done    Diabetic retinal exam  08/21/2020    COVID-19 Vaccine (4 - 2024-25 season) 09/01/2024    DTaP/Tdap/Td vaccine (1 - Tdap) 10/18/2026 (Originally 5/28/1959)    Diabetic Alb to Cr ratio (uACR) test  06/19/2025    A1C test (Diabetic or Prediabetic)  09/03/2025    Diabetic foot exam  10/29/2025    Lipids  03/03/2026    Depression Screen  03/03/2026    GFR test (Diabetes, CKD 3-4, OR last GFR 15-59)  03/03/2026    DEXA (modify frequency per FRAX score)  Completed    Annual Wellness Visit (Medicare Advantage)  Completed    Flu vaccine  Completed    Pneumococcal 50+ years Vaccine  Completed    Hepatitis A vaccine  Aged Out    Hepatitis B vaccine  Aged Out    Hib vaccine  Aged Out    Polio vaccine  Aged Out    Meningococcal (ACWY) vaccine  Aged Out    Meningococcal B vaccine  Aged Out    Hepatitis C screen  Discontinued       Hemoglobin A1C (%)   Date Value   03/03/2025 5.9   10/24/2024 6.3   06/19/2024 6.6             ( goal A1C is < 7)   No components found for: \"LABMICR\"  No components found for: \"LDLCHOLESTEROL\", \"LDLCALC\"    (goal LDL is <100)   AST (U/L)   Date Value   03/03/2025 24     ALT (U/L)   Date Value   03/03/2025 11     BUN (mg/dL)   Date Value   03/03/2025 14     BP Readings from Last 3 Encounters:   03/03/25 128/76   10/24/24 138/86   06/19/24 128/70          (goal 120/80)    All Future Testing planned in [facility]  Lab Frequency Next Occurrence               Patient Active

## 2025-04-07 ENCOUNTER — OFFICE VISIT (OUTPATIENT)
Dept: PODIATRY | Age: 85
End: 2025-04-07
Payer: MEDICARE

## 2025-04-07 VITALS — BODY MASS INDEX: 27.42 KG/M2 | WEIGHT: 149 LBS | HEIGHT: 62 IN

## 2025-04-07 DIAGNOSIS — M79.604 PAIN IN BOTH LOWER EXTREMITIES: ICD-10-CM

## 2025-04-07 DIAGNOSIS — M79.605 PAIN IN BOTH LOWER EXTREMITIES: ICD-10-CM

## 2025-04-07 DIAGNOSIS — K21.9 GASTROESOPHAGEAL REFLUX DISEASE, UNSPECIFIED WHETHER ESOPHAGITIS PRESENT: ICD-10-CM

## 2025-04-07 DIAGNOSIS — B35.1 DERMATOPHYTOSIS OF NAIL: ICD-10-CM

## 2025-04-07 DIAGNOSIS — R60.0 EDEMA OF LOWER EXTREMITY: ICD-10-CM

## 2025-04-07 DIAGNOSIS — E11.40 TYPE 2 DIABETES MELLITUS WITH DIABETIC NEUROPATHY, WITHOUT LONG-TERM CURRENT USE OF INSULIN (HCC): Primary | ICD-10-CM

## 2025-04-07 DIAGNOSIS — I73.9 PERIPHERAL VASCULAR DISORDER: ICD-10-CM

## 2025-04-07 PROCEDURE — 99213 OFFICE O/P EST LOW 20 MIN: CPT | Performed by: PODIATRIST

## 2025-04-07 PROCEDURE — G8417 CALC BMI ABV UP PARAM F/U: HCPCS | Performed by: PODIATRIST

## 2025-04-07 PROCEDURE — 1123F ACP DISCUSS/DSCN MKR DOCD: CPT | Performed by: PODIATRIST

## 2025-04-07 PROCEDURE — 1126F AMNT PAIN NOTED NONE PRSNT: CPT | Performed by: PODIATRIST

## 2025-04-07 PROCEDURE — 1036F TOBACCO NON-USER: CPT | Performed by: PODIATRIST

## 2025-04-07 PROCEDURE — G8399 PT W/DXA RESULTS DOCUMENT: HCPCS | Performed by: PODIATRIST

## 2025-04-07 PROCEDURE — G8427 DOCREV CUR MEDS BY ELIG CLIN: HCPCS | Performed by: PODIATRIST

## 2025-04-07 PROCEDURE — 11721 DEBRIDE NAIL 6 OR MORE: CPT | Performed by: PODIATRIST

## 2025-04-07 PROCEDURE — 1090F PRES/ABSN URINE INCON ASSESS: CPT | Performed by: PODIATRIST

## 2025-04-07 PROCEDURE — 3044F HG A1C LEVEL LT 7.0%: CPT | Performed by: PODIATRIST

## 2025-04-07 RX ORDER — KETOCONAZOLE 20 MG/G
CREAM TOPICAL
Qty: 30 G | Refills: 2 | Status: SHIPPED | OUTPATIENT
Start: 2025-04-07

## 2025-04-07 NOTE — TELEPHONE ENCOUNTER
Last visit: 3/3/2025   Last Med refill:   Does patient have enough medication for 72 hours: unknown, electronic request    Next Visit Date:  Future Appointments   Date Time Provider Department Center   4/15/2025 10:00 AM Ralph Martini MD [facility]   7/8/2025 10:15 AM Connie Hale MD [facility] DEP   7/14/2025 10:30 AM Miryam Ruiz DPM [facility]       Health Maintenance   Topic Date Due    Shingles vaccine (1 of 2) Never done    Respiratory Syncytial Virus (RSV) Pregnant or age 60 yrs+ (1 - 1-dose 75+ series) Never done    Diabetic retinal exam  08/21/2020    COVID-19 Vaccine (4 - 2024-25 season) 09/01/2024    DTaP/Tdap/Td vaccine (1 - Tdap) 10/18/2026 (Originally 5/28/1959)    Diabetic Alb to Cr ratio (uACR) test  06/19/2025    A1C test (Diabetic or Prediabetic)  09/03/2025    Lipids  03/03/2026    Depression Screen  03/03/2026    GFR test (Diabetes, CKD 3-4, OR last GFR 15-59)  03/03/2026    Diabetic foot exam  04/07/2026    DEXA (modify frequency per FRAX score)  Completed    Annual Wellness Visit (Medicare Advantage)  Completed    Flu vaccine  Completed    Pneumococcal 50+ years Vaccine  Completed    Hepatitis A vaccine  Aged Out    Hepatitis B vaccine  Aged Out    Hib vaccine  Aged Out    Polio vaccine  Aged Out    Meningococcal (ACWY) vaccine  Aged Out    Meningococcal B vaccine  Aged Out    Hepatitis C screen  Discontinued       Hemoglobin A1C (%)   Date Value   03/03/2025 5.9   10/24/2024 6.3   06/19/2024 6.6             ( goal A1C is < 7)   No components found for: \"LABMICR\"  No components found for: \"LDLCHOLESTEROL\", \"LDLCALC\"    (goal LDL is <100)   AST (U/L)   Date Value   03/03/2025 24     ALT (U/L)   Date Value   03/03/2025 11     BUN (mg/dL)   Date Value   03/03/2025 14     BP Readings from Last 3 Encounters:   03/03/25 128/76   10/24/24 138/86   06/19/24 128/70          (goal 120/80)    All Future Testing planned in [facility]  Lab Frequency Next Occurrence

## 2025-04-09 RX ORDER — PANTOPRAZOLE SODIUM 40 MG/1
40 TABLET, DELAYED RELEASE ORAL
Qty: 90 TABLET | Refills: 1 | Status: SHIPPED | OUTPATIENT
Start: 2025-04-09

## 2025-04-15 ENCOUNTER — OFFICE VISIT (OUTPATIENT)
Dept: PAIN MANAGEMENT | Age: 85
End: 2025-04-15
Payer: MEDICARE

## 2025-04-15 VITALS — HEIGHT: 62 IN | BODY MASS INDEX: 27.42 KG/M2 | WEIGHT: 149 LBS

## 2025-04-15 DIAGNOSIS — Z79.891 CHRONIC USE OF OPIATE DRUG FOR THERAPEUTIC PURPOSE: ICD-10-CM

## 2025-04-15 DIAGNOSIS — M48.02 CERVICAL STENOSIS OF SPINE: Chronic | ICD-10-CM

## 2025-04-15 DIAGNOSIS — Z98.890 HX OF CERVICAL SPINE SURGERY: ICD-10-CM

## 2025-04-15 DIAGNOSIS — M48.062 LUMBAR STENOSIS WITH NEUROGENIC CLAUDICATION: Chronic | ICD-10-CM

## 2025-04-15 DIAGNOSIS — M15.0 PRIMARY OSTEOARTHRITIS INVOLVING MULTIPLE JOINTS: Primary | ICD-10-CM

## 2025-04-15 PROCEDURE — 1123F ACP DISCUSS/DSCN MKR DOCD: CPT | Performed by: ANESTHESIOLOGY

## 2025-04-15 PROCEDURE — 1090F PRES/ABSN URINE INCON ASSESS: CPT | Performed by: ANESTHESIOLOGY

## 2025-04-15 PROCEDURE — G8417 CALC BMI ABV UP PARAM F/U: HCPCS | Performed by: ANESTHESIOLOGY

## 2025-04-15 PROCEDURE — G8427 DOCREV CUR MEDS BY ELIG CLIN: HCPCS | Performed by: ANESTHESIOLOGY

## 2025-04-15 PROCEDURE — 99213 OFFICE O/P EST LOW 20 MIN: CPT | Performed by: ANESTHESIOLOGY

## 2025-04-15 PROCEDURE — 1159F MED LIST DOCD IN RCRD: CPT | Performed by: ANESTHESIOLOGY

## 2025-04-15 PROCEDURE — 1125F AMNT PAIN NOTED PAIN PRSNT: CPT | Performed by: ANESTHESIOLOGY

## 2025-04-15 PROCEDURE — 1036F TOBACCO NON-USER: CPT | Performed by: ANESTHESIOLOGY

## 2025-04-15 PROCEDURE — G8399 PT W/DXA RESULTS DOCUMENT: HCPCS | Performed by: ANESTHESIOLOGY

## 2025-04-15 RX ORDER — HYDROCODONE BITARTRATE AND ACETAMINOPHEN 5; 325 MG/1; MG/1
1 TABLET ORAL 2 TIMES DAILY PRN
Qty: 45 TABLET | Refills: 0 | Status: SHIPPED | OUTPATIENT
Start: 2025-04-15 | End: 2025-05-15

## 2025-04-15 ASSESSMENT — ENCOUNTER SYMPTOMS
RESPIRATORY NEGATIVE: 1
GASTROINTESTINAL NEGATIVE: 1
BACK PAIN: 1

## 2025-04-15 NOTE — PROGRESS NOTES
1    atorvastatin (LIPITOR) 40 MG tablet Take 1 tablet by mouth daily 90 tablet 1    carvedilol (COREG) 3.125 MG tablet take 1 tablet by mouth twice a day 180 tablet 1    lisinopril (PRINIVIL;ZESTRIL) 5 MG tablet Take 1 tablet by mouth daily 90 tablet 1    cloNIDine (CATAPRES) 0.1 MG tablet TAKE 1 TABLET BY MOUTH TWICE DAILY 180 tablet 1    naloxegol (MOVANTIK) 25 MG TABS tablet Take 1 tablet by mouth every morning (before breakfast) 30 tablet 5    zolpidem (AMBIEN) 10 MG tablet Take 1 tablet by mouth nightly as needed for Sleep for up to 60 days. Max Daily Amount: 10 mg 30 tablet 1    gabapentin (NEURONTIN) 600 MG tablet Take 1 tablet by mouth 3 times daily for 90 days. 90 tablet 2    polyethylene glycol (GLYCOLAX) 17 GM/SCOOP powder take 17GM (DISSOLVED IN WATER) by mouth once daily 510 g 1    amitriptyline (ELAVIL) 25 MG tablet TAKE 1 TABLET BY MOUTH EVERY NIGHT AT BEDTIME 90 tablet 1    allopurinol (ZYLOPRIM) 100 MG tablet TAKE 1 TABLET BY MOUTH DAILY 90 tablet 1    blood glucose monitor strips Check blood sugars daily as directed. 50 strip 6    Accu-Chek Softclix Lancets MISC 1 each by Does not apply route daily 100 each 3    cyanocobalamin ([employer] VITAMIN B12) 1000 MCG tablet Take 1 tablet by mouth daily 90 tablet 3    ferrous gluconate 324 (37.5 Fe) MG TABS Take 1 tablet by mouth daily 90 tablet 3    loratadine (CLARITIN) 10 MG tablet take 1 tablet by mouth once daily PRN 90 tablet 1    estradiol (ESTRACE VAGINAL) 0.1 MG/GM vaginal cream Apply 0.5 grams to vaginal introitus and urethra Monday, Wednesday, Friday 42.5 g 1    ketoconazole (NIZORAL) 2 % cream Apply topically daily. 30 g 2    RA Alcohol Swabs 70 % PADS use as directed once daily 100 each 3    fluticasone (FLONASE) 50 MCG/ACT nasal spray instill 1 spray into each nostril once daily 16 g 2    Handicap Placard MISC by Does not apply route Exp: 8/9/2027 1 each 0    aspirin 325 MG tablet Take 1 tablet by mouth daily       No current facility-administered

## 2025-04-21 NOTE — PROGRESS NOTES
[facility] PODIATRY [address]  [address]  [place] [id number]  Dept: [phone]  Dept Fax: [phone]    DIABETIC PROGRESS NOTE  Date of patient's visit: 4/21/2025  Patient's Name:  Sanna Duncan YOB: 1940            Patient Care Team:  Connie Hale MD as PCP - General (Internal Medicine)  Connie Hale MD as PCP - Empaneled Provider  Mckay Collado MD as Consulting Physician (Colon and Rectal Surgery)  Ralph Martini MD as Consulting Physician (Pain Management)  Miryam Ruiz DPM as Physician (Podiatry)          Chief Complaint   Patient presents with    Diabetes    Peripheral Neuropathy     Bilateral foot       Subjective:   Sanna Duncan comes to clinic for Diabetes and Peripheral Neuropathy (Bilateral foot)    she is a diabetic and states that needs footcare today.  Pt currently has complaint of thickened, elongated nails that they cannot manage by themselves.   Pt's primary care physician is Connie Hale MD last seen 6/19/24.   Pt's last blood sugar/a1c was 6.6-6/19/24.    Pt has a new complaint of increased swelling to bilateral lower extremities.       Lab Results   Component Value Date    LABA1C 5.9 03/03/2025      Complains of numbness in the feet bilat.  Past Medical History:   Diagnosis Date    Arthritis     Cataract     Cataracts, bilateral     Cervical spondylosis with myelopathy     Depression     Diverticulosis of colon 2015    Dysuria     Dysuria     Gastroesophageal reflux disease 4/13/2021    Gout     Headache(784.0)     Hematuria     History of colon polyps 2015    Insomnia     Lumbar stenosis with neurogenic claudication     Neck pain     JANN (obstructive sleep apnea)     Osteoarthrosis, unspecified whether generalized or localized, unspecified site 10/5/2012    Pelvic pain     Pure hypercholesterolemia 10/5/2012    Shoulder blade pain     right  side    Sinus problem     Stroke (HCC)

## 2025-05-10 DIAGNOSIS — I10 ESSENTIAL HYPERTENSION: ICD-10-CM

## 2025-05-12 RX ORDER — CARVEDILOL 3.12 MG/1
3.12 TABLET ORAL 2 TIMES DAILY
Qty: 180 TABLET | Refills: 1 | Status: SHIPPED | OUTPATIENT
Start: 2025-05-12

## 2025-05-12 NOTE — TELEPHONE ENCOUNTER
Last visit: 03/03/2025  Last Med refill: 02/06/2025  Does patient have enough medication for 72 hours: No:     Next Visit Date:  Future Appointments   Date Time Provider Department Center   6/5/2025 11:20 AM Dasha Llanos, APRN - CNP [facility]   7/8/2025 10:15 AM Connie Hale MD [facility] DEP   7/14/2025 10:30 AM Miryam Ruiz DPM [facility]       Health Maintenance   Topic Date Due    Shingles vaccine (1 of 2) Never done    Respiratory Syncytial Virus (RSV) Pregnant or age 60 yrs+ (1 - 1-dose 75+ series) Never done    Diabetic retinal exam  08/21/2020    COVID-19 Vaccine (4 - 2024-25 season) 09/01/2024    DTaP/Tdap/Td vaccine (1 - Tdap) 10/18/2026 (Originally 5/28/1959)    Diabetic Alb to Cr ratio (uACR) test  06/19/2025    A1C test (Diabetic or Prediabetic)  09/03/2025    Lipids  03/03/2026    Depression Screen  03/03/2026    GFR test (Diabetes, CKD 3-4, OR last GFR 15-59)  03/03/2026    Diabetic foot exam  04/07/2026    DEXA (modify frequency per FRAX score)  Completed    Annual Wellness Visit (Medicare Advantage)  Completed    Flu vaccine  Completed    Pneumococcal 50+ years Vaccine  Completed    Hepatitis A vaccine  Aged Out    Hepatitis B vaccine  Aged Out    Hib vaccine  Aged Out    Polio vaccine  Aged Out    Meningococcal (ACWY) vaccine  Aged Out    Meningococcal B vaccine  Aged Out    Hepatitis C screen  Discontinued       Hemoglobin A1C (%)   Date Value   03/03/2025 5.9   10/24/2024 6.3   06/19/2024 6.6             ( goal A1C is < 7)   No components found for: \"LABMICR\"  No components found for: \"LDLCHOLESTEROL\", \"LDLCALC\"    (goal LDL is <100)   AST (U/L)   Date Value   03/03/2025 24     ALT (U/L)   Date Value   03/03/2025 11     BUN (mg/dL)   Date Value   03/03/2025 14     BP Readings from Last 3 Encounters:   03/03/25 128/76   10/24/24 138/86   06/19/24 128/70          (goal 120/80)    All Future Testing planned in [facility]  Lab Frequency Next Occurrence

## 2025-05-15 ENCOUNTER — TELEPHONE (OUTPATIENT)
Dept: PAIN MANAGEMENT | Age: 85
End: 2025-05-15

## 2025-05-15 DIAGNOSIS — M48.02 CERVICAL STENOSIS OF SPINE: Chronic | ICD-10-CM

## 2025-05-15 DIAGNOSIS — Z79.891 CHRONIC USE OF OPIATE DRUG FOR THERAPEUTIC PURPOSE: ICD-10-CM

## 2025-05-15 DIAGNOSIS — M48.062 LUMBAR STENOSIS WITH NEUROGENIC CLAUDICATION: Chronic | ICD-10-CM

## 2025-05-15 DIAGNOSIS — M15.0 PRIMARY OSTEOARTHRITIS INVOLVING MULTIPLE JOINTS: ICD-10-CM

## 2025-05-15 DIAGNOSIS — Z98.890 HX OF CERVICAL SPINE SURGERY: ICD-10-CM

## 2025-05-15 RX ORDER — HYDROCODONE BITARTRATE AND ACETAMINOPHEN 5; 325 MG/1; MG/1
1 TABLET ORAL 2 TIMES DAILY PRN
Qty: 45 TABLET | Refills: 0 | Status: CANCELLED | OUTPATIENT
Start: 2025-05-15 | End: 2025-06-14

## 2025-05-15 RX ORDER — HYDROCODONE BITARTRATE AND ACETAMINOPHEN 5; 325 MG/1; MG/1
1 TABLET ORAL 2 TIMES DAILY PRN
Qty: 45 TABLET | Refills: 0 | Status: SHIPPED | OUTPATIENT
Start: 2025-05-15 | End: 2025-06-14

## 2025-05-29 DIAGNOSIS — G47.9 SLEEPING DIFFICULTY: ICD-10-CM

## 2025-05-29 RX ORDER — ZOLPIDEM TARTRATE 10 MG/1
TABLET ORAL
Qty: 30 TABLET | Refills: 0 | Status: SHIPPED | OUTPATIENT
Start: 2025-05-29 | End: 2025-06-28

## 2025-05-29 NOTE — TELEPHONE ENCOUNTER
Last visit: 03/03/2025  Last Med refill: 02/18/2025  Does patient have enough medication for 72 hours: Yes    Next Visit Date:  Future Appointments   Date Time Provider Department Center   6/5/2025 11:20 AM Dasha Llanos, APRN - CNP [facility]   7/8/2025 10:15 AM Connie Hale MD [facility] E[facility] DEP   7/14/2025 10:30 AM Miryam Ruiz DPM [facility]       Health Maintenance   Topic Date Due    Shingles vaccine (1 of 2) Never done    Respiratory Syncytial Virus (RSV) Pregnant or age 60 yrs+ (1 - 1-dose 75+ series) Never done    Diabetic retinal exam  08/21/2020    COVID-19 Vaccine (4 - 2024-25 season) 09/01/2024    Diabetic Alb to Cr ratio (uACR) test  06/19/2025    DTaP/Tdap/Td vaccine (1 - Tdap) 10/18/2026 (Originally 5/28/1959)    A1C test (Diabetic or Prediabetic)  09/03/2025    Lipids  03/03/2026    Depression Screen  03/03/2026    GFR test (Diabetes, CKD 3-4, OR last GFR 15-59)  03/03/2026    Diabetic foot exam  04/07/2026    DEXA (modify frequency per FRAX score)  Completed    Annual Wellness Visit (Medicare Advantage)  Completed    Flu vaccine  Completed    Pneumococcal 50+ years Vaccine  Completed    Hepatitis A vaccine  Aged Out    Hepatitis B vaccine  Aged Out    Hib vaccine  Aged Out    Polio vaccine  Aged Out    Meningococcal (ACWY) vaccine  Aged Out    Meningococcal B vaccine  Aged Out    Hepatitis C screen  Discontinued       Hemoglobin A1C (%)   Date Value   03/03/2025 5.9   10/24/2024 6.3   06/19/2024 6.6             ( goal A1C is < 7)   No components found for: \"LABMICR\"  No components found for: \"LDLCHOLESTEROL\", \"LDLCALC\"    (goal LDL is <100)   AST (U/L)   Date Value   03/03/2025 24     ALT (U/L)   Date Value   03/03/2025 11     BUN (mg/dL)   Date Value   03/03/2025 14     BP Readings from Last 3 Encounters:   03/03/25 128/76   10/24/24 138/86   06/19/24 128/70          (goal 120/80)    All Future Testing planned in [facility]  Lab Frequency Next Occurrence

## 2025-06-05 ENCOUNTER — OFFICE VISIT (OUTPATIENT)
Dept: PAIN MANAGEMENT | Age: 85
End: 2025-06-05
Payer: MEDICARE

## 2025-06-05 VITALS — HEIGHT: 62 IN | BODY MASS INDEX: 27.42 KG/M2 | WEIGHT: 149 LBS

## 2025-06-05 DIAGNOSIS — G95.89 MYELOMALACIA (HCC): ICD-10-CM

## 2025-06-05 DIAGNOSIS — K59.03 THERAPEUTIC OPIOID-INDUCED CONSTIPATION (OIC): ICD-10-CM

## 2025-06-05 DIAGNOSIS — M15.0 PRIMARY OSTEOARTHRITIS INVOLVING MULTIPLE JOINTS: ICD-10-CM

## 2025-06-05 DIAGNOSIS — Z98.890 HX OF CERVICAL SPINE SURGERY: ICD-10-CM

## 2025-06-05 DIAGNOSIS — M47.816 LUMBAR FACET JOINT SYNDROME: ICD-10-CM

## 2025-06-05 DIAGNOSIS — T40.2X5A THERAPEUTIC OPIOID-INDUCED CONSTIPATION (OIC): ICD-10-CM

## 2025-06-05 DIAGNOSIS — M48.02 CERVICAL STENOSIS OF SPINE: Chronic | ICD-10-CM

## 2025-06-05 DIAGNOSIS — M48.062 LUMBAR STENOSIS WITH NEUROGENIC CLAUDICATION: Chronic | ICD-10-CM

## 2025-06-05 DIAGNOSIS — Z79.891 CHRONIC USE OF OPIATE DRUG FOR THERAPEUTIC PURPOSE: Primary | ICD-10-CM

## 2025-06-05 PROCEDURE — 1125F AMNT PAIN NOTED PAIN PRSNT: CPT | Performed by: NURSE PRACTITIONER

## 2025-06-05 PROCEDURE — G8427 DOCREV CUR MEDS BY ELIG CLIN: HCPCS | Performed by: NURSE PRACTITIONER

## 2025-06-05 PROCEDURE — G8399 PT W/DXA RESULTS DOCUMENT: HCPCS | Performed by: NURSE PRACTITIONER

## 2025-06-05 PROCEDURE — 1123F ACP DISCUSS/DSCN MKR DOCD: CPT | Performed by: NURSE PRACTITIONER

## 2025-06-05 PROCEDURE — G8417 CALC BMI ABV UP PARAM F/U: HCPCS | Performed by: NURSE PRACTITIONER

## 2025-06-05 PROCEDURE — 1159F MED LIST DOCD IN RCRD: CPT | Performed by: NURSE PRACTITIONER

## 2025-06-05 PROCEDURE — 99213 OFFICE O/P EST LOW 20 MIN: CPT | Performed by: NURSE PRACTITIONER

## 2025-06-05 PROCEDURE — 1036F TOBACCO NON-USER: CPT | Performed by: NURSE PRACTITIONER

## 2025-06-05 PROCEDURE — 1090F PRES/ABSN URINE INCON ASSESS: CPT | Performed by: NURSE PRACTITIONER

## 2025-06-05 PROCEDURE — 1160F RVW MEDS BY RX/DR IN RCRD: CPT | Performed by: NURSE PRACTITIONER

## 2025-06-05 RX ORDER — HYDROCODONE BITARTRATE AND ACETAMINOPHEN 5; 325 MG/1; MG/1
1 TABLET ORAL 2 TIMES DAILY PRN
Qty: 45 TABLET | Refills: 0 | Status: SHIPPED | OUTPATIENT
Start: 2025-06-14 | End: 2025-07-14

## 2025-06-05 ASSESSMENT — ENCOUNTER SYMPTOMS
BACK PAIN: 1
BOWEL INCONTINENCE: 0
SHORTNESS OF BREATH: 0

## 2025-06-05 NOTE — PROGRESS NOTES
Chief Complaint   Patient presents with    Back Pain    Medication Refill     Norco  due 6/14/25       PMH     Patient is a 85-year-old female with history of chronic neck and chronic lower back pain  She is diagnosed with cervical spinal stenosis and lumbar spinal stenosis  Neck pain is located midline into left upper trapezius. Reports constant bilat. finger/hand numbness, denies HA. Worse with ROM.   Her back pain is minimal at this time but at times located in lower back pain in the lumbar area with radiation down both legs. aggravated with standing and walking and does notice bilat ankle and foot swelling at times    Patient is on chronic low-dose opioid therapy with Norco 5 mg twice a day along with gabapentin. Does take norco sparingly. Denies side effects.  Finds the medication helpful allowing her to do daily life activities and be independent  Not a candidate for NSAIDs because of renal insufficiency and advanced age  Denies changes to PMH today.     Back Pain  This is a chronic problem. The current episode started more than 1 year ago. The problem occurs constantly. The problem is unchanged. The pain is present in the lumbar spine. The quality of the pain is described as aching. The pain does not radiate. The pain is at a severity of 4/10. The pain is mild. The pain is The same all the time. The symptoms are aggravated by bending, position, sitting, standing and twisting. Stiffness is present All day. Associated symptoms include numbness, tingling and weakness. Pertinent negatives include no bladder incontinence, bowel incontinence, chest pain or fever. She has tried NSAIDs and muscle relaxant for the symptoms.   Neck Pain   This is a chronic problem. The current episode started more than 1 year ago. The problem occurs constantly. The problem has been unchanged. The pain is associated with nothing. The pain is present in the left side, right side and midline. The quality of the pain is described as

## 2025-07-02 DIAGNOSIS — M48.02 CERVICAL STENOSIS OF SPINE: ICD-10-CM

## 2025-07-02 RX ORDER — GABAPENTIN 600 MG/1
600 TABLET ORAL 3 TIMES DAILY
Qty: 90 TABLET | Refills: 2 | Status: SHIPPED | OUTPATIENT
Start: 2025-07-02 | End: 2025-09-30

## 2025-07-02 NOTE — TELEPHONE ENCOUNTER
Last visit: 03/03/2025  Last Med refill: 06/12/2025  Does patient have enough medication for 72 hours: Yes    Next Visit Date:  Future Appointments   Date Time Provider Department Center   7/8/2025 10:15 AM Connie Hale MD [facility] DEP   7/11/2025  9:40 AM Dasha Llanos, APRN - CNP [facility]   7/14/2025 10:30 AM Miryam Ruiz DPM [facility]       Health Maintenance   Topic Date Due    Shingles vaccine (1 of 2) Never done    Respiratory Syncytial Virus (RSV) Pregnant or age 60 yrs+ (1 - 1-dose 75+ series) Never done    Diabetic retinal exam  08/21/2020    COVID-19 Vaccine (4 - 2024-25 season) 09/01/2024    Diabetic Alb to Cr ratio (uACR) test  06/19/2025    DTaP/Tdap/Td vaccine (1 - Tdap) 10/18/2026 (Originally 5/28/1959)    Flu vaccine (1) 08/01/2025    A1C test (Diabetic or Prediabetic)  09/03/2025    Lipids  03/03/2026    Depression Screen  03/03/2026    GFR test (Diabetes, CKD 3-4, OR last GFR 15-59)  03/03/2026    Diabetic foot exam  04/07/2026    DEXA (modify frequency per FRAX score)  Completed    Annual Wellness Visit (Medicare Advantage)  Completed    Pneumococcal 50+ years Vaccine  Completed    Hepatitis A vaccine  Aged Out    Hepatitis B vaccine  Aged Out    Hib vaccine  Aged Out    Polio vaccine  Aged Out    Meningococcal (ACWY) vaccine  Aged Out    Meningococcal B vaccine  Aged Out    Hepatitis C screen  Discontinued       Hemoglobin A1C (%)   Date Value   03/03/2025 5.9   10/24/2024 6.3   06/19/2024 6.6             ( goal A1C is < 7)   No components found for: \"LABMICR\"  No components found for: \"LDLCHOLESTEROL\", \"LDLCALC\"    (goal LDL is <100)   AST (U/L)   Date Value   03/03/2025 24     ALT (U/L)   Date Value   03/03/2025 11     BUN (mg/dL)   Date Value   03/03/2025 14     BP Readings from Last 3 Encounters:   03/03/25 128/76   10/24/24 138/86   06/19/24 128/70          (goal 120/80)    All Future Testing planned in [facility]  Lab Frequency Next Occurrence

## 2025-07-07 DIAGNOSIS — M54.12 CERVICAL RADICULAR PAIN: Chronic | ICD-10-CM

## 2025-07-07 NOTE — TELEPHONE ENCOUNTER
Last visit: 3/3/25  Last Med refill: 12/23/24  Does patient have enough medication for 72 hours: No:     Next Visit Date:  Future Appointments   Date Time Provider Department Center   7/8/2025 10:15 AM Connie Hale MD [facility] ECC DEP   7/11/2025  9:40 AM Dasha Llanos, APRN - CNP [facility]   7/14/2025 10:30 AM Miryam Ruiz DPM [facility]       Health Maintenance   Topic Date Due    Shingles vaccine (1 of 2) Never done    Respiratory Syncytial Virus (RSV) Pregnant or age 60 yrs+ (1 - 1-dose 75+ series) Never done    Diabetic retinal exam  08/21/2020    COVID-19 Vaccine (4 - 2024-25 season) 09/01/2024    Diabetic Alb to Cr ratio (uACR) test  06/19/2025    DTaP/Tdap/Td vaccine (1 - Tdap) 10/18/2026 (Originally 5/28/1959)    Flu vaccine (1) 08/01/2025    A1C test (Diabetic or Prediabetic)  09/03/2025    Lipids  03/03/2026    Depression Screen  03/03/2026    GFR test (Diabetes, CKD 3-4, OR last GFR 15-59)  03/03/2026    Diabetic foot exam  04/07/2026    DEXA (modify frequency per FRAX score)  Completed    Annual Wellness Visit (Medicare Advantage)  Completed    Pneumococcal 50+ years Vaccine  Completed    Hepatitis A vaccine  Aged Out    Hepatitis B vaccine  Aged Out    Hib vaccine  Aged Out    Polio vaccine  Aged Out    Meningococcal (ACWY) vaccine  Aged Out    Meningococcal B vaccine  Aged Out    Hepatitis C screen  Discontinued       Hemoglobin A1C (%)   Date Value   03/03/2025 5.9   10/24/2024 6.3   06/19/2024 6.6             ( goal A1C is < 7)   No components found for: \"LABMICR\"  No components found for: \"LDLCHOLESTEROL\", \"LDLCALC\"    (goal LDL is <100)   AST (U/L)   Date Value   03/03/2025 24     ALT (U/L)   Date Value   03/03/2025 11     BUN (mg/dL)   Date Value   03/03/2025 14     BP Readings from Last 3 Encounters:   03/03/25 128/76   10/24/24 138/86   06/19/24 128/70          (goal 120/80)    All Future Testing planned in [facility]  Lab Frequency Next Occurrence

## 2025-07-08 ENCOUNTER — OFFICE VISIT (OUTPATIENT)
Dept: FAMILY MEDICINE CLINIC | Age: 85
End: 2025-07-08

## 2025-07-08 ENCOUNTER — RESULTS FOLLOW-UP (OUTPATIENT)
Dept: FAMILY MEDICINE CLINIC | Age: 85
End: 2025-07-08

## 2025-07-08 VITALS — SYSTOLIC BLOOD PRESSURE: 136 MMHG | HEART RATE: 79 BPM | OXYGEN SATURATION: 95 % | DIASTOLIC BLOOD PRESSURE: 82 MMHG

## 2025-07-08 DIAGNOSIS — M48.062 LUMBAR STENOSIS WITH NEUROGENIC CLAUDICATION: Chronic | ICD-10-CM

## 2025-07-08 DIAGNOSIS — I10 PRIMARY HYPERTENSION: ICD-10-CM

## 2025-07-08 DIAGNOSIS — R60.0 PERIPHERAL EDEMA: ICD-10-CM

## 2025-07-08 DIAGNOSIS — D50.9 IRON DEFICIENCY ANEMIA, UNSPECIFIED IRON DEFICIENCY ANEMIA TYPE: ICD-10-CM

## 2025-07-08 DIAGNOSIS — J30.89 CHRONIC NON-SEASONAL ALLERGIC RHINITIS: ICD-10-CM

## 2025-07-08 DIAGNOSIS — E55.9 VITAMIN D DEFICIENCY: ICD-10-CM

## 2025-07-08 DIAGNOSIS — N95.2 POST-MENOPAUSAL ATROPHIC VAGINITIS: ICD-10-CM

## 2025-07-08 DIAGNOSIS — M1A.09X0 CHRONIC GOUT OF MULTIPLE SITES, UNSPECIFIED CAUSE: ICD-10-CM

## 2025-07-08 DIAGNOSIS — Z79.891 CHRONIC USE OF OPIATE DRUG FOR THERAPEUTIC PURPOSE: ICD-10-CM

## 2025-07-08 DIAGNOSIS — E11.40 TYPE 2 DIABETES MELLITUS WITH DIABETIC NEUROPATHY, WITHOUT LONG-TERM CURRENT USE OF INSULIN (HCC): Primary | ICD-10-CM

## 2025-07-08 DIAGNOSIS — Z98.890 HX OF CERVICAL SPINE SURGERY: ICD-10-CM

## 2025-07-08 DIAGNOSIS — M15.0 PRIMARY OSTEOARTHRITIS INVOLVING MULTIPLE JOINTS: ICD-10-CM

## 2025-07-08 DIAGNOSIS — M48.02 CERVICAL STENOSIS OF SPINE: Chronic | ICD-10-CM

## 2025-07-08 DIAGNOSIS — E53.8 B12 DEFICIENCY: ICD-10-CM

## 2025-07-08 DIAGNOSIS — M54.12 CERVICAL RADICULAR PAIN: Chronic | ICD-10-CM

## 2025-07-08 DIAGNOSIS — M79.672 PAIN OF LEFT HEEL: ICD-10-CM

## 2025-07-08 LAB — HBA1C MFR BLD: 6.3 %

## 2025-07-08 RX ORDER — ESTRADIOL 0.1 MG/G
CREAM VAGINAL
Qty: 42.5 G | Refills: 1 | Status: SHIPPED | OUTPATIENT
Start: 2025-07-08

## 2025-07-08 RX ORDER — ERGOCALCIFEROL 1.25 MG/1
50000 CAPSULE, LIQUID FILLED ORAL WEEKLY
Qty: 12 CAPSULE | Refills: 0 | Status: SHIPPED | OUTPATIENT
Start: 2025-07-08

## 2025-07-08 RX ORDER — LORATADINE 10 MG/1
TABLET ORAL
Qty: 90 TABLET | Refills: 1 | Status: SHIPPED | OUTPATIENT
Start: 2025-07-08

## 2025-07-08 RX ORDER — ALLOPURINOL 100 MG/1
100 TABLET ORAL DAILY
Qty: 90 TABLET | Refills: 1 | Status: SHIPPED | OUTPATIENT
Start: 2025-07-08

## 2025-07-08 RX ORDER — FERROUS GLUCONATE 324(37.5)
324 TABLET ORAL DAILY
Qty: 90 TABLET | Refills: 3 | Status: SHIPPED | OUTPATIENT
Start: 2025-07-08

## 2025-07-08 RX ORDER — HYDROCODONE BITARTRATE AND ACETAMINOPHEN 5; 325 MG/1; MG/1
1 TABLET ORAL 2 TIMES DAILY PRN
Qty: 45 TABLET | Refills: 0 | Status: CANCELLED | OUTPATIENT
Start: 2025-07-08 | End: 2025-08-07

## 2025-07-08 RX ORDER — TIZANIDINE 2 MG/1
TABLET ORAL
Qty: 90 TABLET | Refills: 1 | Status: SHIPPED | OUTPATIENT
Start: 2025-07-08

## 2025-07-08 RX ORDER — FUROSEMIDE 20 MG/1
20 TABLET ORAL DAILY
Qty: 30 TABLET | Refills: 5 | Status: SHIPPED | OUTPATIENT
Start: 2025-07-08

## 2025-07-08 ASSESSMENT — ENCOUNTER SYMPTOMS
BLOOD IN STOOL: 0
CHEST TIGHTNESS: 0
COUGH: 0
WHEEZING: 0
NAUSEA: 0
ABDOMINAL PAIN: 0
SHORTNESS OF BREATH: 0
ANAL BLEEDING: 0
CHOKING: 0
DIARRHEA: 0
VOMITING: 0
CONSTIPATION: 0

## 2025-07-08 ASSESSMENT — VISUAL ACUITY: OU: 1

## 2025-07-08 NOTE — PROGRESS NOTES
[facility]  [facility]  [address] [place] [id number]  Dept: [phone]  Dept Fax: [phone]      Sanna Duncan is a 85 y.o. female who presents today for hermedical conditions/complaints as noted below.  Sanna Duncan is c/o of Hypertension (F/u), Diabetes (F/u), and Foot Swelling (Both feet have been swelling and painful, started last week, )        Assessment/Plan:     1. Type 2 diabetes mellitus with diabetic neuropathy, without long-term current use of insulin (HCC)  -     POCT glycosylated hemoglobin (Hb A1C)  -     Albumin/Creatinine Ratio, Urine; Future  2. Chronic gout of multiple sites, unspecified cause  -     allopurinol (ZYLOPRIM) 100 MG tablet; Take 1 tablet by mouth daily, Disp-90 tablet, R-1Normal  3. B12 deficiency  -     cyanocobalamin ([employer] VITAMIN B12) 1000 MCG tablet; Take 1 tablet by mouth daily, Disp-90 tablet, R-3Normal  4. Post-menopausal atrophic vaginitis  -     estradiol (ESTRACE VAGINAL) 0.1 MG/GM vaginal cream; Apply 0.5 grams to vaginal introitus and urethra Monday, Wednesday, Friday, Disp-42.5 g, R-1Normal  5. Chronic use of opiate drugs therapeutic purposes  6. Cervical stenosis of spine  7. Hx of cervical spine surgery  8. Lumbar stenosis with neurogenic claudication  9. Primary osteoarthritis involving multiple joints  10. Chronic non-seasonal allergic rhinitis  -     loratadine (CLARITIN) 10 MG tablet; take 1 tablet by mouth once daily PRN, Disp-90 tablet, R-1Normal  11. Cervical radicular pain  -     tiZANidine (ZANAFLEX) 2 MG tablet; TAKE 2 TABLETS BY MOUTH AT BEDTIME AND DURING THE DAY AS NEEDED, Disp-90 tablet, R-1Normal  12. Primary hypertension  Comments:  d/c clonidine - add lasix instead. continue lisinopril and carvedilol.  Orders:  -     furosemide (LASIX) 20 MG tablet; Take 1 tablet by mouth daily, Disp-30 tablet, R-5Normal  13. Peripheral edema  -     furosemide (LASIX) 20 MG tablet; Take 1 tablet by mouth daily, Disp-30

## 2025-07-11 ENCOUNTER — OFFICE VISIT (OUTPATIENT)
Dept: PAIN MANAGEMENT | Age: 85
End: 2025-07-11
Payer: MEDICARE

## 2025-07-11 VITALS — HEIGHT: 62 IN | BODY MASS INDEX: 27.42 KG/M2 | WEIGHT: 149 LBS

## 2025-07-11 DIAGNOSIS — M15.0 PRIMARY OSTEOARTHRITIS INVOLVING MULTIPLE JOINTS: ICD-10-CM

## 2025-07-11 DIAGNOSIS — M47.816 LUMBAR FACET JOINT SYNDROME: ICD-10-CM

## 2025-07-11 DIAGNOSIS — Z79.891 CHRONIC USE OF OPIATE DRUG FOR THERAPEUTIC PURPOSE: Primary | ICD-10-CM

## 2025-07-11 DIAGNOSIS — M48.062 LUMBAR STENOSIS WITH NEUROGENIC CLAUDICATION: Chronic | ICD-10-CM

## 2025-07-11 DIAGNOSIS — M48.02 CERVICAL STENOSIS OF SPINE: Chronic | ICD-10-CM

## 2025-07-11 DIAGNOSIS — Z98.890 HX OF CERVICAL SPINE SURGERY: ICD-10-CM

## 2025-07-11 DIAGNOSIS — M96.1 FAILED NECK SYNDROME: ICD-10-CM

## 2025-07-11 PROCEDURE — 1125F AMNT PAIN NOTED PAIN PRSNT: CPT | Performed by: NURSE PRACTITIONER

## 2025-07-11 PROCEDURE — 1123F ACP DISCUSS/DSCN MKR DOCD: CPT | Performed by: NURSE PRACTITIONER

## 2025-07-11 PROCEDURE — G8417 CALC BMI ABV UP PARAM F/U: HCPCS | Performed by: NURSE PRACTITIONER

## 2025-07-11 PROCEDURE — 1159F MED LIST DOCD IN RCRD: CPT | Performed by: NURSE PRACTITIONER

## 2025-07-11 PROCEDURE — 1160F RVW MEDS BY RX/DR IN RCRD: CPT | Performed by: NURSE PRACTITIONER

## 2025-07-11 PROCEDURE — G8427 DOCREV CUR MEDS BY ELIG CLIN: HCPCS | Performed by: NURSE PRACTITIONER

## 2025-07-11 PROCEDURE — G8399 PT W/DXA RESULTS DOCUMENT: HCPCS | Performed by: NURSE PRACTITIONER

## 2025-07-11 PROCEDURE — 99214 OFFICE O/P EST MOD 30 MIN: CPT | Performed by: NURSE PRACTITIONER

## 2025-07-11 PROCEDURE — 1090F PRES/ABSN URINE INCON ASSESS: CPT | Performed by: NURSE PRACTITIONER

## 2025-07-11 PROCEDURE — 1036F TOBACCO NON-USER: CPT | Performed by: NURSE PRACTITIONER

## 2025-07-11 RX ORDER — HYDROCODONE BITARTRATE AND ACETAMINOPHEN 5; 325 MG/1; MG/1
1 TABLET ORAL 2 TIMES DAILY PRN
Qty: 45 TABLET | Refills: 0 | Status: SHIPPED | OUTPATIENT
Start: 2025-07-14 | End: 2025-08-13

## 2025-07-11 RX ORDER — FERROUS GLUCONATE 324(38)MG
TABLET ORAL
COMMUNITY
Start: 2025-07-10

## 2025-07-11 ASSESSMENT — ENCOUNTER SYMPTOMS
BACK PAIN: 1
BOWEL INCONTINENCE: 0

## 2025-07-11 NOTE — PROGRESS NOTES
Chief Complaint   Patient presents with    Back Pain    Medication Refill       PMH     Patient is a 85-year-old female with history of chronic neck and chronic lower back pain  She is diagnosed with cervical spinal stenosis and lumbar spinal stenosis  Neck pain is located midline into left upper trapezius. Reports constant bilat. finger/hand numbness, denies HA. Worse with ROM.   Her back pain is minimal at this time but at times located in lower back pain in the lumbar area with radiation down both legs. aggravated with standing and walking and does notice bilat ankle and foot swelling at times     Patient is on chronic low-dose opioid therapy with Norco 5 mg twice a day along with gabapentin. Denies side effects.  Finds the medication helpful allowing her to do daily life activities and be independent  Not a candidate for NSAIDs because of renal insufficiency and advanced age  Denies changes to PMH today.     Back Pain  This is a chronic problem. The current episode started more than 1 year ago. The problem occurs constantly. The problem is unchanged. The pain is present in the lumbar spine. The pain does not radiate. The pain is at a severity of 5/10. The pain is mild. The pain is The same all the time. The symptoms are aggravated by bending, standing and twisting. Stiffness is present All day. Pertinent negatives include no bladder incontinence, bowel incontinence, chest pain or fever. She has tried NSAIDs for the symptoms. The treatment provided moderate relief.       Patient denies any new neurological symptoms. No bowel or bladder incontinence, no weakness, and no falling.    Pill count: Norco- 0    -due for refill 7/14/2025  Inappropriate. Discussed risks associated with dose escalation, including respiratory depression and even death. Patient must take prescribed opioids as prescribed in order to continue pain contract. Patient verbalized understanding.     Morphine equivalent: 10    Periodic

## 2025-07-14 ENCOUNTER — OFFICE VISIT (OUTPATIENT)
Dept: PODIATRY | Age: 85
End: 2025-07-14
Payer: MEDICARE

## 2025-07-14 VITALS — WEIGHT: 149 LBS | HEIGHT: 62 IN | BODY MASS INDEX: 27.42 KG/M2

## 2025-07-14 DIAGNOSIS — R60.0 EDEMA OF LOWER EXTREMITY: ICD-10-CM

## 2025-07-14 DIAGNOSIS — I73.9 PERIPHERAL VASCULAR DISORDER: ICD-10-CM

## 2025-07-14 DIAGNOSIS — M72.2 PLANTAR FASCIITIS: ICD-10-CM

## 2025-07-14 DIAGNOSIS — G47.9 SLEEPING DIFFICULTY: ICD-10-CM

## 2025-07-14 DIAGNOSIS — M79.604 PAIN IN BOTH LOWER EXTREMITIES: ICD-10-CM

## 2025-07-14 DIAGNOSIS — B35.1 DERMATOPHYTOSIS OF NAIL: ICD-10-CM

## 2025-07-14 DIAGNOSIS — E11.40 TYPE 2 DIABETES MELLITUS WITH DIABETIC NEUROPATHY, WITHOUT LONG-TERM CURRENT USE OF INSULIN (HCC): Primary | ICD-10-CM

## 2025-07-14 DIAGNOSIS — M79.605 PAIN IN BOTH LOWER EXTREMITIES: ICD-10-CM

## 2025-07-14 DIAGNOSIS — M20.41 HAMMERTOE OF RIGHT FOOT: ICD-10-CM

## 2025-07-14 PROCEDURE — 1036F TOBACCO NON-USER: CPT | Performed by: PODIATRIST

## 2025-07-14 PROCEDURE — G8417 CALC BMI ABV UP PARAM F/U: HCPCS | Performed by: PODIATRIST

## 2025-07-14 PROCEDURE — G8427 DOCREV CUR MEDS BY ELIG CLIN: HCPCS | Performed by: PODIATRIST

## 2025-07-14 PROCEDURE — 1090F PRES/ABSN URINE INCON ASSESS: CPT | Performed by: PODIATRIST

## 2025-07-14 PROCEDURE — 3044F HG A1C LEVEL LT 7.0%: CPT | Performed by: PODIATRIST

## 2025-07-14 PROCEDURE — G8399 PT W/DXA RESULTS DOCUMENT: HCPCS | Performed by: PODIATRIST

## 2025-07-14 PROCEDURE — 1123F ACP DISCUSS/DSCN MKR DOCD: CPT | Performed by: PODIATRIST

## 2025-07-14 PROCEDURE — 1126F AMNT PAIN NOTED NONE PRSNT: CPT | Performed by: PODIATRIST

## 2025-07-14 PROCEDURE — 1159F MED LIST DOCD IN RCRD: CPT | Performed by: PODIATRIST

## 2025-07-14 PROCEDURE — 99213 OFFICE O/P EST LOW 20 MIN: CPT | Performed by: PODIATRIST

## 2025-07-14 PROCEDURE — 11721 DEBRIDE NAIL 6 OR MORE: CPT | Performed by: PODIATRIST

## 2025-07-15 NOTE — TELEPHONE ENCOUNTER
Albumin/Creatinine Ratio, Urine Once 07/08/2025               Patient Active Problem List:     Osteoarthritis     Essential hypertension     Type 2 diabetes mellitus with diabetic neuropathy, without long-term current use of insulin (HCC)     Dyslipidemia associated with type 2 diabetes mellitus (HCC)     Therapeutic opioid-induced constipation (OIC)     Rectal pain     Diverticulosis of colon     Tubular adenoma of colon     History of colon polyps     Rectal pressure     Failed neck syndrome     Cervical stenosis of spine     Cervical radicular pain     Lumbar stenosis with neurogenic claudication     JANN on CPAP     Primary osteoarthritis involving multiple joints     Hx of cervical spine surgery     Chronic use of opiate drugs therapeutic purposes     Myelomalacia (HCC)     Lumbar facet joint syndrome     Gastroesophageal reflux disease     Sleeping difficulty

## 2025-07-16 RX ORDER — ZOLPIDEM TARTRATE 10 MG/1
10 TABLET ORAL NIGHTLY PRN
Qty: 30 TABLET | Refills: 1 | Status: SHIPPED | OUTPATIENT
Start: 2025-07-16 | End: 2025-09-14

## 2025-07-21 NOTE — PROGRESS NOTES
[facility], [facility] [address]  [address]  [place] [id number]  Dept: [phone]  Dept Fax: [phone]    DIABETIC PROGRESS NOTE  Date of patient's visit: 7/21/2025  Patient's Name:  Sanna Duncan YOB: 1940            Patient Care Team:  Connie Hale MD as PCP - General (Internal Medicine)  Connie Hale MD as PCP - Empaneled Provider  Mckay Collado MD as Consulting Physician (Colon and Rectal Surgery)  Ralph Martini MD as Consulting Physician (Pain Management)  Miryam Ruiz DPM as Physician (Podiatry)          Chief Complaint   Patient presents with    Diabetes     Hemoglobin A1C 6.3        Peripheral Neuropathy     Bilateral foot       Subjective:   Sanna Duncan comes to clinic for Diabetes (Hemoglobin A1C 6.3//) and Peripheral Neuropathy (Bilateral foot)    she is a diabetic and states that needs footcare today.  Pt currently has complaint of thickened, elongated nails that they cannot manage by themselves.   Pt's primary care physician is Connie Hale MD last seen 6/19/24.   Pt's last blood sugar/a1c was 6.6-6/19/24.    Pt has a new complaint of increased painful right heel and painful right fifth toe.  She denies any injury or trauma.     Lab Results   Component Value Date    LABA1C 6.3 07/08/2025      Complains of numbness in the feet bilat.  Past Medical History:   Diagnosis Date    Arthritis     Cataract     Cataracts, bilateral     Cervical spondylosis with myelopathy     Depression     Diverticulosis of colon 2015    Dysuria     Dysuria     Gastroesophageal reflux disease 4/13/2021    Gout     Headache(784.0)     Hematuria     History of colon polyps 2015    Insomnia     Lumbar stenosis with neurogenic claudication     Neck pain     JANN (obstructive sleep apnea)     Osteoarthrosis, unspecified whether generalized or localized, unspecified site 10/5/2012    Pelvic pain     Pure

## 2025-07-23 ENCOUNTER — HOSPITAL ENCOUNTER (OUTPATIENT)
Age: 85
Setting detail: SPECIMEN
Discharge: HOME | End: 2025-07-23

## 2025-07-23 DIAGNOSIS — E11.40 TYPE 2 DIABETES MELLITUS WITH DIABETIC NEUROPATHY, WITHOUT LONG-TERM CURRENT USE OF INSULIN (HCC): ICD-10-CM

## 2025-07-23 LAB
CREAT UR-MCNC: 40.7 MG/DL (ref 28–217)
MICROALBUMIN UR-MCNC: <12 MG/L (ref 0–20)
MICROALBUMIN/CREAT UR-RTO: NORMAL MCG/MG CREAT (ref 0–25)

## 2025-08-14 ENCOUNTER — OFFICE VISIT (OUTPATIENT)
Dept: PAIN MANAGEMENT | Age: 85
End: 2025-08-14
Payer: MEDICARE

## 2025-08-14 ENCOUNTER — HOSPITAL ENCOUNTER (OUTPATIENT)
Age: 85
Setting detail: SPECIMEN
Discharge: HOME | End: 2025-08-14

## 2025-08-14 VITALS — BODY MASS INDEX: 27.42 KG/M2 | WEIGHT: 149 LBS | HEIGHT: 62 IN

## 2025-08-14 DIAGNOSIS — Z79.891 CHRONIC USE OF OPIATE DRUG FOR THERAPEUTIC PURPOSE: ICD-10-CM

## 2025-08-14 DIAGNOSIS — M15.0 PRIMARY OSTEOARTHRITIS INVOLVING MULTIPLE JOINTS: ICD-10-CM

## 2025-08-14 DIAGNOSIS — Z98.890 HX OF CERVICAL SPINE SURGERY: ICD-10-CM

## 2025-08-14 DIAGNOSIS — Z79.891 CHRONIC USE OF OPIATE DRUG FOR THERAPEUTIC PURPOSE: Primary | ICD-10-CM

## 2025-08-14 DIAGNOSIS — M48.062 LUMBAR STENOSIS WITH NEUROGENIC CLAUDICATION: Chronic | ICD-10-CM

## 2025-08-14 DIAGNOSIS — M48.02 CERVICAL STENOSIS OF SPINE: Chronic | ICD-10-CM

## 2025-08-14 DIAGNOSIS — G95.89 MYELOMALACIA (HCC): ICD-10-CM

## 2025-08-14 PROCEDURE — G8417 CALC BMI ABV UP PARAM F/U: HCPCS | Performed by: NURSE PRACTITIONER

## 2025-08-14 PROCEDURE — 1090F PRES/ABSN URINE INCON ASSESS: CPT | Performed by: NURSE PRACTITIONER

## 2025-08-14 PROCEDURE — 1123F ACP DISCUSS/DSCN MKR DOCD: CPT | Performed by: NURSE PRACTITIONER

## 2025-08-14 PROCEDURE — 1036F TOBACCO NON-USER: CPT | Performed by: NURSE PRACTITIONER

## 2025-08-14 PROCEDURE — 1160F RVW MEDS BY RX/DR IN RCRD: CPT | Performed by: NURSE PRACTITIONER

## 2025-08-14 PROCEDURE — G8427 DOCREV CUR MEDS BY ELIG CLIN: HCPCS | Performed by: NURSE PRACTITIONER

## 2025-08-14 PROCEDURE — 1159F MED LIST DOCD IN RCRD: CPT | Performed by: NURSE PRACTITIONER

## 2025-08-14 PROCEDURE — 99214 OFFICE O/P EST MOD 30 MIN: CPT | Performed by: NURSE PRACTITIONER

## 2025-08-14 PROCEDURE — G8399 PT W/DXA RESULTS DOCUMENT: HCPCS | Performed by: NURSE PRACTITIONER

## 2025-08-14 RX ORDER — HYDROCODONE BITARTRATE AND ACETAMINOPHEN 5; 325 MG/1; MG/1
1 TABLET ORAL 2 TIMES DAILY PRN
Qty: 45 TABLET | Refills: 0 | Status: SHIPPED | OUTPATIENT
Start: 2025-08-14 | End: 2025-09-13

## 2025-08-14 ASSESSMENT — ENCOUNTER SYMPTOMS
SHORTNESS OF BREATH: 0
BACK PAIN: 1

## 2025-08-17 LAB
AMPHETAMINE: NEGATIVE
BARBITURATES: NEGATIVE
BENZODIAZEPINES: NEGATIVE
BUPRENORPHINE: NEGATIVE
COCAINE: NEGATIVE
DRUGS OF ABUSE COMMENT: NORMAL
METHADONE: NEGATIVE
METHAMPHETAMINE: NEGATIVE
OPIATES: NORMAL NG/ML
OXYCODONE: NEGATIVE
PHENCYCLIDINE: NEGATIVE
THC: NEGATIVE

## 2025-08-19 DIAGNOSIS — M54.12 CERVICAL RADICULAR PAIN: Chronic | ICD-10-CM

## 2025-08-20 ENCOUNTER — TELEPHONE (OUTPATIENT)
Dept: FAMILY MEDICINE CLINIC | Age: 85
End: 2025-08-20

## 2025-08-20 DIAGNOSIS — M54.12 CERVICAL RADICULAR PAIN: Chronic | ICD-10-CM

## 2025-08-20 RX ORDER — TIZANIDINE 2 MG/1
TABLET ORAL
Qty: 90 TABLET | Refills: 1 | Status: SHIPPED | OUTPATIENT
Start: 2025-08-20 | End: 2025-08-21

## 2025-08-21 RX ORDER — TIZANIDINE 2 MG/1
2-4 TABLET ORAL NIGHTLY PRN
Qty: 90 TABLET | Refills: 1 | Status: SHIPPED | OUTPATIENT
Start: 2025-08-21

## 2025-08-22 LAB
CODEINE: <2 NG/ML
HYDROCODONE: 15 NG/ML
HYDROMORPHONE: <2 NG/ML
MORPHINE: <2 NG/ML
OPIATES: <2 NG/ML
OXYCODONE: <2 NG/ML
OXYMORPHONE: <2 NG/ML

## (undated) DEVICE — SINGLE DOSE EPI TY

## (undated) DEVICE — PACK PROCEDURE SURG CYSTO SVMMC LF

## (undated) DEVICE — Z DISCONTINUED APPLICATOR SURG PREP 0.35OZ 2% CHG 70% ISO ALC W/ HI LT

## (undated) DEVICE — GLOVE SURG SZ 75 L12IN FNGR THK87MIL WHT LTX FREE

## (undated) DEVICE — TOWEL,OR,DSP,ST,BLUE,STD,4/PK,20PK/CS: Brand: MEDLINE

## (undated) DEVICE — 1010 S-DRAPE TOWEL DRAPE 10/BX: Brand: STERI-DRAPE™

## (undated) DEVICE — SINGLE SHOT EPIDURAL TRAY: Brand: MEDLINE INDUSTRIES, INC.

## (undated) DEVICE — EXTENSION SET IV 12 IN 0.45 CC INFUSION MINIBOR TBNG CLMP

## (undated) DEVICE — NEEDLE SPNL L4.5IN OD22GA QNCKE TYP SPINOCAN

## (undated) DEVICE — TOWEL,OR,DSP,ST,BLUE,DLX,XR,4/PK,20PK/CS: Brand: MEDLINE

## (undated) DEVICE — BANDAGE ADH W1XL3IN UNIV PLAS NAT GEN USE STRP

## (undated) DEVICE — APPLICATOR MEDICATED 10.5 CC SOLUTION HI LT ORNG CHLORAPREP

## (undated) DEVICE — Device

## (undated) DEVICE — SYRINGE CATH TIP 50ML

## (undated) DEVICE — GLOVE SURG SZ 75 CRM LTX FREE POLYISOPRENE POLYMER BEAD ANTI

## (undated) DEVICE — CATHETER URET 5FR L70CM OPN END SGL LUMN INJ HUB FLEXIMA

## (undated) DEVICE — NEEDLE SPINAL 22GA L3.5IN SPINOCAN

## (undated) DEVICE — CONVERTED USE 248063 TOWELS OR BL ST

## (undated) DEVICE — GLOVE SURG SZ 6 THK91MIL LTX FREE SYN POLYISOPRENE ANTI

## (undated) DEVICE — PROTECTOR ULN NRV PUR FOAM HK LOOP STRP ANATOMICALLY

## (undated) DEVICE — CATHETER URET 5FR L70CM TIP 8FR OPN END CONE TIP INJ HUB

## (undated) DEVICE — 6 ML SYRINGE LUER-LOCK TIP: Brand: MONOJECT

## (undated) DEVICE — GLOVE SURG SZ 65 THK91MIL LTX FREE SYN POLYISOPRENE

## (undated) DEVICE — DISCONTINUED USE 394504 SYRINGE LUER LOCK TIP 12 ML

## (undated) DEVICE — GUIDEWIRE URO L150CM DIA0.035IN STIFF NIT HYDRPHLC STR TIP

## (undated) DEVICE — GLOVE ORANGE PI 7 1/2   MSG9075